# Patient Record
Sex: FEMALE | Race: BLACK OR AFRICAN AMERICAN | NOT HISPANIC OR LATINO | URBAN - METROPOLITAN AREA
[De-identification: names, ages, dates, MRNs, and addresses within clinical notes are randomized per-mention and may not be internally consistent; named-entity substitution may affect disease eponyms.]

---

## 2017-03-29 ENCOUNTER — HOSPITAL ENCOUNTER (OUTPATIENT)
Dept: MEDSURG UNIT | Facility: HOSPITAL | Age: 52
End: 2017-03-29
Attending: INTERNAL MEDICINE | Admitting: INTERNAL MEDICINE

## 2017-04-19 ENCOUNTER — HISTORICAL (OUTPATIENT)
Dept: LAB | Facility: HOSPITAL | Age: 52
End: 2017-04-19

## 2017-04-19 LAB
CREAT UR-MCNC: 155.9 MG/DL (ref 30–125)
MICROALBUMIN UR-MCNC: 0.7 MG/DL (ref 0–3)
MICROALBUMIN/CREAT RATIO PNL UR: 4.5 MG/GM CR (ref 0–30)

## 2017-07-20 ENCOUNTER — HISTORICAL (OUTPATIENT)
Dept: LAB | Facility: HOSPITAL | Age: 52
End: 2017-07-20

## 2017-07-20 LAB
ALBUMIN SERPL-MCNC: 2.9 GM/DL (ref 3.4–5)
ALBUMIN/GLOB SERPL: 0.6 RATIO (ref 1.1–2)
ALP SERPL-CCNC: 133 UNIT/L (ref 46–116)
ALT SERPL-CCNC: 32 UNIT/L (ref 12–78)
AST SERPL-CCNC: 19 UNIT/L (ref 15–37)
BILIRUB SERPL-MCNC: 0.4 MG/DL (ref 0.2–1)
BILIRUBIN DIRECT+TOT PNL SERPL-MCNC: 0.11 MG/DL (ref 0–0.2)
BILIRUBIN DIRECT+TOT PNL SERPL-MCNC: 0.29 MG/DL (ref 0–0.8)
BUN SERPL-MCNC: 8 MG/DL (ref 7–18)
CALCIUM SERPL-MCNC: 8.8 MG/DL (ref 8.5–10.1)
CHLORIDE SERPL-SCNC: 99 MMOL/L (ref 98–107)
CHOLEST SERPL-MCNC: 195 MG/DL (ref 0–200)
CHOLEST/HDLC SERPL: 5.3 {RATIO} (ref 0–4)
CO2 SERPL-SCNC: 25.4 MMOL/L (ref 21–32)
CREAT SERPL-MCNC: 1.12 MG/DL (ref 0.6–1.3)
EST. AVERAGE GLUCOSE BLD GHB EST-MCNC: 286 MG/DL
GLOBULIN SER-MCNC: 4.5 GM/DL (ref 2.4–3.5)
GLUCOSE SERPL-MCNC: 562 MG/DL (ref 74–106)
HBA1C MFR BLD: 11.6 % (ref 4.5–6.2)
HDLC SERPL-MCNC: 37 MG/DL (ref 40–60)
LDLC SERPL CALC-MCNC: 128 MG/DL (ref 0–129)
POTASSIUM SERPL-SCNC: 3.9 MMOL/L (ref 3.5–5.1)
PROT SERPL-MCNC: 7.4 GM/DL (ref 6.4–8.2)
SODIUM SERPL-SCNC: 136 MMOL/L (ref 136–145)
TRIGL SERPL-MCNC: 149 MG/DL
VLDLC SERPL CALC-MCNC: 30 MG/DL

## 2017-08-23 ENCOUNTER — HISTORICAL (OUTPATIENT)
Dept: LAB | Facility: HOSPITAL | Age: 52
End: 2017-08-23

## 2017-08-23 LAB
T4 SERPL-MCNC: 10.1 MCG/DL (ref 4.7–13.3)
TSH SERPL-ACNC: 0.65 MIU/ML (ref 0.36–3.74)

## 2017-08-25 ENCOUNTER — HISTORICAL (OUTPATIENT)
Dept: RADIOLOGY | Facility: HOSPITAL | Age: 52
End: 2017-08-25

## 2017-09-08 ENCOUNTER — HOSPITAL ENCOUNTER (OUTPATIENT)
Dept: EMERGENCY MEDICINE | Facility: HOSPITAL | Age: 52
End: 2017-09-09
Attending: INTERNAL MEDICINE | Admitting: INTERNAL MEDICINE

## 2017-09-08 LAB
ABS NEUT (OLG): 5.73 X10(3)/MCL (ref 2.1–9.2)
ALBUMIN SERPL-MCNC: 3 GM/DL (ref 3.4–5)
ALBUMIN/GLOB SERPL: 0.6 RATIO (ref 1.1–2)
ALP SERPL-CCNC: 113 UNIT/L (ref 38–126)
ALT SERPL-CCNC: 21 UNIT/L (ref 12–78)
AST SERPL-CCNC: 14 UNIT/L (ref 15–37)
BASOPHILS # BLD AUTO: 0.1 X10(3)/MCL (ref 0–0.2)
BASOPHILS NFR BLD AUTO: 1 %
BILIRUB SERPL-MCNC: 0.3 MG/DL (ref 0.2–1)
BILIRUBIN DIRECT+TOT PNL SERPL-MCNC: 0.1 MG/DL (ref 0–0.5)
BILIRUBIN DIRECT+TOT PNL SERPL-MCNC: 0.2 MG/DL (ref 0–0.8)
BNP BLD-MCNC: 21 PG/ML (ref 0–100)
BUN SERPL-MCNC: 11 MG/DL (ref 7–18)
CALCIUM SERPL-MCNC: 9.2 MG/DL (ref 8.5–10.1)
CHLORIDE SERPL-SCNC: 99 MMOL/L (ref 98–107)
CO2 SERPL-SCNC: 29 MMOL/L (ref 21–32)
CREAT SERPL-MCNC: 0.79 MG/DL (ref 0.55–1.02)
EOSINOPHIL # BLD AUTO: 0.3 X10(3)/MCL (ref 0–0.9)
EOSINOPHIL NFR BLD AUTO: 3 %
ERYTHROCYTE [DISTWIDTH] IN BLOOD BY AUTOMATED COUNT: 12 % (ref 11.5–17)
GLOBULIN SER-MCNC: 5.1 GM/DL (ref 2.4–3.5)
GLUCOSE SERPL-MCNC: 237 MG/DL (ref 74–106)
HCT VFR BLD AUTO: 39.5 % (ref 37–47)
HGB BLD-MCNC: 13 GM/DL (ref 12–16)
LYMPHOCYTES # BLD AUTO: 3.2 X10(3)/MCL (ref 0.6–4.6)
LYMPHOCYTES NFR BLD AUTO: 32 %
MCH RBC QN AUTO: 30.7 PG (ref 27–31)
MCHC RBC AUTO-ENTMCNC: 32.9 GM/DL (ref 33–36)
MCV RBC AUTO: 93.4 FL (ref 80–94)
MONOCYTES # BLD AUTO: 0.6 X10(3)/MCL (ref 0.1–1.3)
MONOCYTES NFR BLD AUTO: 6 %
NEUTROPHILS # BLD AUTO: 5.73 X10(3)/MCL (ref 1.4–7.9)
NEUTROPHILS NFR BLD AUTO: 57 %
PLATELET # BLD AUTO: 336 X10(3)/MCL (ref 130–400)
PMV BLD AUTO: 9.8 FL (ref 9.4–12.4)
POC TROPONIN: 0 NG/ML (ref 0–0.08)
POTASSIUM SERPL-SCNC: 3.5 MMOL/L (ref 3.5–5.1)
PROT SERPL-MCNC: 8.1 GM/DL (ref 6.4–8.2)
RBC # BLD AUTO: 4.23 X10(6)/MCL (ref 4.2–5.4)
SODIUM SERPL-SCNC: 137 MMOL/L (ref 136–145)
WBC # SPEC AUTO: 10 X10(3)/MCL (ref 4.5–11.5)

## 2017-09-09 LAB
TROPONIN I SERPL-MCNC: <0.02 NG/ML (ref 0.02–0.49)
TROPONIN I SERPL-MCNC: <0.02 NG/ML (ref 0.02–0.49)

## 2017-09-20 ENCOUNTER — HISTORICAL (OUTPATIENT)
Dept: SLEEP MEDICINE | Facility: HOSPITAL | Age: 52
End: 2017-09-20

## 2017-11-03 ENCOUNTER — HISTORICAL (OUTPATIENT)
Dept: RADIOLOGY | Facility: HOSPITAL | Age: 52
End: 2017-11-03

## 2017-12-26 ENCOUNTER — HISTORICAL (OUTPATIENT)
Dept: LAB | Facility: HOSPITAL | Age: 52
End: 2017-12-26

## 2017-12-26 LAB
ALBUMIN SERPL-MCNC: 3 GM/DL (ref 3.4–5)
ALBUMIN/GLOB SERPL: 0.7 RATIO (ref 1.1–2)
ALP SERPL-CCNC: 126 UNIT/L (ref 46–116)
ALT SERPL-CCNC: 18 UNIT/L (ref 12–78)
AST SERPL-CCNC: 12 UNIT/L (ref 15–37)
BILIRUB SERPL-MCNC: 0.3 MG/DL (ref 0.2–1)
BILIRUBIN DIRECT+TOT PNL SERPL-MCNC: 0.09 MG/DL (ref 0–0.2)
BILIRUBIN DIRECT+TOT PNL SERPL-MCNC: 0.25 MG/DL (ref 0–0.8)
BUN SERPL-MCNC: 8.6 MG/DL (ref 7–18)
CALCIUM SERPL-MCNC: 9.2 MG/DL (ref 8.5–10.1)
CHLORIDE SERPL-SCNC: 100 MMOL/L (ref 98–107)
CO2 SERPL-SCNC: 29.9 MMOL/L (ref 21–32)
CREAT SERPL-MCNC: 0.93 MG/DL (ref 0.6–1.3)
EST. AVERAGE GLUCOSE BLD GHB EST-MCNC: 194 MG/DL
GLOBULIN SER-MCNC: 4.5 GM/DL (ref 2.4–3.5)
GLUCOSE SERPL-MCNC: 363 MG/DL (ref 74–106)
HBA1C MFR BLD: 8.4 % (ref 4.5–6.2)
POTASSIUM SERPL-SCNC: 4.5 MMOL/L (ref 3.5–5.1)
PROT SERPL-MCNC: 7.5 GM/DL (ref 6.4–8.2)
SODIUM SERPL-SCNC: 138 MMOL/L (ref 136–145)

## 2018-04-27 LAB
BILIRUB SERPL-MCNC: NEGATIVE MG/DL
BLOOD URINE, POC: NEGATIVE
CLARITY, POC UA: CLEAR
COLOR, POC UA: NORMAL
GLUCOSE UR QL STRIP: NORMAL
KETONES UR QL STRIP: NEGATIVE
LEUKOCYTE EST, POC UA: NEGATIVE
NITRITE, POC UA: NEGATIVE
PH, POC UA: 6
PROTEIN, POC: NEGATIVE
SPECIFIC GRAVITY, POC UA: 1.01
UROBILINOGEN, POC UA: NORMAL

## 2018-05-02 ENCOUNTER — HISTORICAL (OUTPATIENT)
Dept: RADIOLOGY | Facility: HOSPITAL | Age: 53
End: 2018-05-02

## 2018-05-02 LAB
BUN SERPL-MCNC: 10 MG/DL (ref 7–18)
CALCIUM SERPL-MCNC: 9.3 MG/DL (ref 8.5–10.1)
CHLORIDE SERPL-SCNC: 101 MMOL/L (ref 98–107)
CO2 SERPL-SCNC: 30.8 MMOL/L (ref 21–32)
CREAT SERPL-MCNC: 0.82 MG/DL (ref 0.6–1.3)
CREAT/UREA NIT SERPL: 12
GLUCOSE SERPL-MCNC: 373 MG/DL (ref 74–106)
MAGNESIUM SERPL-MCNC: 1.7 MG/DL (ref 1.8–2.4)
POTASSIUM SERPL-SCNC: 4 MMOL/L (ref 3.5–5.1)
SODIUM SERPL-SCNC: 137 MMOL/L (ref 136–145)

## 2018-05-30 ENCOUNTER — HISTORICAL (OUTPATIENT)
Dept: LAB | Facility: HOSPITAL | Age: 53
End: 2018-05-30

## 2018-05-30 LAB
CREAT UR-MCNC: 40.6 MG/DL (ref 30–125)
EST. AVERAGE GLUCOSE BLD GHB EST-MCNC: 301 MG/DL
HBA1C MFR BLD: 12.1 % (ref 4.5–6.2)
MICROALBUMIN UR-MCNC: 0.1 MG/DL (ref 0–3)
MICROALBUMIN/CREAT RATIO PNL UR: 2.5 MG/GM CR (ref 0–30)

## 2018-07-05 ENCOUNTER — HISTORICAL (OUTPATIENT)
Dept: NUTRITION | Facility: HOSPITAL | Age: 53
End: 2018-07-05

## 2018-09-20 ENCOUNTER — HISTORICAL (OUTPATIENT)
Dept: LAB | Facility: HOSPITAL | Age: 53
End: 2018-09-20

## 2018-09-20 LAB
BUN SERPL-MCNC: 10.3 MG/DL (ref 7–18)
CALCIUM SERPL-MCNC: 9.2 MG/DL (ref 8.5–10.1)
CHLORIDE SERPL-SCNC: 96 MMOL/L (ref 98–107)
CO2 SERPL-SCNC: 27.7 MMOL/L (ref 21–32)
CREAT SERPL-MCNC: 1.02 MG/DL (ref 0.6–1.3)
CREAT/UREA NIT SERPL: 10
EST. AVERAGE GLUCOSE BLD GHB EST-MCNC: 278 MG/DL
GLUCOSE SERPL-MCNC: 546 MG/DL (ref 74–106)
HBA1C MFR BLD: 11.3 % (ref 4.5–6.2)
POTASSIUM SERPL-SCNC: 4.4 MMOL/L (ref 3.5–5.1)
SODIUM SERPL-SCNC: 133 MMOL/L (ref 136–145)
T3FREE SERPL-MCNC: 2.4 PG/ML (ref 2.2–4)
T4 FREE SERPL-MCNC: 1.18 NG/DL (ref 0.76–1.46)
TSH SERPL-ACNC: 0.59 MIU/ML (ref 0.36–3.74)

## 2019-04-11 ENCOUNTER — HISTORICAL (OUTPATIENT)
Dept: LAB | Facility: HOSPITAL | Age: 54
End: 2019-04-11

## 2019-04-11 LAB
ABS NEUT (OLG): 4.62 X10(3)/MCL (ref 2.1–9.2)
ALBUMIN SERPL-MCNC: 3.2 GM/DL (ref 3.4–5)
ALBUMIN/GLOB SERPL: 0.7 RATIO (ref 1.1–2)
ALP SERPL-CCNC: 128 UNIT/L (ref 46–116)
ALT SERPL-CCNC: 24 UNIT/L (ref 12–78)
AST SERPL-CCNC: 12 UNIT/L (ref 15–37)
BASOPHILS # BLD AUTO: 0 X10(3)/MCL (ref 0–0.2)
BASOPHILS NFR BLD AUTO: 0 %
BILIRUB SERPL-MCNC: 0.4 MG/DL (ref 0.2–1)
BILIRUBIN DIRECT+TOT PNL SERPL-MCNC: 0.12 MG/DL (ref 0–0.2)
BILIRUBIN DIRECT+TOT PNL SERPL-MCNC: 0.28 MG/DL (ref 0–0.8)
BUN SERPL-MCNC: 14.2 MG/DL (ref 7–18)
CALCIUM SERPL-MCNC: 9.2 MG/DL (ref 8.5–10.1)
CHLORIDE SERPL-SCNC: 101 MMOL/L (ref 98–107)
CHOLEST SERPL-MCNC: 160 MG/DL (ref 0–200)
CHOLEST/HDLC SERPL: 4.6 {RATIO} (ref 0–4)
CO2 SERPL-SCNC: 30.3 MMOL/L (ref 21–32)
CREAT SERPL-MCNC: 0.79 MG/DL (ref 0.6–1.3)
DEPRECATED CALCIDIOL+CALCIFEROL SERPL-MC: 10.66 NG/ML (ref 30–80)
EOSINOPHIL # BLD AUTO: 0.3 X10(3)/MCL (ref 0–0.9)
EOSINOPHIL NFR BLD AUTO: 4 %
ERYTHROCYTE [DISTWIDTH] IN BLOOD BY AUTOMATED COUNT: 11.7 % (ref 11.5–17)
EST. AVERAGE GLUCOSE BLD GHB EST-MCNC: 258 MG/DL
GLOBULIN SER-MCNC: 4.3 GM/DL (ref 2.4–3.5)
GLUCOSE SERPL-MCNC: 236 MG/DL (ref 74–106)
HBA1C MFR BLD: 10.6 % (ref 4.5–6.2)
HCT VFR BLD AUTO: 37.7 % (ref 37–47)
HDLC SERPL-MCNC: 35 MG/DL (ref 40–60)
HGB BLD-MCNC: 12.6 GM/DL (ref 12–16)
IMM GRANULOCYTES # BLD AUTO: 0.02 % (ref 0–0.02)
IMM GRANULOCYTES NFR BLD AUTO: 0.3 % (ref 0–0.43)
LDLC SERPL CALC-MCNC: 108 MG/DL (ref 0–129)
LYMPHOCYTES # BLD AUTO: 2.2 X10(3)/MCL (ref 0.6–4.6)
LYMPHOCYTES NFR BLD AUTO: 29 %
MCH RBC QN AUTO: 30.1 PG (ref 27–31)
MCHC RBC AUTO-ENTMCNC: 33.4 GM/DL (ref 33–36)
MCV RBC AUTO: 90 FL (ref 80–94)
MONOCYTES # BLD AUTO: 0.5 X10(3)/MCL (ref 0.1–1.3)
MONOCYTES NFR BLD AUTO: 7 %
NEUTROPHILS # BLD AUTO: 4.62 X10(3)/MCL (ref 1.4–7.9)
NEUTROPHILS NFR BLD AUTO: 60 %
PLATELET # BLD AUTO: 301 X10(3)/MCL (ref 130–400)
PMV BLD AUTO: 10.1 FL (ref 9.4–12.4)
POTASSIUM SERPL-SCNC: 4.2 MMOL/L (ref 3.5–5.1)
PROT SERPL-MCNC: 7.5 GM/DL (ref 6.4–8.2)
RBC # BLD AUTO: 4.19 X10(6)/MCL (ref 4.2–5.4)
SODIUM SERPL-SCNC: 139 MMOL/L (ref 136–145)
T4 FREE SERPL-MCNC: 1.2 NG/DL (ref 0.76–1.46)
TRIGL SERPL-MCNC: 86 MG/DL
TSH SERPL-ACNC: 1.17 MIU/ML (ref 0.36–3.74)
VIT B12 SERPL-MCNC: 768 PG/ML (ref 193–986)
VLDLC SERPL CALC-MCNC: 17 MG/DL
WBC # SPEC AUTO: 7.7 X10(3)/MCL (ref 4.5–11.5)

## 2022-04-10 ENCOUNTER — HISTORICAL (OUTPATIENT)
Dept: ADMINISTRATIVE | Facility: HOSPITAL | Age: 57
End: 2022-04-10

## 2022-04-25 VITALS
BODY MASS INDEX: 38.97 KG/M2 | DIASTOLIC BLOOD PRESSURE: 110 MMHG | HEIGHT: 66 IN | SYSTOLIC BLOOD PRESSURE: 180 MMHG | WEIGHT: 242.5 LBS | OXYGEN SATURATION: 100 %

## 2022-09-21 ENCOUNTER — HISTORICAL (OUTPATIENT)
Dept: ADMINISTRATIVE | Facility: HOSPITAL | Age: 57
End: 2022-09-21

## 2023-04-30 ENCOUNTER — HOSPITAL ENCOUNTER (OUTPATIENT)
Facility: HOSPITAL | Age: 58
Discharge: HOME | End: 2023-05-03
Attending: EMERGENCY MEDICINE | Admitting: INTERNAL MEDICINE
Payer: MEDICAID

## 2023-04-30 DIAGNOSIS — R51.9 INTRACTABLE HEADACHE, UNSPECIFIED CHRONICITY PATTERN, UNSPECIFIED HEADACHE TYPE: Primary | ICD-10-CM

## 2023-04-30 DIAGNOSIS — R07.9 CHEST PAIN: ICD-10-CM

## 2023-04-30 DIAGNOSIS — R51.9 INTRACTABLE HEADACHE: ICD-10-CM

## 2023-04-30 DIAGNOSIS — I10 HYPERTENSION, UNSPECIFIED TYPE: ICD-10-CM

## 2023-04-30 LAB
ALBUMIN SERPL-MCNC: 2.9 G/DL (ref 3.5–5)
ALBUMIN/GLOB SERPL: 0.7 RATIO (ref 1.1–2)
ALP SERPL-CCNC: 122 UNIT/L (ref 40–150)
ALT SERPL-CCNC: 24 UNIT/L (ref 0–55)
APTT PPP: 25.3 SECONDS (ref 23.2–33.7)
AST SERPL-CCNC: 28 UNIT/L (ref 5–34)
BASOPHILS # BLD AUTO: 0.04 X10(3)/MCL (ref 0–0.2)
BASOPHILS NFR BLD AUTO: 0.4 %
BILIRUBIN DIRECT+TOT PNL SERPL-MCNC: 0.5 MG/DL
BUN SERPL-MCNC: 16.6 MG/DL (ref 9.8–20.1)
CALCIUM SERPL-MCNC: 9 MG/DL (ref 8.4–10.2)
CHLORIDE SERPL-SCNC: 103 MMOL/L (ref 98–107)
CO2 SERPL-SCNC: 31 MMOL/L (ref 22–29)
CREAT SERPL-MCNC: 0.73 MG/DL (ref 0.55–1.02)
EOSINOPHIL # BLD AUTO: 0.17 X10(3)/MCL (ref 0–0.9)
EOSINOPHIL NFR BLD AUTO: 1.9 %
ERYTHROCYTE [DISTWIDTH] IN BLOOD BY AUTOMATED COUNT: 12.1 % (ref 11.5–17)
GFR SERPLBLD CREATININE-BSD FMLA CKD-EPI: >60 MLS/MIN/1.73/M2
GLOBULIN SER-MCNC: 4 GM/DL (ref 2.4–3.5)
GLUCOSE SERPL-MCNC: 192 MG/DL (ref 74–100)
HCT VFR BLD AUTO: 35.9 % (ref 37–47)
HGB BLD-MCNC: 11.6 G/DL (ref 12–16)
IMM GRANULOCYTES # BLD AUTO: 0.05 X10(3)/MCL (ref 0–0.04)
IMM GRANULOCYTES NFR BLD AUTO: 0.6 %
INR BLD: 0.98 (ref 0–1.3)
LYMPHOCYTES # BLD AUTO: 2.34 X10(3)/MCL (ref 0.6–4.6)
LYMPHOCYTES NFR BLD AUTO: 26.3 %
MCH RBC QN AUTO: 30.7 PG (ref 27–31)
MCHC RBC AUTO-ENTMCNC: 32.3 G/DL (ref 33–36)
MCV RBC AUTO: 95 FL (ref 80–94)
MONOCYTES # BLD AUTO: 0.58 X10(3)/MCL (ref 0.1–1.3)
MONOCYTES NFR BLD AUTO: 6.5 %
NEUTROPHILS # BLD AUTO: 5.72 X10(3)/MCL (ref 2.1–9.2)
NEUTROPHILS NFR BLD AUTO: 64.3 %
NRBC BLD AUTO-RTO: 0 %
PLATELET # BLD AUTO: 256 X10(3)/MCL (ref 130–400)
PMV BLD AUTO: 11.1 FL (ref 7.4–10.4)
POCT GLUCOSE: 149 MG/DL (ref 70–110)
POCT GLUCOSE: 54 MG/DL (ref 70–110)
POTASSIUM SERPL-SCNC: 4 MMOL/L (ref 3.5–5.1)
PROT SERPL-MCNC: 6.9 GM/DL (ref 6.4–8.3)
PROTHROMBIN TIME: 12.9 SECONDS (ref 12.5–14.5)
RBC # BLD AUTO: 3.78 X10(6)/MCL (ref 4.2–5.4)
SODIUM SERPL-SCNC: 140 MMOL/L (ref 136–145)
TROPONIN I SERPL-MCNC: <0.01 NG/ML (ref 0–0.04)
WBC # SPEC AUTO: 8.9 X10(3)/MCL (ref 4.5–11.5)

## 2023-04-30 PROCEDURE — 63600175 PHARM REV CODE 636 W HCPCS: Performed by: EMERGENCY MEDICINE

## 2023-04-30 PROCEDURE — 85730 THROMBOPLASTIN TIME PARTIAL: CPT | Performed by: EMERGENCY MEDICINE

## 2023-04-30 PROCEDURE — 96372 THER/PROPH/DIAG INJ SC/IM: CPT | Performed by: PHYSICIAN ASSISTANT

## 2023-04-30 PROCEDURE — 82962 GLUCOSE BLOOD TEST: CPT

## 2023-04-30 PROCEDURE — G0378 HOSPITAL OBSERVATION PER HR: HCPCS

## 2023-04-30 PROCEDURE — 85610 PROTHROMBIN TIME: CPT | Performed by: EMERGENCY MEDICINE

## 2023-04-30 PROCEDURE — 85025 COMPLETE CBC W/AUTO DIFF WBC: CPT | Performed by: EMERGENCY MEDICINE

## 2023-04-30 PROCEDURE — 96375 TX/PRO/DX INJ NEW DRUG ADDON: CPT

## 2023-04-30 PROCEDURE — 84484 ASSAY OF TROPONIN QUANT: CPT | Performed by: EMERGENCY MEDICINE

## 2023-04-30 PROCEDURE — 80053 COMPREHEN METABOLIC PANEL: CPT | Performed by: EMERGENCY MEDICINE

## 2023-04-30 PROCEDURE — 63600175 PHARM REV CODE 636 W HCPCS: Performed by: PHYSICIAN ASSISTANT

## 2023-04-30 PROCEDURE — 99285 EMERGENCY DEPT VISIT HI MDM: CPT | Mod: 25

## 2023-04-30 PROCEDURE — 25000003 PHARM REV CODE 250: Performed by: NURSE PRACTITIONER

## 2023-04-30 PROCEDURE — 96376 TX/PRO/DX INJ SAME DRUG ADON: CPT

## 2023-04-30 RX ORDER — ACETAMINOPHEN 325 MG/1
650 TABLET ORAL EVERY 4 HOURS PRN
Status: DISCONTINUED | OUTPATIENT
Start: 2023-04-30 | End: 2023-04-30

## 2023-04-30 RX ORDER — PROCHLORPERAZINE EDISYLATE 5 MG/ML
10 INJECTION INTRAMUSCULAR; INTRAVENOUS
Status: COMPLETED | OUTPATIENT
Start: 2023-04-30 | End: 2023-04-30

## 2023-04-30 RX ORDER — ENOXAPARIN SODIUM 100 MG/ML
40 INJECTION SUBCUTANEOUS EVERY 24 HOURS
Status: DISCONTINUED | OUTPATIENT
Start: 2023-04-30 | End: 2023-05-03 | Stop reason: HOSPADM

## 2023-04-30 RX ORDER — DIPHENHYDRAMINE HYDROCHLORIDE 50 MG/ML
25 INJECTION INTRAMUSCULAR; INTRAVENOUS
Status: COMPLETED | OUTPATIENT
Start: 2023-04-30 | End: 2023-04-30

## 2023-04-30 RX ORDER — IBUPROFEN 200 MG
16 TABLET ORAL
Status: DISCONTINUED | OUTPATIENT
Start: 2023-04-30 | End: 2023-05-03 | Stop reason: HOSPADM

## 2023-04-30 RX ORDER — GLUCAGON 1 MG
1 KIT INJECTION
Status: DISCONTINUED | OUTPATIENT
Start: 2023-04-30 | End: 2023-05-03 | Stop reason: HOSPADM

## 2023-04-30 RX ORDER — KETOROLAC TROMETHAMINE 30 MG/ML
30 INJECTION, SOLUTION INTRAMUSCULAR; INTRAVENOUS
Status: COMPLETED | OUTPATIENT
Start: 2023-04-30 | End: 2023-04-30

## 2023-04-30 RX ORDER — KETOROLAC TROMETHAMINE 30 MG/ML
30 INJECTION, SOLUTION INTRAMUSCULAR; INTRAVENOUS EVERY 6 HOURS PRN
Status: DISCONTINUED | OUTPATIENT
Start: 2023-04-30 | End: 2023-05-01

## 2023-04-30 RX ORDER — ONDANSETRON 2 MG/ML
4 INJECTION INTRAMUSCULAR; INTRAVENOUS EVERY 6 HOURS PRN
Status: DISCONTINUED | OUTPATIENT
Start: 2023-04-30 | End: 2023-04-30

## 2023-04-30 RX ORDER — INSULIN ASPART 100 [IU]/ML
1-10 INJECTION, SOLUTION INTRAVENOUS; SUBCUTANEOUS
Status: DISCONTINUED | OUTPATIENT
Start: 2023-04-30 | End: 2023-05-03 | Stop reason: HOSPADM

## 2023-04-30 RX ORDER — PROCHLORPERAZINE EDISYLATE 5 MG/ML
5 INJECTION INTRAMUSCULAR; INTRAVENOUS EVERY 6 HOURS PRN
Status: DISCONTINUED | OUTPATIENT
Start: 2023-04-30 | End: 2023-05-03 | Stop reason: HOSPADM

## 2023-04-30 RX ORDER — POLYETHYLENE GLYCOL 3350 17 G/17G
17 POWDER, FOR SOLUTION ORAL 2 TIMES DAILY PRN
Status: DISCONTINUED | OUTPATIENT
Start: 2023-04-30 | End: 2023-05-03 | Stop reason: HOSPADM

## 2023-04-30 RX ORDER — ACETAMINOPHEN 325 MG/1
650 TABLET ORAL EVERY 6 HOURS PRN
Status: DISCONTINUED | OUTPATIENT
Start: 2023-04-30 | End: 2023-05-03 | Stop reason: HOSPADM

## 2023-04-30 RX ORDER — IBUPROFEN 200 MG
24 TABLET ORAL
Status: DISCONTINUED | OUTPATIENT
Start: 2023-04-30 | End: 2023-05-03 | Stop reason: HOSPADM

## 2023-04-30 RX ORDER — MAG HYDROX/ALUMINUM HYD/SIMETH 200-200-20
30 SUSPENSION, ORAL (FINAL DOSE FORM) ORAL 4 TIMES DAILY PRN
Status: DISCONTINUED | OUTPATIENT
Start: 2023-04-30 | End: 2023-05-03 | Stop reason: HOSPADM

## 2023-04-30 RX ORDER — HYDRALAZINE HYDROCHLORIDE 20 MG/ML
10 INJECTION INTRAMUSCULAR; INTRAVENOUS
Status: DISCONTINUED | OUTPATIENT
Start: 2023-04-30 | End: 2023-05-03 | Stop reason: HOSPADM

## 2023-04-30 RX ORDER — NALOXONE HCL 0.4 MG/ML
0.02 VIAL (ML) INJECTION
Status: DISCONTINUED | OUTPATIENT
Start: 2023-04-30 | End: 2023-05-03 | Stop reason: HOSPADM

## 2023-04-30 RX ORDER — ONDANSETRON 2 MG/ML
4 INJECTION INTRAMUSCULAR; INTRAVENOUS EVERY 4 HOURS PRN
Status: DISCONTINUED | OUTPATIENT
Start: 2023-04-30 | End: 2023-05-03 | Stop reason: HOSPADM

## 2023-04-30 RX ORDER — SODIUM CHLORIDE 9 MG/ML
INJECTION, SOLUTION INTRAVENOUS CONTINUOUS
Status: DISCONTINUED | OUTPATIENT
Start: 2023-04-30 | End: 2023-05-03 | Stop reason: HOSPADM

## 2023-04-30 RX ORDER — SIMETHICONE 80 MG
1 TABLET,CHEWABLE ORAL 4 TIMES DAILY PRN
Status: DISCONTINUED | OUTPATIENT
Start: 2023-04-30 | End: 2023-05-03 | Stop reason: HOSPADM

## 2023-04-30 RX ORDER — TALC
6 POWDER (GRAM) TOPICAL NIGHTLY PRN
Status: DISCONTINUED | OUTPATIENT
Start: 2023-04-30 | End: 2023-05-03 | Stop reason: HOSPADM

## 2023-04-30 RX ADMIN — DIPHENHYDRAMINE HYDROCHLORIDE 25 MG: 50 INJECTION, SOLUTION INTRAMUSCULAR; INTRAVENOUS at 03:04

## 2023-04-30 RX ADMIN — ENOXAPARIN SODIUM 40 MG: 40 INJECTION SUBCUTANEOUS at 06:04

## 2023-04-30 RX ADMIN — HYDRALAZINE HYDROCHLORIDE 10 MG: 20 INJECTION INTRAMUSCULAR; INTRAVENOUS at 06:04

## 2023-04-30 RX ADMIN — SODIUM CHLORIDE: 9 INJECTION, SOLUTION INTRAVENOUS at 09:04

## 2023-04-30 RX ADMIN — KETOROLAC TROMETHAMINE 30 MG: 30 INJECTION, SOLUTION INTRAMUSCULAR; INTRAVENOUS at 03:04

## 2023-04-30 RX ADMIN — HYDRALAZINE HYDROCHLORIDE 10 MG: 20 INJECTION INTRAMUSCULAR; INTRAVENOUS at 08:04

## 2023-04-30 RX ADMIN — PROCHLORPERAZINE EDISYLATE 10 MG: 5 INJECTION INTRAMUSCULAR; INTRAVENOUS at 03:04

## 2023-04-30 NOTE — Clinical Note
Diagnosis: Intractable headache [[id number]]   Future Attending Provider: CHUY EVANGELISTA [[id number]]   Admitting Provider:: CHUY EVANGELISTA [[id number]]

## 2023-04-30 NOTE — ED PROVIDER NOTES
Encounter Date: 4/30/2023       History     Chief Complaint   Patient presents with    Headache     C/o sudden onset headache with r arm and r leg weakness which began around 1300. Denies BT. Hx of HTN. . Bilat upper and lower ext weakness noted on arrival. No slurred speech or facial droop noted. GCS 15.     57-year-old female presents to the emergency department for evaluation of generalized headache which began about 1-1/2 hours prior to arrival, reports that she feels like her right side is weak as well.  She is a history of hypertension, quite hypertensive on arrival as well.  She denies any anticoagulant use.  No associated chest pain or shortness of breath.  No changes speech or facial asymmetry.  She denies any syncope.  No neck pain or stiffness.  No associated nausea or vomiting.    The history is provided by the patient and the EMS personnel.   Headache   This is a new problem. The current episode started today. The problem occurs constantly. The problem has been unchanged. Pain location: generalized. The pain is at a severity of 10/10. Associated symptoms include weakness. Pertinent negatives include no abdominal pain, fever, nausea, seizures or vomiting.   Review of patient's allergies indicates:  Not on File  No past medical history on file.  No past surgical history on file.  No family history on file.     Review of Systems   Constitutional:  Negative for diaphoresis and fever.   Respiratory:  Negative for chest tightness and shortness of breath.    Gastrointestinal:  Negative for abdominal pain, nausea and vomiting.   Neurological:  Positive for weakness and headaches. Negative for seizures, syncope, facial asymmetry and speech difficulty.     Physical Exam     Initial Vitals [04/30/23 1425]   BP Pulse Resp Temp SpO2   (!) 195/101 74 16 97.9 °F (36.6 °C) 100 %      MAP       --         Physical Exam    Nursing note and vitals reviewed.  Constitutional: She appears well-developed and  well-nourished. No distress.   HENT:   Head: Normocephalic and atraumatic.   Mouth/Throat: Oropharynx is clear and moist.   Eyes: Conjunctivae and EOM are normal. Pupils are equal, round, and reactive to light.   Neck: Neck supple.   Normal range of motion.  Cardiovascular:  Normal rate, regular rhythm and normal heart sounds.           No murmur heard.  Pulmonary/Chest: Breath sounds normal. No respiratory distress.   Abdominal: Abdomen is soft. She exhibits no distension. There is no abdominal tenderness.   Musculoskeletal:         General: Normal range of motion.      Cervical back: Normal range of motion and neck supple.     Neurological: She is alert and oriented to person, place, and time. GCS score is 15. GCS eye subscore is 4. GCS verbal subscore is 5. GCS motor subscore is 6.   Global weakness, no appreciable unilateral deficits  Both upper extremities with no drift against gravity however unable to overcome forced from examiner   Bilateral lower extremities unable to remain elevated against gravity  Sensation intact to light touch in all dermatomal distributions of the bilateral upper and lower extremities   No facial droop   Tongue protrudes midline   Speech clear   Skin: Skin is warm and dry. No rash noted.   Psychiatric: She has a normal mood and affect.       ED Course   Procedures  Labs Reviewed   COMPREHENSIVE METABOLIC PANEL - Abnormal; Notable for the following components:       Result Value    Carbon Dioxide 31 (*)     Glucose Level 192 (*)     Albumin Level 2.9 (*)     Globulin 4.0 (*)     Albumin/Globulin Ratio 0.7 (*)     All other components within normal limits   CBC WITH DIFFERENTIAL - Abnormal; Notable for the following components:    RBC 3.78 (*)     Hgb 11.6 (*)     Hct 35.9 (*)     MCV 95.0 (*)     MCHC 32.3 (*)     MPV 11.1 (*)     IG# 0.05 (*)     All other components within normal limits   PROTIME-INR - Normal   APTT - Normal   TROPONIN I - Normal   CBC W/ AUTO DIFFERENTIAL     Narrative:     The following orders were created for panel order CBC auto differential.  Procedure                               Abnormality         Status                     ---------                               -----------         ------                     CBC with Differential[[id number]]        Abnormal            Final result                 Please view results for these tests on the individual orders.          Imaging Results              MRI Brain Without Contrast (Final result)  Result time 04/30/23 16:38:02      Final result by Danny Vrema MD (04/30/23 16:38:02)                   Narrative:    EXAMINATION  MRI BRAIN WITHOUT CONTRAST    CLINICAL HISTORY  Headache, new or worsening (Age >= 50y);    TECHNIQUE  Multiplanar, multisequence MR images were obtained without the intravenous administration of gadolinium-based contrast media.    COMPARISON  Non-contrast head CT obtained earlier the same day.    FINDINGS  Exam quality: Limited secondary to patient movement throughout image acquisition, with resulting artifact.    Brain parenchyma: No restricted diffusion or other suggestion of acute ischemic insult. White matter T2/FLAIR hyperintense signal without associated restricted diffusion statistically represents changes of chronic microvascular disease. No findings to suggest intracranial hemorrhage. No discrete mass, localized mass effect, or midline shift. Differentiation of the gray-white border is grossly preserved.    CSF spaces: Normal in size and configuration of the ventricles. No abnormal extra-axial fluid. The basal cisterns are preserved. Sulcal volume appears normal for patient age.    Sella/Suprasellar regions: No abnormalities.    Other findings: Normal flow signal voids within the large intracranial vessels or dural sinuses. No focal abnormalities of the regional osseous structures and extracranial soft tissues. Mastoid air cells are well aerated. Paranasal sinuses are clear, with no fluid  level.    IMPRESSION  1. Motion degraded assessment.  2. Within limitations no convincing acute intracranial abnormality.  3. Chronic age-related secondary details discussed above.      Electronically signed by: Danny Verma  Date:    04/30/2023  Time:    16:38                                     CT Head Without Contrast (Final result)  Result time 04/30/23 14:56:55      Final result by Danny Verma MD (04/30/23 14:56:55)                   Narrative:    EXAMINATION  CT HEAD WITHOUT CONTRAST    CLINICAL HISTORY  Headache, sudden, severe;    TECHNIQUE  Axial non-contrast CT images of the head were acquired and multiplanar reconstructions accomplished by a CT technologist at a separate workstation, pushed to PACS for physician review.    COMPARISON  13 June 2020    FINDINGS  Images were reviewed in subdural, brain, soft tissue, and bone windows.    Exam quality: Motion/streak artifact limits assessment of the posterior fossa.    Hemorrhage: No evidence of acute hyperattenuating blood products.    Parenchyma: No discrete mass, localized mass-effect, or CT evidence of an acute territorial cortical-based ischemic insult. Gray-white differentiation is preserved.    Midline shift: None.    CSF spaces: Normal ventricle size and configuration. No masses or expansile fluid collections.    Vasculature: No hyperdense artery identified. No abnormal densities within the dural sinuses.    Other findings: No abnormalities of the scalp or subjacent osseous structures. Mastoids are well aerated. No focal abnormality of the sella. No acute abnormality of the included maxillofacial structures is appreciated.  Right maxillary sinus retention cyst versus polyp is unchanged.    IMPRESSION  1. No convincing acute intracranial abnormality.  2. Additional secondary details discussed above, relatively unchanged from the June 2020 CT appearance.    RADIATION DOSE  Automated tube current modulation, weight-based exposure dosing, and/or  iterative reconstruction technique utilized to reach lowest reasonably achievable exposure rate.    DLP: 1221 mGy*cm      Electronically signed by: Danny Verma  Date:    04/30/2023  Time:    14:56                                     Medications   enoxaparin injection 40 mg (has no administration in time range)   hydrALAZINE injection 10 mg (has no administration in time range)   ondansetron injection 4 mg (has no administration in time range)   acetaminophen tablet 650 mg (has no administration in time range)   ketorolac injection 30 mg (has no administration in time range)   ketorolac injection 30 mg (30 mg Intravenous Given 4/30/23 1540)   prochlorperazine injection Soln 10 mg (10 mg Intravenous Given 4/30/23 1545)   diphenhydrAMINE injection 25 mg (25 mg Intravenous Given 4/30/23 1541)      Medical Decision Making  Problems Addressed:  Hypertension, unspecified type: acute illness or injury that poses a threat to life or bodily functions  Intractable headache: acute illness or injury      ED assessment:    Ms. Kahn presented to the emergency department for evaluation of headache with what she reported as right-sided weakness; however, no appreciable unilateral deficits on examination. Markedly hypertensive as well.    Differential diagnosis (including but not limited to):   Complex migraine, hypertensive headache, intracranial hemorrhage, ruptured aneurysm, ischemic CVA, tension headache, acute kidney injury, electrolyte derangements, generalized weakness  No fever nor systemic symptoms or nuchal rigidity to suggest acute infectious etiology of headache/symptoms    ED management:   CT of the head obtained demonstrates no clinically significant acute findings.  Additionally, laboratory studies with no clinically significant acute abnormalities. See ED course below. Admitted for observation, further symptom control.     My independent radiology interpretation:   CT head without IV contrast:  No acute  intracranial hemorrhage, no mass lesion    Amount and/or Complexity of Data Reviewed  Independent historian: EMS   Summary of history:  EMS reports hypertensive, acute onset headache GCS 15 throughout transport, CBG mildly elevated  External data reviewed: notes from previous ED visits  Summary of data reviewed:  Previous emergency department visits in December of 2022 for chest pain, low-grade hypertension on those visits; however, seen in 2021 with hypertensive urgency.  No recent previous visits for significant headache complaints  Risk and benefits of testing: discussed   Labs: ordered and reviewed  Radiology: ordered and independent interpretation performed (see above or ED course)    Discussion of management or test interpretation with external provider(s): discussed with hospitalist physician   Summary of discussion:  Case discussed with hospitalist who accepts for admission    Risk  Decision regarding hospitalization  Shared decision making     Critical Care  none    I, Heidy Arce MD personally performed the history, PE, MDM, and procedures as documented above and agree with the scribe's documentation.              ED Course as of 04/30/23 1845   Sun Apr 30, 2023   1526 CT head without IV contrast demonstrates no clinically significant acute findings.  She is quite hypertensive, I do not appreciate asymmetry to her weakness at this time; however, she reports that she feels weaker on the right.  No facial droop, no pronator drift, sensation intact to light touch in all extremities.  Will attempt to control headache and get stat MRI to evaluate for ischemic changes.  Hypertensive emergency may be contributing to symptoms rather than acute ischemia.  If no ischemia on MRI, will work to lower BP, if ischemic changes, would allow permissive hypertension [KS]   1700 MRI slightly motion degraded; however, no overt ischemic changes [KS]   1809 Patient reports minimal improvement in headache, blood pressure has  improved however.  Given intractable symptoms, will discuss with Hospital Medicine regarding observation admission for further headache control/BP control if needed.  Consider further neurologic workup if symptoms not resolving. [KS]      ED Course User Index  [KS] Heidy Arce MD                 Clinical Impression:   Final diagnoses:  [R51.9] Intractable headache  [R51.9] Intractable headache, unspecified chronicity pattern, unspecified headache type (Primary)  [I10] Hypertension, unspecified type        ED Disposition Condition    Observation Stable                Heidy Arce MD  05/21/23 [id number]

## 2023-05-01 PROBLEM — G43.109 MIGRAINE WITH AURA: Status: ACTIVE | Noted: 2023-05-01

## 2023-05-01 PROBLEM — R51.9 INTRACTABLE HEADACHE: Status: ACTIVE | Noted: 2023-05-01

## 2023-05-01 LAB
ALBUMIN SERPL-MCNC: 2.7 G/DL (ref 3.5–5)
ALBUMIN/GLOB SERPL: 0.8 RATIO (ref 1.1–2)
ALP SERPL-CCNC: 124 UNIT/L (ref 40–150)
ALT SERPL-CCNC: 23 UNIT/L (ref 0–55)
AST SERPL-CCNC: 22 UNIT/L (ref 5–34)
BASOPHILS # BLD AUTO: 0.03 X10(3)/MCL (ref 0–0.2)
BASOPHILS NFR BLD AUTO: 0.3 %
BILIRUBIN DIRECT+TOT PNL SERPL-MCNC: 0.6 MG/DL
BUN SERPL-MCNC: 18.2 MG/DL (ref 9.8–20.1)
CALCIUM SERPL-MCNC: 9 MG/DL (ref 8.4–10.2)
CHLORIDE SERPL-SCNC: 103 MMOL/L (ref 98–107)
CO2 SERPL-SCNC: 32 MMOL/L (ref 22–29)
CREAT SERPL-MCNC: 0.81 MG/DL (ref 0.55–1.02)
EOSINOPHIL # BLD AUTO: 0.13 X10(3)/MCL (ref 0–0.9)
EOSINOPHIL NFR BLD AUTO: 1.5 %
ERYTHROCYTE [DISTWIDTH] IN BLOOD BY AUTOMATED COUNT: 12 % (ref 11.5–17)
GFR SERPLBLD CREATININE-BSD FMLA CKD-EPI: >60 MLS/MIN/1.73/M2
GLOBULIN SER-MCNC: 3.4 GM/DL (ref 2.4–3.5)
GLUCOSE SERPL-MCNC: 408 MG/DL (ref 74–100)
HCT VFR BLD AUTO: 34.7 % (ref 37–47)
HGB BLD-MCNC: 11.1 G/DL (ref 12–16)
IMM GRANULOCYTES # BLD AUTO: 0.05 X10(3)/MCL (ref 0–0.04)
IMM GRANULOCYTES NFR BLD AUTO: 0.6 %
LYMPHOCYTES # BLD AUTO: 2.31 X10(3)/MCL (ref 0.6–4.6)
LYMPHOCYTES NFR BLD AUTO: 26.1 %
MAGNESIUM SERPL-MCNC: 1.6 MG/DL (ref 1.6–2.6)
MCH RBC QN AUTO: 30.1 PG (ref 27–31)
MCHC RBC AUTO-ENTMCNC: 32 G/DL (ref 33–36)
MCV RBC AUTO: 94 FL (ref 80–94)
MONOCYTES # BLD AUTO: 0.51 X10(3)/MCL (ref 0.1–1.3)
MONOCYTES NFR BLD AUTO: 5.8 %
NEUTROPHILS # BLD AUTO: 5.83 X10(3)/MCL (ref 2.1–9.2)
NEUTROPHILS NFR BLD AUTO: 65.7 %
NRBC BLD AUTO-RTO: 0 %
PHOSPHATE SERPL-MCNC: 4.6 MG/DL (ref 2.3–4.7)
PLATELET # BLD AUTO: 241 X10(3)/MCL (ref 130–400)
PMV BLD AUTO: 11.4 FL (ref 7.4–10.4)
POCT GLUCOSE: 177 MG/DL (ref 70–110)
POCT GLUCOSE: 180 MG/DL (ref 70–110)
POCT GLUCOSE: 370 MG/DL (ref 70–110)
POTASSIUM SERPL-SCNC: 5.1 MMOL/L (ref 3.5–5.1)
PROT SERPL-MCNC: 6.1 GM/DL (ref 6.4–8.3)
RBC # BLD AUTO: 3.69 X10(6)/MCL (ref 4.2–5.4)
SODIUM SERPL-SCNC: 140 MMOL/L (ref 136–145)
WBC # SPEC AUTO: 8.9 X10(3)/MCL (ref 4.5–11.5)

## 2023-05-01 PROCEDURE — 96372 THER/PROPH/DIAG INJ SC/IM: CPT | Performed by: NURSE PRACTITIONER

## 2023-05-01 PROCEDURE — 63600175 PHARM REV CODE 636 W HCPCS: Performed by: PHYSICIAN ASSISTANT

## 2023-05-01 PROCEDURE — 83735 ASSAY OF MAGNESIUM: CPT | Performed by: NURSE PRACTITIONER

## 2023-05-01 PROCEDURE — 82962 GLUCOSE BLOOD TEST: CPT

## 2023-05-01 PROCEDURE — 99223 PR INITIAL HOSPITAL CARE,LEVL III: ICD-10-PCS | Mod: ,,, | Performed by: PSYCHIATRY & NEUROLOGY

## 2023-05-01 PROCEDURE — 96376 TX/PRO/DX INJ SAME DRUG ADON: CPT

## 2023-05-01 PROCEDURE — 96372 THER/PROPH/DIAG INJ SC/IM: CPT | Performed by: PHYSICIAN ASSISTANT

## 2023-05-01 PROCEDURE — 63600175 PHARM REV CODE 636 W HCPCS: Performed by: NURSE PRACTITIONER

## 2023-05-01 PROCEDURE — G0378 HOSPITAL OBSERVATION PER HR: HCPCS

## 2023-05-01 PROCEDURE — 80053 COMPREHEN METABOLIC PANEL: CPT | Performed by: PHYSICIAN ASSISTANT

## 2023-05-01 PROCEDURE — 84100 ASSAY OF PHOSPHORUS: CPT | Performed by: NURSE PRACTITIONER

## 2023-05-01 PROCEDURE — 85025 COMPLETE CBC W/AUTO DIFF WBC: CPT | Performed by: PHYSICIAN ASSISTANT

## 2023-05-01 PROCEDURE — 96374 THER/PROPH/DIAG INJ IV PUSH: CPT

## 2023-05-01 PROCEDURE — 25000003 PHARM REV CODE 250: Performed by: NURSE PRACTITIONER

## 2023-05-01 PROCEDURE — 99223 1ST HOSP IP/OBS HIGH 75: CPT | Mod: ,,, | Performed by: PSYCHIATRY & NEUROLOGY

## 2023-05-01 RX ORDER — GABAPENTIN 600 MG/1
600 TABLET ORAL 3 TIMES DAILY
COMMUNITY
Start: 2023-04-25

## 2023-05-01 RX ORDER — METOPROLOL TARTRATE 25 MG/1
25 TABLET, FILM COATED ORAL 2 TIMES DAILY
COMMUNITY

## 2023-05-01 RX ORDER — GLIMEPIRIDE 4 MG/1
4 TABLET ORAL 2 TIMES DAILY
COMMUNITY
Start: 2023-04-25

## 2023-05-01 RX ORDER — ATORVASTATIN CALCIUM 20 MG/1
20 TABLET, FILM COATED ORAL DAILY
COMMUNITY

## 2023-05-01 RX ORDER — IBUPROFEN 600 MG/1
600 TABLET ORAL EVERY 6 HOURS PRN
Status: DISCONTINUED | OUTPATIENT
Start: 2023-05-01 | End: 2023-05-03 | Stop reason: HOSPADM

## 2023-05-01 RX ORDER — LOSARTAN POTASSIUM AND HYDROCHLOROTHIAZIDE 12.5; 5 MG/1; MG/1
1 TABLET ORAL DAILY
COMMUNITY
Start: 2023-04-25

## 2023-05-01 RX ADMIN — HYDRALAZINE HYDROCHLORIDE 10 MG: 20 INJECTION INTRAMUSCULAR; INTRAVENOUS at 08:05

## 2023-05-01 RX ADMIN — PROCHLORPERAZINE EDISYLATE 5 MG: 5 INJECTION INTRAMUSCULAR; INTRAVENOUS at 09:05

## 2023-05-01 RX ADMIN — HYDRALAZINE HYDROCHLORIDE 10 MG: 20 INJECTION INTRAMUSCULAR; INTRAVENOUS at 04:05

## 2023-05-01 RX ADMIN — Medication 6 MG: at 08:05

## 2023-05-01 RX ADMIN — ONDANSETRON 4 MG: 2 INJECTION INTRAMUSCULAR; INTRAVENOUS at 06:05

## 2023-05-01 RX ADMIN — ENOXAPARIN SODIUM 40 MG: 40 INJECTION SUBCUTANEOUS at 04:05

## 2023-05-01 RX ADMIN — ACETAMINOPHEN 650 MG: 325 TABLET ORAL at 12:05

## 2023-05-01 RX ADMIN — INSULIN ASPART 10 UNITS: 100 INJECTION, SOLUTION INTRAVENOUS; SUBCUTANEOUS at 04:05

## 2023-05-01 NOTE — ED NOTES
Informed Dr. Pope of pt's c/o of nausea and dizziness. Dr. Pope gave orders to pass information on to augustus OTERO.

## 2023-05-01 NOTE — HPI
56 y/o F with PMH DM II, HTN, and asthma who presented to ED on 4/30 with c/o HA x3 days. on intial ED presentation pt's BP markedly elevated (190s/100s). pt reported HA had associated RUE/RLE weakness. denies any other focal neurologic deficits. denies h/o migraines. BP improved in ED, w/o resolution of HA.     MRI brain w/o unrevealing for infarct or any other acute intracranial abnormalities. Labs unremarkable.

## 2023-05-01 NOTE — CONSULTS
[facility] - Emergency Dept  Neurology  Consult Note    Patient Name: Greer Kahn  MRN: [id number]  Admission Date: 4/30/2023  Hospital Length of Stay: 0 days  Code Status: Full Code   Attending Provider: Danuta Esposito MD   Consulting Provider: JAIME LynneCNP-BC  Primary Care Physician: No primary care provider on file.  Principal Problem:Intractable headache    Inpatient consult to Neurology  Consult performed by: JOHN Lynne  Consult ordered by: Danuta Esposito MD         Subjective:     Chief Complaint:       HPI:   58 y/o F with PMH DM II, HTN, and asthma who presented to ED on 4/30 with c/o HA x3 days. on intial ED presentation pt's BP markedly elevated (190s/100s). pt reported HA had associated RUE/RLE weakness. denies any other focal neurologic deficits. denies h/o migraines. BP improved in ED, w/o resolution of HA.     MRI brain w/o unrevealing for infarct or any other acute intracranial abnormalities. Labs unremarkable.        No past medical history on file.    No past surgical history on file.    Review of patient's allergies indicates:  Not on File    Current Neurological Medications:     No current facility-administered medications on file prior to encounter.     Current Outpatient Medications on File Prior to Encounter   Medication Sig    atorvastatin (LIPITOR) 20 MG tablet Take 20 mg by mouth once daily.    gabapentin (NEURONTIN) 600 MG tablet Take 600 mg by mouth 3 (three) times daily.    glimepiride (AMARYL) 4 MG tablet Take 4 mg by mouth 2 (two) times daily.    losartan-hydrochlorothiazide 50-12.5 mg (HYZAAR) 50-12.5 mg per tablet Take 1 tablet by mouth once daily.    metoprolol tartrate (LOPRESSOR) 25 MG tablet Take 25 mg by mouth 2 (two) times daily.     Family History    None       Tobacco Use    Smoking status: Not on file    Smokeless tobacco: Not on file   Substance and Sexual Activity    Alcohol use: Not on file    Drug use: Not on file    Sexual  activity: Not on file     Review of Systems  A 14pt ros was reviewed & is negative unless o/w documented in the hpi    Objective:     Vital Signs (Most Recent):  Temp: 97.7 °F (36.5 °C) (04/30/23 1855)  Pulse: 97 (05/01/23 1345)  Resp: 17 (05/01/23 1345)  BP: (!) 153/84 (05/01/23 1315)  SpO2: 95 % (05/01/23 1345)   Vital Signs (24h Range):  Temp:  [97.7 °F (36.5 °C)-97.9 °F (36.6 °C)] 97.7 °F (36.5 °C)  Pulse:  [] 97  Resp:  [0-22] 17  SpO2:  [94 %-100 %] 95 %  BP: ()/() 153/84     Weight: 104.3 kg (230 lb)  Body mass index is 36.02 kg/m².    Physical Exam  Full neurologic exam, to follow per Neurologist       Significant Labs:   Recent Lab Results  (Last 5 results in the past 24 hours)        05/01/23  0631   05/01/23  0433   05/01/23  0345   04/30/23  2344   04/30/23  2306        Albumin/Globulin Ratio     0.8           Albumin     2.7           Alkaline Phosphatase     124           ALT     23           AST     22           Baso #     0.03           Basophil %     0.3           BILIRUBIN TOTAL     0.6           BUN     18.2           Calcium     9.0           Chloride     103           CO2     32           Creatinine     0.81           eGFR     >60           Eos #     0.13           Eosinophil %     1.5           Globulin, Total     3.4           Glucose     408           Hematocrit     34.7           Hemoglobin     11.1           Immature Grans (Abs)     0.05           Immature Granulocytes     0.6           Lymph #     2.31           LYMPH %     26.1           Magnesium     1.60           MCH     30.1           MCHC     32.0           MCV     94.0           Mono #     0.51           Mono %     5.8           MPV     11.4           Neut #     5.83           Neut %     65.7           nRBC     0.0           Phosphorus     4.6           Platelets     241           POCT Glucose 177   370     149   54       Potassium     5.1           PROTEIN TOTAL     6.1           RBC     3.69           RDW      12.0           Sodium     140           WBC     8.9                                  Significant Imaging:   MRI brain w/o 5/1/2023:  FINDINGS  Exam quality: Limited secondary to patient movement throughout image acquisition, with resulting artifact.     Brain parenchyma: No restricted diffusion or other suggestion of acute ischemic insult. White matter T2/FLAIR hyperintense signal without associated restricted diffusion statistically represents changes of chronic microvascular disease. No findings to suggest intracranial hemorrhage. No discrete mass, localized mass effect, or midline shift. Differentiation of the gray-white border is grossly preserved.     CSF spaces: Normal in size and configuration of the ventricles. No abnormal extra-axial fluid. The basal cisterns are preserved. Sulcal volume appears normal for patient age.     Sella/Suprasellar regions: No abnormalities.     Other findings: Normal flow signal voids within the large intracranial vessels or dural sinuses. No focal abnormalities of the regional osseous structures and extracranial soft tissues. Mastoid air cells are well aerated. Paranasal sinuses are clear, with no fluid level.     IMPRESSION  1. Motion degraded assessment.  2. Within limitations no convincing acute intracranial abnormality.  3. Chronic age-related secondary details discussed above.       I have reviewed all pertinent imaging results/findings within the past 24 hours.    Assessment and Plan:     Migraine with aura  Needs to f/u o/p with neurology  Call with any questions        VTE Risk Mitigation (From admission, onward)         Ordered     enoxaparin injection 40 mg  Daily         04/30/23 3591                Thank you for your consult. Further recommendations to follow per MD Any Correa, [facility]-BC  Inpatient Neurology  [facility] - Emergency Dept

## 2023-05-01 NOTE — SUBJECTIVE & OBJECTIVE
No past medical history on file.    No past surgical history on file.    Review of patient's allergies indicates:  Not on File    Current Neurological Medications:     No current facility-administered medications on file prior to encounter.     Current Outpatient Medications on File Prior to Encounter   Medication Sig    atorvastatin (LIPITOR) 20 MG tablet Take 20 mg by mouth once daily.    gabapentin (NEURONTIN) 600 MG tablet Take 600 mg by mouth 3 (three) times daily.    glimepiride (AMARYL) 4 MG tablet Take 4 mg by mouth 2 (two) times daily.    losartan-hydrochlorothiazide 50-12.5 mg (HYZAAR) 50-12.5 mg per tablet Take 1 tablet by mouth once daily.    metoprolol tartrate (LOPRESSOR) 25 MG tablet Take 25 mg by mouth 2 (two) times daily.     Family History    None       Tobacco Use    Smoking status: Not on file    Smokeless tobacco: Not on file   Substance and Sexual Activity    Alcohol use: Not on file    Drug use: Not on file    Sexual activity: Not on file     Review of Systems  A 14pt ros was reviewed & is negative unless o/w documented in the hpi    Objective:     Vital Signs (Most Recent):  Temp: 97.7 °F (36.5 °C) (04/30/23 1855)  Pulse: 97 (05/01/23 1345)  Resp: 17 (05/01/23 1345)  BP: (!) 153/84 (05/01/23 1315)  SpO2: 95 % (05/01/23 1345)   Vital Signs (24h Range):  Temp:  [97.7 °F (36.5 °C)-97.9 °F (36.6 °C)] 97.7 °F (36.5 °C)  Pulse:  [] 97  Resp:  [0-22] 17  SpO2:  [94 %-100 %] 95 %  BP: ()/() 153/84     Weight: 104.3 kg (230 lb)  Body mass index is 36.02 kg/m².    Physical Exam  Full neurologic exam, to follow per Neurologist       Significant Labs:   Recent Lab Results  (Last 5 results in the past 24 hours)        05/01/23  0631   05/01/23  0433   05/01/23  0345   04/30/23  2344   04/30/23  2306        Albumin/Globulin Ratio     0.8           Albumin     2.7           Alkaline Phosphatase     124           ALT     23           AST     22           Baso #     0.03           Basophil %      0.3           BILIRUBIN TOTAL     0.6           BUN     18.2           Calcium     9.0           Chloride     103           CO2     32           Creatinine     0.81           eGFR     >60           Eos #     0.13           Eosinophil %     1.5           Globulin, Total     3.4           Glucose     408           Hematocrit     34.7           Hemoglobin     11.1           Immature Grans (Abs)     0.05           Immature Granulocytes     0.6           Lymph #     2.31           LYMPH %     26.1           Magnesium     1.60           MCH     30.1           MCHC     32.0           MCV     94.0           Mono #     0.51           Mono %     5.8           MPV     11.4           Neut #     5.83           Neut %     65.7           nRBC     0.0           Phosphorus     4.6           Platelets     241           POCT Glucose 177   370     149   54       Potassium     5.1           PROTEIN TOTAL     6.1           RBC     3.69           RDW     12.0           Sodium     140           WBC     8.9                                  Significant Imaging:   MRI brain w/o 5/1/2023:  FINDINGS  Exam quality: Limited secondary to patient movement throughout image acquisition, with resulting artifact.     Brain parenchyma: No restricted diffusion or other suggestion of acute ischemic insult. White matter T2/FLAIR hyperintense signal without associated restricted diffusion statistically represents changes of chronic microvascular disease. No findings to suggest intracranial hemorrhage. No discrete mass, localized mass effect, or midline shift. Differentiation of the gray-white border is grossly preserved.     CSF spaces: Normal in size and configuration of the ventricles. No abnormal extra-axial fluid. The basal cisterns are preserved. Sulcal volume appears normal for patient age.     Sella/Suprasellar regions: No abnormalities.     Other findings: Normal flow signal voids within the large intracranial vessels or dural sinuses. No focal  abnormalities of the regional osseous structures and extracranial soft tissues. Mastoid air cells are well aerated. Paranasal sinuses are clear, with no fluid level.     IMPRESSION  1. Motion degraded assessment.  2. Within limitations no convincing acute intracranial abnormality.  3. Chronic age-related secondary details discussed above.       I have reviewed all pertinent imaging results/findings within the past 24 hours.

## 2023-05-01 NOTE — ED NOTES
"Patient ambulated to restroom independently. Reports relief of dizziness "for the most part". Wctm   "

## 2023-05-01 NOTE — PROGRESS NOTES
[facility]  Hospital Medicine Progress Note        Chief Complaint: Inpatient Follow-up for     HPI: 57 year old female with a pmh of DM2, HTN, Asthma who presented to the ED with c/o headache x3 days, getting worse today.  She denies a previous history of migraines.  She states she did not try any medication for her symptoms.  Denies chest pain, shortness of breath, fever.     Today's ED lab work revealed glucose 192, all other indices unremarkable.  CT head without contrast showed no convincing acute intracranial abnormality.  MRI brain without contrast showed within limitations, no convincing acute intracranial abnormality.  She was afebrile, markedly hypertensive in the ED, satting well on room air.  She was given Toradol, Benadryl, Compazine and admitted to Hospital Medicine for management.  MRI of the brain was ordered that has been negative, patient    Interval Hx:   Patient seen and examined this morning complained of nausea and some dizziness and headache    Objective/physical exam:  General: In no acute distress, afebrile  Chest: Clear to auscultation bilaterally  Heart: RRR, +S1, S2, no appreciable murmur  Abdomen: Soft, nontender, BS +  MSK: Warm, no lower extremity edema, no clubbing or cyanosis  Neurologic: Alert and oriented x4,  VITAL SIGNS: 24 HRS MIN & MAX LAST   Temp  Min: 97.7 °F (36.5 °C)  Max: 97.9 °F (36.6 °C) 97.7 °F (36.5 °C)   BP  Min: 97/67  Max: 195/101 112/81   Pulse  Min: 66  Max: 101  87   Resp  Min: 0  Max: 20 12   SpO2  Min: 94 %  Max: 100 % 97 %       Recent Labs   Lab 04/30/23  1506 05/01/23  0345   WBC 8.9 8.9   RBC 3.78* 3.69*   HGB 11.6* 11.1*   HCT 35.9* 34.7*   MCV 95.0* 94.0   MCH 30.7 30.1   MCHC 32.3* 32.0*   RDW 12.1 12.0    241   MPV 11.1* 11.4*       Recent Labs   Lab 04/30/23  1506 05/01/23  0345    140   K 4.0 5.1   CO2 31* 32*   BUN 16.6 18.2   CREATININE 0.73 0.81   CALCIUM 9.0 9.0   MG  --  1.60   ALBUMIN 2.9* 2.7*    ALKPHOS 122 124   ALT 24 23   AST 28 22   BILITOT 0.5 0.6          Microbiology Results (last 7 days)       ** No results found for the last 168 hours. **             See below for Radiology    Scheduled Med:   enoxaparin  40 mg Subcutaneous Daily        Continuous Infusions:   sodium chloride 0.9% 75 mL/hr at 04/30/23 2156        PRN Meds:  acetaminophen, aluminum-magnesium hydroxide-simethicone, dextrose 10%, dextrose 10%, glucagon (human recombinant), glucose, glucose, hydrALAZINE, ibuprofen, insulin aspart U-100, melatonin, naloxone, ondansetron, polyethylene glycol, prochlorperazine, simethicone       Assessment/Plan:  Headache with nausea and dizziness   Uncontrolled hypertension  Diabetes mellitus type 2   Asthma    MRI of the brain was negative for any acute infarct  Patient is on Zofran and prochlorperazine as needed for nausea   Tylenol as needed for headache   We have consulted Neurology  Most likely migraine patient does complain of history of migraine when she was younger but nothing in the past few years   Will get orthostatics  Patient's blood sugar was found to be significantly elevated but we checked it later and was found to be 131   Discussed with the nursing staff to update patient's home medications and she checked with the patient apparently patient does not know and once family will be here then they will bring her home medication list    VTE prophylaxis:  Lovenox    Patient condition:  Stable/Fair/Guarded/ Serious/ Critical    Anticipated discharge and Disposition:         All diagnosis and differential diagnosis have been reviewed; assessment and plan has been documented; I have personally reviewed the labs and test results that are presently available; I have reviewed the patients medication list; I have reviewed the consulting providers response and recommendations. I have reviewed or attempted to review medical records based upon their availability    All of the patient's questions have  been  addressed and answered. Patient's is agreeable to the above stated plan. I will continue to monitor closely and make adjustments to medical management as needed.  _____________________________________________________________________    Nutrition Status:    Radiology:  MRI Brain Without Contrast  EXAMINATION  MRI BRAIN WITHOUT CONTRAST    CLINICAL HISTORY  Headache, new or worsening (Age >= 50y);    TECHNIQUE  Multiplanar, multisequence MR images were obtained without the intravenous administration of gadolinium-based contrast media.    COMPARISON  Non-contrast head CT obtained earlier the same day.    FINDINGS  Exam quality: Limited secondary to patient movement throughout image acquisition, with resulting artifact.    Brain parenchyma: No restricted diffusion or other suggestion of acute ischemic insult. White matter T2/FLAIR hyperintense signal without associated restricted diffusion statistically represents changes of chronic microvascular disease. No findings to suggest intracranial hemorrhage. No discrete mass, localized mass effect, or midline shift. Differentiation of the gray-white border is grossly preserved.    CSF spaces: Normal in size and configuration of the ventricles. No abnormal extra-axial fluid. The basal cisterns are preserved. Sulcal volume appears normal for patient age.    Sella/Suprasellar regions: No abnormalities.    Other findings: Normal flow signal voids within the large intracranial vessels or dural sinuses. No focal abnormalities of the regional osseous structures and extracranial soft tissues. Mastoid air cells are well aerated. Paranasal sinuses are clear, with no fluid level.    IMPRESSION  1. Motion degraded assessment.  2. Within limitations no convincing acute intracranial abnormality.  3. Chronic age-related secondary details discussed above.    Electronically signed by: Danny Verma  Date:    04/30/2023  Time:    16:38  CT Head Without Contrast  EXAMINATION  CT HEAD  WITHOUT CONTRAST    CLINICAL HISTORY  Headache, sudden, severe;    TECHNIQUE  Axial non-contrast CT images of the head were acquired and multiplanar reconstructions accomplished by a CT technologist at a separate workstation, pushed to PACS for physician review.    COMPARISON  13 June 2020    FINDINGS  Images were reviewed in subdural, brain, soft tissue, and bone windows.    Exam quality: Motion/streak artifact limits assessment of the posterior fossa.    Hemorrhage: No evidence of acute hyperattenuating blood products.    Parenchyma: No discrete mass, localized mass-effect, or CT evidence of an acute territorial cortical-based ischemic insult. Gray-white differentiation is preserved.    Midline shift: None.    CSF spaces: Normal ventricle size and configuration. No masses or expansile fluid collections.    Vasculature: No hyperdense artery identified. No abnormal densities within the dural sinuses.    Other findings: No abnormalities of the scalp or subjacent osseous structures. Mastoids are well aerated. No focal abnormality of the sella. No acute abnormality of the included maxillofacial structures is appreciated.  Right maxillary sinus retention cyst versus polyp is unchanged.    IMPRESSION  1. No convincing acute intracranial abnormality.  2. Additional secondary details discussed above, relatively unchanged from the June 2020 CT appearance.    RADIATION DOSE  Automated tube current modulation, weight-based exposure dosing, and/or iterative reconstruction technique utilized to reach lowest reasonably achievable exposure rate.    DLP: 1221 mGy*cm    Electronically signed by: Danny Verma  Date:    04/30/2023  Time:    14:56      Danuta Esposito MD   05/01/2023

## 2023-05-01 NOTE — H&P
[facility] Hospital Medicine History & Physical Examination       Patient Name: Greer Kahn  MRN: [id number]  Patient Class: OP- Observation   Admission Date: 4/30/2023  2:36 PM  Length of Stay: 0  Admitting Service: Hospital Medicine   Attending Physician: Franck Pope MD   Primary Care Provider: No primary care provider on file.  History source: EMR, patient and/or patient's family    CHIEF COMPLAINT   Headache (C/o sudden onset headache with r arm and r leg weakness which began around 1300. Denies BT. Hx of HTN. . Bilat upper and lower ext weakness noted on arrival. No slurred speech or facial droop noted. GCS 15.)      HISTORY OF PRESENT ILLNESS:   Ms. Kahn is a 57 year old female with a pmh of DM2, HTN, Asthma who presented to the ED with c/o headache x3 days, getting worse today.  She denies a previous history of migraines.  She states she did not try any medication for her symptoms.  Denies chest pain, shortness of breath, fever.    Today's ED lab work revealed glucose 192, all other indices unremarkable.  CT head without contrast showed no convincing acute intracranial abnormality.  MRI brain without contrast showed within limitations, no convincing acute intracranial abnormality.  She was afebrile, markedly hypertensive in the ED, satting well on room air.  She was given Toradol, Benadryl, Compazine and admitted to Hospital Medicine for management.      PAST MEDICAL HISTORY:     Hypertension  Diabetes Mellitus Type II  Asthma      PAST SURGICAL HISTORY:     Cholecystectomy    ALLERGIES:   Patient has no allergy information on record.    FAMILY HISTORY:   Reviewed and non-contributory     SOCIAL HISTORY:     Tobacco use: Denies  Etoh use: Denies  Drug use: Denies       HOME MEDICATIONS:     Prior to Admission medications    Not on File       REVIEW OF SYSTEMS:   Except as documented, all other systems reviewed and negative     PHYSICAL EXAM:   T 97.7 °F  (36.5 °C)   BP (!) 170/96   P 75   RR 14   O2 95 %  GENERAL: awake, alert, oriented and in no acute distress, non-toxic appearing   HEENT: normocephalic atraumatic   NECK: supple   LUNGS: Clear bilaterally, no wheezing or rales, no accessory muscle use   CVS: Regular rate and rhythm, normal peripheral perfusion  ABD: Soft, non-tender, non-distended, bowel sounds present  EXTREMITIES: no clubbing or cyanosis  SKIN: Warm, dry.   NEURO: alert and oriented, grossly without focal deficits. Equal strength throughout. No facial droop.  PSYCHIATRIC: Cooperative    LABS AND IMAGING:     Recent Labs     04/30/23  1506   WBC 8.9   RBC 3.78*   HGB 11.6*   HCT 35.9*   MCV 95.0*   MCH 30.7   MCHC 32.3*   RDW 12.1        No results for input(s): LACTIC in the last 72 hours.  Recent Labs     04/30/23  1506   INR 0.98     No results for input(s): HGBA1C, CHOL, TRIG, LDL, VLDL, HDL in the last 72 hours.   Recent Labs     04/30/23  1506      K 4.0   CHLORIDE 103   CO2 31*   BUN 16.6   CREATININE 0.73   GLUCOSE 192*   CALCIUM 9.0   ALBUMIN 2.9*   GLOBULIN 4.0*   ALKPHOS 122   ALT 24   AST 28   BILITOT 0.5     Recent Labs     04/30/23  1506   TROPONINI <0.010          MRI Brain Without Contrast  EXAMINATION  MRI BRAIN WITHOUT CONTRAST    CLINICAL HISTORY  Headache, new or worsening (Age >= 50y);    TECHNIQUE  Multiplanar, multisequence MR images were obtained without the intravenous administration of gadolinium-based contrast media.    COMPARISON  Non-contrast head CT obtained earlier the same day.    FINDINGS  Exam quality: Limited secondary to patient movement throughout image acquisition, with resulting artifact.    Brain parenchyma: No restricted diffusion or other suggestion of acute ischemic insult. White matter T2/FLAIR hyperintense signal without associated restricted diffusion statistically represents changes of chronic microvascular disease. No findings to suggest intracranial hemorrhage. No discrete mass,  localized mass effect, or midline shift. Differentiation of the gray-white border is grossly preserved.    CSF spaces: Normal in size and configuration of the ventricles. No abnormal extra-axial fluid. The basal cisterns are preserved. Sulcal volume appears normal for patient age.    Sella/Suprasellar regions: No abnormalities.    Other findings: Normal flow signal voids within the large intracranial vessels or dural sinuses. No focal abnormalities of the regional osseous structures and extracranial soft tissues. Mastoid air cells are well aerated. Paranasal sinuses are clear, with no fluid level.    IMPRESSION  1. Motion degraded assessment.  2. Within limitations no convincing acute intracranial abnormality.  3. Chronic age-related secondary details discussed above.    Electronically signed by: Danny Verma  Date:    04/30/2023  Time:    16:38  CT Head Without Contrast  EXAMINATION  CT HEAD WITHOUT CONTRAST    CLINICAL HISTORY  Headache, sudden, severe;    TECHNIQUE  Axial non-contrast CT images of the head were acquired and multiplanar reconstructions accomplished by a CT technologist at a separate workstation, pushed to PACS for physician review.    COMPARISON  13 June 2020    FINDINGS  Images were reviewed in subdural, brain, soft tissue, and bone windows.    Exam quality: Motion/streak artifact limits assessment of the posterior fossa.    Hemorrhage: No evidence of acute hyperattenuating blood products.    Parenchyma: No discrete mass, localized mass-effect, or CT evidence of an acute territorial cortical-based ischemic insult. Gray-white differentiation is preserved.    Midline shift: None.    CSF spaces: Normal ventricle size and configuration. No masses or expansile fluid collections.    Vasculature: No hyperdense artery identified. No abnormal densities within the dural sinuses.    Other findings: No abnormalities of the scalp or subjacent osseous structures. Mastoids are well aerated. No focal abnormality  of the sella. No acute abnormality of the included maxillofacial structures is appreciated.  Right maxillary sinus retention cyst versus polyp is unchanged.    IMPRESSION  1. No convincing acute intracranial abnormality.  2. Additional secondary details discussed above, relatively unchanged from the June 2020 CT appearance.    RADIATION DOSE  Automated tube current modulation, weight-based exposure dosing, and/or iterative reconstruction technique utilized to reach lowest reasonably achievable exposure rate.    DLP: 1221 mGy*cm    Electronically signed by: Danny Verma  Date:    04/30/2023  Time:    14:56      ASSESSMENT & PLAN:     1.  Intractable headache  2.  Hypertensive urgency    History:  HTN, DM2, Asthma    PLAN:  -Tylenol/Motrin PRN  -Sliding scale insulin with accu-checks  -Antihypertensives as needed  -Resume home meds as appropriate once available  -Labs in AM    I, Sylvia Campos NP have reviewed and discussed this case with Dr. Pope.  Please see addendum for further assessment and plan from attending MD.      DVT prophylaxis: Lovenox  Code status: Full      _____________________________________________________________________  I, Dr. Franck Pope assumed care of this patient.  For the patient encounter, I performed the substantive portion of the visit, I reviewed the NPPA documentation, treatment plan, and medical decision making.  I had face to face time with this patient.  I have personally reviewed the labs and test results that are presently available. I have reviewed or attempted to review medical records based upon their availability. If patient was admitted under observational status it is with my approval.      57-year-old female presents with symptoms of migraine, CT head no acute findings MRI brain unremarkable.  No focal neurologic deficits on exam.  Will likely need a sumatriptan prescription at discharge in AM.     Time seen: 11PM 4/30/23  Franck Pope MD

## 2023-05-01 NOTE — ED NOTES
Notified Dr. Pope that pt's BS was 54 on Bedside check, gave pt sandwich tray and glass of OJ. Will recheck in 20 minutes

## 2023-05-01 NOTE — ED NOTES
Paged hospitalist to notify them that patient is nauseated, vomiting, and dizzy. Waiting for response

## 2023-05-01 NOTE — ED NOTES
Patient reports symptom improvement with tylenol administration. Family at bedside. Pt eating lunch, NAD at this time

## 2023-05-01 NOTE — NURSING
Nurses Note -- 4 Eyes      5/1/2023   6:07 PM      Skin assessed during: Admit      [x] No Altered Skin Integrity Present    [x]Prevention Measures Documented      [] Yes- Altered Skin Integrity Present or Discovered   [] LDA Added if Not in Epic (Describe Wound)   [] New Altered Skin Integrity was Present on Admit and Documented in LDA   [] Wound Image Taken    Wound Care Consulted? No    Attending Nurse:  Junior Vaughn LPN     Second RN/Staff Member:  Cecille Young RN

## 2023-05-02 LAB
POCT GLUCOSE: 131 MG/DL (ref 70–110)
POCT GLUCOSE: 156 MG/DL (ref 70–110)
POCT GLUCOSE: 193 MG/DL (ref 70–110)
POCT GLUCOSE: 221 MG/DL (ref 70–110)
POCT GLUCOSE: 226 MG/DL (ref 70–110)

## 2023-05-02 PROCEDURE — 96372 THER/PROPH/DIAG INJ SC/IM: CPT | Performed by: NURSE PRACTITIONER

## 2023-05-02 PROCEDURE — 25000003 PHARM REV CODE 250: Performed by: INTERNAL MEDICINE

## 2023-05-02 PROCEDURE — 96372 THER/PROPH/DIAG INJ SC/IM: CPT | Performed by: PHYSICIAN ASSISTANT

## 2023-05-02 PROCEDURE — 63600175 PHARM REV CODE 636 W HCPCS: Performed by: NURSE PRACTITIONER

## 2023-05-02 PROCEDURE — 96376 TX/PRO/DX INJ SAME DRUG ADON: CPT

## 2023-05-02 PROCEDURE — 25000003 PHARM REV CODE 250: Performed by: NURSE PRACTITIONER

## 2023-05-02 PROCEDURE — G0378 HOSPITAL OBSERVATION PER HR: HCPCS

## 2023-05-02 PROCEDURE — 96374 THER/PROPH/DIAG INJ IV PUSH: CPT | Mod: 59

## 2023-05-02 PROCEDURE — 63600175 PHARM REV CODE 636 W HCPCS: Performed by: PHYSICIAN ASSISTANT

## 2023-05-02 RX ORDER — METOPROLOL TARTRATE 25 MG/1
25 TABLET, FILM COATED ORAL 2 TIMES DAILY
Status: DISCONTINUED | OUTPATIENT
Start: 2023-05-02 | End: 2023-05-03 | Stop reason: HOSPADM

## 2023-05-02 RX ORDER — BUTALBITAL, ACETAMINOPHEN AND CAFFEINE 50; 325; 40 MG/1; MG/1; MG/1
1 TABLET ORAL EVERY 4 HOURS PRN
Status: DISCONTINUED | OUTPATIENT
Start: 2023-05-02 | End: 2023-05-03 | Stop reason: HOSPADM

## 2023-05-02 RX ORDER — GABAPENTIN 300 MG/1
600 CAPSULE ORAL 3 TIMES DAILY
Status: DISCONTINUED | OUTPATIENT
Start: 2023-05-02 | End: 2023-05-03 | Stop reason: HOSPADM

## 2023-05-02 RX ORDER — HYDROCHLOROTHIAZIDE 12.5 MG/1
12.5 TABLET ORAL DAILY
Status: DISCONTINUED | OUTPATIENT
Start: 2023-05-02 | End: 2023-05-03 | Stop reason: HOSPADM

## 2023-05-02 RX ORDER — LOSARTAN POTASSIUM 50 MG/1
50 TABLET ORAL DAILY
Status: DISCONTINUED | OUTPATIENT
Start: 2023-05-02 | End: 2023-05-03 | Stop reason: HOSPADM

## 2023-05-02 RX ORDER — LOSARTAN POTASSIUM AND HYDROCHLOROTHIAZIDE 12.5; 5 MG/1; MG/1
1 TABLET ORAL DAILY
Status: DISCONTINUED | OUTPATIENT
Start: 2023-05-02 | End: 2023-05-02 | Stop reason: CLARIF

## 2023-05-02 RX ORDER — ATORVASTATIN CALCIUM 10 MG/1
20 TABLET, FILM COATED ORAL DAILY
Status: DISCONTINUED | OUTPATIENT
Start: 2023-05-03 | End: 2023-05-03 | Stop reason: HOSPADM

## 2023-05-02 RX ADMIN — BUTALBITAL, ACETAMINOPHEN, AND CAFFEINE 1 TABLET: 50; 325; 40 TABLET ORAL at 08:05

## 2023-05-02 RX ADMIN — ONDANSETRON 4 MG: 2 INJECTION INTRAMUSCULAR; INTRAVENOUS at 06:05

## 2023-05-02 RX ADMIN — SODIUM CHLORIDE: 9 INJECTION, SOLUTION INTRAVENOUS at 12:05

## 2023-05-02 RX ADMIN — LOSARTAN POTASSIUM 50 MG: 50 TABLET, FILM COATED ORAL at 04:05

## 2023-05-02 RX ADMIN — Medication 6 MG: at 08:05

## 2023-05-02 RX ADMIN — HYDRALAZINE HYDROCHLORIDE 10 MG: 20 INJECTION INTRAMUSCULAR; INTRAVENOUS at 06:05

## 2023-05-02 RX ADMIN — GABAPENTIN 600 MG: 300 CAPSULE ORAL at 08:05

## 2023-05-02 RX ADMIN — ENOXAPARIN SODIUM 40 MG: 40 INJECTION SUBCUTANEOUS at 04:05

## 2023-05-02 RX ADMIN — ACETAMINOPHEN 650 MG: 325 TABLET ORAL at 12:05

## 2023-05-02 RX ADMIN — INSULIN ASPART 4 UNITS: 100 INJECTION, SOLUTION INTRAVENOUS; SUBCUTANEOUS at 04:05

## 2023-05-02 RX ADMIN — INSULIN ASPART 2 UNITS: 100 INJECTION, SOLUTION INTRAVENOUS; SUBCUTANEOUS at 08:05

## 2023-05-02 RX ADMIN — BUTALBITAL, ACETAMINOPHEN, AND CAFFEINE 1 TABLET: 50; 325; 40 TABLET ORAL at 10:05

## 2023-05-02 RX ADMIN — METOPROLOL TARTRATE 25 MG: 25 TABLET, FILM COATED ORAL at 08:05

## 2023-05-02 RX ADMIN — HYDROCHLOROTHIAZIDE 12.5 MG: 12.5 TABLET ORAL at 04:05

## 2023-05-02 NOTE — PROGRESS NOTES
[facility]  Hospital Medicine Progress Note        Chief Complaint: Inpatient Follow-up for migraine    HPI: 57 year old female with a pmh of DM2, HTN, Asthma who presented to the ED with c/o headache x3 days, getting worse today.  She denies a previous history of migraines.  She states she did not try any medication for her symptoms.  Denies chest pain, shortness of breath, fever.     Today's ED lab work revealed glucose 192, all other indices unremarkable.  CT head without contrast showed no convincing acute intracranial abnormality.  MRI brain without contrast showed within limitations, no convincing acute intracranial abnormality.  She was afebrile, markedly hypertensive in the ED, satting well on room air.  She was given Toradol, Benadryl, Compazine and admitted to Hospital Medicine for management.  MRI of the brain was ordered that has been negative.  Neurology evaluated suspected most likely her headache is migraines with aura recommended to have neurology outpatient follow up for migraines in 4-8 weeks.    Interval Hx:   No acute events reported overnight.  Patient was seen and examined at bedside this morning.  She reported 10/10 headache associated with nausea and she was unable to open her eyes completely.  She was requesting the room to be deemed could not tolerate breakfast this morning.  Objective/physical exam:  General: In no acute distress, afebrile  Chest: Clear to auscultation bilaterally  Heart: RRR  Abdomen: Soft, nontender, BS +  MSK: Warm, no lower extremity edema, no clubbing or cyanosis  Neurologic: Alert and oriented x4, moving all extremities spontaneously  VITAL SIGNS: 24 HRS MIN & MAX LAST   Temp  Min: 97.9 °F (36.6 °C)  Max: 98.2 °F (36.8 °C) 97.9 °F (36.6 °C)   BP  Min: 119/74  Max: 186/114 (!) 175/76   Pulse  Min: 81  Max: 97  84   Resp  Min: 17  Max: 22 17   SpO2  Min: 94 %  Max: 96 % 95 %       Recent Labs   Lab 04/30/23  1506 05/01/23  0345   WBC 8.9  8.9   RBC 3.78* 3.69*   HGB 11.6* 11.1*   HCT 35.9* 34.7*   MCV 95.0* 94.0   MCH 30.7 30.1   MCHC 32.3* 32.0*   RDW 12.1 12.0    241   MPV 11.1* 11.4*       Recent Labs   Lab 04/30/23  1506 05/01/23  0345    140   K 4.0 5.1   CO2 31* 32*   BUN 16.6 18.2   CREATININE 0.73 0.81   CALCIUM 9.0 9.0   MG  --  1.60   ALBUMIN 2.9* 2.7*   ALKPHOS 122 124   ALT 24 23   AST 28 22   BILITOT 0.5 0.6          Microbiology Results (last 7 days)       ** No results found for the last 168 hours. **             See below for Radiology    Scheduled Med:   enoxaparin  40 mg Subcutaneous Daily        Continuous Infusions:   sodium chloride 0.9% 75 mL/hr at 05/02/23 0008        PRN Meds:  acetaminophen, aluminum-magnesium hydroxide-simethicone, dextrose 10%, dextrose 10%, glucagon (human recombinant), glucose, glucose, hydrALAZINE, ibuprofen, insulin aspart U-100, melatonin, naloxone, ondansetron, polyethylene glycol, prochlorperazine, simethicone       Assessment/Plan:  Headache with nausea and dizziness likely Migraine with Aura   Uncontrolled hypertension  Diabetes mellitus type 2   Asthma    Add Fioricet to her current regimen  Patient is on Zofran and prochlorperazine as needed for nausea   Tylenol as needed for headache   Reviewed neurology input.  Much appreciated.  MRI of the brain was negative for any acute infarct  Continue IV fluids until p.o. intake is adequate  Reviewed CBGS blood glucose in the range of 130-190  Check orthostatics  Monitor blood pressure  Reviewed home medications, resumed Lipitor, gabapentin, metoprolol, losartan hydrochlorothiazide  Case discussed with nursing staff    VTE prophylaxis:  Lovenox    Patient condition:  Fair    Anticipated discharge and Disposition:   Likely tomorrow    _____________________________________________________________________    Nutrition Status:    Radiology:  MRI Brain Without Contrast  EXAMINATION  MRI BRAIN WITHOUT CONTRAST    CLINICAL HISTORY  Headache, new or  worsening (Age >= 50y);    TECHNIQUE  Multiplanar, multisequence MR images were obtained without the intravenous administration of gadolinium-based contrast media.    COMPARISON  Non-contrast head CT obtained earlier the same day.    FINDINGS  Exam quality: Limited secondary to patient movement throughout image acquisition, with resulting artifact.    Brain parenchyma: No restricted diffusion or other suggestion of acute ischemic insult. White matter T2/FLAIR hyperintense signal without associated restricted diffusion statistically represents changes of chronic microvascular disease. No findings to suggest intracranial hemorrhage. No discrete mass, localized mass effect, or midline shift. Differentiation of the gray-white border is grossly preserved.    CSF spaces: Normal in size and configuration of the ventricles. No abnormal extra-axial fluid. The basal cisterns are preserved. Sulcal volume appears normal for patient age.    Sella/Suprasellar regions: No abnormalities.    Other findings: Normal flow signal voids within the large intracranial vessels or dural sinuses. No focal abnormalities of the regional osseous structures and extracranial soft tissues. Mastoid air cells are well aerated. Paranasal sinuses are clear, with no fluid level.    IMPRESSION  1. Motion degraded assessment.  2. Within limitations no convincing acute intracranial abnormality.  3. Chronic age-related secondary details discussed above.    Electronically signed by: Danny Verma  Date:    04/30/2023  Time:    16:38  CT Head Without Contrast  EXAMINATION  CT HEAD WITHOUT CONTRAST    CLINICAL HISTORY  Headache, sudden, severe;    TECHNIQUE  Axial non-contrast CT images of the head were acquired and multiplanar reconstructions accomplished by a CT technologist at a separate workstation, pushed to PACS for physician review.    COMPARISON  13 June 2020    FINDINGS  Images were reviewed in subdural, brain, soft tissue, and bone windows.    Exam  quality: Motion/streak artifact limits assessment of the posterior fossa.    Hemorrhage: No evidence of acute hyperattenuating blood products.    Parenchyma: No discrete mass, localized mass-effect, or CT evidence of an acute territorial cortical-based ischemic insult. Gray-white differentiation is preserved.    Midline shift: None.    CSF spaces: Normal ventricle size and configuration. No masses or expansile fluid collections.    Vasculature: No hyperdense artery identified. No abnormal densities within the dural sinuses.    Other findings: No abnormalities of the scalp or subjacent osseous structures. Mastoids are well aerated. No focal abnormality of the sella. No acute abnormality of the included maxillofacial structures is appreciated.  Right maxillary sinus retention cyst versus polyp is unchanged.    IMPRESSION  1. No convincing acute intracranial abnormality.  2. Additional secondary details discussed above, relatively unchanged from the June 2020 CT appearance.    RADIATION DOSE  Automated tube current modulation, weight-based exposure dosing, and/or iterative reconstruction technique utilized to reach lowest reasonably achievable exposure rate.    DLP: 1221 mGy*cm    Electronically signed by: Danny Verma  Date:    04/30/2023  Time:    14:56      Justine Cortez MD   05/02/2023

## 2023-05-03 VITALS
HEART RATE: 76 BPM | BODY MASS INDEX: 36.1 KG/M2 | SYSTOLIC BLOOD PRESSURE: 139 MMHG | WEIGHT: 230 LBS | HEIGHT: 67 IN | RESPIRATION RATE: 18 BRPM | OXYGEN SATURATION: 96 % | TEMPERATURE: 98 F | DIASTOLIC BLOOD PRESSURE: 82 MMHG

## 2023-05-03 LAB — POCT GLUCOSE: 239 MG/DL (ref 70–110)

## 2023-05-03 PROCEDURE — 25000003 PHARM REV CODE 250: Performed by: INTERNAL MEDICINE

## 2023-05-03 PROCEDURE — 63600175 PHARM REV CODE 636 W HCPCS: Performed by: NURSE PRACTITIONER

## 2023-05-03 PROCEDURE — G0378 HOSPITAL OBSERVATION PER HR: HCPCS

## 2023-05-03 PROCEDURE — 96372 THER/PROPH/DIAG INJ SC/IM: CPT | Performed by: NURSE PRACTITIONER

## 2023-05-03 RX ORDER — BUTALBITAL, ACETAMINOPHEN AND CAFFEINE 50; 325; 40 MG/1; MG/1; MG/1
1 TABLET ORAL EVERY 6 HOURS PRN
Qty: 20 TABLET | Refills: 0 | Status: SHIPPED | OUTPATIENT
Start: 2023-05-03 | End: 2023-06-02

## 2023-05-03 RX ADMIN — LOSARTAN POTASSIUM 50 MG: 50 TABLET, FILM COATED ORAL at 09:05

## 2023-05-03 RX ADMIN — GABAPENTIN 600 MG: 300 CAPSULE ORAL at 09:05

## 2023-05-03 RX ADMIN — INSULIN ASPART 4 UNITS: 100 INJECTION, SOLUTION INTRAVENOUS; SUBCUTANEOUS at 06:05

## 2023-05-03 RX ADMIN — HYDROCHLOROTHIAZIDE 12.5 MG: 12.5 TABLET ORAL at 09:05

## 2023-05-03 RX ADMIN — METOPROLOL TARTRATE 25 MG: 25 TABLET, FILM COATED ORAL at 09:05

## 2023-05-03 RX ADMIN — BUTALBITAL, ACETAMINOPHEN, AND CAFFEINE 1 TABLET: 50; 325; 40 TABLET ORAL at 09:05

## 2023-05-03 RX ADMIN — ATORVASTATIN CALCIUM 20 MG: 10 TABLET, FILM COATED ORAL at 09:05

## 2023-05-03 NOTE — DISCHARGE SUMMARY
[facility] Hospital Medicine Discharge Summary    Admit Date: 4/30/2023  Discharge Date and Time: 5/3/84682:49 AM  Admitting Physician: RADHA Team  Discharging Physician: Justine Cortez MD.  Primary Care Physician: No primary care provider on file.  Consults: Neurology    Discharge Diagnoses:  Intractable Headache with nausea and dizziness likely due to Migraine with Aura   Uncontrolled hypertension  Diabetes mellitus type 2   Asthma     Hospital Course:   57 year old female with a pmh of DM2, HTN, Asthma who presented to the ED with c/o headache x3 days, getting worse today.  She denies a previous history of migraines.  She states she did not try any medication for her symptoms.  Denies chest pain, shortness of breath, fever.      ED lab work revealed glucose 192, all other indices unremarkable.  CT head without contrast showed no convincing acute intracranial abnormality.  MRI brain without contrast showed within limitations, no convincing acute intracranial abnormality.  She was afebrile, markedly hypertensive in the ED, satting well on room air.  She was given Toradol, Benadryl, Compazine and admitted to Hospital Medicine for management.  MRI of the brain was ordered that has been negative.  Neurology evaluated suspected most likely her headache is migraines with aura recommended to have neurology outpatient follow up for migraines in 4-8 weeks.  Pt was seen and examined on the day of discharge, reported improvement in headache able to tolerate p.o. diet with no nausea, prescription for Fioricet was given and discussed maintenance therapy for possible migraine and have close follow-up with PCP.  Discussed outpatient neurology referral for migraine treatment.      Vitals:  VITAL SIGNS: 24 HRS MIN & MAX LAST   Temp  Min: 97.7 °F (36.5 °C)  Max: 98.1 °F (36.7 °C) 97.7 °F (36.5 °C)   BP  Min: 131/81  Max: 161/82 139/82   Pulse  Min: 72  Max: 89  76   Resp  Min: 16  Max: 18 18   SpO2   Min: 93 %  Max: 96 % 96 %       Physical Exam:  General: In no acute distress, afebrile  Chest: Clear to auscultation bilaterally  Heart: RRR  Abdomen: Soft, nontender, BS +  MSK: Warm, no lower extremity edema, no clubbing or cyanosis  Neurologic: Alert and oriented x4, moving all extremities spontaneously    Procedures Performed: No admission procedures for hospital encounter.     Significant Diagnostic Studies: See Full reports for all details    Recent Labs   Lab 04/30/23  1506 05/01/23  0345   WBC 8.9 8.9   RBC 3.78* 3.69*   HGB 11.6* 11.1*   HCT 35.9* 34.7*   MCV 95.0* 94.0   MCH 30.7 30.1   MCHC 32.3* 32.0*   RDW 12.1 12.0    241   MPV 11.1* 11.4*       Recent Labs   Lab 04/30/23  1506 05/01/23  0345    140   K 4.0 5.1   CO2 31* 32*   BUN 16.6 18.2   CREATININE 0.73 0.81   CALCIUM 9.0 9.0   MG  --  1.60   ALBUMIN 2.9* 2.7*   ALKPHOS 122 124   ALT 24 23   AST 28 22   BILITOT 0.5 0.6        Microbiology Results (last 7 days)       ** No results found for the last 168 hours. **             MRI Brain Without Contrast  EXAMINATION  MRI BRAIN WITHOUT CONTRAST    CLINICAL HISTORY  Headache, new or worsening (Age >= 50y);    TECHNIQUE  Multiplanar, multisequence MR images were obtained without the intravenous administration of gadolinium-based contrast media.    COMPARISON  Non-contrast head CT obtained earlier the same day.    FINDINGS  Exam quality: Limited secondary to patient movement throughout image acquisition, with resulting artifact.    Brain parenchyma: No restricted diffusion or other suggestion of acute ischemic insult. White matter T2/FLAIR hyperintense signal without associated restricted diffusion statistically represents changes of chronic microvascular disease. No findings to suggest intracranial hemorrhage. No discrete mass, localized mass effect, or midline shift. Differentiation of the gray-white border is grossly preserved.    CSF spaces: Normal in size and configuration of the  ventricles. No abnormal extra-axial fluid. The basal cisterns are preserved. Sulcal volume appears normal for patient age.    Sella/Suprasellar regions: No abnormalities.    Other findings: Normal flow signal voids within the large intracranial vessels or dural sinuses. No focal abnormalities of the regional osseous structures and extracranial soft tissues. Mastoid air cells are well aerated. Paranasal sinuses are clear, with no fluid level.    IMPRESSION  1. Motion degraded assessment.  2. Within limitations no convincing acute intracranial abnormality.  3. Chronic age-related secondary details discussed above.    Electronically signed by: Danny Verma  Date:    04/30/2023  Time:    16:38  CT Head Without Contrast  EXAMINATION  CT HEAD WITHOUT CONTRAST    CLINICAL HISTORY  Headache, sudden, severe;    TECHNIQUE  Axial non-contrast CT images of the head were acquired and multiplanar reconstructions accomplished by a CT technologist at a separate workstation, pushed to PACS for physician review.    COMPARISON  13 June 2020    FINDINGS  Images were reviewed in subdural, brain, soft tissue, and bone windows.    Exam quality: Motion/streak artifact limits assessment of the posterior fossa.    Hemorrhage: No evidence of acute hyperattenuating blood products.    Parenchyma: No discrete mass, localized mass-effect, or CT evidence of an acute territorial cortical-based ischemic insult. Gray-white differentiation is preserved.    Midline shift: None.    CSF spaces: Normal ventricle size and configuration. No masses or expansile fluid collections.    Vasculature: No hyperdense artery identified. No abnormal densities within the dural sinuses.    Other findings: No abnormalities of the scalp or subjacent osseous structures. Mastoids are well aerated. No focal abnormality of the sella. No acute abnormality of the included maxillofacial structures is appreciated.  Right maxillary sinus retention cyst versus polyp is  unchanged.    IMPRESSION  1. No convincing acute intracranial abnormality.  2. Additional secondary details discussed above, relatively unchanged from the June 2020 CT appearance.    RADIATION DOSE  Automated tube current modulation, weight-based exposure dosing, and/or iterative reconstruction technique utilized to reach lowest reasonably achievable exposure rate.    DLP: 1221 mGy*cm    Electronically signed by: Danny Verma  Date:    04/30/2023  Time:    14:56         Medication List        START taking these medications      butalbital-acetaminophen-caffeine -40 mg -40 mg per tablet  Commonly known as: FIORICET, ESGIC  Take 1 tablet by mouth every 6 (six) hours as needed for Headaches.            CONTINUE taking these medications      atorvastatin 20 MG tablet  Commonly known as: LIPITOR     gabapentin 600 MG tablet  Commonly known as: NEURONTIN     glimepiride 4 MG tablet  Commonly known as: AMARYL     losartan-hydrochlorothiazide 50-12.5 mg 50-12.5 mg per tablet  Commonly known as: HYZAAR     metoprolol tartrate 25 MG tablet  Commonly known as: LOPRESSOR               Where to Get Your Medications        These medications were sent to [facility] - [facility] [address]  [address] [address]      Phone: [phone]   butalbital-acetaminophen-caffeine -40 mg -40 mg per tablet          Explained in detail to the patient about the discharge plan, medications, and follow-up visits. Pt understands and agrees with the treatment plan  Discharge Disposition:    Discharged Condition: stable  Diet-   Dietary Orders (From admission, onward)       Start     Ordered    04/30/23 2113  Diet diabetic  (Diet/Nutrition [facility])  Diet effective now         04/30/23 2116                   Medications Per [facility] med rec  Activities as tolerated    For further questions contact hospitalist office    Discharge time 33 minutes    For  worsening symptoms, chest pain, shortness of breath, increased abdominal pain, high grade fever, stroke or stroke like symptoms, immediately go to the nearest Emergency Room or call 911 as soon as possible.      Justine Amaya M.D, on 5/3/2023. at 8:49 AM.

## 2023-05-03 NOTE — PLAN OF CARE
05/03/23 1039   Discharge Assessment   Assessment Type Discharge Planning Assessment   Confirmed/corrected address, phone number and insurance Yes   Confirmed Demographics Correct on Facesheet   Source of Information patient   Does patient/caregiver understand observation status Yes   Communicated TANIYA with patient/caregiver Yes   Reason For Admission intractable headache   People in Home child(hien), adult   Facility Arrived From: home   Do you expect to return to your current living situation? Yes   Do you have help at home or someone to help you manage your care at home? Yes   Who are your caregiver(s) and their phone number(s)? dgtr. Harmony Marquez   Prior to hospitilization cognitive status: Unable to Assess   Current cognitive status: Alert/Oriented   Equipment Currently Used at Home none   Readmission within 30 days? No   Patient currently being followed by outpatient case management? No   Do you currently have service(s) that help you manage your care at home? No   Do you take prescription medications? Yes   Do you have prescription coverage? Yes   Coverage medcaid   Is the patient taking medications as prescribed? yes   Who is going to help you get home at discharge? dgtr   Are you on dialysis? No   Discharge Plan A Home with family   Discharge Plan B Home   DME Needed Upon Discharge  none   Discharge Plan discussed with: Patient   Discharge Barriers Identified None   Financial Resource Strain   How hard is it for you to pay for the very basics like food, housing, medical care, and heating? Not hard   Housing Stability   In the last 12 months, how many places have you lived? 1   In the last 12 months, was there a time when you did not have a steady place to sleep or slept in a shelter (including now)? N   Transportation Needs   In the past 12 months, has lack of transportation kept you from medical appointments or from getting medications? no   In the past 12 months, has lack of transportation kept you from  meetings, work, or from getting things needed for daily living? No   OTHER   Name(s) of People in Home pt and zena Sierra     Pt's is employed by [employer],  she has 3 kids, spouse  4 yrs ago.  Pcp is macey ross NP,  uses [facility], asked for RTW excuse RN is aware

## 2024-04-07 ENCOUNTER — HOSPITAL ENCOUNTER (INPATIENT)
Facility: HOSPITAL | Age: 59
LOS: 5 days | Discharge: HOME | DRG: 690 | End: 2024-04-12
Attending: STUDENT IN AN ORGANIZED HEALTH CARE EDUCATION/TRAINING PROGRAM | Admitting: INTERNAL MEDICINE
Payer: MEDICAID

## 2024-04-07 DIAGNOSIS — R11.2 NAUSEA AND VOMITING, UNSPECIFIED VOMITING TYPE: ICD-10-CM

## 2024-04-07 DIAGNOSIS — N12 PYELONEPHRITIS OF RIGHT KIDNEY: Primary | ICD-10-CM

## 2024-04-07 DIAGNOSIS — R11.2 NAUSEA & VOMITING: ICD-10-CM

## 2024-04-07 LAB
ALBUMIN SERPL-MCNC: 2.8 G/DL (ref 3.5–5)
ALBUMIN/GLOB SERPL: 0.6 RATIO (ref 1.1–2)
ALP SERPL-CCNC: 119 UNIT/L (ref 40–150)
ALT SERPL-CCNC: 28 UNIT/L (ref 0–55)
APPEARANCE UR: ABNORMAL
AST SERPL-CCNC: 21 UNIT/L (ref 5–34)
BACTERIA #/AREA URNS AUTO: ABNORMAL /HPF
BASOPHILS # BLD AUTO: 0.04 X10(3)/MCL
BASOPHILS NFR BLD AUTO: 0.5 %
BILIRUB SERPL-MCNC: 0.6 MG/DL
BILIRUB UR QL STRIP.AUTO: NEGATIVE
BUN SERPL-MCNC: 11.2 MG/DL (ref 9.8–20.1)
CALCIUM SERPL-MCNC: 9.1 MG/DL (ref 8.4–10.2)
CHLORIDE SERPL-SCNC: 97 MMOL/L (ref 98–107)
CO2 SERPL-SCNC: 27 MMOL/L (ref 22–29)
COLOR UR AUTO: ABNORMAL
CREAT SERPL-MCNC: 0.89 MG/DL (ref 0.55–1.02)
EOSINOPHIL # BLD AUTO: 0.15 X10(3)/MCL (ref 0–0.9)
EOSINOPHIL NFR BLD AUTO: 2 %
ERYTHROCYTE [DISTWIDTH] IN BLOOD BY AUTOMATED COUNT: 11.9 % (ref 11.5–17)
EST. AVERAGE GLUCOSE BLD GHB EST-MCNC: 326.4 MG/DL
FLUAV AG UPPER RESP QL IA.RAPID: NOT DETECTED
FLUBV AG UPPER RESP QL IA.RAPID: NOT DETECTED
GFR SERPLBLD CREATININE-BSD FMLA CKD-EPI: >60 MLS/MIN/1.73/M2
GLOBULIN SER-MCNC: 4.8 GM/DL (ref 2.4–3.5)
GLUCOSE SERPL-MCNC: 394 MG/DL (ref 74–100)
GLUCOSE SERPL-MCNC: 438 MG/DL (ref 70–110)
GLUCOSE UR QL STRIP.AUTO: >=1000
HBA1C MFR BLD: 13 %
HCT VFR BLD AUTO: 38.7 % (ref 37–47)
HGB BLD-MCNC: 12.7 G/DL (ref 12–16)
IMM GRANULOCYTES # BLD AUTO: 0.05 X10(3)/MCL (ref 0–0.04)
IMM GRANULOCYTES NFR BLD AUTO: 0.7 %
KETONES UR QL STRIP.AUTO: NEGATIVE
LEUKOCYTE ESTERASE UR QL STRIP.AUTO: NEGATIVE
LIPASE SERPL-CCNC: 9 U/L
LYMPHOCYTES # BLD AUTO: 2.08 X10(3)/MCL (ref 0.6–4.6)
LYMPHOCYTES NFR BLD AUTO: 28.4 %
MCH RBC QN AUTO: 29.5 PG (ref 27–31)
MCHC RBC AUTO-ENTMCNC: 32.8 G/DL (ref 33–36)
MCV RBC AUTO: 90 FL (ref 80–94)
MONOCYTES # BLD AUTO: 1.14 X10(3)/MCL (ref 0.1–1.3)
MONOCYTES NFR BLD AUTO: 15.6 %
NEUTROPHILS # BLD AUTO: 3.86 X10(3)/MCL (ref 2.1–9.2)
NEUTROPHILS NFR BLD AUTO: 52.8 %
NITRITE UR QL STRIP.AUTO: NEGATIVE
NRBC BLD AUTO-RTO: 0 %
PH UR STRIP.AUTO: 6 [PH]
PLATELET # BLD AUTO: 246 X10(3)/MCL (ref 130–400)
PMV BLD AUTO: 11.2 FL (ref 7.4–10.4)
POTASSIUM SERPL-SCNC: 3.9 MMOL/L (ref 3.5–5.1)
PROT SERPL-MCNC: 7.6 GM/DL (ref 6.4–8.3)
PROT UR QL STRIP.AUTO: ABNORMAL
RBC # BLD AUTO: 4.3 X10(6)/MCL (ref 4.2–5.4)
RBC #/AREA URNS AUTO: ABNORMAL /HPF
RBC UR QL AUTO: ABNORMAL
RSV A 5' UTR RNA NPH QL NAA+PROBE: NOT DETECTED
SARS-COV-2 RNA RESP QL NAA+PROBE: NOT DETECTED
SODIUM SERPL-SCNC: 133 MMOL/L (ref 136–145)
SP GR UR STRIP.AUTO: 1.01 (ref 1–1.03)
SQUAMOUS #/AREA URNS AUTO: ABNORMAL /HPF
TROPONIN I SERPL-MCNC: 0.01 NG/ML (ref 0–0.04)
UROBILINOGEN UR STRIP-ACNC: 0.2
WBC # SPEC AUTO: 7.32 X10(3)/MCL (ref 4.5–11.5)
WBC #/AREA URNS AUTO: ABNORMAL /HPF

## 2024-04-07 PROCEDURE — 83690 ASSAY OF LIPASE: CPT | Performed by: STUDENT IN AN ORGANIZED HEALTH CARE EDUCATION/TRAINING PROGRAM

## 2024-04-07 PROCEDURE — 0241U COVID/RSV/FLU A&B PCR: CPT | Performed by: STUDENT IN AN ORGANIZED HEALTH CARE EDUCATION/TRAINING PROGRAM

## 2024-04-07 PROCEDURE — 81003 URINALYSIS AUTO W/O SCOPE: CPT | Performed by: STUDENT IN AN ORGANIZED HEALTH CARE EDUCATION/TRAINING PROGRAM

## 2024-04-07 PROCEDURE — 84484 ASSAY OF TROPONIN QUANT: CPT | Performed by: STUDENT IN AN ORGANIZED HEALTH CARE EDUCATION/TRAINING PROGRAM

## 2024-04-07 PROCEDURE — 63600175 PHARM REV CODE 636 W HCPCS: Performed by: STUDENT IN AN ORGANIZED HEALTH CARE EDUCATION/TRAINING PROGRAM

## 2024-04-07 PROCEDURE — 93010 ELECTROCARDIOGRAM REPORT: CPT | Mod: ,,, | Performed by: INTERNAL MEDICINE

## 2024-04-07 PROCEDURE — 11000001 HC ACUTE MED/SURG PRIVATE ROOM

## 2024-04-07 PROCEDURE — 85025 COMPLETE CBC W/AUTO DIFF WBC: CPT | Performed by: STUDENT IN AN ORGANIZED HEALTH CARE EDUCATION/TRAINING PROGRAM

## 2024-04-07 PROCEDURE — 81001 URINALYSIS AUTO W/SCOPE: CPT | Performed by: STUDENT IN AN ORGANIZED HEALTH CARE EDUCATION/TRAINING PROGRAM

## 2024-04-07 PROCEDURE — 87086 URINE CULTURE/COLONY COUNT: CPT | Performed by: STUDENT IN AN ORGANIZED HEALTH CARE EDUCATION/TRAINING PROGRAM

## 2024-04-07 PROCEDURE — 83036 HEMOGLOBIN GLYCOSYLATED A1C: CPT | Performed by: STUDENT IN AN ORGANIZED HEALTH CARE EDUCATION/TRAINING PROGRAM

## 2024-04-07 PROCEDURE — 96375 TX/PRO/DX INJ NEW DRUG ADDON: CPT

## 2024-04-07 PROCEDURE — 96361 HYDRATE IV INFUSION ADD-ON: CPT

## 2024-04-07 PROCEDURE — 80053 COMPREHEN METABOLIC PANEL: CPT | Performed by: STUDENT IN AN ORGANIZED HEALTH CARE EDUCATION/TRAINING PROGRAM

## 2024-04-07 PROCEDURE — 93005 ELECTROCARDIOGRAM TRACING: CPT

## 2024-04-07 RX ORDER — ONDANSETRON HYDROCHLORIDE 2 MG/ML
4 INJECTION, SOLUTION INTRAVENOUS
Status: COMPLETED | OUTPATIENT
Start: 2024-04-07 | End: 2024-04-07

## 2024-04-07 RX ADMIN — ONDANSETRON 4 MG: 2 INJECTION INTRAMUSCULAR; INTRAVENOUS at 11:04

## 2024-04-07 RX ADMIN — SODIUM CHLORIDE, POTASSIUM CHLORIDE, SODIUM LACTATE AND CALCIUM CHLORIDE 1000 ML: 600; 310; 30; 20 INJECTION, SOLUTION INTRAVENOUS at 11:04

## 2024-04-07 NOTE — Clinical Note
Diagnosis: Pyelonephritis of right kidney [[id number]]   Future Attending Provider: ALEK CARTER [[id number]]   Admit to which facility:: [facility] [[id number]]

## 2024-04-08 PROBLEM — N12 PYELONEPHRITIS OF RIGHT KIDNEY: Status: ACTIVE | Noted: 2024-04-08

## 2024-04-08 PROBLEM — R11.2 NAUSEA & VOMITING: Status: ACTIVE | Noted: 2024-04-08

## 2024-04-08 LAB
ALBUMIN SERPL-MCNC: 2.5 G/DL (ref 3.5–5)
ALBUMIN/GLOB SERPL: 0.6 RATIO (ref 1.1–2)
ALP SERPL-CCNC: 106 UNIT/L (ref 40–150)
ALT SERPL-CCNC: 24 UNIT/L (ref 0–55)
AST SERPL-CCNC: 18 UNIT/L (ref 5–34)
BASOPHILS # BLD AUTO: 0.03 X10(3)/MCL
BASOPHILS NFR BLD AUTO: 0.4 %
BILIRUB SERPL-MCNC: 0.4 MG/DL
BUN SERPL-MCNC: 10 MG/DL (ref 9.8–20.1)
CALCIUM SERPL-MCNC: 8.6 MG/DL (ref 8.4–10.2)
CHLORIDE SERPL-SCNC: 97 MMOL/L (ref 98–107)
CO2 SERPL-SCNC: 29 MMOL/L (ref 22–29)
CREAT SERPL-MCNC: 0.93 MG/DL (ref 0.55–1.02)
EOSINOPHIL # BLD AUTO: 0.18 X10(3)/MCL (ref 0–0.9)
EOSINOPHIL NFR BLD AUTO: 2.4 %
ERYTHROCYTE [DISTWIDTH] IN BLOOD BY AUTOMATED COUNT: 11.9 % (ref 11.5–17)
GFR SERPLBLD CREATININE-BSD FMLA CKD-EPI: >60 MLS/MIN/1.73/M2
GLOBULIN SER-MCNC: 4.2 GM/DL (ref 2.4–3.5)
GLUCOSE SERPL-MCNC: 418 MG/DL (ref 74–100)
HCT VFR BLD AUTO: 36.4 % (ref 37–47)
HGB BLD-MCNC: 11.9 G/DL (ref 12–16)
IMM GRANULOCYTES # BLD AUTO: 0.04 X10(3)/MCL (ref 0–0.04)
IMM GRANULOCYTES NFR BLD AUTO: 0.5 %
LACTATE SERPL-SCNC: 1.1 MMOL/L (ref 0.5–2.2)
LYMPHOCYTES # BLD AUTO: 2.41 X10(3)/MCL (ref 0.6–4.6)
LYMPHOCYTES NFR BLD AUTO: 32.1 %
MCH RBC QN AUTO: 30.1 PG (ref 27–31)
MCHC RBC AUTO-ENTMCNC: 32.7 G/DL (ref 33–36)
MCV RBC AUTO: 92.2 FL (ref 80–94)
MONOCYTES # BLD AUTO: 1.01 X10(3)/MCL (ref 0.1–1.3)
MONOCYTES NFR BLD AUTO: 13.5 %
NEUTROPHILS # BLD AUTO: 3.83 X10(3)/MCL (ref 2.1–9.2)
NEUTROPHILS NFR BLD AUTO: 51.1 %
NRBC BLD AUTO-RTO: 0 %
OHS QRS DURATION: 96 MS
OHS QTC CALCULATION: 447 MS
PLATELET # BLD AUTO: 217 X10(3)/MCL (ref 130–400)
PMV BLD AUTO: 10.8 FL (ref 7.4–10.4)
POCT GLUCOSE: 321 MG/DL (ref 70–110)
POCT GLUCOSE: 337 MG/DL (ref 70–110)
POCT GLUCOSE: 337 MG/DL (ref 70–110)
POCT GLUCOSE: 373 MG/DL (ref 70–110)
POCT GLUCOSE: 438 MG/DL (ref 70–110)
POCT GLUCOSE: >500 MG/DL (ref 70–110)
POTASSIUM SERPL-SCNC: 3.6 MMOL/L (ref 3.5–5.1)
PROT SERPL-MCNC: 6.7 GM/DL (ref 6.4–8.3)
RBC # BLD AUTO: 3.95 X10(6)/MCL (ref 4.2–5.4)
SODIUM SERPL-SCNC: 131 MMOL/L (ref 136–145)
WBC # SPEC AUTO: 7.5 X10(3)/MCL (ref 4.5–11.5)

## 2024-04-08 PROCEDURE — 63600175 PHARM REV CODE 636 W HCPCS: Performed by: STUDENT IN AN ORGANIZED HEALTH CARE EDUCATION/TRAINING PROGRAM

## 2024-04-08 PROCEDURE — 80053 COMPREHEN METABOLIC PANEL: CPT | Performed by: STUDENT IN AN ORGANIZED HEALTH CARE EDUCATION/TRAINING PROGRAM

## 2024-04-08 PROCEDURE — 25000003 PHARM REV CODE 250: Performed by: STUDENT IN AN ORGANIZED HEALTH CARE EDUCATION/TRAINING PROGRAM

## 2024-04-08 PROCEDURE — 96375 TX/PRO/DX INJ NEW DRUG ADDON: CPT

## 2024-04-08 PROCEDURE — 25000003 PHARM REV CODE 250: Performed by: INTERNAL MEDICINE

## 2024-04-08 PROCEDURE — 96365 THER/PROPH/DIAG IV INF INIT: CPT

## 2024-04-08 PROCEDURE — 63600175 PHARM REV CODE 636 W HCPCS: Performed by: INTERNAL MEDICINE

## 2024-04-08 PROCEDURE — 99285 EMERGENCY DEPT VISIT HI MDM: CPT | Mod: 25

## 2024-04-08 PROCEDURE — 96372 THER/PROPH/DIAG INJ SC/IM: CPT | Performed by: STUDENT IN AN ORGANIZED HEALTH CARE EDUCATION/TRAINING PROGRAM

## 2024-04-08 PROCEDURE — G0378 HOSPITAL OBSERVATION PER HR: HCPCS

## 2024-04-08 PROCEDURE — 96372 THER/PROPH/DIAG INJ SC/IM: CPT | Performed by: INTERNAL MEDICINE

## 2024-04-08 PROCEDURE — 63600175 PHARM REV CODE 636 W HCPCS: Mod: JZ,JG | Performed by: INTERNAL MEDICINE

## 2024-04-08 PROCEDURE — 87040 BLOOD CULTURE FOR BACTERIA: CPT | Performed by: STUDENT IN AN ORGANIZED HEALTH CARE EDUCATION/TRAINING PROGRAM

## 2024-04-08 PROCEDURE — 25500020 PHARM REV CODE 255: Performed by: STUDENT IN AN ORGANIZED HEALTH CARE EDUCATION/TRAINING PROGRAM

## 2024-04-08 PROCEDURE — 85025 COMPLETE CBC W/AUTO DIFF WBC: CPT | Performed by: STUDENT IN AN ORGANIZED HEALTH CARE EDUCATION/TRAINING PROGRAM

## 2024-04-08 PROCEDURE — 82962 GLUCOSE BLOOD TEST: CPT

## 2024-04-08 PROCEDURE — 21400001 HC TELEMETRY ROOM

## 2024-04-08 PROCEDURE — 83605 ASSAY OF LACTIC ACID: CPT | Performed by: STUDENT IN AN ORGANIZED HEALTH CARE EDUCATION/TRAINING PROGRAM

## 2024-04-08 RX ORDER — ONDANSETRON HYDROCHLORIDE 2 MG/ML
4 INJECTION, SOLUTION INTRAVENOUS EVERY 8 HOURS PRN
Status: DISCONTINUED | OUTPATIENT
Start: 2024-04-08 | End: 2024-04-09

## 2024-04-08 RX ORDER — PIOGLITAZONEHYDROCHLORIDE 30 MG/1
30 TABLET ORAL DAILY
Status: ON HOLD | COMMUNITY
End: 2024-04-12

## 2024-04-08 RX ORDER — HYDROCODONE BITARTRATE AND ACETAMINOPHEN 5; 325 MG/1; MG/1
1 TABLET ORAL EVERY 6 HOURS PRN
Status: DISCONTINUED | OUTPATIENT
Start: 2024-04-08 | End: 2024-04-12 | Stop reason: HOSPADM

## 2024-04-08 RX ORDER — ATORVASTATIN CALCIUM 10 MG/1
20 TABLET, FILM COATED ORAL DAILY
Status: DISCONTINUED | OUTPATIENT
Start: 2024-04-08 | End: 2024-04-12 | Stop reason: HOSPADM

## 2024-04-08 RX ORDER — BISACODYL 10 MG/1
10 SUPPOSITORY RECTAL DAILY PRN
Status: DISCONTINUED | OUTPATIENT
Start: 2024-04-08 | End: 2024-04-10

## 2024-04-08 RX ORDER — GLIMEPIRIDE 4 MG/1
4 TABLET ORAL
Status: ON HOLD | COMMUNITY
End: 2024-04-12 | Stop reason: HOSPADM

## 2024-04-08 RX ORDER — HYDROCHLOROTHIAZIDE 25 MG/1
25 TABLET ORAL DAILY
Status: ON HOLD | COMMUNITY
End: 2024-04-12

## 2024-04-08 RX ORDER — CLONIDINE HYDROCHLORIDE 0.1 MG/1
0.2 TABLET ORAL
Status: COMPLETED | OUTPATIENT
Start: 2024-04-08 | End: 2024-04-08

## 2024-04-08 RX ORDER — BUTALBITAL, ACETAMINOPHEN AND CAFFEINE 50; 325; 40 MG/1; MG/1; MG/1
1 TABLET ORAL EVERY 4 HOURS PRN
Status: ON HOLD | COMMUNITY
Start: 2024-01-23 | End: 2024-04-12

## 2024-04-08 RX ORDER — AMLODIPINE BESYLATE 10 MG/1
10 TABLET ORAL DAILY
Status: ON HOLD | COMMUNITY
End: 2024-04-12

## 2024-04-08 RX ORDER — ENOXAPARIN SODIUM 100 MG/ML
40 INJECTION SUBCUTANEOUS EVERY 24 HOURS
Status: DISCONTINUED | OUTPATIENT
Start: 2024-04-08 | End: 2024-04-12 | Stop reason: HOSPADM

## 2024-04-08 RX ORDER — METFORMIN HYDROCHLORIDE 1000 MG/1
1000 TABLET ORAL 2 TIMES DAILY
Status: ON HOLD | COMMUNITY
End: 2024-04-12

## 2024-04-08 RX ORDER — HYDRALAZINE HYDROCHLORIDE 20 MG/ML
10 INJECTION INTRAMUSCULAR; INTRAVENOUS EVERY 6 HOURS PRN
Status: DISCONTINUED | OUTPATIENT
Start: 2024-04-08 | End: 2024-04-12 | Stop reason: HOSPADM

## 2024-04-08 RX ORDER — SODIUM CHLORIDE 0.9 % (FLUSH) 0.9 %
10 SYRINGE (ML) INJECTION
Status: DISCONTINUED | OUTPATIENT
Start: 2024-04-08 | End: 2024-04-12 | Stop reason: HOSPADM

## 2024-04-08 RX ORDER — IBUPROFEN 200 MG
16 TABLET ORAL
Status: DISCONTINUED | OUTPATIENT
Start: 2024-04-08 | End: 2024-04-12 | Stop reason: HOSPADM

## 2024-04-08 RX ORDER — MORPHINE SULFATE 4 MG/ML
2 INJECTION, SOLUTION INTRAMUSCULAR; INTRAVENOUS EVERY 4 HOURS PRN
Status: DISCONTINUED | OUTPATIENT
Start: 2024-04-08 | End: 2024-04-10

## 2024-04-08 RX ORDER — FLUTICASONE PROPIONATE 44 UG/1
2 AEROSOL, METERED RESPIRATORY (INHALATION) 2 TIMES DAILY
Status: ON HOLD | COMMUNITY
Start: 2023-12-04 | End: 2024-04-12 | Stop reason: HOSPADM

## 2024-04-08 RX ORDER — POLYETHYLENE GLYCOL 3350 17 G/17G
17 POWDER, FOR SOLUTION ORAL DAILY
Status: DISCONTINUED | OUTPATIENT
Start: 2024-04-08 | End: 2024-04-12 | Stop reason: HOSPADM

## 2024-04-08 RX ORDER — TALC
6 POWDER (GRAM) TOPICAL NIGHTLY PRN
Status: DISCONTINUED | OUTPATIENT
Start: 2024-04-08 | End: 2024-04-12 | Stop reason: HOSPADM

## 2024-04-08 RX ORDER — GLUCAGON 1 MG
1 KIT INJECTION
Status: DISCONTINUED | OUTPATIENT
Start: 2024-04-08 | End: 2024-04-12 | Stop reason: HOSPADM

## 2024-04-08 RX ORDER — METOPROLOL TARTRATE 25 MG/1
25 TABLET, FILM COATED ORAL 2 TIMES DAILY
Status: DISCONTINUED | OUTPATIENT
Start: 2024-04-08 | End: 2024-04-12 | Stop reason: HOSPADM

## 2024-04-08 RX ORDER — CLONIDINE HYDROCHLORIDE 0.1 MG/1
0.1 TABLET ORAL 2 TIMES DAILY PRN
Status: ON HOLD | COMMUNITY
Start: 2024-01-28 | End: 2024-04-12 | Stop reason: HOSPADM

## 2024-04-08 RX ORDER — METFORMIN HYDROCHLORIDE 500 MG/1
500 TABLET ORAL 2 TIMES DAILY WITH MEALS
Status: DISCONTINUED | OUTPATIENT
Start: 2024-04-08 | End: 2024-04-08

## 2024-04-08 RX ORDER — METFORMIN HYDROCHLORIDE 500 MG/1
1000 TABLET ORAL 2 TIMES DAILY
Status: DISCONTINUED | OUTPATIENT
Start: 2024-04-08 | End: 2024-04-12 | Stop reason: HOSPADM

## 2024-04-08 RX ORDER — AMOXICILLIN 250 MG
2 CAPSULE ORAL DAILY
Status: DISCONTINUED | OUTPATIENT
Start: 2024-04-08 | End: 2024-04-12 | Stop reason: HOSPADM

## 2024-04-08 RX ORDER — GLIMEPIRIDE 4 MG/1
4 TABLET ORAL
Status: DISCONTINUED | OUTPATIENT
Start: 2024-04-08 | End: 2024-04-12 | Stop reason: HOSPADM

## 2024-04-08 RX ORDER — INSULIN GLARGINE 100 [IU]/ML
30 INJECTION, SOLUTION SUBCUTANEOUS NIGHTLY
Status: ON HOLD | COMMUNITY
End: 2024-04-12

## 2024-04-08 RX ORDER — FAMOTIDINE 20 MG/1
20 TABLET, FILM COATED ORAL 2 TIMES DAILY
Status: DISCONTINUED | OUTPATIENT
Start: 2024-04-08 | End: 2024-04-12 | Stop reason: HOSPADM

## 2024-04-08 RX ORDER — HYDROCHLOROTHIAZIDE 25 MG/1
25 TABLET ORAL DAILY
Status: DISCONTINUED | OUTPATIENT
Start: 2024-04-08 | End: 2024-04-12 | Stop reason: HOSPADM

## 2024-04-08 RX ORDER — SODIUM CHLORIDE 9 MG/ML
INJECTION, SOLUTION INTRAVENOUS CONTINUOUS
Status: DISCONTINUED | OUTPATIENT
Start: 2024-04-08 | End: 2024-04-09

## 2024-04-08 RX ORDER — INSULIN ASPART 100 [IU]/ML
0-10 INJECTION, SOLUTION INTRAVENOUS; SUBCUTANEOUS
Status: DISCONTINUED | OUTPATIENT
Start: 2024-04-08 | End: 2024-04-12 | Stop reason: HOSPADM

## 2024-04-08 RX ORDER — BISMUTH SUBSALICYLATE 525 MG/30ML
30 LIQUID ORAL EVERY 6 HOURS PRN
Status: DISCONTINUED | OUTPATIENT
Start: 2024-04-08 | End: 2024-04-12 | Stop reason: HOSPADM

## 2024-04-08 RX ORDER — IBUPROFEN 200 MG
24 TABLET ORAL
Status: DISCONTINUED | OUTPATIENT
Start: 2024-04-08 | End: 2024-04-12 | Stop reason: HOSPADM

## 2024-04-08 RX ORDER — SODIUM CHLORIDE, SODIUM LACTATE, POTASSIUM CHLORIDE, CALCIUM CHLORIDE 600; 310; 30; 20 MG/100ML; MG/100ML; MG/100ML; MG/100ML
INJECTION, SOLUTION INTRAVENOUS CONTINUOUS
Status: DISCONTINUED | OUTPATIENT
Start: 2024-04-08 | End: 2024-04-08

## 2024-04-08 RX ORDER — AMLODIPINE BESYLATE 5 MG/1
10 TABLET ORAL DAILY
Status: DISCONTINUED | OUTPATIENT
Start: 2024-04-08 | End: 2024-04-08

## 2024-04-08 RX ORDER — PIOGLITAZONEHYDROCHLORIDE 15 MG/1
30 TABLET ORAL DAILY
Status: DISCONTINUED | OUTPATIENT
Start: 2024-04-08 | End: 2024-04-12 | Stop reason: HOSPADM

## 2024-04-08 RX ADMIN — POLYETHYLENE GLYCOL 3350 17 G: 17 POWDER, FOR SOLUTION ORAL at 03:04

## 2024-04-08 RX ADMIN — SODIUM CHLORIDE, POTASSIUM CHLORIDE, SODIUM LACTATE AND CALCIUM CHLORIDE: 600; 310; 30; 20 INJECTION, SOLUTION INTRAVENOUS at 11:04

## 2024-04-08 RX ADMIN — INSULIN ASPART 10 UNITS: 100 INJECTION, SOLUTION INTRAVENOUS; SUBCUTANEOUS at 05:04

## 2024-04-08 RX ADMIN — METFORMIN HYDROCHLORIDE 1000 MG: 500 TABLET, FILM COATED ORAL at 12:04

## 2024-04-08 RX ADMIN — INSULIN DETEMIR 30 UNITS: 100 INJECTION, SOLUTION SUBCUTANEOUS at 06:04

## 2024-04-08 RX ADMIN — CEFTRIAXONE SODIUM 2 G: 2 INJECTION, POWDER, FOR SOLUTION INTRAMUSCULAR; INTRAVENOUS at 01:04

## 2024-04-08 RX ADMIN — CLONIDINE HYDROCHLORIDE 0.2 MG: 0.1 TABLET ORAL at 01:04

## 2024-04-08 RX ADMIN — HYDROCODONE BITARTRATE AND ACETAMINOPHEN 1 TABLET: 5; 325 TABLET ORAL at 05:04

## 2024-04-08 RX ADMIN — FAMOTIDINE 20 MG: 20 TABLET ORAL at 04:04

## 2024-04-08 RX ADMIN — SODIUM CHLORIDE: 9 INJECTION, SOLUTION INTRAVENOUS at 12:04

## 2024-04-08 RX ADMIN — HYDROCODONE BITARTRATE AND ACETAMINOPHEN 1 TABLET: 5; 325 TABLET ORAL at 08:04

## 2024-04-08 RX ADMIN — SENNOSIDES AND DOCUSATE SODIUM 2 TABLET: 8.6; 5 TABLET ORAL at 03:04

## 2024-04-08 RX ADMIN — PIOGLITAZONE 30 MG: 15 TABLET ORAL at 12:04

## 2024-04-08 RX ADMIN — ENOXAPARIN SODIUM 40 MG: 40 INJECTION SUBCUTANEOUS at 04:04

## 2024-04-08 RX ADMIN — ATORVASTATIN CALCIUM 20 MG: 10 TABLET, FILM COATED ORAL at 09:04

## 2024-04-08 RX ADMIN — IOHEXOL 100 ML: 350 INJECTION, SOLUTION INTRAVENOUS at 12:04

## 2024-04-08 RX ADMIN — GLIMEPIRIDE 4 MG: 4 TABLET ORAL at 12:04

## 2024-04-08 RX ADMIN — METOPROLOL TARTRATE 25 MG: 25 TABLET, FILM COATED ORAL at 08:04

## 2024-04-08 RX ADMIN — HYDROCHLOROTHIAZIDE 25 MG: 25 TABLET ORAL at 09:04

## 2024-04-08 RX ADMIN — INSULIN ASPART 8 UNITS: 100 INJECTION, SOLUTION INTRAVENOUS; SUBCUTANEOUS at 03:04

## 2024-04-08 RX ADMIN — MORPHINE SULFATE 2 MG: 4 INJECTION, SOLUTION INTRAMUSCULAR; INTRAVENOUS at 01:04

## 2024-04-08 RX ADMIN — SODIUM CHLORIDE, POTASSIUM CHLORIDE, SODIUM LACTATE AND CALCIUM CHLORIDE: 600; 310; 30; 20 INJECTION, SOLUTION INTRAVENOUS at 02:04

## 2024-04-08 RX ADMIN — INSULIN DETEMIR 30 UNITS: 100 INJECTION, SOLUTION SUBCUTANEOUS at 08:04

## 2024-04-08 RX ADMIN — INSULIN ASPART 10 UNITS: 100 INJECTION, SOLUTION INTRAVENOUS; SUBCUTANEOUS at 10:04

## 2024-04-08 RX ADMIN — METOPROLOL TARTRATE 25 MG: 25 TABLET, FILM COATED ORAL at 09:04

## 2024-04-08 RX ADMIN — INSULIN ASPART 1 UNITS: 100 INJECTION, SOLUTION INTRAVENOUS; SUBCUTANEOUS at 08:04

## 2024-04-08 NOTE — PLAN OF CARE
Problem: Adult Inpatient Plan of Care  Goal: Plan of Care Review  Outcome: Ongoing, Progressing  Goal: Patient-Specific Goal (Individualized)  Outcome: Ongoing, Progressing  Goal: Absence of Hospital-Acquired Illness or Injury  Outcome: Ongoing, Progressing  Goal: Optimal Comfort and Wellbeing  Outcome: Ongoing, Progressing  Goal: Readiness for Transition of Care  Outcome: Ongoing, Progressing     Problem: Diabetes Comorbidity  Goal: Blood Glucose Level Within Targeted Range  Outcome: Ongoing, Progressing     Problem: Hypertension Comorbidity  Goal: Blood Pressure in Desired Range  Outcome: Ongoing, Progressing

## 2024-04-08 NOTE — NURSING
Nurses Note -- 4 Eyes      4/8/2024   3:26 AM      Skin assessed during: Admit      [x] No Altered Skin Integrity Present    []Prevention Measures Documented      [] Yes- Altered Skin Integrity Present or Discovered   [] LDA Added if Not in Epic (Describe Wound)   [] New Altered Skin Integrity was Present on Admit and Documented in LDA   [] Wound Image Taken    Wound Care Consulted? No    Attending Nurse:  Argelia Jones RN/Staff Member:  Nora Burrell RN

## 2024-04-08 NOTE — H&P
[facility] - Emergency Dept    History & Physical      Patient Name: Greer Kahn  MRN: [id number]  Admission Date: 4/7/2024  Attending Physician: Thor Ryan MD   Primary Care Provider: [facility] Primary Doctor         Patient information was obtained from patient and ER records.     Subjective:     Principal Problem:Pyelonephritis of right kidney    Chief Complaint:   Chief Complaint   Patient presents with    Weakness     Pt states she's been having persistent fevers, vomiting, weakness since Thursday. Went to  in [place] where they did imaging/swabs (all negative). Only UC concern was her sugar and BP were elevated. Pt isnt sure if they treated either before d/c. Was d/c with Abx but her fever has persisted and pt is concerns bc she's getting weaker. CBG in triage 438     Vomiting    Dizziness    Fever    Abdominal Pain        HPI:  58-year-old female with past medical history significant for diabetes mellitus, hypertension, obesity who presented to ED with complaints of generalized abdominal pain associated nausea vomiting with generalized weakness patient also noted to have persistent febrile episodes since onset of last Thursday.  Patient presented to urgent Care sudden and in [place] Friday where she had studies including imaging done and was negative at that time and patient was managed for her uncontrolled diabetes with hyperglycemia.  At this time she was discharged on oral antibiotics for upper respiratory infection, but due to persistent fevers and continued weakness she presented for evaluation.  Upon initial workup in ED patient was found to have urinary tract infection and possible pyelonephritis given CT imaging studies that she has been admitted for urgent management at this time.    Past Medical History:   Diagnosis Date    Asthma     Diabetes mellitus     Hypertension        History reviewed. No pertinent surgical history.    Review of patient's allergies  indicates:  No Known Allergies    No current facility-administered medications on file prior to encounter.     Current Outpatient Medications on File Prior to Encounter   Medication Sig    amLODIPine (NORVASC) 10 MG tablet Take 10 mg by mouth once daily.    atorvastatin (LIPITOR) 20 MG tablet Take 20 mg by mouth once daily.    butalbital-acetaminophen-caffeine -40 mg (FIORICET, ESGIC) -40 mg per tablet Take 1 tablet by mouth every 4 (four) hours as needed.    cloNIDine (CATAPRES) 0.1 MG tablet Take 0.1 mg by mouth 2 (two) times daily as needed.    FLOVENT HFA 44 mcg/actuation inhaler 2 puffs 2 (two) times daily.    glimepiride (AMARYL) 4 MG tablet Take 4 mg by mouth daily with breakfast.    hydroCHLOROthiazide (HYDRODIURIL) 25 MG tablet Take 25 mg by mouth once daily.    insulin glargine (LANTUS) 100 unit/mL injection Inject 30 Units into the skin every evening.    metFORMIN (GLUCOPHAGE) 1000 MG tablet Take 1,000 mg by mouth 2 (two) times daily.    metoprolol tartrate (LOPRESSOR) 25 MG tablet Take 25 mg by mouth 2 (two) times daily.    pioglitazone (ACTOS) 30 MG tablet Take 30 mg by mouth once daily.    gabapentin (NEURONTIN) 600 MG tablet Take 600 mg by mouth 3 (three) times daily.    glimepiride (AMARYL) 4 MG tablet Take 4 mg by mouth 2 (two) times daily.    [DISCONTINUED] losartan-hydrochlorothiazide 50-12.5 mg (HYZAAR) 50-12.5 mg per tablet Take 1 tablet by mouth once daily.     Family History    None       Tobacco Use    Smoking status: Never    Smokeless tobacco: Never   Substance and Sexual Activity    Alcohol use: Never    Drug use: Never    Sexual activity: Not Currently     Review of Systems   Constitutional:  Positive for chills, fatigue and fever.   Gastrointestinal:  Positive for abdominal pain, nausea and vomiting.   Neurological:  Positive for weakness.   All other systems reviewed and are negative.    Objective:     Vital Signs (Most Recent):  Temp: 98.3 °F (36.8 °C) (04/08/24  0526)  Pulse: 81 (04/08/24 0526)  Resp: 20 (04/08/24 0548)  BP: 123/74 (04/08/24 0526)  SpO2: (!) 90 % (04/08/24 0526) Vital Signs (24h Range):  Temp:  [98 °F (36.7 °C)-98.3 °F (36.8 °C)] 98.3 °F (36.8 °C)  Pulse:  [81-95] 81  Resp:  [16-20] 20  SpO2:  [90 %-97 %] 90 %  BP: (123-197)/(74-96) 123/74     Weight: 111.1 kg (245 lb)  Body mass index is 38.36 kg/m².    Physical Exam  Constitutional:       General: She is in acute distress.      Appearance: She is obese. She is ill-appearing.   HENT:      Head: Normocephalic and atraumatic.      Nose: Nose normal.      Mouth/Throat:      Pharynx: Oropharynx is clear.   Eyes:      Extraocular Movements: Extraocular movements intact.      Pupils: Pupils are equal, round, and reactive to light.   Cardiovascular:      Rate and Rhythm: Normal rate and regular rhythm.      Pulses: Normal pulses.      Heart sounds: Normal heart sounds.   Pulmonary:      Effort: Pulmonary effort is normal.      Breath sounds: Normal breath sounds.   Abdominal:      Palpations: Abdomen is soft.      Tenderness: There is abdominal tenderness.   Musculoskeletal:         General: Normal range of motion.      Cervical back: Neck supple.   Skin:     General: Skin is warm and dry.      Capillary Refill: Capillary refill takes less than 2 seconds.   Neurological:      General: No focal deficit present.      Mental Status: She is alert. Mental status is at baseline.      Motor: Weakness present.   Psychiatric:         Mood and Affect: Mood normal.           CRANIAL NERVES     CN III, IV, VI   Pupils are equal, round, and reactive to light.      Significant Labs: All pertinent labs within the past 24 hours have been reviewed.  Recent Lab Results  (Last 5 results in the past 24 hours)        04/08/24  0559   04/08/24  0121   04/08/24  0042   04/07/24  2332   04/07/24  2300        Albumin/Globulin Ratio         0.6       Albumin         2.8       ALP         119       ALT         28       AST         21        Baso # 0.03         0.04       Basophil % 0.4         0.5       BILIRUBIN TOTAL         0.6       BUN         11.2       Calcium         9.1       Chloride         97       CO2         27       Creatinine         0.89       eGFR         >60       Eos # 0.18         0.15       Eos % 2.4         2.0       Estimated Avg Glucose       326.4         Globulin, Total         4.8       Glucose         394       Hematocrit 36.4         38.7       Hemoglobin 11.9         12.7       Hemoglobin A1C External       13.0         Immature Grans (Abs) 0.04         0.05       Immature Granulocytes 0.5         0.7       Lactic Acid Level   1.1             Lipase         9       Lymph # 2.41         2.08       LYMPH % 32.1         28.4       MCH 30.1         29.5       MCHC 32.7         32.8       MCV 92.2         90.0       Mono # 1.01         1.14       Mono % 13.5         15.6       MPV 10.8         11.2       Neut # 3.83         3.86       Neut % 51.1         52.8       nRBC 0.0         0.0       Platelet Count 217         246       POCT Glucose     337           Potassium         3.9       PROTEIN TOTAL         7.6       RBC 3.95         4.30       RDW 11.9         11.9       Sodium         133       Troponin I       0.011         WBC 7.50         7.32                              Significant Imaging: I have reviewed all pertinent imaging results/findings within the past 24 hours.    Assessment/Plan:     Active Diagnoses:    Diagnosis Date Noted POA    PRINCIPAL PROBLEM:  Pyelonephritis of right kidney [N12] 04/08/2024 Yes    Nausea & vomiting [R11.2] 04/08/2024 Yes      Problems Resolved During this Admission:     VTE Risk Mitigation (From admission, onward)           Ordered     IP VTE HIGH RISK PATIENT  Once         04/08/24 0104     Place sequential compression device  Until discontinued         04/08/24 0104                    Acute Pyelonephritis:  With bilateral flank pain  CT abdomen pelvis confirmed right acute  pyelonephritis  Patient with persistent nausea with vomiting not able tolerate p.o.  Follow up urine culture  IVF resuscitation  Empiric abx (Rocephin)    DM II:  Uncontrolled for blood glucose 438  Restarted long-acting insulin given 30 units now and we will resume 30 units q.h.s.  Sliding scale Insulin  Accu cheks ACHS  Hypoglycemic protocol    HTN:  Resume home meds once reconciled (metoprolol, HCTZ)  Give IV antihypertensive for prn use with parameters        Code: FULL   PPX: BSCDs              The patient is expected to have a LOS less than 2 midnights and will be admitted to OBSERVATION status.      Service was provided using HIPAA compliant web platform using SOC for audio/visual equipment.  Patient location: Hospital  Provider Location: [place], [place]  Participants on call: Bedside RN, Patient  Consent was obtained and the patient was seen with nurse assiting from the bedside.     Yudelka Aguilera MD  Department of Hospital Medicine   [facility] - Emergency Dept

## 2024-04-08 NOTE — PROGRESS NOTES
Inpatient Nutrition Assessment    Admit Date: 4/7/2024   Total duration of encounter: 1 day   Patient Age: 58 y.o.    Nutrition Recommendation/Prescription     Continue 2000 Diabetic diet as tolerated  Will provided Boost Glucose Control with TID due to poor p.o. intake x 4 days. (240 kcal and 14 gm pro per serving)  Weigh weekly  Diabetic education when pt allows        Communication of Recommendations: reviewed with patient    Nutrition Assessment     Malnutrition Assessment/Nutrition-Focused Physical Exam    Malnutrition Context:  (Does not meet criteria) (04/08/24 1218)Does not meet criteria                                                           A minimum of two characteristics is recommended for diagnosis of either severe or non-severe malnutrition.    Chart Review    Reason Seen: continuous nutrition monitoring    Malnutrition Screening Tool Results   Have you recently lost weight without trying?: No  Have you been eating poorly because of a decreased appetite?: No   MST Score: 0   Diagnosis:  Pyelephritis of right kidney, DM II (uncontrolled) HTN    Relevant Medical History:   DM, HTN, asthma, obesity    Scheduled Medications:  atorvastatin, 20 mg, Daily  cefTRIAXone (Rocephin) IV (PEDS and ADULTS), 2 g, Q24H  enoxparin, 40 mg, Q24H (prophylaxis, 1700)  glimepiride, 4 mg, Daily with breakfast  hydroCHLOROthiazide, 25 mg, Daily  insulin detemir U-100, 30 Units, QHS  metFORMIN, 1,000 mg, BID  metoprolol tartrate, 25 mg, BID  pioglitazone, 30 mg, Daily    Continuous Infusions:  sodium chloride 0.9%, Last Rate: 100 mL/hr at 04/08/24 1215    PRN Medications: dextrose 10%, dextrose 10%, glucagon (human recombinant), glucose, glucose, hydrALAZINE, HYDROcodone-acetaminophen, insulin aspart U-100, melatonin, morphine, ondansetron, sodium chloride 0.9%    Calorie Containing IV Medications: no significant kcals from medications at this time    Recent Labs   Lab 04/07/24  2300 04/07/24  2332 04/08/24  0559   *   "--  131*   K 3.9  --  3.6   CALCIUM 9.1  --  8.6   CHLORIDE 97*  --  97*   CO2 27  --  29   BUN 11.2  --  10.0   CREATININE 0.89  --  0.93   EGFRNORACEVR >60  --  >60   GLUCOSE 394*  --  418*   BILITOT 0.6  --  0.4   ALKPHOS 119  --  106   ALT 28  --  24   AST 21  --  18   ALBUMIN 2.8*  --  2.5*   HGBA1C  --  13.0*  --    LIPASE 9  --   --    WBC 7.32  --  7.50   HGB 12.7  --  11.9*   HCT 38.7  --  36.4*     Nutrition Orders:  Diet diabetic 2000 Calorie      Appetite/Oral Intake: poor/0-25% of meals  Factors Affecting Nutritional Intake: decreased appetite  Food/Mormon/Cultural Preferences: none reported  Food Allergies: none reported  Last Bowel Movement: 24  Wound(s):  Intact    Comments    () Met with pt. Pt poor appetite and intake x past 4 days. RDN observed pt had not touched her lunch tray and she does not plan it eat. Reported no N/V/D/C. Stated she still had some abdominal pain but it "isn't too bad right not". Stated no difficulties chewing or swallowing. Able to feed self with set-up assist. Denied any recent wt changes. Stated # Med/labs reviewed. RDN attempted to provide diabetic education due to elevated A1C of 13% Pt stated she has been a diabetic for a long time. She is not interested in education at this time due to not feeling well. RDN to attempt education before pt is discharged    Anthropometrics    Height: 5' 7.01" (170.2 cm), Height Method: Stated  Last Weight: 111.1 kg (245 lb) (24 0333), Weight Method: Standard Scale  BMI (Calculated): 38.4  BMI Classification: obese grade II (BMI 35-39.9)     Ideal Body Weight (IBW), Female: 135.05 lb     % Ideal Body Weight, Female (lb): 181.41 %                    Usual Body Weight (UBW), k kg  % Usual Body Weight: 102.17     Usual Weight Provided By: patient    Wt Readings from Last 5 Encounters:   24 111.1 kg (245 lb)   23 104.3 kg (230 lb)   19 110 kg (242 lb 8.1 oz)     Weight Change(s) Since Admission: " new admit  Wt Readings from Last 1 Encounters:   04/08/24 0333 111.1 kg (245 lb)   04/07/24 2121 111.1 kg (245 lb)   Admit Weight: 111.1 kg (245 lb) (04/07/24 2121), Weight Method: Stated    Estimated Needs    Weight Used For Calorie Calculations: 111 kg (244 lb 11.4 oz)  Energy Calorie Requirements (kcal): 2000 kcal (18 kcal/kg/BW)  Energy Need Method: Kcal/kg  Weight Used For Protein Calculations: 111 kg (244 lb 11.4 oz)  Protein Requirements: 111 gm (1 gm/kg/BW)  Fluid Requirements (mL): 2778 ml (25 ml/kcal)    Enteral Nutrition     Patient not receiving enteral nutrition at this time.    Parenteral Nutrition     Patient not receiving parenteral nutrition support at this time.    Evaluation of Received Nutrient Intake    Calories: not meeting estimated needs  Protein: not meeting estimated needs    Patient Education     Not applicable.    Nutrition Diagnosis     PES: Altered nutrition-related laboratory values related to   diabetes dx as evidenced by elevated A1C 13%. (new)     PES: Inadequate oral intake related to inability to consume sufficient nutrients as evidenced by pt with 0% intake ,decreased appetite and some abdominal pain. (new)     Nutrition Interventions     Intervention(s): general/healthful diet, commercial beverage, and collaboration with other providers    Goal: Consume % of meals/snacks by follow-up. (new)  Goal: Maintain weight throughout hospitalization. (new)    Nutrition Goals & Monitoring     Dietitian will monitor: energy intake, weight, electrolyte/renal panel, glucose/endocrine profile, and gastrointestinal profile    Nutrition Risk/Follow-Up: moderate (follow-up in 3-5 days)   Please consult if re-assessment needed sooner.

## 2024-04-08 NOTE — NURSING
Nurses Note -- 4 Eyes      4/8/2024   3:25 PM      Skin assessed during: Admit      [x] No Altered Skin Integrity Present    []Prevention Measures Documented      [] Yes- Altered Skin Integrity Present or Discovered   [] LDA Added if Not in Epic (Describe Wound)   [] New Altered Skin Integrity was Present on Admit and Documented in LDA   [] Wound Image Taken    Wound Care Consulted? No    Attending Nurse:  Jerilyn Jones RN/Staff Member:  Cathi

## 2024-04-08 NOTE — ED PROVIDER NOTES
Encounter Date: 4/7/2024       History     Chief Complaint   Patient presents with    Weakness     Pt states she's been having persistent fevers, vomiting, weakness since Thursday. Went to  in [place] Friday where they did imaging/swabs (all negative). Only  concern was her sugar and BP were elevated. Pt isnt sure if they treated either before d/c. Was d/c with Abx but her fever has persisted and pt is concerns bc she's getting weaker. CBG in triage 438     Vomiting    Dizziness    Fever    Abdominal Pain     HPI    58-year-old female with a past medical history of insulin-dependent type 2 diabetes, hypertension and asthma presents emergency department generalized abdominal pain, nausea, vomiting and weakness.  Patient states that she had episodes of vomiting for last few days.  States she had fever few days back no fevers for last 2 days.  Into urgent care discharge with antibiotics for URI supposedly.  Patient states she has not feeling any better.    Review of patient's allergies indicates:  No Known Allergies  Past Medical History:   Diagnosis Date    Asthma     Diabetes mellitus     Hypertension      History reviewed. No pertinent surgical history.  History reviewed. No pertinent family history.  Social History     Tobacco Use    Smoking status: Never    Smokeless tobacco: Never   Substance Use Topics    Alcohol use: Never    Drug use: Never     Review of Systems   Constitutional:  Positive for fatigue. Negative for fever.   HENT:  Negative for sore throat.    Respiratory:  Negative for cough and shortness of breath.    Cardiovascular:  Negative for chest pain.   Gastrointestinal:  Positive for abdominal pain, nausea and vomiting. Negative for constipation and diarrhea.   Genitourinary:  Negative for dysuria.   Musculoskeletal:  Negative for back pain.   Skin:  Negative for rash.   Neurological:  Negative for weakness and headaches.   Hematological:  Does not bruise/bleed easily.   All other systems reviewed  and are negative.      Physical Exam     Initial Vitals [04/07/24 2121]   BP Pulse Resp Temp SpO2   (!) 197/96 95 19 98.3 °F (36.8 °C) 97 %      MAP       --         Physical Exam    Nursing note and vitals reviewed.  Constitutional: She appears well-developed and well-nourished. No distress.   Cardiovascular:  Normal rate and regular rhythm.           Pulmonary/Chest: Breath sounds normal. No respiratory distress. She has no wheezes. She has no rhonchi. She has no rales.   Abdominal: Abdomen is soft. There is abdominal tenderness (diffuse). There is no rebound and no guarding.   Musculoskeletal:         General: No tenderness. Normal range of motion.     Neurological: She is alert and oriented to person, place, and time. She has normal strength.   Skin: Skin is warm. Capillary refill takes less than 2 seconds.         ED Course   Procedures  Labs Reviewed   URINALYSIS, REFLEX TO URINE CULTURE - Abnormal; Notable for the following components:       Result Value    Appearance, UA SL CLOUDY (*)     Protein, UA Trace (*)     Glucose, UA >=1000 (*)     Blood, UA Small (*)     All other components within normal limits   COMPREHENSIVE METABOLIC PANEL - Abnormal; Notable for the following components:    Sodium Level 133 (*)     Chloride 97 (*)     Glucose Level 394 (*)     Albumin Level 2.8 (*)     Globulin 4.8 (*)     Albumin/Globulin Ratio 0.6 (*)     All other components within normal limits   CBC WITH DIFFERENTIAL - Abnormal; Notable for the following components:    MCHC 32.8 (*)     MPV 11.2 (*)     IG# 0.05 (*)     All other components within normal limits   URINALYSIS, MICROSCOPIC - Abnormal; Notable for the following components:    Bacteria, UA Few (*)     WBC, UA 11-20 (*)     Squamous Epithelial Cells, UA Few (*)     All other components within normal limits   HEMOGLOBIN A1C - Abnormal; Notable for the following components:    Hemoglobin A1c 13.0 (*)     All other components within normal limits   POCT GLUCOSE -  Abnormal; Notable for the following components:    POCT Glucose 438 (*)     All other components within normal limits   POCT GLUCOSE - Abnormal; Notable for the following components:    POCT Glucose 337 (*)     All other components within normal limits   COVID/RSV/FLU A&B PCR - Normal    Narrative:     The Xpert Xpress SARS-CoV-2/FLU/RSV plus is a rapid, multiplexed real-time PCR test intended for the simultaneous qualitative detection and differentiation of SARS-CoV-2, Influenza A, Influenza B, and respiratory syncytial virus (RSV) viral RNA in either nasopharyngeal swab or nasal swab specimens.         LIPASE - Normal   TROPONIN I - Normal   CULTURE, URINE   BLOOD CULTURE [facility]   BLOOD CULTURE [facility]   CBC W/ AUTO DIFFERENTIAL    Narrative:     The following orders were created for panel order CBC auto differential.  Procedure                               Abnormality         Status                     ---------                               -----------         ------                     CBC with Differential[[id number]]       Abnormal            Final result                 Please view results for these tests on the individual orders.   LACTIC ACID, PLASMA   CBC W/ AUTO DIFFERENTIAL    Narrative:     The following orders were created for panel order CBC auto differential.  Procedure                               Abnormality         Status                     ---------                               -----------         ------                     CBC with Differential[[id number]]                                                        Please view results for these tests on the individual orders.   COMPREHENSIVE METABOLIC PANEL   CBC WITH DIFFERENTIAL   POCT GLUCOSE, HAND-HELD DEVICE   POCT GLUCOSE MONITORING CONTINUOUS     EKG Readings: (Independently Interpreted)   Initial Reading: No STEMI. Rhythm: Normal Sinus Rhythm. Heart Rate: 92. Ectopy: No Ectopy. Conduction: Normal. ST Segments: Normal ST Segments. T Waves:  Normal. Clinical Impression: Normal Sinus Rhythm       Imaging Results              CT Abdomen Pelvis With IV Contrast NO Oral Contrast (Preliminary result)  Result time 04/08/24 00:43:44      Preliminary result by Olayinka Dukes MD (04/08/24 00:43:44)                   Narrative:    START OF REPORT:  Technique: CT of the abdomen and pelvis was performed with axial images as well as sagittal and coronal reconstruction images with intravenous contrast.    Comparison: None available.    Clinical History: Pt states she's been having persistent fevers, vomiting, weakness since Thursday. Went to  in [place] Friday where they did imaging/swabs (all negative). Only  concern was her sugar and BP were elevated. Pt isnt sure if they treated either before d/c. Was d/c with Abx but her fever has persisted and pt is concerns bc she's getting weaker. CBG in triage 438.    Dosage Information: Automated Exposure Control was utilized 525.44 mGy.cm.    Findings:  Thorax:  Lungs: Streaky opacity is noted in the right lower lobe, which may reflect subsegmental atelectasis and / or parenchymal scarring.  Pleura: No effusions or thickening are seen.  Heart: The heart size is within normal limits.  Abdomen:  Abdominal Wall: No abdominal wall pathology is seen.  Liver: The liver is mildly enlarged with mild diffuse fatty infiltration. The liver otherwise appears unremarkable.  Biliary System: No extrahepatic biliary duct dilatation is seen.  Gallbladder: Surgical clips are seen in the gallbladder fossa consistent with prior cholecystectomy.  Pancreas: The pancreas appears unremarkable.  Spleen: The spleen appears unremarkable.  Kidneys: The left kidney appears unremarkable with no stones cysts masses or hydronephrosis. A single cyst measuring 1 cm is seen on series 2, image 32 in the lower pole of the right kidney. Subtle ill-defined hypoperfused areas are seen in the right renal cortex including in the upper and mid poles with  associated perinephric fat stranding (series 2, images 13 and 27). This is consistent with acute pyelonephritis. No stones cysts or masses are identified in the right kidney.  Aorta: There is mild calcification of the abdominal aorta and its branches.  IVC: Unremarkable.  Bowel:  Esophagus: The visualized esophagus appears unremarkable.  Stomach: The stomach appears unremarkable.  Duodenum: Unremarkable appearing duodenum.  Small Bowel: The small bowel appears unremarkable.  Colon: Nondistended.  Appendix: The appendix appears unremarkable (series 2, images 55-59).  Peritoneum: No intraperitoneal free air or ascites is seen.    Pelvis:  Bladder: The bladder appears unremarkable.  Female:  Uterus: The uterus appears unremarkable.  Ovaries: No adnexal masses are seen.    Bony structures:  Dorsal Spine: There is mild spondylosis of the visualized dorsal spine.  Bony Pelvis: Soft tissue calcification is seen in the right iliacus muscle at its insertion. There is mild osteoarthrosis of both hip joints.      Impression:  1. Subtle ill-defined hypoperfused areas are seen in the right renal cortex including in the upper and mid poles with associated perinephric fat stranding (series 2, images 13 and 27). This is consistent with acute pyelonephritis. Correlate with clinical and laboratory findings.  2. Details and other findings as discussed above.                                         Medications   cefTRIAXone (ROCEPHIN) 2 g in dextrose 5 % in water (D5W) 100 mL IVPB (MB+) (has no administration in time range)   glucose chewable tablet 16 g (has no administration in time range)   glucose chewable tablet 24 g (has no administration in time range)   glucagon (human recombinant) injection 1 mg (has no administration in time range)   insulin aspart U-100 injection 0-10 Units (has no administration in time range)   dextrose 10% bolus 125 mL 125 mL (has no administration in time range)   dextrose 10% bolus 250 mL 250 mL (has no  administration in time range)   sodium chloride 0.9% flush 10 mL (has no administration in time range)   melatonin tablet 6 mg (has no administration in time range)   lactated ringers infusion (has no administration in time range)   ondansetron injection 4 mg (has no administration in time range)   lactated ringers bolus 1,000 mL (0 mLs Intravenous Stopped 4/8/24 0001)   ondansetron injection 4 mg (4 mg Intravenous Given 4/7/24 2304)   iohexoL (OMNIPAQUE 350) injection 100 mL (100 mLs Intravenous Given 4/8/24 0009)     Medical Decision Making  differential diagnosis  Nausea, vomiting, gastroenteritis, constipation, diverticulitis, UTI,  as well as multiple other possible etiologies      Problems Addressed:  Nausea and vomiting, unspecified vomiting type: acute illness or injury  Pyelonephritis of right kidney: acute illness or injury    Amount and/or Complexity of Data Reviewed  Labs: ordered. Decision-making details documented in ED Course.  Radiology: ordered.  ECG/medicine tests: ordered and independent interpretation performed.    Risk  OTC drugs.  Prescription drug management.  Decision regarding hospitalization.               ED Course as of 04/08/24 0104   Sun Apr 07, 2024   2324 WBC: 7.32 [BS]   2324 Hemoglobin: 12.7 [BS]   2324 Hematocrit: 38.7 [BS]   2324 Platelet Count: 246 [BS]   2324 WBC, UA(!): 11-20 [BS]   2324 Bacteria, UA(!): Few [BS]   2324 Leukocyte Esterase, UA: Negative [BS]   2324 NITRITE UA: Negative [BS]   Mon Apr 08, 2024   0049 CT confirming right pyelonephritis.  Patient with continued nausea and not wanting to drink because of the concern of vomiting.  Will admit for pyelonephritis and treat the nausea until she is able to tolerate p.o. for antibiotic treatment by mouth [BS]   0103 Hospitalist agrees with admission [BS]      ED Course User Index  [BS] Orlando Garcia MD                             Clinical Impression:  Final diagnoses:  [R11.2] Nausea & vomiting  [N12] Pyelonephritis  of right kidney (Primary)  [R11.2] Nausea and vomiting, unspecified vomiting type          ED Disposition Condition    Observation                 Orlando Garcia MD  04/08/24 0104

## 2024-04-08 NOTE — PROGRESS NOTES
[facility]  [facility] Medicine Progress Note      Patient Name: Greer Kahn  MRN: [id number]  Admission Date: 4/7/2024   Length of Stay: 0  Attending Physician: Thor Ryan MD  Primary Care Provider: Jesica, Primary Doctor  Face-to-Face encounter date: 04/08/2024    Code Status: Full Code        Chief Complaint:   Weakness (Pt states she's been having persistent fevers, vomiting, weakness since Thursday. Went to  in [place] where they did imaging/swabs (all negative). Only UC concern was her sugar and BP were elevated. Pt isnt sure if they treated either before d/c. Was d/c with Abx but her fever has persisted and pt is concerns bc she's getting weaker. CBG in triage 438 ), Vomiting, Dizziness, Fever, and Abdominal Pain        HPI:   58-year-old female with past medical history significant for diabetes mellitus, hypertension, obesity who presented to ED with complaints of generalized abdominal pain associated nausea vomiting with generalized weakness patient also noted to have persistent febrile episodes since onset of last Thursday.  Patient presented to urgent Care sudden and in [place] where she had studies including imaging done and was negative at that time and patient was managed for her uncontrolled diabetes with hyperglycemia.  At this time she was discharged on oral antibiotics for upper respiratory infection, but due to persistent fevers and continued weakness she presented for evaluation.  Upon initial workup in ED patient was found to have urinary tract infection and possible pyelonephritis given CT imaging studies that she has been admitted for urgent management at this time.     Overview/Hospital Course:  No notes on file       Interval Hx:   Pt resting in bed, pain controlled, says cbgs poorly controlled at home.  No f/c/n/v, +right flank pain    Review of Systems   All other systems reviewed and are negative.      Objective/physical  exam:  General: In no acute distress, afebrile, obese  Chest: Clear to auscultation bilaterally  Heart: RRR, +S1, S2, no appreciable murmur  Abdomen: Soft, right flank tenderness/guarding, BS +  MSK: Warm, 1+ lower extremity edema, no clubbing or cyanosis  Neurologic: Alert and oriented x4, Cranial nerve II-XII intact,     VITAL SIGNS: 24 HRS MIN & MAX LAST   Temp  Min: 97.9 °F (36.6 °C)  Max: 98.3 °F (36.8 °C) 97.9 °F (36.6 °C)   BP  Min: 95/61  Max: 197/96 95/61   Pulse  Min: 72  Max: 95  72   Resp  Min: 16  Max: 20 20   SpO2  Min: 90 %  Max: 97 % 95 %       Recent Labs   Lab 04/07/24 2300 04/08/24  0559   WBC 7.32 7.50   RBC 4.30 3.95*   HGB 12.7 11.9*   HCT 38.7 36.4*   MCV 90.0 92.2   MCH 29.5 30.1   MCHC 32.8* 32.7*   RDW 11.9 11.9    217   MPV 11.2* 10.8*       Recent Labs   Lab 04/07/24 2300 04/08/24  0559   * 131*   K 3.9 3.6   CO2 27 29   BUN 11.2 10.0   CREATININE 0.89 0.93   CALCIUM 9.1 8.6   ALBUMIN 2.8* 2.5*   ALKPHOS 119 106   ALT 28 24   AST 21 18   BILITOT 0.6 0.4        Microbiology Results (last 7 days)       Procedure Component Value Units Date/Time    Blood culture #2 **CANNOT BE ORDERED STAT** [[id number]] Collected: 04/08/24 0121    Order Status: Resulted Specimen: Blood from Hand, Right Updated: 04/08/24 0123    Blood culture #1 **CANNOT BE ORDERED STAT** [[id number]] Collected: 04/08/24 0121    Order Status: Resulted Specimen: Blood from Hand, Left Updated: 04/08/24 0123    Urine culture [[id number]] Collected: 04/07/24 2130    Order Status: Sent Specimen: Urine Updated: 04/07/24 2207             Radiology:  CT Abdomen Pelvis With IV Contrast NO Oral Contrast  Narrative: Technique: CT of the abdomen and pelvis was performed with axial images as well as sagittal and coronal reconstruction images with intravenous contrast.    Comparison: June 13, 2020 none available.    Clinical History: Pt states she's been having persistent fevers, vomiting, weakness since Thursday. Went to UC  [place] where they did imaging/swabs (all negative). Only UC concern was her sugar and BP were elevated. Pt isnt sure if they treated either before d/c. Was d/c with Abx but her fever has persisted and pt is concerns bc she's getting weaker. CBG in triage 438.    Dosage Information: Automated Exposure Control was utilized 525.44 mGy.cm.    Findings:    Thorax:    Lungs: Streaky opacity is noted in the right lower lobe, which may reflect subsegmental atelectasis and / or parenchymal scarring.    Pleura: No effusions or thickening are seen.    Abdomen:    Abdominal Wall: No abdominal wall pathology is seen.    Liver: The liver is mildly enlarged with mild diffuse fatty infiltration. The liver otherwise appears unremarkable.    Biliary System: No extrahepatic biliary duct dilatation is seen.    Gallbladder: Surgical clips are seen in the gallbladder fossa consistent with prior cholecystectomy.    Pancreas: The pancreas appears unremarkable.    Spleen: The spleen appears unremarkable.    Kidneys: The left kidney appears unremarkable with no stones cysts masses or hydronephrosis. A stent single cyst measuring 1 cm is seen on series 2, image 32 in the lower pole of the right kidney for which no specific follow-up imaging is recommended.  Subtle ill-defined hypoperfused areas are seen in the right renal cortex including in the upper and mid poles with associated perinephric fat stranding (series 2, images 13 and 27). This is consistent with acute pyelonephritis. No stones cysts or masses are identified in the right kidney.    Aorta: There is mild calcification of the abdominal aorta and its branches.    Bowel:    Esophagus: The visualized esophagus appears unremarkable.    Stomach: The stomach appears unremarkable.    Duodenum: Unremarkable appearing duodenum.    Small Bowel: The small bowel appears unremarkable.    Colon: Nondistended.    Appendix: The appendix appears unremarkable (series 2, images  55-59).    Peritoneum: No intraperitoneal free air or ascites is seen.    Pelvis:    Bladder: The bladder appears unremarkable.    Female:    Uterus: The uterus appears unremarkable.    Ovaries: No adnexal masses are seen.    Bony structures:    Dorsal Spine: There is mild spondylosis of the visualized dorsal spine.    Bony Pelvis: Soft tissue calcification is seen in the right iliacus muscle at its insertion. There is mild osteoarthrosis of both hip joints.  Impression: Impression:    1. Subtle ill-defined hypoperfused areas are seen in the right renal cortex including in the upper and mid poles with associated perinephric fat stranding (series 2, images 13 and 27). This is consistent with acute pyelonephritis. Correlate with clinical and laboratory findings.    2. Details and other findings as discussed above.    No significant discrepancy with overnight report.    Electronically signed by: Garland Bowie  Date:    04/08/2024  Time:    08:13      Scheduled Med:   atorvastatin  20 mg Oral Daily    cefTRIAXone (Rocephin) IV (PEDS and ADULTS)  2 g Intravenous Q24H    hydroCHLOROthiazide  25 mg Oral Daily    insulin detemir U-100  30 Units Subcutaneous QHS    metoprolol tartrate  25 mg Oral BID        Continuous Infusions:   lactated ringers 125 mL/hr at 04/08/24 1101        PRN Meds:  dextrose 10%, dextrose 10%, glucagon (human recombinant), glucose, glucose, hydrALAZINE, HYDROcodone-acetaminophen, insulin aspart U-100, melatonin, morphine, ondansetron, sodium chloride 0.9%     Nutrition Status:      Assessment/Plan:  Acute pyelonephritis  N/v  Dm poor control  Htn    Plan    Pain control  Rocephin  F/u cxs  Change ivfs  Zofran prn  Resume Home meds/iss  2000 calorie ada diet  Labs in am    Dvt proph: lovenox                All diagnosis and differential diagnosis have been reviewed; assessment and plan has been documented; I have personally reviewed the labs and test results that are presently available; I have  reviewed the patients medication list; I have reviewed the consulting providers response and recommendations. I have reviewed or attempted to review medical records based upon their availability      _____________________________________________________________________            Thor Ryan MD   04/08/2024

## 2024-04-08 NOTE — PLAN OF CARE
04/08/24 1517   Discharge Assessment   Assessment Type Discharge Planning Assessment   Source of Information patient   Does patient/caregiver understand observation status Yes   Communicated TANIYA with patient/caregiver Yes   Reason For Admission pyelonephritis   People in Home alone   Do you expect to return to your current living situation? Yes   Do you have help at home or someone to help you manage your care at home? Yes   Who are your caregiver(s) and their phone number(s)? daughter   Prior to hospitilization cognitive status: Alert/Oriented   Current cognitive status: Alert/Oriented   Walking or Climbing Stairs Difficulty no   Dressing/Bathing Difficulty no   Equipment Currently Used at Home none   Readmission within 30 days? No   Patient currently being followed by outpatient case management? No   Do you currently have service(s) that help you manage your care at home? No   Do you take prescription medications? Yes   Do you have prescription coverage? Yes   Coverage medicaid   Do you have any problems affording any of your prescribed medications? No   Is the patient taking medications as prescribed? yes   Who is going to help you get home at discharge? daughter   How do you get to doctors appointments? car, drives self   Are you on dialysis? No   Do you take coumadin? No   Discharge Plan A Home with family   Discharge Plan B Home with family   DME Needed Upon Discharge  none   Discharge Plan discussed with: Patient   Transition of Care Barriers None     Admitted w pyelonephritis; N/V. Spk w pt -- std she feels better. Nausea/ vomiting resolved. Still c/o severe headache & constipation. No dme. No hh. No dcp needs at present. Daughter to asst w recovery.   Will f/u.

## 2024-04-09 LAB
ANION GAP SERPL CALC-SCNC: 8 MEQ/L
BASOPHILS # BLD AUTO: 0.03 X10(3)/MCL
BASOPHILS NFR BLD AUTO: 0.4 %
BUN SERPL-MCNC: 12.7 MG/DL (ref 9.8–20.1)
CALCIUM SERPL-MCNC: 8.7 MG/DL (ref 8.4–10.2)
CHLORIDE SERPL-SCNC: 100 MMOL/L (ref 98–107)
CO2 SERPL-SCNC: 29 MMOL/L (ref 22–29)
CREAT SERPL-MCNC: 0.76 MG/DL (ref 0.55–1.02)
CREAT/UREA NIT SERPL: 17
EOSINOPHIL # BLD AUTO: 0.24 X10(3)/MCL (ref 0–0.9)
EOSINOPHIL NFR BLD AUTO: 2.9 %
ERYTHROCYTE [DISTWIDTH] IN BLOOD BY AUTOMATED COUNT: 12 % (ref 11.5–17)
GFR SERPLBLD CREATININE-BSD FMLA CKD-EPI: >60 MLS/MIN/1.73/M2
GLUCOSE SERPL-MCNC: 182 MG/DL (ref 74–100)
HCT VFR BLD AUTO: 35.5 % (ref 37–47)
HGB BLD-MCNC: 11.5 G/DL (ref 12–16)
IMM GRANULOCYTES # BLD AUTO: 0.06 X10(3)/MCL (ref 0–0.04)
IMM GRANULOCYTES NFR BLD AUTO: 0.7 %
LYMPHOCYTES # BLD AUTO: 2.79 X10(3)/MCL (ref 0.6–4.6)
LYMPHOCYTES NFR BLD AUTO: 33.2 %
MCH RBC QN AUTO: 30.2 PG (ref 27–31)
MCHC RBC AUTO-ENTMCNC: 32.4 G/DL (ref 33–36)
MCV RBC AUTO: 93.2 FL (ref 80–94)
MONOCYTES # BLD AUTO: 0.98 X10(3)/MCL (ref 0.1–1.3)
MONOCYTES NFR BLD AUTO: 11.7 %
NEUTROPHILS # BLD AUTO: 4.3 X10(3)/MCL (ref 2.1–9.2)
NEUTROPHILS NFR BLD AUTO: 51.1 %
NRBC BLD AUTO-RTO: 0 %
PLATELET # BLD AUTO: 227 X10(3)/MCL (ref 130–400)
PMV BLD AUTO: 10.8 FL (ref 7.4–10.4)
POCT GLUCOSE: 167 MG/DL (ref 70–110)
POCT GLUCOSE: 194 MG/DL (ref 70–110)
POCT GLUCOSE: 239 MG/DL (ref 70–110)
POCT GLUCOSE: 261 MG/DL (ref 70–110)
POCT GLUCOSE: >500 MG/DL (ref 70–110)
POTASSIUM SERPL-SCNC: 3.8 MMOL/L (ref 3.5–5.1)
RBC # BLD AUTO: 3.81 X10(6)/MCL (ref 4.2–5.4)
SODIUM SERPL-SCNC: 137 MMOL/L (ref 136–145)
WBC # SPEC AUTO: 8.4 X10(3)/MCL (ref 4.5–11.5)

## 2024-04-09 PROCEDURE — 25000003 PHARM REV CODE 250: Performed by: INTERNAL MEDICINE

## 2024-04-09 PROCEDURE — 63600175 PHARM REV CODE 636 W HCPCS: Performed by: INTERNAL MEDICINE

## 2024-04-09 PROCEDURE — 96372 THER/PROPH/DIAG INJ SC/IM: CPT | Performed by: STUDENT IN AN ORGANIZED HEALTH CARE EDUCATION/TRAINING PROGRAM

## 2024-04-09 PROCEDURE — 21400001 HC TELEMETRY ROOM

## 2024-04-09 PROCEDURE — 96372 THER/PROPH/DIAG INJ SC/IM: CPT | Performed by: INTERNAL MEDICINE

## 2024-04-09 PROCEDURE — 94761 N-INVAS EAR/PLS OXIMETRY MLT: CPT

## 2024-04-09 PROCEDURE — 25000003 PHARM REV CODE 250: Performed by: STUDENT IN AN ORGANIZED HEALTH CARE EDUCATION/TRAINING PROGRAM

## 2024-04-09 PROCEDURE — 63600175 PHARM REV CODE 636 W HCPCS: Performed by: STUDENT IN AN ORGANIZED HEALTH CARE EDUCATION/TRAINING PROGRAM

## 2024-04-09 PROCEDURE — G0378 HOSPITAL OBSERVATION PER HR: HCPCS

## 2024-04-09 PROCEDURE — 97166 OT EVAL MOD COMPLEX 45 MIN: CPT

## 2024-04-09 PROCEDURE — 97162 PT EVAL MOD COMPLEX 30 MIN: CPT

## 2024-04-09 PROCEDURE — 99900031 HC PATIENT EDUCATION (STAT)

## 2024-04-09 PROCEDURE — 85025 COMPLETE CBC W/AUTO DIFF WBC: CPT | Performed by: INTERNAL MEDICINE

## 2024-04-09 PROCEDURE — 80048 BASIC METABOLIC PNL TOTAL CA: CPT | Performed by: INTERNAL MEDICINE

## 2024-04-09 RX ORDER — PROCHLORPERAZINE EDISYLATE 5 MG/ML
5 INJECTION INTRAMUSCULAR; INTRAVENOUS EVERY 6 HOURS PRN
Status: DISCONTINUED | OUTPATIENT
Start: 2024-04-09 | End: 2024-04-09

## 2024-04-09 RX ORDER — BUTALBITAL, ACETAMINOPHEN AND CAFFEINE 50; 325; 40 MG/1; MG/1; MG/1
1 TABLET ORAL EVERY 4 HOURS PRN
Status: DISCONTINUED | OUTPATIENT
Start: 2024-04-09 | End: 2024-04-12 | Stop reason: HOSPADM

## 2024-04-09 RX ORDER — PROCHLORPERAZINE EDISYLATE 5 MG/ML
5 INJECTION INTRAMUSCULAR; INTRAVENOUS EVERY 6 HOURS PRN
Status: DISCONTINUED | OUTPATIENT
Start: 2024-04-09 | End: 2024-04-12 | Stop reason: HOSPADM

## 2024-04-09 RX ORDER — AMLODIPINE BESYLATE 5 MG/1
10 TABLET ORAL DAILY
Status: DISCONTINUED | OUTPATIENT
Start: 2024-04-09 | End: 2024-04-12 | Stop reason: HOSPADM

## 2024-04-09 RX ORDER — ONDANSETRON HYDROCHLORIDE 2 MG/ML
4 INJECTION, SOLUTION INTRAVENOUS EVERY 6 HOURS PRN
Status: DISCONTINUED | OUTPATIENT
Start: 2024-04-09 | End: 2024-04-12 | Stop reason: HOSPADM

## 2024-04-09 RX ADMIN — METFORMIN HYDROCHLORIDE 1000 MG: 500 TABLET, FILM COATED ORAL at 11:04

## 2024-04-09 RX ADMIN — ONDANSETRON HYDROCHLORIDE 4 MG: 2 SOLUTION INTRAMUSCULAR; INTRAVENOUS at 08:04

## 2024-04-09 RX ADMIN — POLYETHYLENE GLYCOL 3350 17 G: 17 POWDER, FOR SOLUTION ORAL at 11:04

## 2024-04-09 RX ADMIN — ENOXAPARIN SODIUM 40 MG: 40 INJECTION SUBCUTANEOUS at 04:04

## 2024-04-09 RX ADMIN — FAMOTIDINE 20 MG: 20 TABLET ORAL at 08:04

## 2024-04-09 RX ADMIN — GLIMEPIRIDE 4 MG: 4 TABLET ORAL at 07:04

## 2024-04-09 RX ADMIN — ONDANSETRON 4 MG: 2 INJECTION INTRAMUSCULAR; INTRAVENOUS at 12:04

## 2024-04-09 RX ADMIN — METOPROLOL TARTRATE 25 MG: 25 TABLET, FILM COATED ORAL at 11:04

## 2024-04-09 RX ADMIN — METOPROLOL TARTRATE 25 MG: 25 TABLET, FILM COATED ORAL at 08:04

## 2024-04-09 RX ADMIN — INSULIN ASPART 2 UNITS: 100 INJECTION, SOLUTION INTRAVENOUS; SUBCUTANEOUS at 06:04

## 2024-04-09 RX ADMIN — AMLODIPINE BESYLATE 10 MG: 5 TABLET ORAL at 03:04

## 2024-04-09 RX ADMIN — PROCHLORPERAZINE EDISYLATE 5 MG: 5 INJECTION INTRAMUSCULAR; INTRAVENOUS at 06:04

## 2024-04-09 RX ADMIN — HYDROCODONE BITARTRATE AND ACETAMINOPHEN 1 TABLET: 5; 325 TABLET ORAL at 08:04

## 2024-04-09 RX ADMIN — INSULIN ASPART 2 UNITS: 100 INJECTION, SOLUTION INTRAVENOUS; SUBCUTANEOUS at 10:04

## 2024-04-09 RX ADMIN — HYDROCHLOROTHIAZIDE 25 MG: 25 TABLET ORAL at 11:04

## 2024-04-09 RX ADMIN — INSULIN ASPART 3 UNITS: 100 INJECTION, SOLUTION INTRAVENOUS; SUBCUTANEOUS at 08:04

## 2024-04-09 RX ADMIN — PIOGLITAZONE 30 MG: 15 TABLET ORAL at 11:04

## 2024-04-09 RX ADMIN — INSULIN DETEMIR 35 UNITS: 100 INJECTION, SOLUTION SUBCUTANEOUS at 08:04

## 2024-04-09 RX ADMIN — SODIUM CHLORIDE: 9 INJECTION, SOLUTION INTRAVENOUS at 03:04

## 2024-04-09 RX ADMIN — CEFTRIAXONE SODIUM 2 G: 2 INJECTION, POWDER, FOR SOLUTION INTRAMUSCULAR; INTRAVENOUS at 02:04

## 2024-04-09 RX ADMIN — METFORMIN HYDROCHLORIDE 1000 MG: 500 TABLET, FILM COATED ORAL at 08:04

## 2024-04-09 RX ADMIN — SENNOSIDES AND DOCUSATE SODIUM 2 TABLET: 8.6; 5 TABLET ORAL at 11:04

## 2024-04-09 RX ADMIN — ONDANSETRON HYDROCHLORIDE 4 MG: 2 SOLUTION INTRAMUSCULAR; INTRAVENOUS at 03:04

## 2024-04-09 RX ADMIN — INSULIN ASPART 4 UNITS: 100 INJECTION, SOLUTION INTRAVENOUS; SUBCUTANEOUS at 04:04

## 2024-04-09 RX ADMIN — ATORVASTATIN CALCIUM 20 MG: 10 TABLET, FILM COATED ORAL at 11:04

## 2024-04-09 RX ADMIN — FAMOTIDINE 20 MG: 20 TABLET ORAL at 11:04

## 2024-04-09 NOTE — PROGRESS NOTES
[facility]  [facility] Medicine Progress Note      Patient Name: Greer aKhn  MRN: [id number]  Admission Date: 4/7/2024   Length of Stay: 0  Attending Physician: Thor Ryan MD  Primary Care Provider: Jesica, Primary Doctor  Face-to-Face encounter date: 04/09/2024    Code Status: Full Code        Chief Complaint:   Weakness (Pt states she's been having persistent fevers, vomiting, weakness since Thursday. Went to  in [place] where they did imaging/swabs (all negative). Only UC concern was her sugar and BP were elevated. Pt isnt sure if they treated either before d/c. Was d/c with Abx but her fever has persisted and pt is concerns bc she's getting weaker. CBG in triage 438 ), Vomiting, Dizziness, Fever, and Abdominal Pain        HPI:   58-year-old female with past medical history significant for diabetes mellitus, hypertension, obesity who presented to ED with complaints of generalized abdominal pain associated nausea vomiting with generalized weakness patient also noted to have persistent febrile episodes since onset of last Thursday.  Patient presented to urgent Care sudden and in [place] where she had studies including imaging done and was negative at that time and patient was managed for her uncontrolled diabetes with hyperglycemia.  At this time she was discharged on oral antibiotics for upper respiratory infection, but due to persistent fevers and continued weakness she presented for evaluation.  Upon initial workup in ED patient was found to have urinary tract infection and possible pyelonephritis given CT imaging studies that she has been admitted for urgent management at this time.     Overview/Hospital Course:  No notes on file       Interval Hx:   Flank pain resolved, no f/c.  Complaint of dizziness with nausea at rest, +headache    Review of Systems   All other systems reviewed and are negative.      Objective/physical exam:  General: In  no acute distress, afebrile, obese  Chest: Clear to auscultation bilaterally  Heart: RRR, +S1, S2, no appreciable murmur  Abdomen: Soft, no tenderness/guarding, BS +  MSK: Warm, 1+ lower extremity edema, no clubbing or cyanosis  Neurologic: Alert and oriented x4, Cranial nerve II-XII intact,     VITAL SIGNS: 24 HRS MIN & MAX LAST   Temp  Min: 97.8 °F (36.6 °C)  Max: 98.9 °F (37.2 °C) 97.8 °F (36.6 °C)   BP  Min: 145/82  Max: 161/97 (!) 149/83   Pulse  Min: 79  Max: 83  83   Resp  Min: 17  Max: 20 18   SpO2  Min: 93 %  Max: 97 % 97 %       Recent Labs   Lab 04/07/24 2300 04/08/24  0559 04/09/24  0450   WBC 7.32 7.50 8.40   RBC 4.30 3.95* 3.81*   HGB 12.7 11.9* 11.5*   HCT 38.7 36.4* 35.5*   MCV 90.0 92.2 93.2   MCH 29.5 30.1 30.2   MCHC 32.8* 32.7* 32.4*   RDW 11.9 11.9 12.0    217 227   MPV 11.2* 10.8* 10.8*         Recent Labs   Lab 04/07/24 2300 04/08/24  0559 04/09/24  0450   * 131* 137   K 3.9 3.6 3.8   CO2 27 29 29   BUN 11.2 10.0 12.7   CREATININE 0.89 0.93 0.76   CALCIUM 9.1 8.6 8.7   ALBUMIN 2.8* 2.5*  --    ALKPHOS 119 106  --    ALT 28 24  --    AST 21 18  --    BILITOT 0.6 0.4  --           Microbiology Results (last 7 days)       Procedure Component Value Units Date/Time    Blood culture #2 **CANNOT BE ORDERED STAT** [[id number]]  (Normal) Collected: 04/08/24 0121    Order Status: Completed Specimen: Blood from Hand, Right Updated: 04/09/24 1101     CULTURE, BLOOD ([facility]) No Growth At 24 Hours    Blood culture #1 **CANNOT BE ORDERED STAT** [[id number]]  (Normal) Collected: 04/08/24 0121    Order Status: Completed Specimen: Blood from Hand, Left Updated: 04/09/24 1101     CULTURE, BLOOD ([facility]) No Growth At 24 Hours    Urine culture [[id number]] Collected: 04/07/24 2130    Order Status: Completed Specimen: Urine Updated: 04/09/24 0654     Urine Culture No Growth At 24 Hours             Radiology:  CT Abdomen Pelvis With IV Contrast NO Oral Contrast  Narrative: Technique: CT of the abdomen and  pelvis was performed with axial images as well as sagittal and coronal reconstruction images with intravenous contrast.    Comparison: June 13, 2020 none available.    Clinical History: Pt states she's been having persistent fevers, vomiting, weakness since Thursday. Went to  in [place] where they did imaging/swabs (all negative). Only UC concern was her sugar and BP were elevated. Pt isnt sure if they treated either before d/c. Was d/c with Abx but her fever has persisted and pt is concerns bc she's getting weaker. CBG in triage 438.    Dosage Information: Automated Exposure Control was utilized 525.44 mGy.cm.    Findings:    Thorax:    Lungs: Streaky opacity is noted in the right lower lobe, which may reflect subsegmental atelectasis and / or parenchymal scarring.    Pleura: No effusions or thickening are seen.    Abdomen:    Abdominal Wall: No abdominal wall pathology is seen.    Liver: The liver is mildly enlarged with mild diffuse fatty infiltration. The liver otherwise appears unremarkable.    Biliary System: No extrahepatic biliary duct dilatation is seen.    Gallbladder: Surgical clips are seen in the gallbladder fossa consistent with prior cholecystectomy.    Pancreas: The pancreas appears unremarkable.    Spleen: The spleen appears unremarkable.    Kidneys: The left kidney appears unremarkable with no stones cysts masses or hydronephrosis. A stent single cyst measuring 1 cm is seen on series 2, image 32 in the lower pole of the right kidney for which no specific follow-up imaging is recommended.  Subtle ill-defined hypoperfused areas are seen in the right renal cortex including in the upper and mid poles with associated perinephric fat stranding (series 2, images 13 and 27). This is consistent with acute pyelonephritis. No stones cysts or masses are identified in the right kidney.    Aorta: There is mild calcification of the abdominal aorta and its branches.    Bowel:    Esophagus: The visualized  esophagus appears unremarkable.    Stomach: The stomach appears unremarkable.    Duodenum: Unremarkable appearing duodenum.    Small Bowel: The small bowel appears unremarkable.    Colon: Nondistended.    Appendix: The appendix appears unremarkable (series 2, images 55-59).    Peritoneum: No intraperitoneal free air or ascites is seen.    Pelvis:    Bladder: The bladder appears unremarkable.    Female:    Uterus: The uterus appears unremarkable.    Ovaries: No adnexal masses are seen.    Bony structures:    Dorsal Spine: There is mild spondylosis of the visualized dorsal spine.    Bony Pelvis: Soft tissue calcification is seen in the right iliacus muscle at its insertion. There is mild osteoarthrosis of both hip joints.  Impression: Impression:    1. Subtle ill-defined hypoperfused areas are seen in the right renal cortex including in the upper and mid poles with associated perinephric fat stranding (series 2, images 13 and 27). This is consistent with acute pyelonephritis. Correlate with clinical and laboratory findings.    2. Details and other findings as discussed above.    No significant discrepancy with overnight report.    Electronically signed by: Garland Bowie  Date:    04/08/2024  Time:    08:13      Scheduled Med:   atorvastatin  20 mg Oral Daily    cefTRIAXone (Rocephin) IV (PEDS and ADULTS)  2 g Intravenous Q24H    enoxparin  40 mg Subcutaneous Q24H (prophylaxis, 1700)    famotidine  20 mg Oral BID    glimepiride  4 mg Oral Daily with breakfast    hydroCHLOROthiazide  25 mg Oral Daily    insulin detemir U-100  30 Units Subcutaneous QHS    metFORMIN  1,000 mg Oral BID    metoprolol tartrate  25 mg Oral BID    pioglitazone  30 mg Oral Daily    polyethylene glycol  17 g Oral Daily    senna-docusate 8.6-50 mg  2 tablet Oral Daily        Continuous Infusions:   sodium chloride 0.9% 100 mL/hr at 04/09/24 0315        PRN Meds:  bisacodyL, bismuth subsalicylate, dextrose 10%, dextrose 10%, glucagon (human  recombinant), glucose, glucose, hydrALAZINE, HYDROcodone-acetaminophen, insulin aspart U-100, melatonin, morphine, ondansetron, prochlorperazine, sodium chloride 0.9%     Nutrition Status:      Assessment/Plan:  Acute pyelonephritis  N/v  Dm poor control  Htn poor control  Headache    Plan       Rocephin   cxs no growth  dc ivfs  Resume home meds -fioricet, flovent, norvasc  Increase detemir 35u  Monitor bp  Zofran prn  2000 calorie ada diet  Home tomorrow  Consult PTOT    Dvt proph: lovenox                All diagnosis and differential diagnosis have been reviewed; assessment and plan has been documented; I have personally reviewed the labs and test results that are presently available; I have reviewed the patients medication list; I have reviewed the consulting providers response and recommendations. I have reviewed or attempted to review medical records based upon their availability      _____________________________________________________________________            Thor Ryan MD   04/09/2024

## 2024-04-09 NOTE — PLAN OF CARE
Problem: Adult Inpatient Plan of Care  Goal: Plan of Care Review  Outcome: Ongoing, Progressing  Goal: Patient-Specific Goal (Individualized)  Outcome: Ongoing, Progressing  Goal: Absence of Hospital-Acquired Illness or Injury  Outcome: Ongoing, Progressing  Goal: Optimal Comfort and Wellbeing  Outcome: Ongoing, Progressing  Goal: Readiness for Transition of Care  Outcome: Ongoing, Progressing     Problem: Diabetes Comorbidity  Goal: Blood Glucose Level Within Targeted Range  Outcome: Ongoing, Progressing     Problem: Hypertension Comorbidity  Goal: Blood Pressure in Desired Range  Outcome: Ongoing, Progressing     Problem: Nausea and Vomiting  Goal: Fluid and Electrolyte Balance  Outcome: Ongoing, Progressing

## 2024-04-09 NOTE — PT/OT/SLP EVAL
Occupational Therapy   Evaluation and Discharge Note    Name: Greer Kahn  MRN: [id number]  Admitting Diagnosis: Pyelonephritis of right kidney  Recent Surgery: * No surgery found *      Recommendations:     Discharge Recommendations: No Therapy Indicated  Discharge Equipment Recommendations: none  Barriers to discharge:  None    Assessment:     Greer Kahn is a 58 y.o. female with a medical diagnosis of Pyelonephritis of right kidney. At this time, patient is functioning at their prior level of function and does not require further acute OT services.     Plan:     During this hospitalization, patient does not require further acute OT services.  Please re-consult if situation changes.    Plan of Care Reviewed with: patient    Subjective     Chief Complaint: previous nausea  Patient/Family Comments/goals: feeling much better now    Occupational Profile:  Living Environment: lives alone in [facility] with 3 [address]. Has a tub/shower combo.  Previous level of function: independent  Roles and Routines: full time DSW  Equipment Used at home: none  Assistance upon Discharge: daughter    Pain/Comfort:  Pain Rating 1: 0/10    Patients cultural, spiritual, Congregation conflicts given the current situation: no    Objective:     Communicated with: NSDAFNE prior to session.  Patient found supine with peripheral IV upon OT entry to room.    General Precautions: Standard, fall  Orthopedic Precautions: N/A  Braces: N/A  Respiratory Status: Room air     Occupational Performance:    Bed Mobility:    Patient completed Rolling/Turning to Left with  independence  Patient completed Rolling/Turning to Right with independence  Patient completed Scooting/Bridging with independence  Patient completed Supine to Sit with independence  Patient completed Sit to Supine with independence    Functional Mobility/Transfers:  Patient completed Sit <> Stand Transfer with independence  with  no assistive device   Patient completed Toilet Transfer Step Transfer  technique with independence with  no AD  Functional Mobility: In-room FM with no AD for toilet transfer.     Activities of Daily Living:  Bathing: independence per pt report, took bath last night prior to presenting to ED  Upper Body Dressing: independence per pt report  Lower Body Dressing: independence per pt report    Cognitive/Visual Perceptual:  Cognitive/Psychosocial Skills:     -       Oriented to: Person, Place, Time, and Situation   -       Follows Commands/attention:Follows multistep  commands  -       Communication: clear/fluent  -       Memory: No Deficits noted  -       Safety awareness/insight to disability: intact   -       Mood/Affect/Coping skills/emotional control: Appropriate to situation, Cooperative, and Pleasant    Physical Exam:  Upper Extremity Range of Motion:     -       Right Upper Extremity: WFL except deficits noted with external rotation  -       Left Upper Extremity: WFL except deficits noted with external rotation  Upper Extremity Strength:    -       Right Upper Extremity: WFL  -       Left Upper Extremity: WFL    Treatment & Education:  Roles and goals of occupational therapy discussed with patient.    Patient left left sidelying with all lines intact, call button in reach, and RN notified    GOALS:   Multidisciplinary Problems       Occupational Therapy Goals       Not on file              Multidisciplinary Problems (Resolved)          Problem: Occupational Therapy    Goal Priority Disciplines Outcome Interventions   Occupational Therapy Goal   (Resolved)     OT, PT/OT Met    Description: Patient to perform toilet transfer independently by d/c. MET                       History:     Past Medical History:   Diagnosis Date    Asthma     Diabetes mellitus     Hypertension        History reviewed. No pertinent surgical history.    Time Tracking:     OT Date of Treatment: 04/09/24  OT Start Time: 1650  OT Stop Time: 1700  OT Total Time (min): 10 min    Billable Minutes:Evaluation  10    4/9/2024

## 2024-04-09 NOTE — PLAN OF CARE
Problem: Occupational Therapy  Goal: Occupational Therapy Goal  Description: Patient to perform toilet transfer independently by d/c. MET  Outcome: Met

## 2024-04-09 NOTE — PT/OT/SLP EVAL
Physical Therapy Evaluation    Patient Name:  Greer Kahn   MRN:  [id number]    Recommendations:     Discharge Recommendations: No Therapy Indicated   Discharge Equipment Recommendations: none   Barriers to discharge: None    Assessment:     Greer Kahn is a 58 y.o. female admitted with a medical diagnosis of Pyelonephritis of right kidney.  She presents with the following impairments/functional limitations: weakness .    Rehab Prognosis: Good; patient would benefit from acute skilled PT services to address these deficits and reach maximum level of function.    Recent Surgery: * No surgery found *      Plan:     During this hospitalization, patient to be seen daily to address the identified rehab impairments via gait training, therapeutic activities, therapeutic exercises and progress toward the following goals:    Plan of Care Expires:  04/15/24    Subjective     Chief Complaint: Dizziness  Patient/Family Comments/goals:   Pain/Comfort:  Pain Rating 1: 0/10    Patients cultural, spiritual, Yarsanism conflicts given the current situation:      Living Environment:  Pt lives in single story home alone, 3 steps with R HR to enter.  Prior to admission, patients level of function was ind.  Equipment used at home: none.  DME owned (not currently used): none.  Upon discharge, patient will have assistance from family.    Objective:     Communicated with nurse prior to session.  Patient found supine with peripheral IV  upon PT entry to room.    General Precautions: Standard, fall  Orthopedic Precautions:N/A   Braces: N/A  Respiratory Status: Room air    Exams:  RLE ROM: WFL  RLE Strength: WFL  LLE ROM: WFL  LLE Strength: WFL    Functional Mobility:  Bed Mobility:     Supine to Sit: stand by assistance  Transfers:     Sit to Stand:  contact guard assistance with no AD  Toilet Transfer: contact guard assistance with no AD using  Step Transfer  Gait: Pt ambulated 100 ft w no AD and CGA, using step through gait pattern  at slow pace. Pt refused further ambulation due to slight dizziness          Treatment & Education:  Pt edu on importance of frequent mobility    Patient left up in chair with all lines intact, call button in reach, and nurse notified.    GOALS:   Multidisciplinary Problems       Physical Therapy Goals          Problem: Physical Therapy    Goal Priority Disciplines Outcome Goal Variances Interventions   Physical Therapy Goal     PT, PT/OT Ongoing, Progressing     Description: Pt will improve functional independence by performing:    Bed mobility: mod I  Sit to stand: SBA with rolling walker  Bed to chair t/f: SBA with Stand Step  with rolling walker  Ambulation x 200'  with SBA with rolling walker  1 Step (Curb): SBA with rolling walker  3 Steps: Min A  with R HR  Independent with total hip HEP                        History:     Past Medical History:   Diagnosis Date    Asthma     Diabetes mellitus     Hypertension        History reviewed. No pertinent surgical history.    Time Tracking:     PT Received On:    PT Start Time: 1718     PT Stop Time: 1730  PT Total Time (min): 12 min     Billable Minutes: Evaluation 12 04/09/2024

## 2024-04-09 NOTE — PLAN OF CARE
Problem: Physical Therapy  Goal: Physical Therapy Goal  Description: Pt will improve functional independence by performing:    Bed mobility: mod I  Sit to stand: SBA with rolling walker  Bed to chair t/f: SBA with Stand Step  with rolling walker  Ambulation x 200'  with SBA with rolling walker  1 Step (Curb): SBA with rolling walker  3 Steps: Min A  with R HR  Independent with total hip HEP   Outcome: Ongoing, Progressing

## 2024-04-10 LAB
BACTERIA UR CULT: NORMAL
POCT GLUCOSE: 183 MG/DL (ref 70–110)
POCT GLUCOSE: 185 MG/DL (ref 70–110)
POCT GLUCOSE: 234 MG/DL (ref 70–110)
POCT GLUCOSE: 253 MG/DL (ref 70–110)

## 2024-04-10 PROCEDURE — 96372 THER/PROPH/DIAG INJ SC/IM: CPT | Performed by: STUDENT IN AN ORGANIZED HEALTH CARE EDUCATION/TRAINING PROGRAM

## 2024-04-10 PROCEDURE — 25000003 PHARM REV CODE 250: Performed by: INTERNAL MEDICINE

## 2024-04-10 PROCEDURE — 63600175 PHARM REV CODE 636 W HCPCS: Performed by: INTERNAL MEDICINE

## 2024-04-10 PROCEDURE — 97116 GAIT TRAINING THERAPY: CPT | Mod: CQ

## 2024-04-10 PROCEDURE — 21400001 HC TELEMETRY ROOM

## 2024-04-10 PROCEDURE — 94761 N-INVAS EAR/PLS OXIMETRY MLT: CPT

## 2024-04-10 PROCEDURE — 63600175 PHARM REV CODE 636 W HCPCS: Performed by: STUDENT IN AN ORGANIZED HEALTH CARE EDUCATION/TRAINING PROGRAM

## 2024-04-10 PROCEDURE — 97530 THERAPEUTIC ACTIVITIES: CPT | Mod: CQ

## 2024-04-10 PROCEDURE — 25000003 PHARM REV CODE 250: Performed by: STUDENT IN AN ORGANIZED HEALTH CARE EDUCATION/TRAINING PROGRAM

## 2024-04-10 PROCEDURE — 99900031 HC PATIENT EDUCATION (STAT)

## 2024-04-10 RX ORDER — MECLIZINE HCL 12.5 MG 12.5 MG/1
25 TABLET ORAL 3 TIMES DAILY
Status: DISCONTINUED | OUTPATIENT
Start: 2024-04-10 | End: 2024-04-12 | Stop reason: HOSPADM

## 2024-04-10 RX ADMIN — FAMOTIDINE 20 MG: 20 TABLET ORAL at 08:04

## 2024-04-10 RX ADMIN — INSULIN ASPART 6 UNITS: 100 INJECTION, SOLUTION INTRAVENOUS; SUBCUTANEOUS at 05:04

## 2024-04-10 RX ADMIN — METOPROLOL TARTRATE 25 MG: 25 TABLET, FILM COATED ORAL at 08:04

## 2024-04-10 RX ADMIN — METFORMIN HYDROCHLORIDE 1000 MG: 500 TABLET, FILM COATED ORAL at 08:04

## 2024-04-10 RX ADMIN — INSULIN ASPART 2 UNITS: 100 INJECTION, SOLUTION INTRAVENOUS; SUBCUTANEOUS at 06:04

## 2024-04-10 RX ADMIN — MECLIZINE 25 MG: 12.5 TABLET ORAL at 09:04

## 2024-04-10 RX ADMIN — ONDANSETRON HYDROCHLORIDE 4 MG: 2 SOLUTION INTRAMUSCULAR; INTRAVENOUS at 02:04

## 2024-04-10 RX ADMIN — HYDROCODONE BITARTRATE AND ACETAMINOPHEN 1 TABLET: 5; 325 TABLET ORAL at 07:04

## 2024-04-10 RX ADMIN — INSULIN DETEMIR 50 UNITS: 100 INJECTION, SOLUTION SUBCUTANEOUS at 08:04

## 2024-04-10 RX ADMIN — ENOXAPARIN SODIUM 40 MG: 40 INJECTION SUBCUTANEOUS at 05:04

## 2024-04-10 RX ADMIN — GLIMEPIRIDE 4 MG: 4 TABLET ORAL at 08:04

## 2024-04-10 RX ADMIN — PIOGLITAZONE 30 MG: 15 TABLET ORAL at 08:04

## 2024-04-10 RX ADMIN — INSULIN ASPART 2 UNITS: 100 INJECTION, SOLUTION INTRAVENOUS; SUBCUTANEOUS at 11:04

## 2024-04-10 RX ADMIN — HYDROCHLOROTHIAZIDE 25 MG: 25 TABLET ORAL at 08:04

## 2024-04-10 RX ADMIN — AMLODIPINE BESYLATE 10 MG: 5 TABLET ORAL at 08:04

## 2024-04-10 RX ADMIN — CEFTRIAXONE SODIUM 2 G: 2 INJECTION, POWDER, FOR SOLUTION INTRAMUSCULAR; INTRAVENOUS at 02:04

## 2024-04-10 RX ADMIN — PROCHLORPERAZINE EDISYLATE 5 MG: 5 INJECTION INTRAMUSCULAR; INTRAVENOUS at 07:04

## 2024-04-10 RX ADMIN — ATORVASTATIN CALCIUM 20 MG: 10 TABLET, FILM COATED ORAL at 08:04

## 2024-04-10 NOTE — PLAN OF CARE
Problem: Physical Therapy  Goal: Physical Therapy Goal  Description: Pt will improve functional independence by performing:    Bed mobility: mod I--MET  Sit to stand: SBA with rolling walker--MET  Bed to chair t/f: SBA with Stand Step  with rolling walker--MET  Ambulation x 200'  with SBA with rolling walker  1 Step (Curb): SBA with rolling walker  3 Steps: Min A  with R HR--MET  Independent with total hip HEP   Outcome: Ongoing, Progressing

## 2024-04-10 NOTE — PT/OT/SLP PROGRESS
Physical Therapy Treatment    Patient Name:  Greer Kahn   MRN:  [id number]    Recommendations:     Discharge Recommendations: No Therapy Indicated  Discharge Equipment Recommendations: none  Barriers to discharge: Decreased caregiver support    Assessment:     Greer Kahn is a 58 y.o. female admitted with a medical diagnosis of Pyelonephritis of right kidney.  She presents with the following impairments/functional limitations: weakness, impaired balance, impaired endurance, impaired self care skills .    Rehab Prognosis: Fair; patient would benefit from acute skilled PT services to address these deficits and reach maximum level of function.    Recent Surgery: * No surgery found *      Plan:     During this hospitalization, patient to be seen daily to address the identified rehab impairments via gait training, therapeutic activities, therapeutic exercises and progress toward the following goals:    Plan of Care Expires:  04/15/24    Subjective     Chief Complaint: nausea   Patient/Family Comments/goals:  to feel better   Pain/Comfort:  Pain Rating 1: 0/10  Pain Addressed 1: Other (see comments) (nursing aware of nausea)      Objective:     Communicated with nursing  prior to session.  Patient found supine with peripheral IV, Other (comments) (pt in bed) upon PT entry to room.     General Precautions: Standard, fall  Orthopedic Precautions: N/A  Braces: N/A  Respiratory Status: Room air     Functional Mobility:  Bed Mobility:     Supine to Sit: independence  Transfers:     Sit to Stand:  independence with no AD  Gait: pt ambulated without assistive device 225ft cga/sba   Stairs:  Pt ascended/descended 3 stair(s) with No Assistive Device with right handrail with Supervision or Set-up Assistance.     Treatment & Education:  Pt educated on at this time use of assistive device for safety/fall prevention     Patient left up in chair with call button in reach and BLE elevated pt paz tx well pt improving with  activities tolerance  ..    GOALS:   Multidisciplinary Problems       Physical Therapy Goals          Problem: Physical Therapy    Goal Priority Disciplines Outcome Goal Variances Interventions   Physical Therapy Goal     PT, PT/OT Ongoing, Progressing     Description: Pt will improve functional independence by performing:    Bed mobility: mod I--MET  Sit to stand: SBA with rolling walker--MET  Bed to chair t/f: SBA with Stand Step  with rolling walker--MET  Ambulation x 200'  with SBA with rolling walker  1 Step (Curb): SBA with rolling walker  3 Steps: Min A  with R HR--MET  Independent with total hip HEP                        Time Tracking:     PT Received On: 04/10/24  PT Start Time: 0750     PT Stop Time: 0815  PT Total Time (min): 25 min     Billable Minutes: Gait Training 15min and Therapeutic Activity 10min    Treatment Type: Treatment  PT/PTA: PTA     Number of PTA visits since last PT visit: 2     04/10/2024

## 2024-04-10 NOTE — PROGRESS NOTES
[facility]  [facility] Medicine Progress Note      Patient Name: Greer Kahn  MRN: [id number]  Admission Date: 4/7/2024   Length of Stay: 0  Attending Physician: Ernesto Kennedy MD  Primary Care Provider: Jesica, Primary Doctor  Face-to-Face encounter date: 04/10/2024    Code Status: Full Code        Chief Complaint:   Weakness (Pt states she's been having persistent fevers, vomiting, weakness since Thursday. Went to  in [place] Friday where they did imaging/swabs (all negative). Only UC concern was her sugar and BP were elevated. Pt isnt sure if they treated either before d/c. Was d/c with Abx but her fever has persisted and pt is concerns bc she's getting weaker. CBG in triage 438 ), Vomiting, Dizziness, Fever, and Abdominal Pain        HPI:   58-year-old female with past medical history significant for diabetes mellitus, hypertension, obesity who presented to ED with complaints of generalized abdominal pain associated nausea vomiting with generalized weakness patient also noted to have persistent febrile episodes since onset of last Thursday.  Patient presented to urgent Care sudden and in [place] Friday where she had studies including imaging done and was negative at that time and patient was managed for her uncontrolled diabetes with hyperglycemia.  At this time she was discharged on oral antibiotics for upper respiratory infection, but due to persistent fevers and continued weakness she presented for evaluation.  Upon initial workup in ED patient was found to have urinary tract infection and possible pyelonephritis given CT imaging studies that she has been admitted for urgent management at this time.     Overview/Hospital Course:  No notes on file       Interval Hx:   The pt reports 3 episodes of emesis today. On further questioning she has vertigo and the triggers her vomiting. She denies bloating or early satiety. She uses 56 units of Lantus QHS at home not the  30 units that is listed. Her cbgs range from 200-400 but her glucometer broke over a month ago so she hasn't been monitoring her blood sugars. She has had polyuria and polydipsia.     Review of Systems   All other systems reviewed and are negative.      Objective/physical exam:  General: In no acute distress, afebrile, obese  Chest: Clear to auscultation bilaterally  Heart: RRR, +S1, S2, no appreciable murmur  Abdomen: Soft, no tenderness/guarding, BS +  MSK: Warm,   Neurologic: Alert and oriented x4    VITAL SIGNS: 24 HRS MIN & MAX LAST   Temp  Min: 97.9 °F (36.6 °C)  Max: 98.9 °F (37.2 °C) 98.7 °F (37.1 °C)   BP  Min: 112/72  Max: 147/84 120/74   Pulse  Min: 78  Max: 90  81   Resp  Min: 17  Max: 18 18   SpO2  Min: 94 %  Max: 97 % (!) 94 %       Recent Labs   Lab 04/07/24 2300 04/08/24  0559 04/09/24  0450   WBC 7.32 7.50 8.40   RBC 4.30 3.95* 3.81*   HGB 12.7 11.9* 11.5*   HCT 38.7 36.4* 35.5*   MCV 90.0 92.2 93.2   MCH 29.5 30.1 30.2   MCHC 32.8* 32.7* 32.4*   RDW 11.9 11.9 12.0    217 227   MPV 11.2* 10.8* 10.8*         Recent Labs   Lab 04/07/24 2300 04/08/24  0559 04/09/24  0450   * 131* 137   K 3.9 3.6 3.8   CO2 27 29 29   BUN 11.2 10.0 12.7   CREATININE 0.89 0.93 0.76   CALCIUM 9.1 8.6 8.7   ALBUMIN 2.8* 2.5*  --    ALKPHOS 119 106  --    ALT 28 24  --    AST 21 18  --    BILITOT 0.6 0.4  --           Microbiology Results (last 7 days)       Procedure Component Value Units Date/Time    Blood culture #1 **CANNOT BE ORDERED STAT** [[id number]]  (Normal) Collected: 04/08/24 0121    Order Status: Completed Specimen: Blood from Hand, Left Updated: 04/10/24 1100     CULTURE, BLOOD ([facility]) No Growth At 48 Hours    Blood culture #2 **CANNOT BE ORDERED STAT** [[id number]]  (Normal) Collected: 04/08/24 0121    Order Status: Completed Specimen: Blood from Hand, Right Updated: 04/10/24 1100     CULTURE, BLOOD ([facility]) No Growth At 48 Hours    Urine culture [[id number]] Collected: 04/07/24 2130    Order Status:  Completed Specimen: Urine Updated: 04/10/24 1006     Urine Culture No Significant Growth             Radiology:  CT Abdomen Pelvis With IV Contrast NO Oral Contrast  Narrative: Technique: CT of the abdomen and pelvis was performed with axial images as well as sagittal and coronal reconstruction images with intravenous contrast.    Comparison: June 13, 2020 none available.    Clinical History: Pt states she's been having persistent fevers, vomiting, weakness since Thursday. Went to  in [place] Friday where they did imaging/swabs (all negative). Only UC concern was her sugar and BP were elevated. Pt isnt sure if they treated either before d/c. Was d/c with Abx but her fever has persisted and pt is concerns bc she's getting weaker. CBG in triage 438.    Dosage Information: Automated Exposure Control was utilized 525.44 mGy.cm.    Findings:    Thorax:    Lungs: Streaky opacity is noted in the right lower lobe, which may reflect subsegmental atelectasis and / or parenchymal scarring.    Pleura: No effusions or thickening are seen.    Abdomen:    Abdominal Wall: No abdominal wall pathology is seen.    Liver: The liver is mildly enlarged with mild diffuse fatty infiltration. The liver otherwise appears unremarkable.    Biliary System: No extrahepatic biliary duct dilatation is seen.    Gallbladder: Surgical clips are seen in the gallbladder fossa consistent with prior cholecystectomy.    Pancreas: The pancreas appears unremarkable.    Spleen: The spleen appears unremarkable.    Kidneys: The left kidney appears unremarkable with no stones cysts masses or hydronephrosis. A stent single cyst measuring 1 cm is seen on series 2, image 32 in the lower pole of the right kidney for which no specific follow-up imaging is recommended.  Subtle ill-defined hypoperfused areas are seen in the right renal cortex including in the upper and mid poles with associated perinephric fat stranding (series 2, images 13 and 27). This is consistent  with acute pyelonephritis. No stones cysts or masses are identified in the right kidney.    Aorta: There is mild calcification of the abdominal aorta and its branches.    Bowel:    Esophagus: The visualized esophagus appears unremarkable.    Stomach: The stomach appears unremarkable.    Duodenum: Unremarkable appearing duodenum.    Small Bowel: The small bowel appears unremarkable.    Colon: Nondistended.    Appendix: The appendix appears unremarkable (series 2, images 55-59).    Peritoneum: No intraperitoneal free air or ascites is seen.    Pelvis:    Bladder: The bladder appears unremarkable.    Female:    Uterus: The uterus appears unremarkable.    Ovaries: No adnexal masses are seen.    Bony structures:    Dorsal Spine: There is mild spondylosis of the visualized dorsal spine.    Bony Pelvis: Soft tissue calcification is seen in the right iliacus muscle at its insertion. There is mild osteoarthrosis of both hip joints.  Impression: Impression:    1. Subtle ill-defined hypoperfused areas are seen in the right renal cortex including in the upper and mid poles with associated perinephric fat stranding (series 2, images 13 and 27). This is consistent with acute pyelonephritis. Correlate with clinical and laboratory findings.    2. Details and other findings as discussed above.    No significant discrepancy with overnight report.    Electronically signed by: Garland Bowie  Date:    04/08/2024  Time:    08:13      Scheduled Med:   amLODIPine  10 mg Oral Daily    atorvastatin  20 mg Oral Daily    cefTRIAXone (Rocephin) IV (PEDS and ADULTS)  2 g Intravenous Q24H    enoxparin  40 mg Subcutaneous Q24H (prophylaxis, 1700)    famotidine  20 mg Oral BID    fluticasone furoate  1 puff Inhalation Daily    glimepiride  4 mg Oral Daily with breakfast    hydroCHLOROthiazide  25 mg Oral Daily    insulin detemir U-100  35 Units Subcutaneous QHS    metFORMIN  1,000 mg Oral BID    metoprolol tartrate  25 mg Oral BID    pioglitazone   30 mg Oral Daily    polyethylene glycol  17 g Oral Daily    senna-docusate 8.6-50 mg  2 tablet Oral Daily        Continuous Infusions:         PRN Meds:  bisacodyL, bismuth subsalicylate, butalbital-acetaminophen-caffeine -40 mg, dextrose 10%, dextrose 10%, glucagon (human recombinant), glucose, glucose, hydrALAZINE, HYDROcodone-acetaminophen, insulin aspart U-100, melatonin, morphine, ondansetron, prochlorperazine, sodium chloride 0.9%     Nutrition Status:      Assessment/Plan:  Acute pyelonephritis of right kidney  Vertigo, N/v  Dm 2 uncontrolled - A1c 13  Htn  Headache    Plan  Cont Rocephin, plan for 2 week course of abx  Urine cxs no growth  Dc tele  Start antivert  Increase detemir      Dvt proph: lovenox                All diagnosis and differential diagnosis have been reviewed; assessment and plan has been documented; I have personally reviewed the labs and test results that are presently available; I have reviewed the patients medication list; I have reviewed the consulting providers response and recommendations. I have reviewed or attempted to review medical records based upon their availability      _____________________________________________________________________            Ernesto Kennedy MD   04/10/2024

## 2024-04-10 NOTE — PLAN OF CARE
Per Medical MD, possible discharge Weds 4/10 to home.     Problem: Adult Inpatient Plan of Care  Goal: Plan of Care Review  Outcome: Ongoing, Progressing  Goal: Patient-Specific Goal (Individualized)  Outcome: Ongoing, Progressing  Goal: Absence of Hospital-Acquired Illness or Injury  Outcome: Ongoing, Progressing  Goal: Optimal Comfort and Wellbeing  Outcome: Ongoing, Progressing  Goal: Readiness for Transition of Care  Outcome: Ongoing, Progressing     Problem: Hypertension Comorbidity  Goal: Blood Pressure in Desired Range  Outcome: Ongoing, Progressing

## 2024-04-11 LAB
POCT GLUCOSE: 158 MG/DL (ref 70–110)
POCT GLUCOSE: 163 MG/DL (ref 70–110)
POCT GLUCOSE: 170 MG/DL (ref 70–110)
POCT GLUCOSE: 187 MG/DL (ref 70–110)

## 2024-04-11 PROCEDURE — 25000003 PHARM REV CODE 250: Performed by: STUDENT IN AN ORGANIZED HEALTH CARE EDUCATION/TRAINING PROGRAM

## 2024-04-11 PROCEDURE — 63600175 PHARM REV CODE 636 W HCPCS: Performed by: STUDENT IN AN ORGANIZED HEALTH CARE EDUCATION/TRAINING PROGRAM

## 2024-04-11 PROCEDURE — 25000242 PHARM REV CODE 250 ALT 637 W/ HCPCS: Performed by: INTERNAL MEDICINE

## 2024-04-11 PROCEDURE — 25000003 PHARM REV CODE 250: Performed by: INTERNAL MEDICINE

## 2024-04-11 PROCEDURE — 63600175 PHARM REV CODE 636 W HCPCS: Performed by: INTERNAL MEDICINE

## 2024-04-11 PROCEDURE — 21400001 HC TELEMETRY ROOM

## 2024-04-11 RX ADMIN — METFORMIN HYDROCHLORIDE 1000 MG: 500 TABLET, FILM COATED ORAL at 08:04

## 2024-04-11 RX ADMIN — AMLODIPINE BESYLATE 10 MG: 5 TABLET ORAL at 08:04

## 2024-04-11 RX ADMIN — FAMOTIDINE 20 MG: 20 TABLET ORAL at 08:04

## 2024-04-11 RX ADMIN — METOPROLOL TARTRATE 25 MG: 25 TABLET, FILM COATED ORAL at 08:04

## 2024-04-11 RX ADMIN — PROCHLORPERAZINE EDISYLATE 5 MG: 5 INJECTION INTRAMUSCULAR; INTRAVENOUS at 02:04

## 2024-04-11 RX ADMIN — HYDROCODONE BITARTRATE AND ACETAMINOPHEN 1 TABLET: 5; 325 TABLET ORAL at 05:04

## 2024-04-11 RX ADMIN — MECLIZINE 25 MG: 12.5 TABLET ORAL at 04:04

## 2024-04-11 RX ADMIN — PROCHLORPERAZINE EDISYLATE 5 MG: 5 INJECTION INTRAMUSCULAR; INTRAVENOUS at 10:04

## 2024-04-11 RX ADMIN — GLIMEPIRIDE 4 MG: 4 TABLET ORAL at 08:04

## 2024-04-11 RX ADMIN — INSULIN ASPART 2 UNITS: 100 INJECTION, SOLUTION INTRAVENOUS; SUBCUTANEOUS at 04:04

## 2024-04-11 RX ADMIN — INSULIN ASPART 2 UNITS: 100 INJECTION, SOLUTION INTRAVENOUS; SUBCUTANEOUS at 10:04

## 2024-04-11 RX ADMIN — PIOGLITAZONE 30 MG: 15 TABLET ORAL at 08:04

## 2024-04-11 RX ADMIN — CEFTRIAXONE SODIUM 2 G: 2 INJECTION, POWDER, FOR SOLUTION INTRAMUSCULAR; INTRAVENOUS at 02:04

## 2024-04-11 RX ADMIN — INSULIN ASPART 2 UNITS: 100 INJECTION, SOLUTION INTRAVENOUS; SUBCUTANEOUS at 06:04

## 2024-04-11 RX ADMIN — MECLIZINE 25 MG: 12.5 TABLET ORAL at 08:04

## 2024-04-11 RX ADMIN — ENOXAPARIN SODIUM 40 MG: 40 INJECTION SUBCUTANEOUS at 04:04

## 2024-04-11 RX ADMIN — HYDROCHLOROTHIAZIDE 25 MG: 25 TABLET ORAL at 08:04

## 2024-04-11 RX ADMIN — ATORVASTATIN CALCIUM 20 MG: 10 TABLET, FILM COATED ORAL at 08:04

## 2024-04-11 RX ADMIN — FLUTICASONE FUROATE 1 PUFF: 100 POWDER RESPIRATORY (INHALATION) at 11:04

## 2024-04-11 RX ADMIN — INSULIN DETEMIR 50 UNITS: 100 INJECTION, SOLUTION SUBCUTANEOUS at 08:04

## 2024-04-11 NOTE — PT/OT/SLP PROGRESS
Physical Therapy      Patient Name:  Greer Kahn   MRN:  [id number]    Attempted treatment at 1104 and 1646. pt refused both times due to nausea/weakness despite edu on importance of frequent mobility. Nursing notified. Will follow up tomorrow AM

## 2024-04-11 NOTE — PROGRESS NOTES
[facility]  [facility] Medicine Progress Note      Patient Name: Greer Kahn  MRN: [id number]  Admission Date: 4/7/2024   Length of Stay: 1  Attending Physician: Ernesto Kennedy MD  Primary Care Provider: Jesica, Primary Doctor  Face-to-Face encounter date: 04/11/2024    Code Status: Full Code        Chief Complaint:   Weakness (Pt states she's been having persistent fevers, vomiting, weakness since Thursday. Went to  in [place] Friday where they did imaging/swabs (all negative). Only UC concern was her sugar and BP were elevated. Pt isnt sure if they treated either before d/c. Was d/c with Abx but her fever has persisted and pt is concerns bc she's getting weaker. CBG in triage 438 ), Vomiting, Dizziness, Fever, and Abdominal Pain        HPI:   58-year-old female with past medical history significant for diabetes mellitus, hypertension, obesity who presented to ED with complaints of generalized abdominal pain associated nausea vomiting with generalized weakness patient also noted to have persistent febrile episodes since onset of last Thursday.  Patient presented to urgent Care sudden and in [place] Friday where she had studies including imaging done and was negative at that time and patient was managed for her uncontrolled diabetes with hyperglycemia.  At this time she was discharged on oral antibiotics for upper respiratory infection, but due to persistent fevers and continued weakness she presented for evaluation.  Upon initial workup in ED patient was found to have urinary tract infection and possible pyelonephritis given CT imaging studies that she has been admitted for urgent management at this time.     Overview/Hospital Course:  No notes on file       Interval Hx:   The pt reports 2 episodes of nausea but no emesis today. Her vertigo was better today.       Review of Systems   All other systems reviewed and are negative.      Objective/physical exam:  General: In  no acute distress, afebrile, obese  Chest: Clear to auscultation bilaterally  Heart: RRR, +S1, S2, no appreciable murmur  Abdomen: Soft, no tenderness/guarding, BS +  MSK: Warm,   Neurologic: Alert and oriented x4    VITAL SIGNS: 24 HRS MIN & MAX LAST   Temp  Min: 97.9 °F (36.6 °C)  Max: 98.8 °F (37.1 °C) 98.8 °F (37.1 °C)   BP  Min: 120/64  Max: 159/82 (!) 142/83   Pulse  Min: 76  Max: 88  87   Resp  Min: 16  Max: 20 16   SpO2  Min: 93 %  Max: 96 % 95 %       Recent Labs   Lab 04/07/24 2300 04/08/24  0559 04/09/24  0450   WBC 7.32 7.50 8.40   RBC 4.30 3.95* 3.81*   HGB 12.7 11.9* 11.5*   HCT 38.7 36.4* 35.5*   MCV 90.0 92.2 93.2   MCH 29.5 30.1 30.2   MCHC 32.8* 32.7* 32.4*   RDW 11.9 11.9 12.0    217 227   MPV 11.2* 10.8* 10.8*         Recent Labs   Lab 04/07/24 2300 04/08/24  0559 04/09/24  0450   * 131* 137   K 3.9 3.6 3.8   CO2 27 29 29   BUN 11.2 10.0 12.7   CREATININE 0.89 0.93 0.76   CALCIUM 9.1 8.6 8.7   ALBUMIN 2.8* 2.5*  --    ALKPHOS 119 106  --    ALT 28 24  --    AST 21 18  --    BILITOT 0.6 0.4  --           Microbiology Results (last 7 days)       Procedure Component Value Units Date/Time    Blood culture #2 **CANNOT BE ORDERED STAT** [[id number]]  (Normal) Collected: 04/08/24 0121    Order Status: Completed Specimen: Blood from Hand, Right Updated: 04/11/24 1100     CULTURE, BLOOD ([facility]) No Growth At 72 Hours    Blood culture #1 **CANNOT BE ORDERED STAT** [[id number]]  (Normal) Collected: 04/08/24 0121    Order Status: Completed Specimen: Blood from Hand, Left Updated: 04/11/24 1100     CULTURE, BLOOD ([facility]) No Growth At 72 Hours    Urine culture [[id number]] Collected: 04/07/24 2130    Order Status: Completed Specimen: Urine Updated: 04/10/24 1006     Urine Culture No Significant Growth             Radiology:  CT Abdomen Pelvis With IV Contrast NO Oral Contrast  Narrative: Technique: CT of the abdomen and pelvis was performed with axial images as well as sagittal and coronal  reconstruction images with intravenous contrast.    Comparison: June 13, 2020 none available.    Clinical History: Pt states she's been having persistent fevers, vomiting, weakness since Thursday. Went to UC in [place] Friday where they did imaging/swabs (all negative). Only UC concern was her sugar and BP were elevated. Pt isnt sure if they treated either before d/c. Was d/c with Abx but her fever has persisted and pt is concerns bc she's getting weaker. CBG in triage 438.    Dosage Information: Automated Exposure Control was utilized 525.44 mGy.cm.    Findings:    Thorax:    Lungs: Streaky opacity is noted in the right lower lobe, which may reflect subsegmental atelectasis and / or parenchymal scarring.    Pleura: No effusions or thickening are seen.    Abdomen:    Abdominal Wall: No abdominal wall pathology is seen.    Liver: The liver is mildly enlarged with mild diffuse fatty infiltration. The liver otherwise appears unremarkable.    Biliary System: No extrahepatic biliary duct dilatation is seen.    Gallbladder: Surgical clips are seen in the gallbladder fossa consistent with prior cholecystectomy.    Pancreas: The pancreas appears unremarkable.    Spleen: The spleen appears unremarkable.    Kidneys: The left kidney appears unremarkable with no stones cysts masses or hydronephrosis. A stent single cyst measuring 1 cm is seen on series 2, image 32 in the lower pole of the right kidney for which no specific follow-up imaging is recommended.  Subtle ill-defined hypoperfused areas are seen in the right renal cortex including in the upper and mid poles with associated perinephric fat stranding (series 2, images 13 and 27). This is consistent with acute pyelonephritis. No stones cysts or masses are identified in the right kidney.    Aorta: There is mild calcification of the abdominal aorta and its branches.    Bowel:    Esophagus: The visualized esophagus appears unremarkable.    Stomach: The stomach appears  unremarkable.    Duodenum: Unremarkable appearing duodenum.    Small Bowel: The small bowel appears unremarkable.    Colon: Nondistended.    Appendix: The appendix appears unremarkable (series 2, images 55-59).    Peritoneum: No intraperitoneal free air or ascites is seen.    Pelvis:    Bladder: The bladder appears unremarkable.    Female:    Uterus: The uterus appears unremarkable.    Ovaries: No adnexal masses are seen.    Bony structures:    Dorsal Spine: There is mild spondylosis of the visualized dorsal spine.    Bony Pelvis: Soft tissue calcification is seen in the right iliacus muscle at its insertion. There is mild osteoarthrosis of both hip joints.  Impression: Impression:    1. Subtle ill-defined hypoperfused areas are seen in the right renal cortex including in the upper and mid poles with associated perinephric fat stranding (series 2, images 13 and 27). This is consistent with acute pyelonephritis. Correlate with clinical and laboratory findings.    2. Details and other findings as discussed above.    No significant discrepancy with overnight report.    Electronically signed by: Garland Bowie  Date:    04/08/2024  Time:    08:13      Scheduled Med:   amLODIPine  10 mg Oral Daily    atorvastatin  20 mg Oral Daily    cefTRIAXone (Rocephin) IV (PEDS and ADULTS)  2 g Intravenous Q24H    enoxparin  40 mg Subcutaneous Q24H (prophylaxis, 1700)    famotidine  20 mg Oral BID    fluticasone furoate  1 puff Inhalation Daily    glimepiride  4 mg Oral Daily with breakfast    hydroCHLOROthiazide  25 mg Oral Daily    insulin detemir U-100  50 Units Subcutaneous QHS    meclizine  25 mg Oral TID    metFORMIN  1,000 mg Oral BID    metoprolol tartrate  25 mg Oral BID    pioglitazone  30 mg Oral Daily    polyethylene glycol  17 g Oral Daily    senna-docusate 8.6-50 mg  2 tablet Oral Daily        Continuous Infusions:         PRN Meds:  bismuth subsalicylate, butalbital-acetaminophen-caffeine -40 mg, dextrose 10%,  dextrose 10%, glucagon (human recombinant), glucose, glucose, hydrALAZINE, HYDROcodone-acetaminophen, insulin aspart U-100, melatonin, ondansetron, prochlorperazine, sodium chloride 0.9%     Nutrition Status:      Assessment/Plan:  Acute pyelonephritis of right kidney  Vertigo, N/v  Dm 2 uncontrolled - A1c 13  Htn  Headache    Plan  Cont Rocephin, plan for 2 week course of abx  Urine cxs no growth  cont antivert  cont detemir      Dvt proph: lovenox                All diagnosis and differential diagnosis have been reviewed; assessment and plan has been documented; I have personally reviewed the labs and test results that are presently available; I have reviewed the patients medication list; I have reviewed the consulting providers response and recommendations. I have reviewed or attempted to review medical records based upon their availability      _____________________________________________________________________            Ernesto Kennedy MD   04/11/2024

## 2024-04-12 VITALS
SYSTOLIC BLOOD PRESSURE: 111 MMHG | BODY MASS INDEX: 38.45 KG/M2 | RESPIRATION RATE: 18 BRPM | WEIGHT: 245 LBS | HEIGHT: 67 IN | TEMPERATURE: 98 F | DIASTOLIC BLOOD PRESSURE: 72 MMHG | OXYGEN SATURATION: 96 % | HEART RATE: 83 BPM

## 2024-04-12 PROBLEM — R11.2 NAUSEA & VOMITING: Status: RESOLVED | Noted: 2024-04-08 | Resolved: 2024-04-12

## 2024-04-12 LAB
ALBUMIN SERPL-MCNC: 2.4 G/DL (ref 3.5–5)
BUN SERPL-MCNC: 16.2 MG/DL (ref 9.8–20.1)
CALCIUM SERPL-MCNC: 9.1 MG/DL (ref 8.4–10.2)
CHLORIDE SERPL-SCNC: 97 MMOL/L (ref 98–107)
CO2 SERPL-SCNC: 33 MMOL/L (ref 22–29)
CREAT SERPL-MCNC: 0.72 MG/DL (ref 0.55–1.02)
ERYTHROCYTE [DISTWIDTH] IN BLOOD BY AUTOMATED COUNT: 12.1 % (ref 11.5–17)
GFR SERPLBLD CREATININE-BSD FMLA CKD-EPI: >60 MLS/MIN/1.73/M2
GLUCOSE SERPL-MCNC: 154 MG/DL (ref 74–100)
HCT VFR BLD AUTO: 35.3 % (ref 37–47)
HGB BLD-MCNC: 11.1 G/DL (ref 12–16)
MAGNESIUM SERPL-MCNC: 1.5 MG/DL (ref 1.6–2.6)
MCH RBC QN AUTO: 29.9 PG (ref 27–31)
MCHC RBC AUTO-ENTMCNC: 31.4 G/DL (ref 33–36)
MCV RBC AUTO: 95.1 FL (ref 80–94)
NRBC BLD AUTO-RTO: 0 %
PHOSPHATE SERPL-MCNC: 3.3 MG/DL (ref 2.3–4.7)
PLATELET # BLD AUTO: 359 X10(3)/MCL (ref 130–400)
PMV BLD AUTO: 10.3 FL (ref 7.4–10.4)
POCT GLUCOSE: 143 MG/DL (ref 70–110)
POCT GLUCOSE: 162 MG/DL (ref 70–110)
POTASSIUM SERPL-SCNC: 3.8 MMOL/L (ref 3.5–5.1)
RBC # BLD AUTO: 3.71 X10(6)/MCL (ref 4.2–5.4)
SODIUM SERPL-SCNC: 139 MMOL/L (ref 136–145)
WBC # SPEC AUTO: 10.23 X10(3)/MCL (ref 4.5–11.5)

## 2024-04-12 PROCEDURE — 25000003 PHARM REV CODE 250: Performed by: INTERNAL MEDICINE

## 2024-04-12 PROCEDURE — 25000003 PHARM REV CODE 250: Performed by: STUDENT IN AN ORGANIZED HEALTH CARE EDUCATION/TRAINING PROGRAM

## 2024-04-12 PROCEDURE — 85027 COMPLETE CBC AUTOMATED: CPT | Performed by: INTERNAL MEDICINE

## 2024-04-12 PROCEDURE — 25000242 PHARM REV CODE 250 ALT 637 W/ HCPCS: Performed by: INTERNAL MEDICINE

## 2024-04-12 PROCEDURE — 80069 RENAL FUNCTION PANEL: CPT | Performed by: INTERNAL MEDICINE

## 2024-04-12 PROCEDURE — 63600175 PHARM REV CODE 636 W HCPCS: Performed by: STUDENT IN AN ORGANIZED HEALTH CARE EDUCATION/TRAINING PROGRAM

## 2024-04-12 PROCEDURE — 83735 ASSAY OF MAGNESIUM: CPT | Performed by: INTERNAL MEDICINE

## 2024-04-12 RX ORDER — BUTALBITAL, ACETAMINOPHEN AND CAFFEINE 50; 325; 40 MG/1; MG/1; MG/1
1 TABLET ORAL EVERY 4 HOURS PRN
Qty: 30 TABLET | Refills: 0 | Status: SHIPPED | OUTPATIENT
Start: 2024-04-12 | End: 2024-04-22

## 2024-04-12 RX ORDER — HYDROCODONE BITARTRATE AND ACETAMINOPHEN 5; 325 MG/1; MG/1
1 TABLET ORAL EVERY 8 HOURS PRN
Qty: 21 TABLET | Refills: 0 | Status: SHIPPED | OUTPATIENT
Start: 2024-04-12 | End: 2024-04-19

## 2024-04-12 RX ORDER — PIOGLITAZONEHYDROCHLORIDE 30 MG/1
30 TABLET ORAL DAILY
Qty: 30 TABLET | Refills: 0 | Status: ON HOLD | OUTPATIENT
Start: 2024-04-12 | End: 2024-05-12

## 2024-04-12 RX ORDER — LANOLIN ALCOHOL/MO/W.PET/CERES
400 CREAM (GRAM) TOPICAL DAILY
Status: DISCONTINUED | OUTPATIENT
Start: 2024-04-12 | End: 2024-04-12 | Stop reason: HOSPADM

## 2024-04-12 RX ORDER — GLIMEPIRIDE 4 MG/1
4 TABLET ORAL
Qty: 30 TABLET | Refills: 0 | Status: ON HOLD | OUTPATIENT
Start: 2024-04-13 | End: 2024-05-13

## 2024-04-12 RX ORDER — ATORVASTATIN CALCIUM 20 MG/1
20 TABLET, FILM COATED ORAL DAILY
Qty: 30 TABLET | Refills: 0 | Status: ON HOLD | OUTPATIENT
Start: 2024-04-12 | End: 2024-05-12

## 2024-04-12 RX ORDER — INSULIN GLARGINE 100 [IU]/ML
50 INJECTION, SOLUTION SUBCUTANEOUS NIGHTLY
Qty: 15 ML | Refills: 1 | Status: ON HOLD | OUTPATIENT
Start: 2024-04-12 | End: 2024-06-11

## 2024-04-12 RX ORDER — METFORMIN HYDROCHLORIDE 1000 MG/1
1000 TABLET ORAL 2 TIMES DAILY
Qty: 60 TABLET | Refills: 0 | Status: ON HOLD | OUTPATIENT
Start: 2024-04-12 | End: 2024-05-12

## 2024-04-12 RX ORDER — CIPROFLOXACIN 500 MG/1
500 TABLET ORAL EVERY 12 HOURS
Qty: 18 TABLET | Refills: 0 | Status: SHIPPED | OUTPATIENT
Start: 2024-04-12 | End: 2024-04-21

## 2024-04-12 RX ORDER — AMLODIPINE BESYLATE 10 MG/1
10 TABLET ORAL DAILY
Qty: 30 TABLET | Refills: 0 | Status: ON HOLD | OUTPATIENT
Start: 2024-04-12 | End: 2024-05-12

## 2024-04-12 RX ORDER — FAMOTIDINE 20 MG/1
20 TABLET, FILM COATED ORAL 2 TIMES DAILY
Qty: 60 TABLET | Refills: 0 | Status: ON HOLD | OUTPATIENT
Start: 2024-04-12 | End: 2024-05-12

## 2024-04-12 RX ORDER — MECLIZINE HYDROCHLORIDE 25 MG/1
25 TABLET ORAL 3 TIMES DAILY
Qty: 30 TABLET | Refills: 0 | Status: ON HOLD | OUTPATIENT
Start: 2024-04-12 | End: 2024-04-22

## 2024-04-12 RX ORDER — METOPROLOL TARTRATE 25 MG/1
25 TABLET, FILM COATED ORAL 2 TIMES DAILY
Qty: 60 TABLET | Refills: 0 | Status: ON HOLD | OUTPATIENT
Start: 2024-04-12 | End: 2024-05-12

## 2024-04-12 RX ORDER — HYDROCHLOROTHIAZIDE 25 MG/1
25 TABLET ORAL DAILY
Qty: 30 TABLET | Refills: 0 | Status: ON HOLD | OUTPATIENT
Start: 2024-04-12 | End: 2024-05-12

## 2024-04-12 RX ORDER — LANOLIN ALCOHOL/MO/W.PET/CERES
400 CREAM (GRAM) TOPICAL DAILY
Qty: 7 TABLET | Refills: 0 | Status: SHIPPED | OUTPATIENT
Start: 2024-04-12 | End: 2024-04-19

## 2024-04-12 RX ADMIN — METFORMIN HYDROCHLORIDE 1000 MG: 500 TABLET, FILM COATED ORAL at 09:04

## 2024-04-12 RX ADMIN — METOPROLOL TARTRATE 25 MG: 25 TABLET, FILM COATED ORAL at 09:04

## 2024-04-12 RX ADMIN — MECLIZINE 25 MG: 12.5 TABLET ORAL at 09:04

## 2024-04-12 RX ADMIN — MECLIZINE 25 MG: 12.5 TABLET ORAL at 03:04

## 2024-04-12 RX ADMIN — POLYETHYLENE GLYCOL 3350 17 G: 17 POWDER, FOR SOLUTION ORAL at 09:04

## 2024-04-12 RX ADMIN — INSULIN ASPART 2 UNITS: 100 INJECTION, SOLUTION INTRAVENOUS; SUBCUTANEOUS at 12:04

## 2024-04-12 RX ADMIN — CEFTRIAXONE SODIUM 2 G: 2 INJECTION, POWDER, FOR SOLUTION INTRAMUSCULAR; INTRAVENOUS at 01:04

## 2024-04-12 RX ADMIN — HYDROCHLOROTHIAZIDE 25 MG: 25 TABLET ORAL at 09:04

## 2024-04-12 RX ADMIN — FLUTICASONE FUROATE 1 PUFF: 100 POWDER RESPIRATORY (INHALATION) at 09:04

## 2024-04-12 RX ADMIN — PIOGLITAZONE 30 MG: 15 TABLET ORAL at 09:04

## 2024-04-12 RX ADMIN — FAMOTIDINE 20 MG: 20 TABLET ORAL at 09:04

## 2024-04-12 RX ADMIN — GLIMEPIRIDE 4 MG: 4 TABLET ORAL at 09:04

## 2024-04-12 RX ADMIN — AMLODIPINE BESYLATE 10 MG: 5 TABLET ORAL at 09:04

## 2024-04-12 RX ADMIN — ATORVASTATIN CALCIUM 20 MG: 10 TABLET, FILM COATED ORAL at 09:04

## 2024-04-12 RX ADMIN — Medication 400 MG: at 03:04

## 2024-04-12 RX ADMIN — SENNOSIDES AND DOCUSATE SODIUM 2 TABLET: 8.6; 5 TABLET ORAL at 09:04

## 2024-04-12 NOTE — NURSING
RX: scripts sent to patient pharmacy    Educated on home care, f/u, meds., complication prevention, when to seek medical attention, ect. See AVS for specifics.     Questions/concerns addressed. Stable and ready for discharge.

## 2024-04-12 NOTE — PROGRESS NOTES
Inpatient Nutrition Assessment    Admit Date: 4/7/2024   Total duration of encounter: 5 days   Patient Age: 58 y.o.    Nutrition Recommendation/Prescription     Continue 2000 Diabetic diet as tolerated  Continue Boost Glucose Control with TID (240 kcal and 14 gm pro per serving)  Weigh weekly  Diabetic education when pt allows        Communication of Recommendations: reviewed with patient    Nutrition Assessment     Malnutrition Assessment/Nutrition-Focused Physical Exam    Malnutrition Context:  (Does not meet criteria) (04/08/24 1218)Does not meet criteria                                                           A minimum of two characteristics is recommended for diagnosis of either severe or non-severe malnutrition.    Chart Review    Reason Seen: follow-up    Malnutrition Screening Tool Results   Have you recently lost weight without trying?: No  Have you been eating poorly because of a decreased appetite?: No   MST Score: 0   Diagnosis:  Pyelephritis of right kidney, DM II (uncontrolled) HTN    Relevant Medical History:   DM, HTN, asthma, obesity    Scheduled Medications:  amLODIPine, 10 mg, Daily  atorvastatin, 20 mg, Daily  cefTRIAXone (Rocephin) IV (PEDS and ADULTS), 2 g, Q24H  enoxparin, 40 mg, Q24H (prophylaxis, 1700)  famotidine, 20 mg, BID  fluticasone furoate, 1 puff, Daily  glimepiride, 4 mg, Daily with breakfast  hydroCHLOROthiazide, 25 mg, Daily  insulin detemir U-100, 50 Units, QHS  meclizine, 25 mg, TID  metFORMIN, 1,000 mg, BID  metoprolol tartrate, 25 mg, BID  pioglitazone, 30 mg, Daily  polyethylene glycol, 17 g, Daily  senna-docusate 8.6-50 mg, 2 tablet, Daily    Continuous Infusions:     PRN Medications: bismuth subsalicylate, butalbital-acetaminophen-caffeine -40 mg, dextrose 10%, dextrose 10%, glucagon (human recombinant), glucose, glucose, hydrALAZINE, HYDROcodone-acetaminophen, insulin aspart U-100, melatonin, ondansetron, prochlorperazine, sodium chloride 0.9%    Calorie Containing  "IV Medications: no significant kcals from medications at this time    Recent Labs   Lab 04/07/24  2300 04/07/24  2332 04/08/24  0559 04/09/24  0450 04/12/24  0436   *  --  131* 137 139   K 3.9  --  3.6 3.8 3.8   CALCIUM 9.1  --  8.6 8.7 9.1   PHOS  --   --   --   --  3.3   MG  --   --   --   --  1.50*   CHLORIDE 97*  --  97* 100 97*   CO2 27  --  29 29 33*   BUN 11.2  --  10.0 12.7 16.2   CREATININE 0.89  --  0.93 0.76 0.72   EGFRNORACEVR >60  --  >60 >60 >60   GLUCOSE 394*  --  418* 182* 154*   BILITOT 0.6  --  0.4  --   --    ALKPHOS 119  --  106  --   --    ALT 28  --  24  --   --    AST 21  --  18  --   --    ALBUMIN 2.8*  --  2.5*  --  2.4*   HGBA1C  --  13.0*  --   --   --    LIPASE 9  --   --   --   --    WBC 7.32  --  7.50 8.40 10.23   HGB 12.7  --  11.9* 11.5* 11.1*   HCT 38.7  --  36.4* 35.5* 35.3*       Nutrition Orders:  Diet diabetic 2000 Calorie      Appetite/Oral Intake: poor/0-25% of meals  Factors Affecting Nutritional Intake: decreased appetite  Food/Holiness/Cultural Preferences: none reported  Food Allergies: none reported  Last Bowel Movement: 04/10/24  Wound(s):  Intact    Comments    (4/8) Met with pt. Pt poor appetite and intake x past 4 days. RDN observed pt had not touched her lunch tray and she does not plan it eat. Reported no N/V/D/C. Stated she still had some abdominal pain but it "isn't too bad right not". Stated no difficulties chewing or swallowing. Able to feed self with set-up assist. Denied any recent wt changes. Stated # Med/labs reviewed. RDN attempted to provide diabetic education due to elevated A1C of 13% Pt stated she has been a diabetic for a long time. She is not interested in education at this time due to not feeling well. RDN to attempt education before pt is discharged    (4/12) Pt reports improved appetite and intake. Stated she consumes about 75% of most meals. Stated she had some nausea this morning but has resolved as of now. No vomiting, constipation " "or diarrhea reported. No new wt recorded. Med/labs reviewed. CBGs over past 4 days ranged 158-261    Anthropometrics    Height: 5' 7.01" (170.2 cm), Height Method: Stated  Last Weight: 111.1 kg (245 lb) (24 0333), Weight Method: Standard Scale  BMI (Calculated): 38.4  BMI Classification: obese grade II (BMI 35-39.9)     Ideal Body Weight (IBW), Female: 135.05 lb     % Ideal Body Weight, Female (lb): 181.41 %                    Usual Body Weight (UBW), k kg  % Usual Body Weight: 102.17     Usual Weight Provided By: patient    Wt Readings from Last 5 Encounters:   24 111.1 kg (245 lb)   23 104.3 kg (230 lb)   19 110 kg (242 lb 8.1 oz)     Weight Change(s) Since Admission: no new wt recorded  Wt Readings from Last 1 Encounters:   24 111.1 kg (245 lb)   24 111.1 kg (245 lb)   Admit Weight: 111.1 kg (245 lb) (24), Weight Method: Stated    Estimated Needs    Weight Used For Calorie Calculations: 111 kg (244 lb 11.4 oz)  Energy Calorie Requirements (kcal): 2000 kcal (18 kcal/kg/BW)  Energy Need Method: Kcal/kg  Weight Used For Protein Calculations: 111 kg (244 lb 11.4 oz)  Protein Requirements: 111 gm (1 gm/kg/BW)  Fluid Requirements (mL): 2778 ml (25 ml/kcal)    Enteral Nutrition     Patient not receiving enteral nutrition at this time.    Parenteral Nutrition     Patient not receiving parenteral nutrition support at this time.    Evaluation of Received Nutrient Intake    Calories: not meeting estimated needs  Protein: not meeting estimated needs    Patient Education     Not applicable.    Nutrition Diagnosis     PES: Altered nutrition-related laboratory values related to   diabetes dx as evidenced by elevated A1C 13%. (active)     PES: Inadequate oral intake related to inability to consume sufficient nutrients as evidenced by pt with 0% intake ,decreased appetite and some abdominal pain. (active)     Nutrition Interventions     Intervention(s): " general/healthful diet, commercial beverage, and collaboration with other providers    Goal: Consume % of meals/snacks by follow-up. (goal progressing)  Goal: Maintain weight throughout hospitalization. (goal progressing)    Nutrition Goals & Monitoring     Dietitian will monitor: energy intake, weight, electrolyte/renal panel, glucose/endocrine profile, and gastrointestinal profile    Nutrition Risk/Follow-Up: moderate (follow-up in 3-5 days)   Please consult if re-assessment needed sooner.

## 2024-04-12 NOTE — DISCHARGE SUMMARY
[facility] Hospital Medicine Discharge Summary    Admit Date: 4/7/2024  Discharge Date and Time: 4/12/20242:49 PM  Admitting Physician: [unfilled]  Discharging Physician: Ernesto Kennedy MD.  Primary Care Physician: Jesica, Primary Doctor           Discharge Diagnoses:  Acute pyelonephritis of right kidney  Vertigo -resolved  N/v -resolved  Dm 2 uncontrolled - A1c 13  Htn  Headache -resolved  Hypomagnesimi    HPI  58-year-old female with past medical history significant for diabetes mellitus, hypertension, obesity who presented to ED with complaints of generalized abdominal pain associated nausea vomiting with generalized weakness patient also noted to have persistent febrile episodes since onset of last Thursday.  Patient presented to urgent Care sudden and in [place] Friday where she had studies including imaging done and was negative at that time and patient was managed for her uncontrolled diabetes with hyperglycemia.  At this time she was discharged on oral antibiotics for upper respiratory infection, but due to persistent fevers and continued weakness she presented for evaluation.  Upon initial workup in ED patient was found to have urinary tract infection and possible pyelonephritis given CT imaging studies that she has been admitted for urgent management at this time.       Hospital Course:   The pt was started on empric treatment for pyelonephritis. Her urine and blood cultures remained negative. Her vertigo, nausea and vomiting have resolved. Her blood sugar control improved once her levemir was increased to 50 units qhs. The pt is stable for dc home. She will finish her antibiotic course with cipro 500 bid for 9 days. She will f/u as below in clinic for DM and general checkup.      Pt was seen and examined on the day of discharge.    Vitals:  VITAL SIGNS: 24 HRS MIN & MAX LAST   Temp  Min: 98 °F (36.7 °C)  Max: 98.8 °F (37.1 °C) 98 °F (36.7 °C)   BP  Min:  120/72  Max: 142/83 132/80   Pulse  Min: 78  Max: 89  78   Resp  Min: 16  Max: 18 18   SpO2  Min: 93 %  Max: 98 % 96 %       Physical Exam:  General: In no acute distress, afebrile, obese  Chest: Clear to auscultation bilaterally  Heart: RRR, +S1, S2, no appreciable murmur  Abdomen: Soft, no tenderness/guarding, BS +  MSK: Warm,   Neurologic: Alert and oriented x4      Procedures Performed:   No admission procedures for hospital encounter.     Significant Diagnostic Studies: See Full reports for all details    Recent Labs   Lab 04/08/24  0559 04/09/24  0450 04/12/24  0436   WBC 7.50 8.40 10.23   RBC 3.95* 3.81* 3.71*   HGB 11.9* 11.5* 11.1*   HCT 36.4* 35.5* 35.3*   MCV 92.2 93.2 95.1*   MCH 30.1 30.2 29.9   MCHC 32.7* 32.4* 31.4*   RDW 11.9 12.0 12.1    227 359   MPV 10.8* 10.8* 10.3       Recent Labs   Lab 04/07/24  2300 04/08/24  0559 04/09/24  0450 04/12/24  0436   * 131* 137 139   K 3.9 3.6 3.8 3.8   CO2 27 29 29 33*   BUN 11.2 10.0 12.7 16.2   CREATININE 0.89 0.93 0.76 0.72   CALCIUM 9.1 8.6 8.7 9.1   MG  --   --   --  1.50*   ALBUMIN 2.8* 2.5*  --  2.4*   ALKPHOS 119 106  --   --    ALT 28 24  --   --    AST 21 18  --   --    BILITOT 0.6 0.4  --   --         Microbiology Results (last 7 days)       Procedure Component Value Units Date/Time    Blood culture #2 **CANNOT BE ORDERED STAT** [[id number]]  (Normal) Collected: 04/08/24 0121    Order Status: Completed Specimen: Blood from Hand, Right Updated: 04/12/24 1101     CULTURE, BLOOD ([facility]) No Growth At 96 Hours    Blood culture #1 **CANNOT BE ORDERED STAT** [[id number]]  (Normal) Collected: 04/08/24 0121    Order Status: Completed Specimen: Blood from Hand, Left Updated: 04/12/24 1101     CULTURE, BLOOD ([facility]) No Growth At 96 Hours    Urine culture [[id number]] Collected: 04/07/24 2130    Order Status: Completed Specimen: Urine Updated: 04/10/24 1006     Urine Culture No Significant Growth             CT Abdomen Pelvis With IV Contrast NO Oral  Contrast  Narrative: Technique: CT of the abdomen and pelvis was performed with axial images as well as sagittal and coronal reconstruction images with intravenous contrast.    Comparison: June 13, 2020 none available.    Clinical History: Pt states she's been having persistent fevers, vomiting, weakness since Thursday. Went to  in [place] Friday where they did imaging/swabs (all negative). Only UC concern was her sugar and BP were elevated. Pt isnt sure if they treated either before d/c. Was d/c with Abx but her fever has persisted and pt is concerns bc she's getting weaker. CBG in triage 438.    Dosage Information: Automated Exposure Control was utilized 525.44 mGy.cm.    Findings:    Thorax:    Lungs: Streaky opacity is noted in the right lower lobe, which may reflect subsegmental atelectasis and / or parenchymal scarring.    Pleura: No effusions or thickening are seen.    Abdomen:    Abdominal Wall: No abdominal wall pathology is seen.    Liver: The liver is mildly enlarged with mild diffuse fatty infiltration. The liver otherwise appears unremarkable.    Biliary System: No extrahepatic biliary duct dilatation is seen.    Gallbladder: Surgical clips are seen in the gallbladder fossa consistent with prior cholecystectomy.    Pancreas: The pancreas appears unremarkable.    Spleen: The spleen appears unremarkable.    Kidneys: The left kidney appears unremarkable with no stones cysts masses or hydronephrosis. A stent single cyst measuring 1 cm is seen on series 2, image 32 in the lower pole of the right kidney for which no specific follow-up imaging is recommended.  Subtle ill-defined hypoperfused areas are seen in the right renal cortex including in the upper and mid poles with associated perinephric fat stranding (series 2, images 13 and 27). This is consistent with acute pyelonephritis. No stones cysts or masses are identified in the right kidney.    Aorta: There is mild calcification of the abdominal aorta and  its branches.    Bowel:    Esophagus: The visualized esophagus appears unremarkable.    Stomach: The stomach appears unremarkable.    Duodenum: Unremarkable appearing duodenum.    Small Bowel: The small bowel appears unremarkable.    Colon: Nondistended.    Appendix: The appendix appears unremarkable (series 2, images 55-59).    Peritoneum: No intraperitoneal free air or ascites is seen.    Pelvis:    Bladder: The bladder appears unremarkable.    Female:    Uterus: The uterus appears unremarkable.    Ovaries: No adnexal masses are seen.    Bony structures:    Dorsal Spine: There is mild spondylosis of the visualized dorsal spine.    Bony Pelvis: Soft tissue calcification is seen in the right iliacus muscle at its insertion. There is mild osteoarthrosis of both hip joints.  Impression: Impression:    1. Subtle ill-defined hypoperfused areas are seen in the right renal cortex including in the upper and mid poles with associated perinephric fat stranding (series 2, images 13 and 27). This is consistent with acute pyelonephritis. Correlate with clinical and laboratory findings.    2. Details and other findings as discussed above.    No significant discrepancy with overnight report.    Electronically signed by: Garland Bowie  Date:    04/08/2024  Time:    08:13         Medication List        START taking these medications      ciprofloxacin HCl 500 MG tablet  Commonly known as: CIPRO  Take 1 tablet (500 mg total) by mouth every 12 (twelve) hours. for 9 days     famotidine 20 MG tablet  Commonly known as: PEPCID  Take 1 tablet (20 mg total) by mouth 2 (two) times daily.     fluticasone furoate 100 mcg/actuation inhaler  Commonly known as: ARNUITY ELLIPTA  Inhale 1 puff into the lungs once daily. Controller  Start taking on: April 13, 2024     HYDROcodone-acetaminophen 5-325 mg per tablet  Commonly known as: NORCO  Take 1 tablet by mouth every 8 (eight) hours as needed for Pain.     magnesium oxide 400 mg (241.3 mg  magnesium) tablet  Commonly known as: MAG-OX  Take 1 tablet (400 mg total) by mouth once daily. for 7 days     meclizine 25 mg tablet  Commonly known as: ANTIVERT  Take 1 tablet (25 mg total) by mouth 3 (three) times daily. for 10 days            CHANGE how you take these medications      butalbital-acetaminophen-caffeine -40 mg -40 mg per tablet  Commonly known as: FIORICET, ESGIC  Take 1 tablet by mouth every 4 (four) hours as needed for Headaches.  What changed: reasons to take this     glimepiride 4 MG tablet  Commonly known as: AMARYL  Take 1 tablet (4 mg total) by mouth daily with breakfast.  Start taking on: April 13, 2024  What changed: Another medication with the same name was removed. Continue taking this medication, and follow the directions you see here.     insulin glargine 100 unit/mL injection  Commonly known as: Lantus  Inject 50 Units into the skin every evening.  What changed: how much to take            CONTINUE taking these medications      amLODIPine 10 MG tablet  Commonly known as: NORVASC  Take 1 tablet (10 mg total) by mouth once daily.     atorvastatin 20 MG tablet  Commonly known as: LIPITOR  Take 1 tablet (20 mg total) by mouth once daily.     hydroCHLOROthiazide 25 MG tablet  Commonly known as: HYDRODIURIL  Take 1 tablet (25 mg total) by mouth once daily.     metFORMIN 1000 MG tablet  Commonly known as: GLUCOPHAGE  Take 1 tablet (1,000 mg total) by mouth 2 (two) times daily.     metoprolol tartrate 25 MG tablet  Commonly known as: LOPRESSOR  Take 1 tablet (25 mg total) by mouth 2 (two) times daily.     pioglitazone 30 MG tablet  Commonly known as: ACTOS  Take 1 tablet (30 mg total) by mouth once daily.            STOP taking these medications      cloNIDine 0.1 MG tablet  Commonly known as: CATAPRES     FLOVENT HFA 44 mcg/actuation inhaler  Generic drug: fluticasone propionate     gabapentin 600 MG tablet  Commonly known as: NEURONTIN               Where to Get Your  Medications        [id number] [place] - [address] AT [facility] [address]  [address], [place] [address]      Phone: [phone]   amLODIPine 10 MG tablet  atorvastatin 20 MG tablet  butalbital-acetaminophen-caffeine -40 mg -40 mg per tablet  ciprofloxacin HCl 500 MG tablet  famotidine 20 MG tablet  fluticasone furoate 100 mcg/actuation inhaler  glimepiride 4 MG tablet  hydroCHLOROthiazide 25 MG tablet  HYDROcodone-acetaminophen 5-325 mg per tablet  insulin glargine 100 unit/mL injection  magnesium oxide 400 mg (241.3 mg magnesium) tablet  meclizine 25 mg tablet  metFORMIN 1000 MG tablet  metoprolol tartrate 25 MG tablet  pioglitazone 30 MG tablet          Explained in detail to the patient the discharge plan, medications, and follow-up visits. Pt understands and agrees with the treatment plan.  Discharge Disposition: Home or Self Care  Discharged Condition: stable  Diet-   Dietary Orders (From admission, onward)       Start     Ordered    04/08/24 1223  Dietary nutrition supplements Boost Glucose Control - Any flavor; TID  Continuous        Question Answer Comment   Select PO Supplement: Boost Glucose Control - Any flavor    Frequency: TID        04/08/24 1223    04/08/24 1133  Diet diabetic 2000 Calorie  Diet effective now        Question:  Total calories:  Answer:  2000 Calorie    04/08/24 1132                   Medications Per DC med rec  Activities as tolerated   Follow-up Information       No, Primary Doctor Follow up.               Anh Nicholson MD Follow up in 2 week(s).    Specialty: Internal Medicine  Why: For diabetic checkup  Contact information:  [address] [facility] [facility] [address]  [place] [address] [phone]                           For further questions contact hospitalist office.    Discharge time >30 minutes    For worsening symptoms, chest pain, shortness of  breath, increased abdominal pain, high grade fever, stroke or stroke like symptoms, immediately go to the nearest Emergency Room or call 911 as soon as possible.      Ernesto Liriano M.D, on 4/12/2024. at 2:49 PM.

## 2024-04-12 NOTE — PT/OT/SLP PROGRESS
Physical Therapy      Patient Name:  Greer Kahn   MRN:  [id number]    Patient not seen today secondary to feeling ill and unwilling to participate  . Pt stated she doesn't want to do anything right now and for therapy to come back later.  Will follow-up when schedule allows.

## 2024-04-13 LAB
BACTERIA BLD CULT: NORMAL
BACTERIA BLD CULT: NORMAL

## 2024-06-13 ENCOUNTER — HOSPITAL ENCOUNTER (INPATIENT)
Facility: HOSPITAL | Age: 59
LOS: 26 days | Discharge: HOME HEALTH | DRG: 853 | End: 2024-07-09
Attending: EMERGENCY MEDICINE | Admitting: INTERNAL MEDICINE

## 2024-06-13 DIAGNOSIS — A41.9 SEPSIS, DUE TO UNSPECIFIED ORGANISM, UNSPECIFIED WHETHER ACUTE ORGAN DYSFUNCTION PRESENT: ICD-10-CM

## 2024-06-13 DIAGNOSIS — E87.70 HYPERVOLEMIA: ICD-10-CM

## 2024-06-13 DIAGNOSIS — L02.215 PERINEAL ABSCESS: Primary | ICD-10-CM

## 2024-06-13 DIAGNOSIS — N17.9 ACUTE KIDNEY INJURY: ICD-10-CM

## 2024-06-13 DIAGNOSIS — D63.1 ANEMIA IN CHRONIC KIDNEY DISEASE, UNSPECIFIED CKD STAGE: ICD-10-CM

## 2024-06-13 DIAGNOSIS — R07.9 CHEST PAIN: ICD-10-CM

## 2024-06-13 DIAGNOSIS — N18.9 ANEMIA IN CHRONIC KIDNEY DISEASE, UNSPECIFIED CKD STAGE: ICD-10-CM

## 2024-06-13 DIAGNOSIS — A41.9 SEPSIS: ICD-10-CM

## 2024-06-13 DIAGNOSIS — N12 PYELONEPHRITIS OF RIGHT KIDNEY: ICD-10-CM

## 2024-06-13 LAB
ABS NEUT CALC (OHS): 21.92 X10(3)/MCL (ref 2.1–9.2)
ALBUMIN SERPL-MCNC: 2.8 G/DL (ref 3.5–5)
ALBUMIN/GLOB SERPL: 0.5 RATIO (ref 1.1–2)
ALP SERPL-CCNC: 178 UNIT/L (ref 40–150)
ALT SERPL-CCNC: 25 UNIT/L (ref 0–55)
ANION GAP SERPL CALC-SCNC: 14 MEQ/L
AST SERPL-CCNC: 22 UNIT/L (ref 5–34)
BACTERIA #/AREA URNS AUTO: ABNORMAL /HPF
BILIRUB SERPL-MCNC: 2.7 MG/DL
BILIRUB UR QL STRIP.AUTO: ABNORMAL
BUN SERPL-MCNC: 9.7 MG/DL (ref 9.8–20.1)
CALCIUM SERPL-MCNC: 9 MG/DL (ref 8.4–10.2)
CHLORIDE SERPL-SCNC: 94 MMOL/L (ref 98–107)
CLARITY UR: ABNORMAL
CO2 SERPL-SCNC: 23 MMOL/L (ref 22–29)
COLOR UR AUTO: ABNORMAL
CREAT SERPL-MCNC: 0.98 MG/DL (ref 0.55–1.02)
CREAT/UREA NIT SERPL: 10
ERYTHROCYTE [DISTWIDTH] IN BLOOD BY AUTOMATED COUNT: 12.1 % (ref 11.5–17)
FLUAV AG UPPER RESP QL IA.RAPID: NOT DETECTED
FLUBV AG UPPER RESP QL IA.RAPID: NOT DETECTED
GFR SERPLBLD CREATININE-BSD FMLA CKD-EPI: >60 ML/MIN/1.73/M2
GLOBULIN SER-MCNC: 5.3 GM/DL (ref 2.4–3.5)
GLUCOSE SERPL-MCNC: 353 MG/DL (ref 74–100)
GLUCOSE UR QL STRIP: >=1000
HCT VFR BLD AUTO: 35.8 % (ref 37–47)
HGB BLD-MCNC: 11.8 G/DL (ref 12–16)
HGB UR QL STRIP: NEGATIVE
KETONES UR QL STRIP: 15
LACTATE SERPL-SCNC: 1.3 MMOL/L (ref 0.5–2.2)
LACTATE SERPL-SCNC: 1.4 MMOL/L (ref 0.5–2.2)
LEUKOCYTE ESTERASE UR QL STRIP: NEGATIVE
LYMPHOCYTES NFR BLD MANUAL: 1.89 X10(3)/MCL
LYMPHOCYTES NFR BLD MANUAL: 7 % (ref 13–40)
MCH RBC QN AUTO: 30.4 PG (ref 27–31)
MCHC RBC AUTO-ENTMCNC: 33 G/DL (ref 33–36)
MCV RBC AUTO: 92.3 FL (ref 80–94)
MONOCYTES NFR BLD MANUAL: 12 % (ref 2–11)
MONOCYTES NFR BLD MANUAL: 3.25 X10(3)/MCL (ref 0.1–1.3)
NEUTROPHILS NFR BLD MANUAL: 81 % (ref 47–80)
NITRITE UR QL STRIP: NEGATIVE
NRBC BLD AUTO-RTO: 0 %
PH UR STRIP: 6 [PH]
PLATELET # BLD AUTO: 264 X10(3)/MCL (ref 130–400)
PLATELET # BLD EST: ADEQUATE 10*3/UL
PMV BLD AUTO: 10.6 FL (ref 7.4–10.4)
POCT GLUCOSE: 422 MG/DL (ref 70–110)
POTASSIUM SERPL-SCNC: 3.8 MMOL/L (ref 3.5–5.1)
PROT SERPL-MCNC: 8.1 GM/DL (ref 6.4–8.3)
PROT UR QL STRIP: 30
RBC # BLD AUTO: 3.88 X10(6)/MCL (ref 4.2–5.4)
RBC #/AREA URNS AUTO: ABNORMAL /HPF
RBC MORPH BLD: NORMAL
SARS-COV-2 RNA RESP QL NAA+PROBE: NOT DETECTED
SODIUM SERPL-SCNC: 131 MMOL/L (ref 136–145)
SP GR UR STRIP.AUTO: 1.01 (ref 1–1.03)
SQUAMOUS #/AREA URNS AUTO: ABNORMAL /HPF
UROBILINOGEN UR STRIP-ACNC: 4
WBC # SPEC AUTO: 27.06 X10(3)/MCL (ref 4.5–11.5)
WBC #/AREA URNS AUTO: ABNORMAL /HPF
YEAST UR QL AUTO: ABNORMAL /HPF

## 2024-06-13 PROCEDURE — 83605 ASSAY OF LACTIC ACID: CPT | Performed by: INTERNAL MEDICINE

## 2024-06-13 PROCEDURE — 0H99XZZ DRAINAGE OF PERINEUM SKIN, EXTERNAL APPROACH: ICD-10-PCS | Performed by: EMERGENCY MEDICINE

## 2024-06-13 PROCEDURE — 81003 URINALYSIS AUTO W/O SCOPE: CPT | Performed by: EMERGENCY MEDICINE

## 2024-06-13 PROCEDURE — 63600175 PHARM REV CODE 636 W HCPCS: Performed by: INTERNAL MEDICINE

## 2024-06-13 PROCEDURE — 85007 BL SMEAR W/DIFF WBC COUNT: CPT | Performed by: EMERGENCY MEDICINE

## 2024-06-13 PROCEDURE — 80053 COMPREHEN METABOLIC PANEL: CPT | Performed by: EMERGENCY MEDICINE

## 2024-06-13 PROCEDURE — 83605 ASSAY OF LACTIC ACID: CPT | Performed by: EMERGENCY MEDICINE

## 2024-06-13 PROCEDURE — 87077 CULTURE AEROBIC IDENTIFY: CPT | Performed by: EMERGENCY MEDICINE

## 2024-06-13 PROCEDURE — 87070 CULTURE OTHR SPECIMN AEROBIC: CPT | Performed by: EMERGENCY MEDICINE

## 2024-06-13 PROCEDURE — 96365 THER/PROPH/DIAG IV INF INIT: CPT

## 2024-06-13 PROCEDURE — 63600175 PHARM REV CODE 636 W HCPCS: Performed by: NURSE PRACTITIONER

## 2024-06-13 PROCEDURE — 21400001 HC TELEMETRY ROOM

## 2024-06-13 PROCEDURE — 85027 COMPLETE CBC AUTOMATED: CPT | Performed by: EMERGENCY MEDICINE

## 2024-06-13 PROCEDURE — 56405 I&D VULVA/PERINEAL ABSCESS: CPT

## 2024-06-13 PROCEDURE — 87040 BLOOD CULTURE FOR BACTERIA: CPT | Performed by: EMERGENCY MEDICINE

## 2024-06-13 PROCEDURE — 96375 TX/PRO/DX INJ NEW DRUG ADDON: CPT

## 2024-06-13 PROCEDURE — 87184 SC STD DISK METHOD PER PLATE: CPT | Performed by: EMERGENCY MEDICINE

## 2024-06-13 PROCEDURE — 36415 COLL VENOUS BLD VENIPUNCTURE: CPT | Performed by: INTERNAL MEDICINE

## 2024-06-13 PROCEDURE — 25500020 PHARM REV CODE 255: Performed by: EMERGENCY MEDICINE

## 2024-06-13 PROCEDURE — 25000003 PHARM REV CODE 250: Performed by: INTERNAL MEDICINE

## 2024-06-13 PROCEDURE — 86140 C-REACTIVE PROTEIN: CPT | Performed by: INTERNAL MEDICINE

## 2024-06-13 PROCEDURE — 0240U COVID/FLU A&B PCR: CPT | Performed by: EMERGENCY MEDICINE

## 2024-06-13 PROCEDURE — 25000003 PHARM REV CODE 250: Performed by: EMERGENCY MEDICINE

## 2024-06-13 PROCEDURE — 99285 EMERGENCY DEPT VISIT HI MDM: CPT | Mod: 25

## 2024-06-13 PROCEDURE — 63600175 PHARM REV CODE 636 W HCPCS: Performed by: EMERGENCY MEDICINE

## 2024-06-13 RX ORDER — SODIUM CHLORIDE 9 MG/ML
1000 INJECTION, SOLUTION INTRAVENOUS
Status: COMPLETED | OUTPATIENT
Start: 2024-06-13 | End: 2024-06-13

## 2024-06-13 RX ORDER — ACETAMINOPHEN 500 MG
1000 TABLET ORAL
Status: COMPLETED | OUTPATIENT
Start: 2024-06-13 | End: 2024-06-13

## 2024-06-13 RX ORDER — CLINDAMYCIN PHOSPHATE 600 MG/50ML
600 INJECTION, SOLUTION INTRAVENOUS
Status: DISCONTINUED | OUTPATIENT
Start: 2024-06-13 | End: 2024-06-14

## 2024-06-13 RX ORDER — LIDOCAINE HYDROCHLORIDE 10 MG/ML
100 INJECTION INFILTRATION; PERINEURAL
Status: COMPLETED | OUTPATIENT
Start: 2024-06-13 | End: 2024-06-13

## 2024-06-13 RX ORDER — ACETAMINOPHEN 500 MG
1000 TABLET ORAL EVERY 6 HOURS PRN
Status: DISCONTINUED | OUTPATIENT
Start: 2024-06-13 | End: 2024-06-14

## 2024-06-13 RX ORDER — IBUPROFEN 200 MG
24 TABLET ORAL
Status: DISCONTINUED | OUTPATIENT
Start: 2024-06-13 | End: 2024-06-24

## 2024-06-13 RX ORDER — INSULIN ASPART 100 [IU]/ML
0-10 INJECTION, SOLUTION INTRAVENOUS; SUBCUTANEOUS
Status: DISCONTINUED | OUTPATIENT
Start: 2024-06-13 | End: 2024-06-13

## 2024-06-13 RX ORDER — ONDANSETRON HYDROCHLORIDE 2 MG/ML
4 INJECTION, SOLUTION INTRAVENOUS
Status: COMPLETED | OUTPATIENT
Start: 2024-06-13 | End: 2024-06-13

## 2024-06-13 RX ORDER — ONDANSETRON HYDROCHLORIDE 2 MG/ML
4 INJECTION, SOLUTION INTRAVENOUS EVERY 6 HOURS PRN
Status: DISCONTINUED | OUTPATIENT
Start: 2024-06-13 | End: 2024-06-14

## 2024-06-13 RX ORDER — MIDODRINE HYDROCHLORIDE 5 MG/1
5 TABLET ORAL 3 TIMES DAILY
Status: DISCONTINUED | OUTPATIENT
Start: 2024-06-13 | End: 2024-06-14

## 2024-06-13 RX ORDER — IBUPROFEN 600 MG/1
600 TABLET ORAL ONCE
Status: COMPLETED | OUTPATIENT
Start: 2024-06-13 | End: 2024-06-13

## 2024-06-13 RX ORDER — INSULIN GLARGINE 100 [IU]/ML
15 INJECTION, SOLUTION SUBCUTANEOUS NIGHTLY
Status: DISCONTINUED | OUTPATIENT
Start: 2024-06-13 | End: 2024-06-14

## 2024-06-13 RX ORDER — ACETAMINOPHEN 325 MG/1
650 TABLET ORAL EVERY 6 HOURS PRN
Status: DISCONTINUED | OUTPATIENT
Start: 2024-06-13 | End: 2024-06-13

## 2024-06-13 RX ORDER — KETOROLAC TROMETHAMINE 30 MG/ML
30 INJECTION, SOLUTION INTRAMUSCULAR; INTRAVENOUS
Status: COMPLETED | OUTPATIENT
Start: 2024-06-13 | End: 2024-06-13

## 2024-06-13 RX ORDER — IBUPROFEN 200 MG
16 TABLET ORAL
Status: DISCONTINUED | OUTPATIENT
Start: 2024-06-13 | End: 2024-06-24

## 2024-06-13 RX ORDER — SODIUM CHLORIDE, SODIUM LACTATE, POTASSIUM CHLORIDE, CALCIUM CHLORIDE 600; 310; 30; 20 MG/100ML; MG/100ML; MG/100ML; MG/100ML
INJECTION, SOLUTION INTRAVENOUS CONTINUOUS
Status: DISCONTINUED | OUTPATIENT
Start: 2024-06-13 | End: 2024-06-14

## 2024-06-13 RX ORDER — INSULIN ASPART 100 [IU]/ML
0-10 INJECTION, SOLUTION INTRAVENOUS; SUBCUTANEOUS
Status: DISCONTINUED | OUTPATIENT
Start: 2024-06-13 | End: 2024-06-14

## 2024-06-13 RX ORDER — IBUPROFEN 400 MG/1
400 TABLET ORAL EVERY 6 HOURS PRN
Status: DISCONTINUED | OUTPATIENT
Start: 2024-06-13 | End: 2024-06-24

## 2024-06-13 RX ORDER — GLUCAGON 1 MG
1 KIT INJECTION
Status: DISCONTINUED | OUTPATIENT
Start: 2024-06-13 | End: 2024-06-24

## 2024-06-13 RX ORDER — ALBUTEROL SULFATE 90 UG/1
2 AEROSOL, METERED RESPIRATORY (INHALATION)
COMMUNITY
Start: 2024-01-17

## 2024-06-13 RX ADMIN — ONDANSETRON 4 MG: 2 INJECTION INTRAMUSCULAR; INTRAVENOUS at 09:06

## 2024-06-13 RX ADMIN — IOHEXOL 100 ML: 350 INJECTION, SOLUTION INTRAVENOUS at 09:06

## 2024-06-13 RX ADMIN — CLINDAMYCIN PHOSPHATE 600 MG: 600 INJECTION, SOLUTION INTRAVENOUS at 09:06

## 2024-06-13 RX ADMIN — ACETAMINOPHEN 1000 MG: 500 TABLET ORAL at 04:06

## 2024-06-13 RX ADMIN — SODIUM CHLORIDE, POTASSIUM CHLORIDE, SODIUM LACTATE AND CALCIUM CHLORIDE: 600; 310; 30; 20 INJECTION, SOLUTION INTRAVENOUS at 09:06

## 2024-06-13 RX ADMIN — SODIUM CHLORIDE, POTASSIUM CHLORIDE, SODIUM LACTATE AND CALCIUM CHLORIDE 1000 ML: 600; 310; 30; 20 INJECTION, SOLUTION INTRAVENOUS at 09:06

## 2024-06-13 RX ADMIN — KETOROLAC TROMETHAMINE 30 MG: 30 INJECTION, SOLUTION INTRAMUSCULAR at 04:06

## 2024-06-13 RX ADMIN — INSULIN GLARGINE 15 UNITS: 100 INJECTION, SOLUTION SUBCUTANEOUS at 09:06

## 2024-06-13 RX ADMIN — ACETAMINOPHEN 1000 MG: 500 TABLET ORAL at 08:06

## 2024-06-13 RX ADMIN — SODIUM CHLORIDE 1000 ML: 9 INJECTION, SOLUTION INTRAVENOUS at 03:06

## 2024-06-13 RX ADMIN — INSULIN ASPART 10 UNITS: 100 INJECTION, SOLUTION INTRAVENOUS; SUBCUTANEOUS at 07:06

## 2024-06-13 RX ADMIN — INSULIN ASPART 5 UNITS: 100 INJECTION, SOLUTION INTRAVENOUS; SUBCUTANEOUS at 09:06

## 2024-06-13 RX ADMIN — KETOROLAC TROMETHAMINE 30 MG: 30 INJECTION, SOLUTION INTRAMUSCULAR at 09:06

## 2024-06-13 RX ADMIN — PIPERACILLIN SODIUM AND TAZOBACTAM SODIUM 4.5 G: 4; .5 INJECTION, POWDER, LYOPHILIZED, FOR SOLUTION INTRAVENOUS at 10:06

## 2024-06-13 RX ADMIN — IBUPROFEN 600 MG: 600 TABLET, FILM COATED ORAL at 07:06

## 2024-06-13 RX ADMIN — VANCOMYCIN HYDROCHLORIDE 1000 MG: 1 INJECTION, POWDER, LYOPHILIZED, FOR SOLUTION INTRAVENOUS at 11:06

## 2024-06-13 RX ADMIN — MIDODRINE HYDROCHLORIDE 5 MG: 5 TABLET ORAL at 09:06

## 2024-06-13 RX ADMIN — VANCOMYCIN HYDROCHLORIDE 1000 MG: 1 INJECTION, POWDER, LYOPHILIZED, FOR SOLUTION INTRAVENOUS at 02:06

## 2024-06-13 RX ADMIN — CEFTRIAXONE SODIUM 2 G: 2 INJECTION, POWDER, FOR SOLUTION INTRAMUSCULAR; INTRAVENOUS at 09:06

## 2024-06-13 RX ADMIN — SODIUM CHLORIDE 500 ML: 9 INJECTION, SOLUTION INTRAVENOUS at 07:06

## 2024-06-13 RX ADMIN — SODIUM CHLORIDE 1000 ML: 9 INJECTION, SOLUTION INTRAVENOUS at 08:06

## 2024-06-13 RX ADMIN — LIDOCAINE HYDROCHLORIDE 100 MG: 10 INJECTION, SOLUTION INFILTRATION; PERINEURAL at 09:06

## 2024-06-13 RX ADMIN — ONDANSETRON 4 MG: 2 INJECTION INTRAMUSCULAR; INTRAVENOUS at 08:06

## 2024-06-13 NOTE — ED PROVIDER NOTES
Encounter Date: 6/13/2024       History     Chief Complaint   Patient presents with    Fever     Pt concerned at fever 102 yesterday reporting possible UTI and boils to right thigh and left buttock     58-year-old female history of asthma, diabetes, hypertension was in her usual state of health until 2 days ago when she developed fevers/chills.  She has also had too numerous to count episodes of nausea/ vomiting.  Patient says she is vomiting phone.  No blood or coffee-ground emesis.  Yesterday she noticed boil x2 to her buttock area.  She denies urinary symptoms, diarrhea, shortness of breath, abdominal pain, URI symptoms    The history is provided by the patient.     Review of patient's allergies indicates:  No Known Allergies  Past Medical History:   Diagnosis Date    Asthma     Diabetes mellitus     Hypertension      No past surgical history on file.  No family history on file.  Social History     Tobacco Use    Smoking status: Never    Smokeless tobacco: Never   Substance Use Topics    Alcohol use: Never    Drug use: Never     Review of Systems   Constitutional:  Positive for chills and fever. Negative for diaphoresis and fatigue.   HENT:  Negative for congestion, drooling, ear discharge, ear pain, postnasal drip, rhinorrhea, sinus pressure, sinus pain, sore throat and tinnitus.    Eyes:  Negative for discharge.   Respiratory:  Negative for cough, chest tightness, shortness of breath and wheezing.    Cardiovascular:  Negative for chest pain and palpitations.   Gastrointestinal:  Positive for nausea and vomiting. Negative for abdominal pain and diarrhea.   Genitourinary:  Negative for frequency, hematuria and urgency.   Musculoskeletal:  Positive for back pain. Negative for neck pain and neck stiffness.   Skin:  Negative for rash.   Neurological:  Positive for weakness and headaches. Negative for syncope, light-headedness and numbness.   Psychiatric/Behavioral:  Negative for suicidal ideas.        Physical Exam      Initial Vitals [06/13/24 0734]   BP Pulse Resp Temp SpO2   (!) 189/132 (!) 124 18 (!) 101 °F (38.3 °C) (!) 94 %      MAP       --         Physical Exam    Nursing note and vitals reviewed.  Constitutional: No distress.   HENT:   Mouth/Throat: Oropharynx is clear and moist.   Eyes: Conjunctivae are normal.   Neck:   Normal range of motion.  Cardiovascular:    Tachycardia present.         Pulmonary/Chest: Breath sounds normal. No respiratory distress.   Abdominal: Abdomen is soft. There is no abdominal tenderness. There is no guarding.   Genitourinary:       Musculoskeletal:         General: Normal range of motion.      Cervical back: Normal range of motion.     Neurological: She is alert and oriented to person, place, and time. She has normal strength. No sensory deficit.   Skin: Skin is warm and dry.         ED Course   I & D - Incision and Drainage    Date/Time: 6/13/2024 9:11 AM  Location procedure was performed: [facility] EMERGENCY DEPARTMENT    Performed by: Ez Rangel MD  Authorized by: Ez Rangel MD  Consent Done: Not Needed  Type: abscess  Body area: anogenital  Location details: perineum  Anesthesia: local infiltration    Anesthesia:  Local Anesthetic: lidocaine 1% without epinephrine    Patient sedated: no  Scalpel size: 11  Incision type: single straight  Incision depth: dermal  Drainage: pus  Drainage amount: scant  Wound treatment: wound left open and deloculation  Complications: No  Specimens: No  Implants: No    Incision depth: dermal        Labs Reviewed   COMPREHENSIVE METABOLIC PANEL - Abnormal; Notable for the following components:       Result Value    Sodium 131 (*)     Chloride 94 (*)     Glucose 353 (*)     Blood Urea Nitrogen 9.7 (*)     Albumin 2.8 (*)     Globulin 5.3 (*)     Albumin/Globulin Ratio 0.5 (*)     Bilirubin Total 2.7 (*)      (*)     All other components within normal limits   CBC WITH DIFFERENTIAL - Abnormal; Notable for the following components:    WBC  27.06 (*)     RBC 3.88 (*)     Hgb 11.8 (*)     Hct 35.8 (*)     MPV 10.6 (*)     All other components within normal limits   MANUAL DIFFERENTIAL - Abnormal; Notable for the following components:    Neutrophils % 81 (*)     Lymphs % 7 (*)     Monocytes % 12 (*)     Neutrophils Abs Calc 21.9186 (*)     Monocytes Abs 3.2472 (*)     All other components within normal limits   COVID/FLU A&B PCR - Normal    Narrative:     The Xpert Xpress SARS-CoV-2/FLU/RSV plus is a rapid, multiplexed real-time PCR test intended for the simultaneous qualitative detection and differentiation of SARS-CoV-2, Influenza A, Influenza B, and respiratory syncytial virus (RSV) viral RNA in either nasopharyngeal swab or nasal swab specimens.         LACTIC ACID, PLASMA - Normal   BLOOD CULTURE [facility]   BLOOD CULTURE [facility]   WOUND CULTURE ([facility])   CBC W/ AUTO DIFFERENTIAL    Narrative:     The following orders were created for panel order CBC auto differential.  Procedure                               Abnormality         Status                     ---------                               -----------         ------                     CBC with Differential[[id number]]       Abnormal            Final result               Manual Differential[[id number]]         Abnormal            Final result                 Please view results for these tests on the individual orders.   URINALYSIS, REFLEX TO URINE CULTURE          Imaging Results              CTA Chest Non-Coronary (PE Studies) (Final result)  Result time 06/13/24 09:58:42      Final result by Dillon Barbour MD (06/13/24 09:58:42)                   Impression:      No pulmonary embolism seen    Right lower lobe atelectasis and small right-sided pleural effusion      Electronically signed by: Ayush Barbour  Date:    06/13/2024  Time:    09:58               Narrative:    EXAMINATION:  CTA CHEST NON CORONARY (PE STUDIES)    CLINICAL HISTORY:  Pulmonary embolism (PE) suspected, unknown  D-dimer;    TECHNIQUE:  Low dose axial images, sagittal and coronal reformations were obtained from the thoracic inlet to the lung bases following the IV administration of contrast..  Contrast timing was optimized to evaluate the pulmonary arteries.  MIP images were performed.  Automated exposure control was utilized    COMPARISON:  None    FINDINGS:  There are chronic interstitial lung changes seen bilaterally.  There is right lower lobe atelectasis.  No infiltrate is seen.  No mass is seen.  There is a right-sided pleural effusion.  It is small..  No pleural thickening is seen.  No pneumothorax is seen.    No pulmonary embolism is seen.    The thoracic aorta appears normal.  No mediastinal lymphadenopathy is seen.  The heart appears normal.    Upper abdomen shows no acute abnormality.  There is some air seen within the esophagus which is nonspecific.                                       CT Abdomen Pelvis With IV Contrast NO Oral Contrast (Final result)  Result time 06/13/24 10:38:30      Final result by Dillon Barbour MD (06/13/24 10:38:30)                   Impression:      Area of cellulitis involving the perineum on the left side extending to the medial gluteal fold with a area of thickening and inflammatory changes in the labia on the left side with a area of hypoattenuation seen within the labia on the left side concerning for possible developing abscess.  Follow-up is recommended.    Right lower lobe atelectasis and right-sided pleural effusion      Electronically signed by: Ayush Barbour  Date:    06/13/2024  Time:    10:38               Narrative:    EXAMINATION:  CT ABDOMEN PELVIS WITH IV CONTRAST    CLINICAL HISTORY:  sepsis, abscess buttock, s/p I&D;    TECHNIQUE:  Low dose axial images, sagittal and coronal reformations were obtained from the lung bases to the pubic symphysis following the IV administration of contrast. Automatic exposure control (AEC) is utilized to reduce patient radiation  exposure.    COMPARISON:  04/08/2024    FINDINGS:  There is right lower lobe atelectasis.  There is a right-sided pleural effusion..  There is a calcified pleural plaque in the right mid hemithorax.    The liver appears normal.  No liver mass or lesion is seen.  Portal and hepatic veins appear normal.    The patient is status post cholecystectomy..    The pancreas appears normal.  No pancreatic mass or lesion is seen.    The spleen shows no acute abnormality.    The adrenal glands appear normal.  No adrenal nodule is seen.    The kidneys appear normal.  No hydronephrosis is seen.  No hydroureter is seen.  There is a punctate nonobstructing medullary calculus in the midpole of the right kidney.  There is a cyst in the lower pole of the right kidney.  It appears to be a simple cyst.  It is stable since the prior examination..  No obvious ureteral stones are seen.    Urinary bladder appears grossly unremarkable.    No colitis is seen.  No diverticulitis is seen.  No obvious colonic mass or lesion is seen.  The appendix appears normal.    Uterus and ovaries appear normal.    There are inflammatory changes seen involving the perineum on the left side extending into the medial gluteal fold on the left side.  The inflammatory changes are limited to the left side with some labial fold thickening seen on the left side as well.  There is a area of hypoattenuation along the labial fold on the left side which may represent developing abscess.  It measures 4 cm x 1.7 cm.  Follow-up of this area is recommended.    No free air is seen.  No free fluid is seen.                                       Medications   vancomycin (VANCOCIN) 1,000 mg in dextrose 5 % (D5W) 250 mL IVPB (Vial-Mate) ( Intravenous Verify Only 6/13/24 1133)   sodium chloride 0.9% bolus 1,000 mL 1,000 mL (0 mLs Intravenous Stopped 6/13/24 0903)   acetaminophen tablet 1,000 mg (1,000 mg Oral Given 6/13/24 0801)   ondansetron injection 4 mg (4 mg Intravenous Given  6/13/24 0803)   lactated ringers bolus 1,000 mL ( Intravenous Stopped 6/13/24 1106)   cefTRIAXone (ROCEPHIN) 2 g in dextrose 5 % in water (D5W) 100 mL IVPB (MB+) (0 g Intravenous Stopped 6/13/24 1017)   LIDOcaine HCL 10 mg/ml (1%) injection 100 mg (100 mg Intradermal Given 6/13/24 0948)   ketorolac injection 30 mg (30 mg Intravenous Given 6/13/24 0923)   iohexoL (OMNIPAQUE 350) injection 100 mL (100 mLs Intravenous Given 6/13/24 0944)     Medical Decision Making  Medical Decision Making  Problem list/ differential diagnosis including but not limited to:        Patient's chronic illnesses impacting care:  none      Diagnostic test considered but not ordered:      My interpretations:    Radiology reports      Discussion of case with external qualified healthcare professionals:  Not applicable      Review of external notes( inpt, ems, NH, clinic):      Decision rules/scores:    Medications reviewed:  Medications ordered in the ER:  Discharge prescriptions:    Social variables possible impacting patient's healthcare:    Code status/discussion    Shared decision making:    Consideration for admission versus discharge:  Admission    Patient arrived to emergency department complaining of fever, nausea vomiting.  Vital signs significant for fever and tachycardia.  Patient was also hypertensive.  Exam showed abscess with induration to right buttock, perineum.  Lab significant for elevated white count.  Lactic normal.  Patient started on IV fluids, given Tylenol and Toradol.  But an hour after arrival patient's oxygenation dropped into the mid 80s.  So placed on 2 L nasal cannula.  Oxygenation has remained stable.  Attempted source control with I and D. only scant pus expressed after probing.  Wound culture sent.  CT was performed after I and D and still shows possible abscess.  Patient placed on ceftriaxone and vancomycin.      Amount and/or Complexity of Data Reviewed  Labs: ordered. Decision-making details documented in ED  Course.  Radiology: ordered.    Risk  OTC drugs.  Prescription drug management.  Decision regarding hospitalization.               ED Course as of 06/13/24 1157   Thu Jun 13, 2024   0929 Influenza A, Molecular: Not Detected [WC]   0929 Influenza B, Molecular: Not Detected [WC]   0929 SARS-CoV2 (COVID-19) Qualitative PCR: Not Detected [WC]   0929 WBC(!): 27.06 [WC]   0929 Glucose(!): 353 [WC]   0929 Anion Gap: 14.0 [WC]   0929 Lactic Acid Level: 1.4 [WC]   1127 Post infusion IV fluids patient heart rate has come down.  Blood pressure is improved as well.  Oxygen has remained stable [WC]      ED Course User Index  [WC] Ez Rangel MD                           Clinical Impression:  Final diagnoses:  [A41.9] Sepsis          ED Disposition Condition    Admit                 Ez Rangel MD  06/14/24 0657

## 2024-06-13 NOTE — ED NOTES
"Pt called me the room and stated that she is "feels like I can't stop shaking" Denies any itching or difficulty breathing.  "

## 2024-06-13 NOTE — AI DETERIORATION ALERT
Artificial Intelligence Notification  [facility]  [address] [place] [address]  Phone: [phone]    This documentation was triggered by an Artificial Intelligence Notification:    Admit Date: 2024   LOS: 0  Code Status: Full Code  : 1965  Age: 58 y.o.  Weight:   Wt Readings from Last 1 Encounters:   24 108.9 kg (240 lb)        Sex: female  Bed: 6/[facility]  MRN: [id number]  Attending Physician: Portillo North MD     Date of Alert: 2024  Time AI Alert Received: 1750            Vitals:    24 1722   BP: 100/67   Pulse: (!) 115   Resp:    Temp: (!) 102.7 °F (39.3 °C)     SpO2: 95 %      Artificial Intelligence alert discussed with Provider:     Name: BELEM Mendes    Date/Time of Provider Notification: 1800      Patient Condition: patient AAOx4. New admit to floor. Suspected UTI and infected boils. Patient given 3 ounds of Abx. Talked with nurse on the floor and advised this patient needs to be seen by the attending and orders placed. RN was told by attending the night team will see this patient and put orders in. I advised the RN to tell the MD that ICU is receiving alerts on this patient and she needs IV fluids for proper fluid resuscitation due to BP trending down and initiating the sepsis protocol. MD ordered bolus and fluids. Patient also having uncontrollable temps. Suggested cooling IV fluids to help with temps. ICU available if needed.

## 2024-06-13 NOTE — Clinical Note
Diagnosis: Sepsis [[id number]]   Future Attending Provider: CORINA DIAZ [[id number]]   Admit to which facility:: [facility] [[id number]]   Reason for IP Medical Treatment  (Clinical interventions that can only be accomplished in the IP setting? ) :: IV antibiotics and oxygen   I certify that Inpatient services for greater than or equal to 2 midnights are medically necessary:: Yes   Plans for Post-Acute care--if anticipated (pick the single best option):: A. No post acute care anticipated at this time

## 2024-06-13 NOTE — H&P
[facility] Hospital Medicine - H&P Note    Patient Name: Greer Kahn  : 1965  MRN: [id number]  PCP: No, Primary Doctor  Admitting Physician:  CANDICE Jackson MD  Admission Class: IP- Inpatient   Code status: Full    Allergies   Patient has no known allergies.    Chief Complaint   Fever, painful boils    History of Present Illness   58-year-old female whose history includes hypertension, diabetes, asthma and dyslipidemia.  Presented to the ED with complaints of a 1 day history of fever and multiple painful lesions to her thighs and buttocks. First noticed them approximately 3 days ago.     VS on arrival: T 101, P 124, R 18, B/P 189/132, Sats 94% on room air.  Initial labs include WBC 27.06 hemoglobin 11.8, hematocrit 35.8, platelets 264, sodium 131, potassium 3.8, chloride 94, bicarb 23, BUN 9.7, creatinine 0.98, glucose 353 and otherwise unremarkable.  Lactic acid 1.4.  COVID and flu not detected.  UA with significant proteinuria, glucosuria, ketones and few bacteria and yeast.  No white blood cells noted.  CT abdomen and pelvis with contrast shows an area of cellulitis involving the perineum on the left side extending to the medial gluteal fold with an area of thickening and inflammatory changes in the labia on the left side with an area of hypoattenuation seen within the labia on the left side concerning for possible developing abscess.  CT chest with contrast shows a right lower lobe atelectasis and small right-sided pleural effusion with no evidence of pulmonary embolism.  Medications given in the ER include vancomycin and Rocephin.    ROS   Except as documented, all other systems reviewed and negative     Past Medical History   Hypertension  Diabetes mellitus type, 2  Asthma  Dyslipidemia   Noncompliance with medical therapy    Past Surgical History   Cholecystectomy     Social History   Denies alcohol, tobacco or illicit drug use.     Screening for Social Drivers for  health:  Patient screened for food insecurity, housing instability, transportation needs, utility difficulties, and interpersonal safety (select all that apply as identified as concern)  []Housing or Food  []Transportation Needs  []Utility Difficulties  []Interpersonal safety  [x]None      Family History   Reviewed and negative    Home Medications     Prior to Admission medications    Medication Sig Start Date End Date Taking? Authorizing Provider   albuterol (PROVENTIL/VENTOLIN HFA) 90 mcg/actuation inhaler Inhale 2 puffs into the lungs every 4 to 6 hours as needed for Shortness of Breath. 1/17/24  Yes Provider, Historical   amLODIPine (NORVASC) 10 MG tablet Take 1 tablet (10 mg total) by mouth once daily. 4/12/24 5/12/24  Ernesto Kennedy MD   atorvastatin (LIPITOR) 20 MG tablet Take 1 tablet (20 mg total) by mouth once daily. 4/12/24 5/12/24  Ernesto Kennedy MD   famotidine (PEPCID) 20 MG tablet Take 1 tablet (20 mg total) by mouth 2 (two) times daily. 4/12/24 5/12/24  Ernesto Kennedy MD   glimepiride (AMARYL) 4 MG tablet Take 1 tablet (4 mg total) by mouth daily with breakfast. 4/13/24 5/13/24  Ernesto Kennedy MD   hydroCHLOROthiazide (HYDRODIURIL) 25 MG tablet Take 1 tablet (25 mg total) by mouth once daily. 4/12/24 5/12/24  Ernesto Kennedy MD   insulin glargine (LANTUS) 100 unit/mL injection Inject 50 Units into the skin every evening. 4/12/24 6/11/24  Ernesto Kennedy MD   meclizine (ANTIVERT) 25 mg tablet Take 1 tablet (25 mg total) by mouth 3 (three) times daily. for 10 days 4/12/24 4/22/24  Ernesto Kennedy MD   metFORMIN (GLUCOPHAGE) 1000 MG tablet Take 1 tablet (1,000 mg total) by mouth 2 (two) times daily. 4/12/24 5/12/24  Ernesto Kennedy MD   metoprolol tartrate (LOPRESSOR) 25 MG tablet Take 1 tablet (25 mg total) by mouth 2 (two) times daily. 4/12/24 5/12/24  Ernesto Kennedy MD   pioglitazone (ACTOS) 30 MG tablet Take 1 tablet (30 mg total) by mouth once daily. 4/12/24 5/12/24  Ernesot Kennedy MD      PATIENT  "ADMITS THAT SHE RAN OUT OF THE ABOVE MEDICATIONS SEVERAL MONTHS AGO AND HAS NOT BEEN TAKING ANY MEDICATIONS AT HOME AT ALL.    Physical Exam   Vital Signs  Temp:  [98.4 °F (36.9 °C)-102.7 °F (39.3 °C)]   Pulse:  []   Resp:  [16-24]   BP: (100-189)/()   SpO2:  [94 %-100 %]    General: Appears comfortable  HEENT: NC/AT  Neck:  No JVD  Chest: CTABL  CVS: Regular rhythm. Normal S1/S2.  Abdomen: nondistended, normoactive BS, soft and non-tender.  MSK: No obvious deformity or joint swelling  Skin: Warm and dry - several lesions to bilateral thighs and buttocks with yellow drainage  Neuro: AAOx3, no focal neurological deficit  Psych: Cooperative    Labs     Recent Labs     06/13/24  0804   WBC 27.06*   RBC 3.88*   HGB 11.8*   HCT 35.8*   MCV 92.3   MCH 30.4   MCHC 33.0   RDW 12.1        No results for input(s): "PROTIME", "INR", "PTT", "D-DIMER", "FERRITIN", "IRON", "TRANS", "TIBC", "LABIRON", "[id number]", "FOLATE", "LDH", "HAPTOGLOBIN", "RETICCNTAUTO", "RETABS", "PERIPSMEAREV" in the last 72 hours.   Recent Labs     06/13/24  0804   *   K 3.8   CO2 23   BUN 9.7*   CREATININE 0.98   EGFRNORACEVR >60   GLUCOSE 353*   CALCIUM 9.0   ALBUMIN 2.8*   GLOBULIN 5.3*   ALKPHOS 178*   ALT 25   AST 22   BILITOT 2.7*     No results for input(s): "LACTIC" in the last 72 hours.  No results for input(s): "CPK", "TROPONINI" in the last 72 hours.     Microbiology Results (last 7 days)       Procedure Component Value Units Date/Time    Wound Culture [[id number]] Collected: 06/13/24 0917    Order Status: Sent Specimen: Abscess from Perineum Updated: 06/13/24 0920    Blood culture #1 **CANNOT BE ORDERED STAT** [[id number]] Collected: 06/13/24 0832    Order Status: Resulted Specimen: Blood from Hand, Left Updated: 06/13/24 0836    Blood culture #2 **CANNOT BE ORDERED STAT** [[id number]] Collected: 06/13/24 0832    Order Status: Resulted Specimen: Blood from Antecubital, Left Updated: 06/13/24 0835           Imaging "   CT Abdomen Pelvis With IV Contrast NO Oral Contrast  Narrative: EXAMINATION:  CT ABDOMEN PELVIS WITH IV CONTRAST    CLINICAL HISTORY:  sepsis, abscess buttock, s/p I&D;    TECHNIQUE:  Low dose axial images, sagittal and coronal reformations were obtained from the lung bases to the pubic symphysis following the IV administration of contrast. Automatic exposure control (AEC) is utilized to reduce patient radiation exposure.    COMPARISON:  04/08/2024    FINDINGS:  There is right lower lobe atelectasis.  There is a right-sided pleural effusion..  There is a calcified pleural plaque in the right mid hemithorax.    The liver appears normal.  No liver mass or lesion is seen.  Portal and hepatic veins appear normal.    The patient is status post cholecystectomy..    The pancreas appears normal.  No pancreatic mass or lesion is seen.    The spleen shows no acute abnormality.    The adrenal glands appear normal.  No adrenal nodule is seen.    The kidneys appear normal.  No hydronephrosis is seen.  No hydroureter is seen.  There is a punctate nonobstructing medullary calculus in the midpole of the right kidney.  There is a cyst in the lower pole of the right kidney.  It appears to be a simple cyst.  It is stable since the prior examination..  No obvious ureteral stones are seen.    Urinary bladder appears grossly unremarkable.    No colitis is seen.  No diverticulitis is seen.  No obvious colonic mass or lesion is seen.  The appendix appears normal.    Uterus and ovaries appear normal.    There are inflammatory changes seen involving the perineum on the left side extending into the medial gluteal fold on the left side.  The inflammatory changes are limited to the left side with some labial fold thickening seen on the left side as well.  There is a area of hypoattenuation along the labial fold on the left side which may represent developing abscess.  It measures 4 cm x 1.7 cm.  Follow-up of this area is recommended.    No  free air is seen.  No free fluid is seen.  Impression: Area of cellulitis involving the perineum on the left side extending to the medial gluteal fold with a area of thickening and inflammatory changes in the labia on the left side with a area of hypoattenuation seen within the labia on the left side concerning for possible developing abscess.  Follow-up is recommended.    Right lower lobe atelectasis and right-sided pleural effusion    Electronically signed by: Ayush Barbour  Date:    06/13/2024  Time:    10:38  CTA Chest Non-Coronary (PE Studies)  Narrative: EXAMINATION:  CTA CHEST NON CORONARY (PE STUDIES)    CLINICAL HISTORY:  Pulmonary embolism (PE) suspected, unknown D-dimer;    TECHNIQUE:  Low dose axial images, sagittal and coronal reformations were obtained from the thoracic inlet to the lung bases following the IV administration of contrast..  Contrast timing was optimized to evaluate the pulmonary arteries.  MIP images were performed.  Automated exposure control was utilized    COMPARISON:  None    FINDINGS:  There are chronic interstitial lung changes seen bilaterally.  There is right lower lobe atelectasis.  No infiltrate is seen.  No mass is seen.  There is a right-sided pleural effusion.  It is small..  No pleural thickening is seen.  No pneumothorax is seen.    No pulmonary embolism is seen.    The thoracic aorta appears normal.  No mediastinal lymphadenopathy is seen.  The heart appears normal.    Upper abdomen shows no acute abnormality.  There is some air seen within the esophagus which is nonspecific.  Impression: No pulmonary embolism seen    Right lower lobe atelectasis and small right-sided pleural effusion    Electronically signed by: Ayush Barbour  Date:    06/13/2024  Time:    09:58    Assessment & Plan     Sepsis secondary to labial abscess - MRSA suspected   - continue IV clindamycin and zosyn   - f/u on blood cultures  - general surgery consult for possible I&D   - PRN analgesics      Hypertensive crisis - resolved  - patient not taking any medications at home for several months  - p.r.n. antihypertensives for now and will need prescriptions at the time of discharge.    Diabetes mellitus, type 2  - has not been on any oral meds or insulin at home for several months  - Start Lantus 40 units sq daily  - Hba1c pending  - accuchecks with SS    VTE Prophylaxis: Elizabeth FOWLER, Lizzy Jin, RAHULP-BC have discussed this patients case with Dr. Jackson who agrees with the diagnosis and treatment plan.

## 2024-06-13 NOTE — ED TRIAGE NOTES
Pt concerned at fever 102 yesterday reporting possible UTI and boils to right thigh and left buttock

## 2024-06-14 ENCOUNTER — ANESTHESIA EVENT (OUTPATIENT)
Dept: SURGERY | Facility: HOSPITAL | Age: 59
End: 2024-06-14

## 2024-06-14 ENCOUNTER — ANESTHESIA (OUTPATIENT)
Dept: SURGERY | Facility: HOSPITAL | Age: 59
End: 2024-06-14

## 2024-06-14 PROBLEM — A41.9 SEPSIS: Status: ACTIVE | Noted: 2024-06-14

## 2024-06-14 PROBLEM — L02.215 PERINEAL ABSCESS: Status: ACTIVE | Noted: 2024-06-14

## 2024-06-14 LAB
ABS NEUT (OLG): 23.5 X10(3)/MCL (ref 2.1–9.2)
ALBUMIN SERPL-MCNC: 2.3 G/DL (ref 3.5–5)
ALBUMIN/GLOB SERPL: 0.5 RATIO (ref 1.1–2)
ALP SERPL-CCNC: 173 UNIT/L (ref 40–150)
ALT SERPL-CCNC: 23 UNIT/L (ref 0–55)
ANION GAP SERPL CALC-SCNC: 10 MEQ/L
AST SERPL-CCNC: 19 UNIT/L (ref 5–34)
BASOPHILS NFR BLD MANUAL: 0.26 X10(3)/MCL (ref 0–0.2)
BASOPHILS NFR BLD MANUAL: 1 %
BILIRUB SERPL-MCNC: 2.6 MG/DL
BUN SERPL-MCNC: 16.9 MG/DL (ref 9.8–20.1)
CALCIUM SERPL-MCNC: 8.4 MG/DL (ref 8.4–10.2)
CHLORIDE SERPL-SCNC: 97 MMOL/L (ref 98–107)
CO2 SERPL-SCNC: 22 MMOL/L (ref 22–29)
CREAT SERPL-MCNC: 1.76 MG/DL (ref 0.55–1.02)
CREAT/UREA NIT SERPL: 10
CRP SERPL-MCNC: 367.1 MG/L
ERYTHROCYTE [DISTWIDTH] IN BLOOD BY AUTOMATED COUNT: 12.3 % (ref 11.5–17)
EST. AVERAGE GLUCOSE BLD GHB EST-MCNC: 269 MG/DL
GFR SERPLBLD CREATININE-BSD FMLA CKD-EPI: 33 ML/MIN/1.73/M2
GLOBULIN SER-MCNC: 4.7 GM/DL (ref 2.4–3.5)
GLUCOSE SERPL-MCNC: 286 MG/DL (ref 70–110)
GLUCOSE SERPL-MCNC: 349 MG/DL (ref 70–110)
GLUCOSE SERPL-MCNC: 484 MG/DL (ref 74–100)
HBA1C MFR BLD: 11 %
HCT VFR BLD AUTO: 32.7 % (ref 37–47)
HGB BLD-MCNC: 10.5 G/DL (ref 12–16)
INSTRUMENT WBC (OLG): 26.41 X10(3)/MCL
LYMPHOCYTES NFR BLD MANUAL: 0.79 X10(3)/MCL
LYMPHOCYTES NFR BLD MANUAL: 3 %
MAGNESIUM SERPL-MCNC: 1.6 MG/DL (ref 1.6–2.6)
MCH RBC QN AUTO: 30.4 PG (ref 27–31)
MCHC RBC AUTO-ENTMCNC: 32.1 G/DL (ref 33–36)
MCV RBC AUTO: 94.8 FL (ref 80–94)
MONOCYTES NFR BLD MANUAL: 2.11 X10(3)/MCL (ref 0.1–1.3)
MONOCYTES NFR BLD MANUAL: 8 %
MRSA PCR SCRN (OHS): DETECTED
NEUTROPHILS NFR BLD MANUAL: 89 %
NRBC BLD AUTO-RTO: 0 %
PHOSPHATE SERPL-MCNC: 2.8 MG/DL (ref 2.3–4.7)
PLATELET # BLD AUTO: 236 X10(3)/MCL (ref 130–400)
PLATELET # BLD EST: NORMAL 10*3/UL
PMV BLD AUTO: 11.5 FL (ref 7.4–10.4)
POCT GLUCOSE: 364 MG/DL (ref 70–110)
POCT GLUCOSE: 400 MG/DL (ref 70–110)
POCT GLUCOSE: 410 MG/DL (ref 70–110)
POTASSIUM SERPL-SCNC: 3.6 MMOL/L (ref 3.5–5.1)
PROT SERPL-MCNC: 7 GM/DL (ref 6.4–8.3)
RBC # BLD AUTO: 3.45 X10(6)/MCL (ref 4.2–5.4)
RBC MORPH BLD: NORMAL
SODIUM SERPL-SCNC: 129 MMOL/L (ref 136–145)
T PALLIDUM AB SER QL: NONREACTIVE
WBC # SPEC AUTO: 26.41 X10(3)/MCL (ref 4.5–11.5)

## 2024-06-14 PROCEDURE — 85027 COMPLETE CBC AUTOMATED: CPT | Performed by: INTERNAL MEDICINE

## 2024-06-14 PROCEDURE — 83735 ASSAY OF MAGNESIUM: CPT | Performed by: INTERNAL MEDICINE

## 2024-06-14 PROCEDURE — 25000003 PHARM REV CODE 250: Performed by: INTERNAL MEDICINE

## 2024-06-14 PROCEDURE — 63600175 PHARM REV CODE 636 W HCPCS: Performed by: NURSE ANESTHETIST, CERTIFIED REGISTERED

## 2024-06-14 PROCEDURE — 85007 BL SMEAR W/DIFF WBC COUNT: CPT | Performed by: INTERNAL MEDICINE

## 2024-06-14 PROCEDURE — 80053 COMPREHEN METABOLIC PANEL: CPT | Performed by: INTERNAL MEDICINE

## 2024-06-14 PROCEDURE — 25000003 PHARM REV CODE 250: Performed by: SURGERY

## 2024-06-14 PROCEDURE — 87641 MR-STAPH DNA AMP PROBE: CPT | Performed by: INTERNAL MEDICINE

## 2024-06-14 PROCEDURE — 86780 TREPONEMA PALLIDUM: CPT | Performed by: NURSE PRACTITIONER

## 2024-06-14 PROCEDURE — 83036 HEMOGLOBIN GLYCOSYLATED A1C: CPT | Performed by: INTERNAL MEDICINE

## 2024-06-14 PROCEDURE — 94760 N-INVAS EAR/PLS OXIMETRY 1: CPT

## 2024-06-14 PROCEDURE — P9047 ALBUMIN (HUMAN), 25%, 50ML: HCPCS | Mod: JZ,JG | Performed by: NURSE ANESTHETIST, CERTIFIED REGISTERED

## 2024-06-14 PROCEDURE — 37000008 HC ANESTHESIA 1ST 15 MINUTES: Performed by: SURGERY

## 2024-06-14 PROCEDURE — 0J9B0ZZ DRAINAGE OF PERINEUM SUBCUTANEOUS TISSUE AND FASCIA, OPEN APPROACH: ICD-10-PCS | Performed by: EMERGENCY MEDICINE

## 2024-06-14 PROCEDURE — 36000705 HC OR TIME LEV I EA ADD 15 MIN: Performed by: SURGERY

## 2024-06-14 PROCEDURE — 63600175 PHARM REV CODE 636 W HCPCS: Performed by: INTERNAL MEDICINE

## 2024-06-14 PROCEDURE — 63600175 PHARM REV CODE 636 W HCPCS: Performed by: STUDENT IN AN ORGANIZED HEALTH CARE EDUCATION/TRAINING PROGRAM

## 2024-06-14 PROCEDURE — 63600175 PHARM REV CODE 636 W HCPCS: Mod: JZ,JG | Performed by: INTERNAL MEDICINE

## 2024-06-14 PROCEDURE — 99900031 HC PATIENT EDUCATION (STAT)

## 2024-06-14 PROCEDURE — 25000003 PHARM REV CODE 250: Performed by: STUDENT IN AN ORGANIZED HEALTH CARE EDUCATION/TRAINING PROGRAM

## 2024-06-14 PROCEDURE — 11004 DBRDMT SKIN XTRNL GENT&PER: CPT | Mod: ,,, | Performed by: SURGERY

## 2024-06-14 PROCEDURE — 84100 ASSAY OF PHOSPHORUS: CPT | Performed by: INTERNAL MEDICINE

## 2024-06-14 PROCEDURE — 36000704 HC OR TIME LEV I 1ST 15 MIN: Performed by: SURGERY

## 2024-06-14 PROCEDURE — 36415 COLL VENOUS BLD VENIPUNCTURE: CPT | Performed by: INTERNAL MEDICINE

## 2024-06-14 PROCEDURE — 99900035 HC TECH TIME PER 15 MIN (STAT)

## 2024-06-14 PROCEDURE — 37000009 HC ANESTHESIA EA ADD 15 MINS: Performed by: SURGERY

## 2024-06-14 PROCEDURE — 21400001 HC TELEMETRY ROOM

## 2024-06-14 PROCEDURE — 25000003 PHARM REV CODE 250: Performed by: NURSE ANESTHETIST, CERTIFIED REGISTERED

## 2024-06-14 PROCEDURE — 27000221 HC OXYGEN, UP TO 24 HOURS

## 2024-06-14 PROCEDURE — 71000033 HC RECOVERY, INTIAL HOUR: Performed by: SURGERY

## 2024-06-14 RX ORDER — ENOXAPARIN SODIUM 100 MG/ML
40 INJECTION SUBCUTANEOUS EVERY 24 HOURS
Status: DISCONTINUED | OUTPATIENT
Start: 2024-06-14 | End: 2024-06-15

## 2024-06-14 RX ORDER — PROCHLORPERAZINE EDISYLATE 5 MG/ML
5 INJECTION INTRAMUSCULAR; INTRAVENOUS EVERY 6 HOURS PRN
Status: DISCONTINUED | OUTPATIENT
Start: 2024-06-14 | End: 2024-07-09 | Stop reason: HOSPADM

## 2024-06-14 RX ORDER — ACETAMINOPHEN 325 MG/1
650 TABLET ORAL EVERY 4 HOURS PRN
Status: DISCONTINUED | OUTPATIENT
Start: 2024-06-14 | End: 2024-07-09 | Stop reason: HOSPADM

## 2024-06-14 RX ORDER — ONDANSETRON HYDROCHLORIDE 2 MG/ML
4 INJECTION, SOLUTION INTRAVENOUS EVERY 4 HOURS PRN
Status: DISCONTINUED | OUTPATIENT
Start: 2024-06-14 | End: 2024-06-15

## 2024-06-14 RX ORDER — SODIUM CHLORIDE 9 MG/ML
INJECTION, SOLUTION INTRAVENOUS CONTINUOUS
Status: DISCONTINUED | OUTPATIENT
Start: 2024-06-14 | End: 2024-06-15

## 2024-06-14 RX ORDER — INSULIN ASPART 100 [IU]/ML
0-15 INJECTION, SOLUTION INTRAVENOUS; SUBCUTANEOUS EVERY 4 HOURS PRN
Status: DISCONTINUED | OUTPATIENT
Start: 2024-06-14 | End: 2024-06-21

## 2024-06-14 RX ORDER — CLINDAMYCIN PHOSPHATE 600 MG/50ML
600 INJECTION, SOLUTION INTRAVENOUS
Status: DISCONTINUED | OUTPATIENT
Start: 2024-06-14 | End: 2024-06-17

## 2024-06-14 RX ORDER — PHENYLEPHRINE HYDROCHLORIDE 10 MG/ML
INJECTION INTRAVENOUS
Status: DISCONTINUED | OUTPATIENT
Start: 2024-06-14 | End: 2024-06-14

## 2024-06-14 RX ORDER — CALCIUM CARBONATE 200(500)MG
1000 TABLET,CHEWABLE ORAL 3 TIMES DAILY PRN
Status: DISCONTINUED | OUTPATIENT
Start: 2024-06-14 | End: 2024-07-09 | Stop reason: HOSPADM

## 2024-06-14 RX ORDER — INSULIN ASPART 100 [IU]/ML
0-10 INJECTION, SOLUTION INTRAVENOUS; SUBCUTANEOUS EVERY 6 HOURS PRN
Status: DISCONTINUED | OUTPATIENT
Start: 2024-06-14 | End: 2024-06-14

## 2024-06-14 RX ORDER — HYDROMORPHONE HYDROCHLORIDE 2 MG/ML
0.2 INJECTION, SOLUTION INTRAMUSCULAR; INTRAVENOUS; SUBCUTANEOUS EVERY 5 MIN PRN
Status: DISCONTINUED | OUTPATIENT
Start: 2024-06-14 | End: 2024-06-14 | Stop reason: HOSPADM

## 2024-06-14 RX ORDER — PROCHLORPERAZINE EDISYLATE 5 MG/ML
2.5 INJECTION INTRAMUSCULAR; INTRAVENOUS EVERY 6 HOURS PRN
Status: DISCONTINUED | OUTPATIENT
Start: 2024-06-14 | End: 2024-07-03

## 2024-06-14 RX ORDER — INSULIN ASPART 100 [IU]/ML
0-15 INJECTION, SOLUTION INTRAVENOUS; SUBCUTANEOUS EVERY 6 HOURS PRN
Status: DISCONTINUED | OUTPATIENT
Start: 2024-06-14 | End: 2024-06-14

## 2024-06-14 RX ORDER — FENTANYL CITRATE 50 UG/ML
INJECTION, SOLUTION INTRAMUSCULAR; INTRAVENOUS
Status: DISCONTINUED | OUTPATIENT
Start: 2024-06-14 | End: 2024-06-14

## 2024-06-14 RX ORDER — SODIUM CHLORIDE, SODIUM LACTATE, POTASSIUM CHLORIDE, CALCIUM CHLORIDE 600; 310; 30; 20 MG/100ML; MG/100ML; MG/100ML; MG/100ML
INJECTION, SOLUTION INTRAVENOUS CONTINUOUS
Status: DISCONTINUED | OUTPATIENT
Start: 2024-06-14 | End: 2024-06-14

## 2024-06-14 RX ORDER — ACETAMINOPHEN 10 MG/ML
INJECTION, SOLUTION INTRAVENOUS
Status: DISCONTINUED | OUTPATIENT
Start: 2024-06-14 | End: 2024-06-14

## 2024-06-14 RX ORDER — INSULIN GLARGINE 100 [IU]/ML
25 INJECTION, SOLUTION SUBCUTANEOUS ONCE
Status: COMPLETED | OUTPATIENT
Start: 2024-06-14 | End: 2024-06-14

## 2024-06-14 RX ORDER — ROCURONIUM BROMIDE 10 MG/ML
INJECTION, SOLUTION INTRAVENOUS
Status: DISCONTINUED | OUTPATIENT
Start: 2024-06-14 | End: 2024-06-14

## 2024-06-14 RX ORDER — AMOXICILLIN 250 MG
2 CAPSULE ORAL 2 TIMES DAILY PRN
Status: DISCONTINUED | OUTPATIENT
Start: 2024-06-14 | End: 2024-07-09 | Stop reason: HOSPADM

## 2024-06-14 RX ORDER — BUPIVACAINE HYDROCHLORIDE AND EPINEPHRINE 2.5; 5 MG/ML; UG/ML
INJECTION, SOLUTION EPIDURAL; INFILTRATION; INTRACAUDAL; PERINEURAL
Status: DISCONTINUED | OUTPATIENT
Start: 2024-06-14 | End: 2024-06-14 | Stop reason: HOSPADM

## 2024-06-14 RX ORDER — SODIUM CHLORIDE 0.9 % (FLUSH) 0.9 %
10 SYRINGE (ML) INJECTION
Status: DISCONTINUED | OUTPATIENT
Start: 2024-06-14 | End: 2024-06-14 | Stop reason: HOSPADM

## 2024-06-14 RX ORDER — INSULIN ASPART 100 [IU]/ML
10 INJECTION, SOLUTION INTRAVENOUS; SUBCUTANEOUS
Status: DISCONTINUED | OUTPATIENT
Start: 2024-06-14 | End: 2024-06-19

## 2024-06-14 RX ORDER — ONDANSETRON HYDROCHLORIDE 2 MG/ML
INJECTION, SOLUTION INTRAVENOUS
Status: DISCONTINUED | OUTPATIENT
Start: 2024-06-14 | End: 2024-06-14

## 2024-06-14 RX ORDER — LIDOCAINE HYDROCHLORIDE 20 MG/ML
INJECTION, SOLUTION EPIDURAL; INFILTRATION; INTRACAUDAL; PERINEURAL
Status: DISCONTINUED | OUTPATIENT
Start: 2024-06-14 | End: 2024-06-14

## 2024-06-14 RX ORDER — ALBUMIN HUMAN 250 G/1000ML
SOLUTION INTRAVENOUS
Status: DISCONTINUED | OUTPATIENT
Start: 2024-06-14 | End: 2024-06-14

## 2024-06-14 RX ORDER — POLYETHYLENE GLYCOL 3350 17 G/17G
17 POWDER, FOR SOLUTION ORAL 2 TIMES DAILY PRN
Status: DISCONTINUED | OUTPATIENT
Start: 2024-06-14 | End: 2024-07-09 | Stop reason: HOSPADM

## 2024-06-14 RX ORDER — SODIUM CHLORIDE 0.9 % (FLUSH) 0.9 %
10 SYRINGE (ML) INJECTION
Status: DISCONTINUED | OUTPATIENT
Start: 2024-06-14 | End: 2024-07-09 | Stop reason: HOSPADM

## 2024-06-14 RX ORDER — ALUMINUM HYDROXIDE, MAGNESIUM HYDROXIDE, AND SIMETHICONE 1200; 120; 1200 MG/30ML; MG/30ML; MG/30ML
30 SUSPENSION ORAL 4 TIMES DAILY PRN
Status: DISCONTINUED | OUTPATIENT
Start: 2024-06-14 | End: 2024-07-09 | Stop reason: HOSPADM

## 2024-06-14 RX ORDER — PROPOFOL 10 MG/ML
VIAL (ML) INTRAVENOUS
Status: DISCONTINUED | OUTPATIENT
Start: 2024-06-14 | End: 2024-06-14

## 2024-06-14 RX ORDER — IPRATROPIUM BROMIDE AND ALBUTEROL SULFATE 2.5; .5 MG/3ML; MG/3ML
3 SOLUTION RESPIRATORY (INHALATION) EVERY 4 HOURS PRN
Status: DISCONTINUED | OUTPATIENT
Start: 2024-06-14 | End: 2024-07-09 | Stop reason: HOSPADM

## 2024-06-14 RX ORDER — INSULIN GLARGINE 100 [IU]/ML
40 INJECTION, SOLUTION SUBCUTANEOUS NIGHTLY
Status: DISCONTINUED | OUTPATIENT
Start: 2024-06-14 | End: 2024-06-14

## 2024-06-14 RX ORDER — HYDROMORPHONE HYDROCHLORIDE 2 MG/ML
0.5 INJECTION, SOLUTION INTRAMUSCULAR; INTRAVENOUS; SUBCUTANEOUS EVERY 5 MIN PRN
Status: DISCONTINUED | OUTPATIENT
Start: 2024-06-14 | End: 2024-06-14 | Stop reason: HOSPADM

## 2024-06-14 RX ORDER — TALC
6 POWDER (GRAM) TOPICAL NIGHTLY PRN
Status: DISCONTINUED | OUTPATIENT
Start: 2024-06-14 | End: 2024-06-18

## 2024-06-14 RX ORDER — INSULIN GLARGINE 100 [IU]/ML
50 INJECTION, SOLUTION SUBCUTANEOUS NIGHTLY
Status: DISCONTINUED | OUTPATIENT
Start: 2024-06-14 | End: 2024-06-17

## 2024-06-14 RX ADMIN — INSULIN ASPART 15 UNITS: 100 INJECTION, SOLUTION INTRAVENOUS; SUBCUTANEOUS at 05:06

## 2024-06-14 RX ADMIN — SODIUM CHLORIDE: 9 INJECTION, SOLUTION INTRAVENOUS at 06:06

## 2024-06-14 RX ADMIN — ONDANSETRON 4 MG: 2 INJECTION INTRAMUSCULAR; INTRAVENOUS at 12:06

## 2024-06-14 RX ADMIN — ONDANSETRON 4 MG: 2 INJECTION INTRAMUSCULAR; INTRAVENOUS at 11:06

## 2024-06-14 RX ADMIN — PHENYLEPHRINE HYDROCHLORIDE 100 MCG: 10 INJECTION INTRAVENOUS at 11:06

## 2024-06-14 RX ADMIN — CLINDAMYCIN PHOSPHATE 600 MG: 600 INJECTION, SOLUTION INTRAVENOUS at 10:06

## 2024-06-14 RX ADMIN — INSULIN ASPART 15 UNITS: 100 INJECTION, SOLUTION INTRAVENOUS; SUBCUTANEOUS at 12:06

## 2024-06-14 RX ADMIN — HYDROMORPHONE HYDROCHLORIDE 0.5 MG: 2 INJECTION INTRAMUSCULAR; INTRAVENOUS; SUBCUTANEOUS at 01:06

## 2024-06-14 RX ADMIN — SODIUM CHLORIDE: 9 INJECTION, SOLUTION INTRAVENOUS at 10:06

## 2024-06-14 RX ADMIN — LIDOCAINE HYDROCHLORIDE 80 MG: 20 INJECTION, SOLUTION INTRAVENOUS at 11:06

## 2024-06-14 RX ADMIN — FENTANYL CITRATE 50 MCG: 50 INJECTION, SOLUTION INTRAMUSCULAR; INTRAVENOUS at 11:06

## 2024-06-14 RX ADMIN — INSULIN GLARGINE 50 UNITS: 100 INJECTION, SOLUTION SUBCUTANEOUS at 08:06

## 2024-06-14 RX ADMIN — INSULIN ASPART 15 UNITS: 100 INJECTION, SOLUTION INTRAVENOUS; SUBCUTANEOUS at 06:06

## 2024-06-14 RX ADMIN — HYDROMORPHONE HYDROCHLORIDE 0.5 MG: 2 INJECTION INTRAMUSCULAR; INTRAVENOUS; SUBCUTANEOUS at 12:06

## 2024-06-14 RX ADMIN — INSULIN ASPART 9 UNITS: 100 INJECTION, SOLUTION INTRAVENOUS; SUBCUTANEOUS at 11:06

## 2024-06-14 RX ADMIN — VANCOMYCIN HYDROCHLORIDE 1500 MG: 1.5 INJECTION, POWDER, LYOPHILIZED, FOR SOLUTION INTRAVENOUS at 11:06

## 2024-06-14 RX ADMIN — ENOXAPARIN SODIUM 40 MG: 40 INJECTION SUBCUTANEOUS at 05:06

## 2024-06-14 RX ADMIN — Medication 6 MG: at 08:06

## 2024-06-14 RX ADMIN — PROPOFOL 100 MG: 10 INJECTION, EMULSION INTRAVENOUS at 11:06

## 2024-06-14 RX ADMIN — SODIUM CHLORIDE, POTASSIUM CHLORIDE, SODIUM LACTATE AND CALCIUM CHLORIDE: 600; 310; 30; 20 INJECTION, SOLUTION INTRAVENOUS at 01:06

## 2024-06-14 RX ADMIN — ONDANSETRON 4 MG: 2 INJECTION INTRAMUSCULAR; INTRAVENOUS at 03:06

## 2024-06-14 RX ADMIN — ACETAMINOPHEN 1000 MG: 10 INJECTION, SOLUTION INTRAVENOUS at 12:06

## 2024-06-14 RX ADMIN — ALBUMIN (HUMAN) 100 ML: 12.5 SOLUTION INTRAVENOUS at 11:06

## 2024-06-14 RX ADMIN — SODIUM CHLORIDE, SODIUM GLUCONATE, SODIUM ACETATE, POTASSIUM CHLORIDE AND MAGNESIUM CHLORIDE: 526; 502; 368; 37; 30 INJECTION, SOLUTION INTRAVENOUS at 11:06

## 2024-06-14 RX ADMIN — INSULIN GLARGINE 25 UNITS: 100 INJECTION, SOLUTION SUBCUTANEOUS at 01:06

## 2024-06-14 RX ADMIN — ROCURONIUM BROMIDE 50 MG: 10 SOLUTION INTRAVENOUS at 11:06

## 2024-06-14 RX ADMIN — PIPERACILLIN SODIUM AND TAZOBACTAM SODIUM 4.5 G: 4; .5 INJECTION, POWDER, LYOPHILIZED, FOR SOLUTION INTRAVENOUS at 08:06

## 2024-06-14 RX ADMIN — SODIUM CHLORIDE 1000 ML: 9 INJECTION, SOLUTION INTRAVENOUS at 08:06

## 2024-06-14 RX ADMIN — PIPERACILLIN SODIUM AND TAZOBACTAM SODIUM 4.5 G: 4; .5 INJECTION, POWDER, LYOPHILIZED, FOR SOLUTION INTRAVENOUS at 11:06

## 2024-06-14 RX ADMIN — PIPERACILLIN SODIUM AND TAZOBACTAM SODIUM 4.5 G: 4; .5 INJECTION, POWDER, LYOPHILIZED, FOR SOLUTION INTRAVENOUS at 03:06

## 2024-06-14 RX ADMIN — INSULIN ASPART 10 UNITS: 100 INJECTION, SOLUTION INTRAVENOUS; SUBCUTANEOUS at 05:06

## 2024-06-14 RX ADMIN — PROCHLORPERAZINE EDISYLATE 5 MG: 5 INJECTION INTRAMUSCULAR; INTRAVENOUS at 10:06

## 2024-06-14 RX ADMIN — CLINDAMYCIN PHOSPHATE 600 MG: 600 INJECTION, SOLUTION INTRAVENOUS at 03:06

## 2024-06-14 RX ADMIN — CLINDAMYCIN PHOSPHATE 600 MG: 600 INJECTION, SOLUTION INTRAVENOUS at 06:06

## 2024-06-14 RX ADMIN — CALCIUM CARBONATE (ANTACID) CHEW TAB 500 MG 1000 MG: 500 CHEW TAB at 08:06

## 2024-06-14 NOTE — PROGRESS NOTES
[facility]  Hospital Medicine Progress Note        Chief Complaint: Inpatient Follow-up for     HPI:    This is a 58-year-old female medical history notable for obesity BMI 37, poorly controlled T2DM with last A1c 13 in April 2024 presented to [facility] ED on 06/13/2024 morning with chief complaint of fever, chills, nausea, vomiting, perivulvar/perineal painful lesion draining pus.     On arrival noted to be febrile 101, tachycardic, hypertensive but only 1 reading, remaining normal to hypotensive.  Labs notable for lactic acid 1.4 > 1.3, WBC 27.06 with neutrophilic shift,  urinalysis contaminated sample with many squamous cells, glucose 353, CO2 23, anion gap 14, creatinine 0.98, CT abdomen pelvis with IV contrast show area of cellulitis involving the perineum on the left side extending to the medial gluteal fold with area of thickening and inflammatory changes in the labia with hypoattenuation concerning for possible abscess CTA chest show no evidence of PE there is right lower lobe atelectasis and small right pleural effusion.     She was resuscitated with IV fluids 2 L of NS and 1 L of LR, given ceftriaxone and vancomycin.     Transferred to [facility] around 5:00 p.m. and referred to hospital medicine service     Upon arrival she was hypotensive, given additional 500 mL normal saline, midodrine, Zosyn and vancomycin by day shift hospitalist team.     Blood pressure slightly improved but still borderline low, hyperglycemia worsening, was on 50 units Lantus 2 months ago while inpatient.    Interval Hx:   Patient seen and examined by bedside.  About to go down for her surgery with General surgery this a.m..  Patient currently appears nauseous and weak.  Sugars has been high this morning.  Patient says her sugars continue to run high at home.    Case was discussed with patient's nurse and  on the floor.    Objective/physical exam:  General: Appears comfortable  HEENT:  NC/AT  Neck:  No JVD  Chest: CTABL  CVS: Regular rhythm. Normal S1/S2.  Abdomen: nondistended, normoactive BS, soft and non-tender.  MSK: No obvious deformity or joint swelling  Skin: Warm and dry - several lesions to bilateral thighs and buttocks with yellow drainage  Neuro: AAOx3, no focal neurological deficit  Psych: Cooperative    VITAL SIGNS: 24 HRS MIN & MAX LAST   Temp  Min: 97.5 °F (36.4 °C)  Max: 102.7 °F (39.3 °C) 97.5 °F (36.4 °C)   BP  Min: 88/58  Max: 138/78 94/63   Pulse  Min: 74  Max: 127  76   Resp  Min: 11  Max: 23 11   SpO2  Min: 92 %  Max: 100 % 99 %     I have reviewed the following labs:  Recent Labs   Lab 06/13/24  0804 06/14/24 0415   WBC 27.06* 26.41  26.41*   RBC 3.88* 3.45*   HGB 11.8* 10.5*   HCT 35.8* 32.7*   MCV 92.3 94.8*   MCH 30.4 30.4   MCHC 33.0 32.1*   RDW 12.1 12.3    236   MPV 10.6* 11.5*     Recent Labs   Lab 06/13/24  0804 06/14/24 0415   * 129*   K 3.8 3.6   CL 94* 97*   CO2 23 22   BUN 9.7* 16.9   CREATININE 0.98 1.76*   CALCIUM 9.0 8.4   MG  --  1.60   ALBUMIN 2.8* 2.3*   ALKPHOS 178* 173*   ALT 25 23   AST 22 19   BILITOT 2.7* 2.6*     Microbiology Results (last 7 days)       Procedure Component Value Units Date/Time    Blood culture #1 **CANNOT BE ORDERED STAT** [[id number]]  (Normal) Collected: 06/13/24 0832    Order Status: Completed Specimen: Blood from Hand, Left Updated: 06/14/24 1300     Blood Culture No Growth At 24 Hours    Blood culture #2 **CANNOT BE ORDERED STAT** [[id number]]  (Normal) Collected: 06/13/24 0832    Order Status: Completed Specimen: Blood from Antecubital, Left Updated: 06/14/24 1300     Blood Culture No Growth At 24 Hours    Wound Culture [[id number]]  (Abnormal) Collected: 06/13/24 0917    Order Status: Completed Specimen: Abscess from Perineum Updated: 06/14/24 0709     Wound Culture Moderate Staphylococcus aureus             See below for Radiology    Scheduled Med:   clindamycin IV (PEDS and ADULTS)  600 mg Intravenous Q8H     enoxparin  40 mg Subcutaneous Daily    insulin aspart U-100  10 Units Subcutaneous TIDWM    insulin glargine U-100  50 Units Subcutaneous QHS    piperacillin-tazobactam (Zosyn) IV (PEDS and ADULTS) (extended infusion is not appropriate)  4.5 g Intravenous Q8H    vancomycin (VANCOCIN) IV (PEDS and ADULTS)  1,500 mg Intravenous Q12H      Continuous Infusions:   sodium chloride 0.9%   Intravenous Continuous 125 mL/hr at 06/14/24 1021 New Bag at 06/14/24 1021      PRN Meds:    Current Facility-Administered Medications:     acetaminophen, 650 mg, Oral, Q4H PRN    albuterol-ipratropium, 3 mL, Nebulization, Q4H PRN    aluminum-magnesium hydroxide-simethicone, 30 mL, Oral, QID PRN    calcium carbonate, 1,000 mg, Oral, TID PRN    dextrose 10%, 12.5 g, Intravenous, PRN    dextrose 10%, 25 g, Intravenous, PRN    glucagon (human recombinant), 1 mg, Intramuscular, PRN    glucose, 16 g, Oral, PRN    glucose, 24 g, Oral, PRN    ibuprofen, 400 mg, Oral, Q6H PRN    insulin aspart U-100, 0-15 Units, Subcutaneous, Q4H PRN    melatonin, 6 mg, Oral, Nightly PRN    ondansetron, 4 mg, Intravenous, Q4H PRN    polyethylene glycol, 17 g, Oral, BID PRN    prochlorperazine, 2.5 mg, Intravenous, Q6H PRN    prochlorperazine, 5 mg, Intravenous, Q6H PRN    senna-docusate 8.6-50 mg, 2 tablet, Oral, BID PRN    sodium chloride 0.9%, 10 mL, Intravenous, PRN    Pharmacy to dose Vancomycin consult, , , Once **AND** vancomycin - pharmacy to dose, , Intravenous, pharmacy to manage frequency     Assessment/Plan:  Severe sepsis secondary to labial abscess   Perineal cellulitis and multiple abscess/blood concerning for necrotizing infection/4 nurse gangrene  Uncontrolled type 2 diabetes with hyperglycemia   Leukocytosis, likely reactive secondary to above   Hyponatremia, also pseudo secondary to hyperglycemia, corrected sodium 135  AUTUMN, likely secondary to dehydration  Positive MRSA PCR    Blood cultures normal to date  Wound cultures obtained, show moderate  Staphylococcus aureus, awaiting final sensitivities and results  Continue broad-spectrum antibiotics with vancomycin, clindamycin, and Zosyn  We will deescalate as cultures finalize  Continue fluids at 125 cc/hour  Patient continues to be significantly hyperglycemic, also likely reactive from stress response   Resume home 50 units of long-acting insulin, add mealtime insulin 10 units t.i.d. along with sliding scale   General surgery has been consulted, appreciate recommendations   Patient was taken down to debridement for suspected necrotizing fasciitis to OR today  Hold home blood pressure medications at this time   We will resume other home meds as appropriate   Further recs based on clinical course   Labs in a.m.    Critical care note:  Critical care diagnosis:  Severe sepsis with necrotizing fasciitis  Critical care interventions: Hands-on evaluation, review of labs/radiographs/records and discussion with patient and family if present  Critical care time spent: 35 minutes     VTE prophylaxis:  Lovenox    Patient condition:  Stable/Fair/Guarded/ Serious/ Critical    Anticipated discharge and Disposition:   To be determined      All diagnosis and differential diagnosis have been reviewed; assessment and plan has been documented; I have personally reviewed the labs and test results that are presently available; I have reviewed the patients medication list; I have reviewed the consulting providers response and recommendations. I have reviewed or attempted to review medical records based upon their availability    All of the patient's questions have been  addressed and answered. Patient's is agreeable to the above stated plan. I will continue to monitor closely and make adjustments to medical management as needed.  _____________________________________________________________________    Nutrition Status:    Radiology:  I have personally reviewed the following imaging and agree with the radiologist.     CT Abdomen  Pelvis With IV Contrast NO Oral Contrast  Narrative: EXAMINATION:  CT ABDOMEN PELVIS WITH IV CONTRAST    CLINICAL HISTORY:  sepsis, abscess buttock, s/p I&D;    TECHNIQUE:  Low dose axial images, sagittal and coronal reformations were obtained from the lung bases to the pubic symphysis following the IV administration of contrast. Automatic exposure control (AEC) is utilized to reduce patient radiation exposure.    COMPARISON:  04/08/2024    FINDINGS:  There is right lower lobe atelectasis.  There is a right-sided pleural effusion..  There is a calcified pleural plaque in the right mid hemithorax.    The liver appears normal.  No liver mass or lesion is seen.  Portal and hepatic veins appear normal.    The patient is status post cholecystectomy..    The pancreas appears normal.  No pancreatic mass or lesion is seen.    The spleen shows no acute abnormality.    The adrenal glands appear normal.  No adrenal nodule is seen.    The kidneys appear normal.  No hydronephrosis is seen.  No hydroureter is seen.  There is a punctate nonobstructing medullary calculus in the midpole of the right kidney.  There is a cyst in the lower pole of the right kidney.  It appears to be a simple cyst.  It is stable since the prior examination..  No obvious ureteral stones are seen.    Urinary bladder appears grossly unremarkable.    No colitis is seen.  No diverticulitis is seen.  No obvious colonic mass or lesion is seen.  The appendix appears normal.    Uterus and ovaries appear normal.    There are inflammatory changes seen involving the perineum on the left side extending into the medial gluteal fold on the left side.  The inflammatory changes are limited to the left side with some labial fold thickening seen on the left side as well.  There is a area of hypoattenuation along the labial fold on the left side which may represent developing abscess.  It measures 4 cm x 1.7 cm.  Follow-up of this area is recommended.    No free air is  seen.  No free fluid is seen.  Impression: Area of cellulitis involving the perineum on the left side extending to the medial gluteal fold with a area of thickening and inflammatory changes in the labia on the left side with a area of hypoattenuation seen within the labia on the left side concerning for possible developing abscess.  Follow-up is recommended.    Right lower lobe atelectasis and right-sided pleural effusion    Electronically signed by: Ayush Barbour  Date:    06/13/2024  Time:    10:38  CTA Chest Non-Coronary (PE Studies)  Narrative: EXAMINATION:  CTA CHEST NON CORONARY (PE STUDIES)    CLINICAL HISTORY:  Pulmonary embolism (PE) suspected, unknown D-dimer;    TECHNIQUE:  Low dose axial images, sagittal and coronal reformations were obtained from the thoracic inlet to the lung bases following the IV administration of contrast..  Contrast timing was optimized to evaluate the pulmonary arteries.  MIP images were performed.  Automated exposure control was utilized    COMPARISON:  None    FINDINGS:  There are chronic interstitial lung changes seen bilaterally.  There is right lower lobe atelectasis.  No infiltrate is seen.  No mass is seen.  There is a right-sided pleural effusion.  It is small..  No pleural thickening is seen.  No pneumothorax is seen.    No pulmonary embolism is seen.    The thoracic aorta appears normal.  No mediastinal lymphadenopathy is seen.  The heart appears normal.    Upper abdomen shows no acute abnormality.  There is some air seen within the esophagus which is nonspecific.  Impression: No pulmonary embolism seen    Right lower lobe atelectasis and small right-sided pleural effusion    Electronically signed by: Ayush Barbour  Date:    06/13/2024  Time:    09:58    Itzel Tillman DO  Department of Hospital Medicine  [facility]  06/14/2024

## 2024-06-14 NOTE — NURSING
Nurses Note -- 4 Eyes      6/13/2024   5:30 PM      Skin assessed during: Admit      [] No Altered Skin Integrity Present    []Prevention Measures Documented      [x] Yes- Altered Skin Integrity Present or Discovered   [x] LDA Added if Not in Epic (Describe Wound)   [x] New Altered Skin Integrity was Present on Admit and Documented in LDA   [x] Wound Image Taken    Wound Care Consulted? No    Attending Nurse:  Cinthya Jones RN/Staff Member:   Mercedez VALENCIA RN

## 2024-06-14 NOTE — TRANSFER OF CARE
"Anesthesia Transfer of Care Note    Patient: Greer Kahn    Procedure(s) Performed: Procedure(s) (LRB):  Incision and Drainage (N/A)    Patient location: PACU    Anesthesia Type: general    Transport from OR: Transported from OR on room air with adequate spontaneous ventilation    Post pain: adequate analgesia    Post assessment: no apparent anesthetic complications    Post vital signs: stable    Level of consciousness: sedated and responds to stimulation    Nausea/Vomiting: no nausea/vomiting    Complications: none    Transfer of care protocol was followed      Last vitals: Visit Vitals  BP (!) 95/58 (BP Location: Left arm, Patient Position: Lying)   Pulse 89   Temp 36.8 °C (98.2 °F) (Oral)   Resp 18   Ht 5' 7" (1.702 m)   Wt 108.9 kg (240 lb)   SpO2 95%   Breastfeeding No   BMI 37.59 kg/m²     "

## 2024-06-14 NOTE — CONSULTS
[facility] General Surgery Service  ADMIT / CONSULT / H&P NOTE  Date: 6/14/2024    CC:   Forniers gangrene     SUBJECTIVE    HPI:   Greer Kahn is a 58 y.o. female with past medical history of obesity, asthma, poorly controlled diabetes, and hypertension who presented last night with fever, chills, nausea, vomiting, and draining perineal/vulvular abscess. Patient reports severe pain to the area. She did have incision and drainage in ED but continues with pain and drainage.     ROS:  Negative except for HPI.     PMH:   Past Medical History:   Diagnosis Date    Asthma     Diabetes mellitus     Hypertension        PSH:   No past surgical history on file.    FamHx:   No family history on file.    SocHx:  Social History     Socioeconomic History    Marital status: Single   Tobacco Use    Smoking status: Never    Smokeless tobacco: Never   Substance and Sexual Activity    Alcohol use: Never    Drug use: Never    Sexual activity: Not Currently     Social Determinants of Health     Financial Resource Strain: High Risk (6/14/2024)    Overall Financial Resource Strain (CARDIA)     Difficulty of Paying Living Expenses: Hard   Food Insecurity: Food Insecurity Present (6/14/2024)    Hunger Vital Sign     Worried About Running Out of Food in the Last Year: Often true     Ran Out of Food in the Last Year: Never true   Transportation Needs: No Transportation Needs (6/14/2024)    TRANSPORTATION NEEDS     Transportation : No   Physical Activity: Unknown (6/14/2024)    Exercise Vital Sign     Days of Exercise per Week: 0 days   Stress: Stress Concern Present (6/14/2024)    [employer] [place] [employer] - Occupational Stress Questionnaire     Feeling of Stress : To some extent   Housing Stability: High Risk (6/14/2024)    Housing Stability Vital Sign     Unable to Pay for Housing in the Last Year: Yes     Homeless in the Last Year: No       Allergies:   Review of patient's allergies indicates:  No Known  "Allergies    Medications:  No current facility-administered medications on file prior to encounter.     Current Outpatient Medications on File Prior to Encounter   Medication Sig Dispense Refill    albuterol (PROVENTIL/VENTOLIN HFA) 90 mcg/actuation inhaler Inhale 2 puffs into the lungs every 4 to 6 hours as needed for Shortness of Breath.      amLODIPine (NORVASC) 10 MG tablet Take 1 tablet (10 mg total) by mouth once daily. 30 tablet 0    atorvastatin (LIPITOR) 20 MG tablet Take 1 tablet (20 mg total) by mouth once daily. 30 tablet 0    famotidine (PEPCID) 20 MG tablet Take 1 tablet (20 mg total) by mouth 2 (two) times daily. 60 tablet 0    glimepiride (AMARYL) 4 MG tablet Take 1 tablet (4 mg total) by mouth daily with breakfast. 30 tablet 0    hydroCHLOROthiazide (HYDRODIURIL) 25 MG tablet Take 1 tablet (25 mg total) by mouth once daily. 30 tablet 0    insulin glargine (LANTUS) 100 unit/mL injection Inject 50 Units into the skin every evening. 15 mL 1    meclizine (ANTIVERT) 25 mg tablet Take 1 tablet (25 mg total) by mouth 3 (three) times daily. for 10 days 30 tablet 0    metFORMIN (GLUCOPHAGE) 1000 MG tablet Take 1 tablet (1,000 mg total) by mouth 2 (two) times daily. 60 tablet 0    metoprolol tartrate (LOPRESSOR) 25 MG tablet Take 1 tablet (25 mg total) by mouth 2 (two) times daily. 60 tablet 0    pioglitazone (ACTOS) 30 MG tablet Take 1 tablet (30 mg total) by mouth once daily. 30 tablet 0       OBJECTIVE    VITAL SIGNS: 24 HR MIN & MAX LAST    Temp  Min: 97.5 °F (36.4 °C)  Max: 102.7 °F (39.3 °C)  98.2 °F (36.8 °C)        BP  Min: 91/58  Max: 159/123  119/82     Pulse  Min: 89  Max: 129  91     Resp  Min: 16  Max: 24  18    SpO2  Min: 92 %  Max: 100 %  (!) 93 %      HT: 5' 7" (170.2 cm)  WT: 108.9 kg (240 lb)  BMI: 37.6       Physical Exam:  General: NAD  HEENT: Anicteric sclera  Resp: Unlabored respirations  Cardiac: RRR  Abdomen: Soft, NT, ND  Groin: perineal abscess with drainage and severe tenderness "   Extremities: Well perfused    Results  Recent Labs   Lab 06/13/24  0804 06/14/24  0415   WBC 27.06* 26.41  26.41*   HGB 11.8* 10.5*   HCT 35.8* 32.7*    236   * 129*   CO2 23 22   BUN 9.7* 16.9   CREATININE 0.98 1.76*   GLUCOSE 353* 484*   CALCIUM 9.0 8.4   MG  --  1.60   PHOS  --  2.8   ALKPHOS 178* 173*       Imaging:  CT abdomen 6/14  Air tracking from perineal area up through pelvis     A/P:   58 y.o. female 58 y.o. female with past medical history of obesity, asthma, poorly controlled diabetes, and hypertension who presented last night with fever, chills, nausea, vomiting, and draining perineal/vulvular abscess. CT abdomen and pelvis suspicious for necrotizing fasciitis.     - To OR emergently for debridement for suspected necrotizing fasciitis   - NPO   - Continue zosyn, clinda, and vanc     Pamela Sr MD  [employer] General Surgery PGY-1

## 2024-06-14 NOTE — ANESTHESIA POSTPROCEDURE EVALUATION
Anesthesia Post Evaluation    Patient: Greer Kahn    Procedure(s) Performed: Procedure(s) (LRB):  Incision and Drainage (N/A)    Final Anesthesia Type: general      Patient location during evaluation: PACU  Patient participation: Yes- Able to Participate  Level of consciousness: awake  Post-procedure vital signs: reviewed and stable  Pain management: adequate  Airway patency: patent    PONV status at discharge: vomiting (controlled) and nausea (controlled)  Anesthetic complications: no      Cardiovascular status: hemodynamically stable  Respiratory status: spontaneous ventilation and unassisted  Hydration status: euvolemic  Follow-up not needed.  Comments:                  Vitals Value Taken Time   /61 06/14/24 1321   Temp 36 06/14/24 1343   Pulse 76 06/14/24 1321   Resp 14 06/14/24 1321   SpO2 99 % 06/14/24 1321   Vitals shown include unfiled device data.      Event Time   Out of Recovery 06/14/2024 13:22:02         Pain/Ailyn Score: Pain Rating Prior to Med Admin: 8 (6/14/2024  1:00 PM)  Pain Rating Post Med Admin: 0 (6/13/2024  8:09 PM)  Ailyn Score: 9 (6/14/2024  1:22 PM)

## 2024-06-14 NOTE — ANESTHESIA PREPROCEDURE EVALUATION
"                                                                                                             06/14/2024  Greer Kahn is a 58 y.o., female.  Pre-operative evaluation for Procedure(s) (LRB):  Incision and Drainage (N/A)    /78   Pulse 99   Temp 36.8 °C (98.2 °F) (Oral)   Resp 20   Ht 5' 7" (1.702 m)   Wt 108.9 kg (240 lb)   SpO2 99% Comment: 2 l nc  Breastfeeding No   BMI 37.59 kg/m²     Past Medical History:   Diagnosis Date    Asthma     Diabetes mellitus     Hypertension        Patient Active Problem List   Diagnosis    Intractable headache    Migraine with aura    Pyelonephritis of right kidney    Perineal abscess    Sepsis       Review of patient's allergies indicates:  No Known Allergies    Current Outpatient Medications   Medication Instructions    albuterol (PROVENTIL/VENTOLIN HFA) 90 mcg/actuation inhaler 2 puffs, Inhalation, Every 4-6 hours PRN    amLODIPine (NORVASC) 10 mg, Oral, Daily    atorvastatin (LIPITOR) 20 mg, Oral, Daily    famotidine (PEPCID) 20 mg, Oral, 2 times daily    glimepiride (AMARYL) 4 mg, Oral, With breakfast    hydroCHLOROthiazide (HYDRODIURIL) 25 mg, Oral, Daily    insulin glargine U-100 (Lantus) 50 Units, Subcutaneous, Nightly    meclizine (ANTIVERT) 25 mg, Oral, 3 times daily    metFORMIN (GLUCOPHAGE) 1,000 mg, Oral, 2 times daily    metoprolol tartrate (LOPRESSOR) 25 mg, Oral, 2 times daily    pioglitazone (ACTOS) 30 mg, Oral, Daily       No past surgical history on file.      Lab Results   Component Value Date    WBC 26.41 (H) 06/14/2024    WBC 26.41 06/14/2024    HGB 10.5 (L) 06/14/2024    HCT 32.7 (L) 06/14/2024    MCV 94.8 (H) 06/14/2024     06/14/2024          BMP  Lab Results   Component Value Date     (L) 06/14/2024    K 3.6 06/14/2024    CO2 22 06/14/2024    GLUCOSE 484 (HH) 06/14/2024    BUN 16.9 06/14/2024    CREATININE 1.76 (H) 06/14/2024    CALCIUM 8.4 06/14/2024    ANIONGAP 9 06/26/2020    CANDE 110 06/26/2020    " "EGFRNONAA >60 06/21/2020        INR  No results for input(s): "PT", "INR", "PROTIME", "APTT" in the last 72 hours.        Diagnostic Studies:      EKG:  Results for orders placed or performed during the hospital encounter of 04/07/24   EKG 12-lead    Collection Time: 04/07/24 11:35 PM   Result Value Ref Range    QRS Duration 96 ms    [facility] QTC Calculation 447 ms    Narrative    Test Reason : R11.2,    Vent. Rate : 092 BPM     Atrial Rate : 092 BPM     P-R Int : 146 ms          QRS Dur : 096 ms      QT Int : 362 ms       P-R-T Axes : 063 031 040 degrees     QTc Int : 447 ms    Normal sinus rhythm  Normal ECG  No previous ECGs available  Confirmed by Phill Santiago MD ([id number]) on 4/8/2024 11:38:47 PM    Referred By: KENNY   SELF           Confirmed By:Phill Santiago MD       No results found for this or any previous visit.            Pre-op Assessment    I have reviewed the Patient Summary Reports.    I have reviewed the NPO Status.   I have reviewed the Medications.     Review of Systems  Anesthesia Hx:  No problems with previous Anesthesia             Denies Family Hx of Anesthesia complications.    Denies Personal Hx of Anesthesia complications.                    Cardiovascular:  Cardiovascular Normal                   No Cardiac Complaints                         Pulmonary:  Pulmonary Normal        No Pulmonary Complaints               Hepatic/GI:        No Current GERD Sx              Physical Exam  General: Alert and Oriented    Airway:  Mallampati: II   Mouth Opening: Normal  TM Distance: Normal  Tongue: Normal  Neck ROM: Normal ROM    Dental:  Periodontal disease    Chest/Lungs:  Clear to auscultation, Normal Respiratory Rate    Heart:  Rate: Normal  Rhythm: Regular Rhythm        Anesthesia Plan  Type of Anesthesia, risks & benefits discussed:    Anesthesia Type: Gen ETT  Intra-op Monitoring Plan: Standard ASA Monitors  Post Op Pain Control Plan: multimodal analgesia  Induction:  IV and Inhalation  Airway Plan: " Direct, Post-Induction  Informed Consent: Informed consent signed with the Patient and all parties understand the risks and agree with anesthesia plan.  All questions answered.   ASA Score: 3  Day of Surgery Review of History & Physical: H&P Update referred to the surgeon/provider.  Anesthesia Plan Notes: Discussed Anesthetic Plan w/ Pt/Family. Questions Entertained. Accepted.    Ready For Surgery From Anesthesia Perspective.     .

## 2024-06-14 NOTE — PLAN OF CARE
06/14/24 0922   Discharge Assessment   Assessment Type Discharge Planning Assessment   Confirmed/corrected address, phone number and insurance Yes   Confirmed Demographics Correct on Facesheet   Source of Information patient   When was your last doctors appointment?   (needs pcp)   Communicated TANIYA with patient/caregiver Date not available/Unable to determine   Reason For Admission perineal abscess   People in Home child(hien), adult   [facility]ility Ar[facility] From:  [facility]   Do you expect to return to your current living situation? Yes   Prior to hospitilization cognitive status: Alert/Oriented   Current cognitive status: Alert/Oriented   Walking or Climbing Stairs Difficulty no   Dressing/Bathing Difficulty no   Equipment Currently Used at Home none   Readmission within 30 days? No   Patient currently being followed by outpatient case management? No   Do you currently have service(s) that help you manage your care at home? No   Do you take prescription medications? Yes   Do you have prescription coverage? Yes   Coverage insurance just lapsed on 6/1   Do you have any problems affording any of your prescribed medications? No   Is the patient taking medications as prescribed? yes   Who is going to help you get home at discharge? tbd   How do you get to doctors appointments? car, drives self   Are you on dialysis? No   Do you take coumadin? No   Discharge Plan A Home   Discharge Plan B Home;Home Health   DME Needed Upon Discharge  none   Discharge Plan discussed with: Patient   Transition of Care Barriers None   Physical Activity   On average, how many days per week do you engage in moderate to strenuous exercise (like a brisk walk)? 0 days   Financial Resource Strain   How hard is it for you to pay for the very basics like food, housing, medical care, and heating? Hard   Housing Stability   In the last 12 months, was there a time when you were not able to pay the mortgage or rent on time? Y   At any time in the past 12  months, were you homeless or living in a shelter (including now)? N   Transportation Needs   Has the lack of transportation kept you from medical appointments, meetings, work or from getting things needed for daily living? No   Food Insecurity   Within the past 12 months, you worried that your food would run out before you got the money to buy more. Often true   Within the past 12 months, the food you bought just didn't last and you didn't have money to get more. Never true   Stress   Do you feel stress - tense, restless, nervous, or anxious, or unable to sleep at night because your mind is troubled all the time - these days? To some exte   Social Isolation   How often do you feel lonely or isolated from those around you?  Often   Alcohol Use   Q1: How often do you have a drink containing alcohol? Never   Utilities   In the past 12 months has the electric, gas, oil, or water company threatened to shut off services in your home? Yes   Health Literacy   How often do you need to have someone help you when you read instructions, pamphlets, or other written material from your doctor or pharmacy? Never   OTHER   Name(s) of People in Home Ann Marquez (Daughter)  [phone] (     Will give pt a beacon referral.

## 2024-06-14 NOTE — PLAN OF CARE
Problem: Adult Inpatient Plan of Care  Goal: Plan of Care Review  Outcome: Progressing  Goal: Patient-Specific Goal (Individualized)  Outcome: Progressing  Goal: Absence of Hospital-Acquired Illness or Injury  Outcome: Progressing  Goal: Optimal Comfort and Wellbeing  Outcome: Progressing  Goal: Readiness for Transition of Care  Outcome: Progressing     Problem: Wound  Goal: Optimal Coping  Outcome: Progressing  Goal: Optimal Functional Ability  Outcome: Progressing  Goal: Absence of Infection Signs and Symptoms  Outcome: Progressing  Goal: Improved Oral Intake  Outcome: Progressing  Goal: Optimal Pain Control and Function  Outcome: Progressing  Goal: Skin Health and Integrity  Outcome: Progressing  Goal: Optimal Wound Healing  Outcome: Progressing     Problem: Sepsis/Septic Shock  Goal: Optimal Coping  Outcome: Progressing  Goal: Absence of Bleeding  Outcome: Progressing  Goal: Blood Glucose Level Within Targeted Range  Outcome: Progressing  Goal: Absence of Infection Signs and Symptoms  Outcome: Progressing  Goal: Optimal Nutrition Intake  Outcome: Progressing     Problem: Infection  Goal: Absence of Infection Signs and Symptoms  Outcome: Progressing

## 2024-06-14 NOTE — PLAN OF CARE
Problem: Adult Inpatient Plan of Care  Goal: Plan of Care Review  Outcome: Progressing  Goal: Patient-Specific Goal (Individualized)  Outcome: Progressing  Goal: Absence of Hospital-Acquired Illness or Injury  Outcome: Progressing  Goal: Optimal Comfort and Wellbeing  Outcome: Progressing  Goal: Readiness for Transition of Care  Outcome: Progressing     Problem: Wound  Goal: Optimal Coping  Outcome: Progressing  Goal: Optimal Functional Ability  Outcome: Progressing  Goal: Absence of Infection Signs and Symptoms  Outcome: Progressing  Goal: Improved Oral Intake  Outcome: Progressing  Goal: Optimal Pain Control and Function  Outcome: Progressing  Goal: Skin Health and Integrity  Outcome: Progressing  Goal: Optimal Wound Healing  Outcome: Progressing     Problem: Sepsis/Septic Shock  Goal: Optimal Coping  Outcome: Progressing  Goal: Absence of Bleeding  Outcome: Progressing  Goal: Blood Glucose Level Within Targeted Range  Outcome: Progressing  Goal: Absence of Infection Signs and Symptoms  Outcome: Progressing  Goal: Optimal Nutrition Intake  Outcome: Progressing

## 2024-06-14 NOTE — ANESTHESIA PROCEDURE NOTES
Intubation    Date/Time: 6/14/2024 11:28 AM    Performed by: Pradip Ireland CRNA  Authorized by: Juan C Rodriguez MD    Intubation:     Induction:  Intravenous    Intubated:  Postinduction    Mask Ventilation:  Easy mask    Attempts:  1    Attempted By:  Student    Method of Intubation:  Direct    Blade:  [place] 2    Laryngeal View Grade: Grade IIA - cords partially seen      Difficult Airway Encountered?: No      Complications:  None    Airway Device:  Oral endotracheal tube    Airway Device Size:  7.5    Style/Cuff Inflation:  Cuffed    Tube secured:  23    Secured at:  The teeth    Placement Verified By:  Capnometry    Complicating Factors:  Obesity    Findings Post-Intubation:  BS equal bilateral and atraumatic/condition of teeth unchanged

## 2024-06-14 NOTE — PROGRESS NOTES
"Pharmacokinetic Initial Assessment: IV Vancomycin    Assessment/Plan:    Initiate intravenous vancomycin with loading dose of 2000 mg once followed by a maintenance dose of vancomycin 1500 mg IV every 12 hours  Desired empiric serum trough concentration is 15 to 20 mcg/mL  Draw vancomycin trough level 60 min prior to fourth dose on 06/15 at approximately 1100  Pharmacy will continue to follow and monitor vancomycin.      Please contact pharmacy at extension [phone] with any questions regarding this assessment.     Thank you for the consult,   Arnaud Briggslly       Patient brief summary:  Greer Kahn is a 58 y.o. female initiated on antimicrobial therapy with IV Vancomycin for treatment of suspected sepsis    Drug Allergies:   Review of patient's allergies indicates:  No Known Allergies    Actual Body Weight:   108.9kg    Renal Function:   Estimated Creatinine Clearance: 80.5 mL/min (based on SCr of 0.98 mg/dL).,     Dialysis Method (if applicable):  N/A    CBC (last 72 hours):  Recent Labs   Lab Result Units 06/13/24  0804   WBC x10(3)/mcL 27.06*   Hgb g/dL 11.8*   Hct % 35.8*   Platelet x10(3)/mcL 264   Monocytes % % 12*       Metabolic Panel (last 72 hours):  Recent Labs   Lab Result Units 06/13/24  0804 06/13/24  1459   Sodium mmol/L 131*  --    Potassium mmol/L 3.8  --    Chloride mmol/L 94*  --    CO2 mmol/L 23  --    Glucose mg/dL 353*  --    Glucose, UA   --  >=1000*   Blood Urea Nitrogen mg/dL 9.7*  --    Creatinine mg/dL 0.98  --    Albumin g/dL 2.8*  --    Bilirubin Total mg/dL 2.7*  --    ALP unit/L 178*  --    AST unit/L 22  --    ALT unit/L 25  --        Drug levels (last 3 results):  No results for input(s): "VANCOMYCINRA", "VANCORANDOM", "VANCOMYCINPE", "VANCOPEAK", "VANCOMYCINTR", "VANCOTROUGH" in the last 72 hours.    Microbiologic Results:  Microbiology Results (last 7 days)       Procedure Component Value Units Date/Time    Wound Culture [[id number]] Collected: 06/13/24 0917    Order Status: Sent " Specimen: Abscess from Perineum Updated: 06/13/24 0920    Blood culture #1 **CANNOT BE ORDERED STAT** [[id number]] Collected: 06/13/24 0832    Order Status: Resulted Specimen: Blood from Hand, Left Updated: 06/13/24 0836    Blood culture #2 **CANNOT BE ORDERED STAT** [[id number]] Collected: 06/13/24 0832    Order Status: Resulted Specimen: Blood from Antecubital, Left Updated: 06/13/24 0835

## 2024-06-14 NOTE — OP NOTE
[facility] - Periop Services  Surgery Department  Operative Note    SUMMARY     Date of Procedure: 6/14/2024     Procedure: Procedure(s) (LRB):  Incision and Drainage (N/A)   Debridement of skin and soft tissue of left groin and perineum     Surgeons and Role:     * Papa Cesar Jr., MD - Primary    Assisting Surgeon: Pamela Sr PGY-1    Pre-Operative Diagnosis: Sepsis [A41.9]  Perineal abscess [L02.215]    Post-Operative Diagnosis: Post-Op Diagnosis Codes:     * Sepsis [A41.9]     * Perineal abscess [L02.215]    Anesthesia: General    Operative Findings (including complications, if any): mild necrotizing soft tissue infection minimal dead tissue. The size of the wound after debridement was 14.5 x 3 cm.    Description of Technical Procedures:   Once appropriate consents were obtained the patient was taken back to the Operative suite placed in supine position and placed under general anesthesia. Next the left groin was prepped and draped in the usual sterile fashion. A time out was done to make sure we have the appropriate patient appropriate operation and appropriate medications.  We used a 10 blade to make an elliptical incision in the left groin including skin and soft tissue( subcutaneous fat) that was necrotic and infected. The size of the wound was approximately 14.5 x 3 cm.We then probe superiorly and inferiorly and found a couple of pockets of purulence that was drained. Once all necrotic tissue was dissected free we copiously irrigated the wound with normal saline. We then packed the wound with betadine soaked Kerlix and dressed it with abd pads. At case end the patient tolerated the procedure well all counts were correct and was ultimately transferred to PACU in stable condition.      Estimated Blood Loss (EBL): 100mL    Implants: * No implants in log *    Specimens:   Specimen (24h ago, onward)      None                    Condition: Stable    Disposition: PACU - hemodynamically  stable.    Attestation: Op Note Attestation: I was physically present and scrubbed for the entire procedure.       Papa Cesar Jr, MD MS  Trauma Critical Care Surgery

## 2024-06-15 LAB
ANION GAP SERPL CALC-SCNC: 13 MEQ/L
BACTERIA WND CULT: ABNORMAL
BSA FOR ECHO PROCEDURE: 2.27 M2
BUN SERPL-MCNC: 21.2 MG/DL (ref 9.8–20.1)
CALCIUM SERPL-MCNC: 8.2 MG/DL (ref 8.4–10.2)
CHLORIDE SERPL-SCNC: 97 MMOL/L (ref 98–107)
CO2 SERPL-SCNC: 23 MMOL/L (ref 22–29)
CREAT SERPL-MCNC: 2.77 MG/DL (ref 0.55–1.02)
CREAT/UREA NIT SERPL: 8
ERYTHROCYTE [DISTWIDTH] IN BLOOD BY AUTOMATED COUNT: 12.6 % (ref 11.5–17)
GFR SERPLBLD CREATININE-BSD FMLA CKD-EPI: 19 ML/MIN/1.73/M2
GLUCOSE SERPL-MCNC: 279 MG/DL (ref 74–100)
HCT VFR BLD AUTO: 28.1 % (ref 37–47)
HGB BLD-MCNC: 8.8 G/DL (ref 12–16)
MCH RBC QN AUTO: 29.7 PG (ref 27–31)
MCHC RBC AUTO-ENTMCNC: 31.3 G/DL (ref 33–36)
MCV RBC AUTO: 94.9 FL (ref 80–94)
NRBC BLD AUTO-RTO: 0 %
PLATELET # BLD AUTO: 249 X10(3)/MCL (ref 130–400)
PMV BLD AUTO: 11.1 FL (ref 7.4–10.4)
POCT GLUCOSE: 241 MG/DL (ref 70–110)
POTASSIUM SERPL-SCNC: 3.3 MMOL/L (ref 3.5–5.1)
RBC # BLD AUTO: 2.96 X10(6)/MCL (ref 4.2–5.4)
SODIUM SERPL-SCNC: 133 MMOL/L (ref 136–145)
VANCOMYCIN SERPL-MCNC: 45.3 UG/ML (ref 15–20)
WBC # BLD AUTO: 25.03 X10(3)/MCL (ref 4.5–11.5)

## 2024-06-15 PROCEDURE — 63600175 PHARM REV CODE 636 W HCPCS: Mod: JZ,JG | Performed by: INTERNAL MEDICINE

## 2024-06-15 PROCEDURE — 80048 BASIC METABOLIC PNL TOTAL CA: CPT | Performed by: STUDENT IN AN ORGANIZED HEALTH CARE EDUCATION/TRAINING PROGRAM

## 2024-06-15 PROCEDURE — 21400001 HC TELEMETRY ROOM

## 2024-06-15 PROCEDURE — 85027 COMPLETE CBC AUTOMATED: CPT | Performed by: STUDENT IN AN ORGANIZED HEALTH CARE EDUCATION/TRAINING PROGRAM

## 2024-06-15 PROCEDURE — 25000003 PHARM REV CODE 250: Performed by: INTERNAL MEDICINE

## 2024-06-15 PROCEDURE — 63600175 PHARM REV CODE 636 W HCPCS: Performed by: HOSPITALIST

## 2024-06-15 PROCEDURE — 36415 COLL VENOUS BLD VENIPUNCTURE: CPT | Performed by: STUDENT IN AN ORGANIZED HEALTH CARE EDUCATION/TRAINING PROGRAM

## 2024-06-15 PROCEDURE — 80202 ASSAY OF VANCOMYCIN: CPT | Performed by: INTERNAL MEDICINE

## 2024-06-15 PROCEDURE — 36415 COLL VENOUS BLD VENIPUNCTURE: CPT | Performed by: INTERNAL MEDICINE

## 2024-06-15 PROCEDURE — 63600175 PHARM REV CODE 636 W HCPCS: Performed by: STUDENT IN AN ORGANIZED HEALTH CARE EDUCATION/TRAINING PROGRAM

## 2024-06-15 PROCEDURE — 63600175 PHARM REV CODE 636 W HCPCS: Performed by: INTERNAL MEDICINE

## 2024-06-15 PROCEDURE — 63600175 PHARM REV CODE 636 W HCPCS

## 2024-06-15 RX ORDER — POTASSIUM CHLORIDE 14.9 MG/ML
20 INJECTION INTRAVENOUS ONCE
Status: COMPLETED | OUTPATIENT
Start: 2024-06-15 | End: 2024-06-15

## 2024-06-15 RX ORDER — SODIUM CHLORIDE, SODIUM LACTATE, POTASSIUM CHLORIDE, CALCIUM CHLORIDE 600; 310; 30; 20 MG/100ML; MG/100ML; MG/100ML; MG/100ML
INJECTION, SOLUTION INTRAVENOUS CONTINUOUS
Status: ACTIVE | OUTPATIENT
Start: 2024-06-15 | End: 2024-06-17

## 2024-06-15 RX ORDER — METOCLOPRAMIDE HYDROCHLORIDE 5 MG/ML
10 INJECTION INTRAMUSCULAR; INTRAVENOUS EVERY 8 HOURS PRN
Status: DISCONTINUED | OUTPATIENT
Start: 2024-06-15 | End: 2024-07-09 | Stop reason: HOSPADM

## 2024-06-15 RX ORDER — ENOXAPARIN SODIUM 100 MG/ML
30 INJECTION SUBCUTANEOUS EVERY 24 HOURS
Status: DISCONTINUED | OUTPATIENT
Start: 2024-06-16 | End: 2024-06-18

## 2024-06-15 RX ORDER — MORPHINE SULFATE 4 MG/ML
2 INJECTION, SOLUTION INTRAMUSCULAR; INTRAVENOUS ONCE AS NEEDED
Status: COMPLETED | OUTPATIENT
Start: 2024-06-15 | End: 2024-06-15

## 2024-06-15 RX ADMIN — ONDANSETRON 4 MG: 2 INJECTION INTRAMUSCULAR; INTRAVENOUS at 07:06

## 2024-06-15 RX ADMIN — INSULIN GLARGINE 50 UNITS: 100 INJECTION, SOLUTION SUBCUTANEOUS at 09:06

## 2024-06-15 RX ADMIN — INSULIN ASPART 12 UNITS: 100 INJECTION, SOLUTION INTRAVENOUS; SUBCUTANEOUS at 09:06

## 2024-06-15 RX ADMIN — SODIUM CHLORIDE, POTASSIUM CHLORIDE, SODIUM LACTATE AND CALCIUM CHLORIDE 1000 ML: 600; 310; 30; 20 INJECTION, SOLUTION INTRAVENOUS at 09:06

## 2024-06-15 RX ADMIN — CLINDAMYCIN PHOSPHATE 600 MG: 600 INJECTION, SOLUTION INTRAVENOUS at 09:06

## 2024-06-15 RX ADMIN — METOCLOPRAMIDE 10 MG: 5 INJECTION, SOLUTION INTRAMUSCULAR; INTRAVENOUS at 05:06

## 2024-06-15 RX ADMIN — PIPERACILLIN SODIUM AND TAZOBACTAM SODIUM 4.5 G: 4; .5 INJECTION, POWDER, LYOPHILIZED, FOR SOLUTION INTRAVENOUS at 04:06

## 2024-06-15 RX ADMIN — VANCOMYCIN HYDROCHLORIDE 1500 MG: 1.5 INJECTION, POWDER, LYOPHILIZED, FOR SOLUTION INTRAVENOUS at 12:06

## 2024-06-15 RX ADMIN — POTASSIUM CHLORIDE 20 MEQ: 14.9 INJECTION, SOLUTION INTRAVENOUS at 02:06

## 2024-06-15 RX ADMIN — INSULIN ASPART 10 UNITS: 100 INJECTION, SOLUTION INTRAVENOUS; SUBCUTANEOUS at 05:06

## 2024-06-15 RX ADMIN — INSULIN ASPART 10 UNITS: 100 INJECTION, SOLUTION INTRAVENOUS; SUBCUTANEOUS at 09:06

## 2024-06-15 RX ADMIN — PROCHLORPERAZINE EDISYLATE 5 MG: 5 INJECTION INTRAMUSCULAR; INTRAVENOUS at 10:06

## 2024-06-15 RX ADMIN — CLINDAMYCIN PHOSPHATE 600 MG: 600 INJECTION, SOLUTION INTRAVENOUS at 02:06

## 2024-06-15 RX ADMIN — CLINDAMYCIN PHOSPHATE 600 MG: 600 INJECTION, SOLUTION INTRAVENOUS at 03:06

## 2024-06-15 RX ADMIN — MORPHINE SULFATE 2 MG: 4 INJECTION INTRAVENOUS at 10:06

## 2024-06-15 RX ADMIN — INSULIN ASPART 10 UNITS: 100 INJECTION, SOLUTION INTRAVENOUS; SUBCUTANEOUS at 07:06

## 2024-06-15 RX ADMIN — PROCHLORPERAZINE EDISYLATE 5 MG: 5 INJECTION INTRAMUSCULAR; INTRAVENOUS at 01:06

## 2024-06-15 RX ADMIN — SODIUM CHLORIDE, POTASSIUM CHLORIDE, SODIUM LACTATE AND CALCIUM CHLORIDE: 600; 310; 30; 20 INJECTION, SOLUTION INTRAVENOUS at 02:06

## 2024-06-15 RX ADMIN — METOCLOPRAMIDE 10 MG: 5 INJECTION, SOLUTION INTRAMUSCULAR; INTRAVENOUS at 09:06

## 2024-06-15 NOTE — PROGRESS NOTES
[facility] Hospital Medicine Progress Note        Follow-up for severe sepsis/septic shock secondary to perineal cellulitis concerning for a necrotizing infection     Hospital course:58 year old female with morbid obesity, poorly-controlled diabetes mellitus type 2, presented with peroneal painful lesion draining pus and septic shock: Febrile 101, tachycardic, hypotensive, [place]BC of 854[address].  Imaging demonstrated cellulitis involving the perineum on the left side extending to the medial gluteal fold with inflammatory changes in the labia.  Concerning for possible abscess.  Evaluated by surgery and underwent emergent intervention for suspected necrotizing fasciitis.  Operative findings include mild necrotizing soft tissue infection for a perineal abscess..  Wound approximately 14 x 3 cm.    Today: Postoperative day 1., at the present time, hemodynamically stable, afebrile.  On 2 L via nasal cannula as she did have some hypoxic episodes.  Presently not hypoxic.  Leukocytosis is slowly starting to trend downward.  H and H at 8.8 28.1.  Labs demonstrate some hypokalemia at 3.3, sodium 133.  Creatinine is at 2.77 and has risen steadily since admission: 0.98, 1.76, 2.77 today..  Wound cultures are growing out moderate Staphylococcus aureus.  The patient is on vanc, Zosyn, clindamycin.  The patient remains on aggressive IV hydration at 125.   Nursing colleagues report that she has been actively vomiting and has been refractory to Compazine and Reglan.  She had an emesis right prior to my evaluation of her.  Since admission, she has had significant poor oral intake:  240 mL on hospital day 1, 240 mL on hospital day 2.    Case was discussed with patient's nurse and  on the floor.    Objective/physical exam:  General: In no acute distress, afebrile  Chest: Clear to auscultation bilaterally  Heart: RRR, +S1, S2, no appreciable murmur  Abdomen: Soft, nontender, BS +  MSK: Warm, no lower  extremity edema, no clubbing or cyanosis  Neurologic: Alert and oriented x 3 , Strength 5/5 in all 4 extremities    VITAL SIGNS: 24 HRS MIN & MAX LAST   Temp  Min: 97 °F (36.1 °C)  Max: 99.9 °F (37.7 °C) 99.9 °F (37.7 °C)   BP  Min: 88/58  Max: 138/88 103/68   Pulse  Min: 74  Max: 103  88   Resp  Min: 11  Max: 23 20   SpO2  Min: 88 %  Max: 100 % 98 %     I have reviewed the following labs:  Recent Labs   Lab 06/13/24  0804 06/14/24  0415 06/15/24  0440   WBC 27.06* 26.41  26.41* 25.03*   RBC 3.88* 3.45* 2.96*   HGB 11.8* 10.5* 8.8*   HCT 35.8* 32.7* 28.1*   MCV 92.3 94.8* 94.9*   MCH 30.4 30.4 29.7   MCHC 33.0 32.1* 31.3*   RDW 12.1 12.3 12.6    236 249   MPV 10.6* 11.5* 11.1*     Recent Labs   Lab 06/13/24  0804 06/14/24 0415 06/15/24  0440   * 129* 133*   K 3.8 3.6 3.3*   CL 94* 97* 97*   CO2 23 22 23   BUN 9.7* 16.9 21.2*   CREATININE 0.98 1.76* 2.77*   CALCIUM 9.0 8.4 8.2*   MG  --  1.60  --    ALBUMIN 2.8* 2.3*  --    ALKPHOS 178* 173*  --    ALT 25 23  --    AST 22 19  --    BILITOT 2.7* 2.6*  --      Microbiology Results (last 7 days)       Procedure Component Value Units Date/Time    Blood culture #1 **CANNOT BE ORDERED STAT** [[id number]]  (Normal) Collected: 06/13/24 0832    Order Status: Completed Specimen: Blood from Hand, Left Updated: 06/14/24 1300     Blood Culture No Growth At 24 Hours    Blood culture #2 **CANNOT BE ORDERED STAT** [[id number]]  (Normal) Collected: 06/13/24 0832    Order Status: Completed Specimen: Blood from Antecubital, Left Updated: 06/14/24 1300     Blood Culture No Growth At 24 Hours    Wound Culture [[id number]]  (Abnormal) Collected: 06/13/24 0917    Order Status: Completed Specimen: Abscess from Perineum Updated: 06/14/24 0709     Wound Culture Moderate Staphylococcus aureus             See below for Radiology    Scheduled Med:   clindamycin IV (PEDS and ADULTS)  600 mg Intravenous Q8H    enoxparin  40 mg Subcutaneous Daily    insulin aspart U-100  10 Units  Subcutaneous TIDWM    insulin glargine U-100  50 Units Subcutaneous QHS    lactated ringers  1,000 mL Intravenous Once    lactated ringers  1,000 mL Intravenous Once    piperacillin-tazobactam (Zosyn) IV (PEDS and ADULTS) (extended infusion is not appropriate)  4.5 g Intravenous Q8H    vancomycin (VANCOCIN) IV (PEDS and ADULTS)  1,500 mg Intravenous Q12H      Continuous Infusions:   sodium chloride 0.9%   Intravenous Continuous 125 mL/hr at 06/14/24 1840 New Bag at 06/14/24 1840      PRN Meds:    Current Facility-Administered Medications:     acetaminophen, 650 mg, Oral, Q4H PRN    albuterol-ipratropium, 3 mL, Nebulization, Q4H PRN    aluminum-magnesium hydroxide-simethicone, 30 mL, Oral, QID PRN    calcium carbonate, 1,000 mg, Oral, TID PRN    dextrose 10%, 12.5 g, Intravenous, PRN    dextrose 10%, 25 g, Intravenous, PRN    glucagon (human recombinant), 1 mg, Intramuscular, PRN    glucose, 16 g, Oral, PRN    glucose, 24 g, Oral, PRN    ibuprofen, 400 mg, Oral, Q6H PRN    insulin aspart U-100, 0-15 Units, Subcutaneous, Q4H PRN    melatonin, 6 mg, Oral, Nightly PRN    ondansetron, 4 mg, Intravenous, Q4H PRN    polyethylene glycol, 17 g, Oral, BID PRN    prochlorperazine, 2.5 mg, Intravenous, Q6H PRN    prochlorperazine, 5 mg, Intravenous, Q6H PRN    senna-docusate 8.6-50 mg, 2 tablet, Oral, BID PRN    sodium chloride 0.9%, 10 mL, Intravenous, PRN    Pharmacy to dose Vancomycin consult, , , Once **AND** vancomycin - pharmacy to dose, , Intravenous, pharmacy to manage frequency     Assessment/Plan:  Perineal cellulitis, mild necrotizing infection  Severe sepsis secondary to above   Acute kidney injury secondary to the above plus lackof oral intake  Uncontrolled type 2 diabetes mellitus with hyperglycemia   Hyponatremia confounded by hyperglycemia and a degree of hypovolemia   Hypokalemia    At the present time, we will continue with current anti-infective therapy and scale back on the Zosyn hopefully within 24 hours.   Continue with postoperative monitoring, including potential acute blood loss anemia.  She does have a degree of hemodilution which is understandable given the hydration component.  She does have Worsening acute kidney injury which is being reflected by her significant poor oral intake alongside septic component.  Will continue to avoid nephrotoxic agents.  This is most likely a combination of sepsis alongside poor oral intake.  She is on vancomycin at the present time which is a necessity given the degree of infection.  We will bolus her with lactated ringer.  Maintenance fluids at 125 mL an hour.  We will also obtain an echocardiogram to assess her cardiac component given the multiple IV hydration.  If kidney function does not improve within 24 hours, will obtain a Nephrology consultation.  In terms of her vomiting, there maybe a degree of gastroparesis given the uncontrolled diabetes.  We will scale back to a clear liquid diet, start Reglan.  Replenish electrolytes.  Renally dose Lovenox for DVT prophylaxis.      PPI ppx:    GI Stress ulcer prophylaxis    1. PPI recommended    a. coagulopathy (platelets < 50k, INR > 1.5, or PTT > 2x control value)  b. Mechanical ventilation > 48 hours  c. hx of GI ulcer with or without bleeding  d. TBI, burn  e. Current dual anti-platelet therapy  f. Anti-platelet and anti-coagulation therapy concurrently   g. Anti-platelet therapy and hx of GI ulcer  H. anti-coagulation/anti-platelet and corticosteroid use    2. PPI suggested (continuation of outpatient treatment for)  a. Erosive esophagitis  b. Symptomatic GERD  c. H. pylori infection  d. NSAID induced GI ulcer  e. Zollinger Elison and GI hypersecretory syndrome      VTE prophylaxis:  lovenox    Patient condition:  fair    Anticipated discharge and Disposition:         All diagnosis and differential diagnosis have been reviewed; assessment and plan has been documented; I have personally reviewed the labs and test results that  are presently available; I have reviewed the patients medication list; I have reviewed the consulting providers response and recommendations. I have reviewed or attempted to review medical records based upon their availability    All of the patient's questions have been  addressed and answered. Patient's is agreeable to the above stated plan. I will continue to monitor closely and make adjustments to medical management as needed.  _____________________________________________________________________    Nutrition Status:    Radiology:  I have personally reviewed the imaging       Jessica Alejandro MD   06/15/2024

## 2024-06-15 NOTE — PROGRESS NOTES
Pharmacokinetic Assessment Follow Up: IV Vancomycin    Vancomycin serum concentration assessment(s):  The random level was drawn correctly and can be used to guide therapy at this time. The measurement is above the desired definitive target range of 15 to 20 mcg/mL.    Vancomycin Regimen Plan:  Suspected AUTUMN with worsening serum creatinine  Re-dose when the random level is less than 20 mcg/mL, next level to be drawn at 0430 on 06/17 with AM labs      Drug levels (last 3 results):  Recent Labs   Lab Result Units 06/15/24  0935   Vancomycin Random ug/ml 45.3*       Vancomycin Administrations:  vancomycin given in the last 96 hours                     vancomycin 1.5 g in dextrose 5 % 250 mL IVPB (ready to mix) (mg) 1,500 mg New Bag 06/15/24 0026     1,500 mg New Bag 06/14/24 1105    vancomycin (VANCOCIN) 1,000 mg in dextrose 5 % (D5W) 250 mL IVPB (Vial-Mate) ()  Restarted 06/13/24 1501     1,000 mg New Bag  1433     1,000 mg New Bag  1121                    Pharmacy will continue to follow and monitor vancomycin.    Please contact pharmacy at extension [phone] for questions regarding this assessment.    Thank you for the consult,   Matilda Riojas       Patient brief summary:  Greer Kahn is a 58 y.o. female initiated on antimicrobial therapy with IV Vancomycin for treatment of skin & soft tissue infection    The patient's current regimen is discontinued    Drug Allergies:   Review of patient's allergies indicates:  No Known Allergies    Actual Body Weight:  Wt Readings from Last 1 Encounters:   06/13/24 108.9 kg (240 lb)       Renal Function:   Estimated Creatinine Clearance: 28.5 mL/min (A) (based on SCr of 2.77 mg/dL (H)).,     Dialysis Method (if applicable):  N/A    CBC (last 72 hours):  Recent Labs   Lab Result Units 06/13/24  0804 06/14/24  0415 06/15/24  0440   WBC x10(3)/mcL 27.06* 26.41  26.41* 25.03*   Hgb g/dL 11.8* 10.5* 8.8*   Hemoglobin A1c %  --  11.0*  --    Hct % 35.8* 32.7* 28.1*   Platelet  x10(3)/mcL 264 236 249   Monocytes % % 12* 8  --    Basophils % %  --  1  --        Metabolic Panel (last 72 hours):  Recent Labs   Lab Result Units 06/13/24  0804 06/13/24  1459 06/14/24  0415 06/15/24  0440   Sodium mmol/L 131*  --  129* 133*   Potassium mmol/L 3.8  --  3.6 3.3*   Chloride mmol/L 94*  --  97* 97*   CO2 mmol/L 23  --  22 23   Glucose mg/dL 353*  --  484* 279*   Glucose, UA   --  >=1000*  --   --    Blood Urea Nitrogen mg/dL 9.7*  --  16.9 21.2*   Creatinine mg/dL 0.98  --  1.76* 2.77*   Albumin g/dL 2.8*  --  2.3*  --    Bilirubin Total mg/dL 2.7*  --  2.6*  --    ALP unit/L 178*  --  173*  --    AST unit/L 22  --  19  --    ALT unit/L 25  --  23  --    Magnesium Level mg/dL  --   --  1.60  --    Phosphorus Level mg/dL  --   --  2.8  --        Microbiologic Results:  Microbiology Results (last 7 days)       Procedure Component Value Units Date/Time    Wound Culture [[id number]]  (Abnormal)  (Susceptibility) Collected: 06/13/24 0917    Order Status: Completed Specimen: Abscess from Perineum Updated: 06/15/24 0903     Wound Culture Moderate Methicillin resistant Staphylococcus aureus    Blood culture #1 **CANNOT BE ORDERED STAT** [[id number]]  (Normal) Collected: 06/13/24 0832    Order Status: Completed Specimen: Blood from Hand, Left Updated: 06/14/24 1300     Blood Culture No Growth At 24 Hours    Blood culture #2 **CANNOT BE ORDERED STAT** [[id number]]  (Normal) Collected: 06/13/24 0832    Order Status: Completed Specimen: Blood from Antecubital, Left Updated: 06/14/24 1300     Blood Culture No Growth At 24 Hours

## 2024-06-15 NOTE — PROGRESS NOTES
Acute Care Surgery   Progress Note  Admit Date: 6/13/2024  HD#2  POD#1 Day Post-Op    Subjective:   Interval history:    No acute events  Afebrile and vitals in normal limits  Pain mostly controlled  Complaining of nausea and vomiting today    Home Meds:  Current Outpatient Medications   Medication Instructions    albuterol (PROVENTIL/VENTOLIN HFA) 90 mcg/actuation inhaler 2 puffs, Inhalation, Every 4-6 hours PRN    amLODIPine (NORVASC) 10 mg, Oral, Daily    atorvastatin (LIPITOR) 20 mg, Oral, Daily    famotidine (PEPCID) 20 mg, Oral, 2 times daily    glimepiride (AMARYL) 4 mg, Oral, With breakfast    hydroCHLOROthiazide (HYDRODIURIL) 25 mg, Oral, Daily    insulin glargine U-100 (Lantus) 50 Units, Subcutaneous, Nightly    meclizine (ANTIVERT) 25 mg, Oral, 3 times daily    metFORMIN (GLUCOPHAGE) 1,000 mg, Oral, 2 times daily    metoprolol tartrate (LOPRESSOR) 25 mg, Oral, 2 times daily    pioglitazone (ACTOS) 30 mg, Oral, Daily      Scheduled Meds:   clindamycin IV (PEDS and ADULTS)  600 mg Intravenous Q8H    [START ON 6/16/2024] enoxaparin  30 mg Subcutaneous Daily    insulin aspart U-100  10 Units Subcutaneous TIDWM    insulin glargine U-100  50 Units Subcutaneous QHS    lactated ringers  1,000 mL Intravenous Once    lactated ringers  1,000 mL Intravenous Once    piperacillin-tazobactam (Zosyn) IV (PEDS and ADULTS) (extended infusion is not appropriate)  4.5 g Intravenous Q8H    potassium chloride in water  20 mEq Intravenous Once     Continuous Infusions:   lactated ringers   Intravenous Continuous         PRN Meds:  Current Facility-Administered Medications:     acetaminophen, 650 mg, Oral, Q4H PRN    albuterol-ipratropium, 3 mL, Nebulization, Q4H PRN    aluminum-magnesium hydroxide-simethicone, 30 mL, Oral, QID PRN    calcium carbonate, 1,000 mg, Oral, TID PRN    dextrose 10%, 12.5 g, Intravenous, PRN    dextrose 10%, 25 g, Intravenous, PRN    glucagon (human recombinant), 1 mg, Intramuscular, PRN    glucose,  "16 g, Oral, PRN    glucose, 24 g, Oral, PRN    ibuprofen, 400 mg, Oral, Q6H PRN    insulin aspart U-100, 0-15 Units, Subcutaneous, Q4H PRN    melatonin, 6 mg, Oral, Nightly PRN    metoclopramide, 10 mg, Intravenous, Q8H PRN    morphine, 2 mg, Intravenous, Once PRN    polyethylene glycol, 17 g, Oral, BID PRN    prochlorperazine, 2.5 mg, Intravenous, Q6H PRN    prochlorperazine, 5 mg, Intravenous, Q6H PRN    senna-docusate 8.6-50 mg, 2 tablet, Oral, BID PRN    sodium chloride 0.9%, 10 mL, Intravenous, PRN    Pharmacy to dose Vancomycin consult, , , Once **AND** vancomycin - pharmacy to dose, , Intravenous, pharmacy to manage frequency     Objective:     VITAL SIGNS: 24 HR MIN & MAX LAST   Temp  Min: 97 °F (36.1 °C)  Max: 99.9 °F (37.7 °C)  99.9 °F (37.7 °C)   BP  Min: 88/58  Max: 138/88  103/68    Pulse  Min: 74  Max: 103  88    Resp  Min: 11  Max: 23  20    SpO2  Min: 88 %  Max: 100 %  98 %      HT: 5' 7" (170.2 cm)  WT: 108.9 kg (240 lb)  BMI: 37.6     Intake/output:  Intake/Output - Last 3 Shifts         06/13 0700  06/14 0659 06/14 0700  06/15 0659 06/15 0700  06/16 0659    P.O. 240      I.V. (mL/kg) 1176 (10.8) 100 (0.9)     IV Piggyback 1594 500     Total Intake(mL/kg) 3010.1 (27.6) 600 (5.5)     Urine (mL/kg/hr) 600 375 (0.1)     Total Output 600 375     Net +2410.1 +225            Urine Occurrence 3 x      Emesis Occurrence  2 x             Intake/Output Summary (Last 24 hours) at 6/15/2024 1037  Last data filed at 6/14/2024 1231  Gross per 24 hour   Intake 600 ml   Output 375 ml   Net 225 ml           Lines/drains/airway:       Peripheral IV - Single Lumen 06/13/24 0802 20 G Anterior;Proximal;Right Forearm (Active)   Site Assessment Clean;Dry;Intact 06/15/24 0600   Extremity Assessment Distal to IV No abnormal discoloration 06/15/24 0600   Line Status Infusing 06/15/24 0600   Dressing Status Clean;Dry;Intact 06/15/24 0600   Dressing Intervention Integrity maintained 06/15/24 0600   Number of days: 2           " " Urethral Catheter 06/14/24 Latex 16 Fr. (Active)   Site Assessment Clean;Intact 06/15/24 0600   Collection Container Urimeter 06/15/24 0600   Securement Method secured to top of thigh w/ adhesive device 06/15/24 0600   Catheter Care Performed yes 06/15/24 0600   Reason for Continuing Urinary Catheterization Post operative 06/15/24 0600   CAUTI Prevention Bundle Securement Device in place with 1" slack;Intact seal between catheter & drainage tubing;Drainage bag/urimeter off the floor;Sheeting clip in use;No dependent loops or kinks;Drainage bag/urimeter not overfilled (<2/3 full);Drainage bag/urimeter below bladder 06/14/24 2000   Number of days: 1       Physical examination:  Gen: NAD, AAOx3, answering questions appropriately  CV: RR  Resp: NWOB  Ext: moving all extremities spontaneously and purposefully  Neuro: CN II-XII grossly intact  Skin/wounds: R groin with packing in place. No significant surrounding erythema    Labs:  Renal:  Recent Labs     06/13/24  0804 06/14/24  0415 06/15/24  0440   BUN 9.7* 16.9 21.2*   CREATININE 0.98 1.76* 2.77*     No results for input(s): "LACTIC" in the last 72 hours.  FENGI:  Recent Labs     06/13/24  0804 06/14/24  0415 06/15/24  0440   * 129* 133*   K 3.8 3.6 3.3*   CL 94* 97* 97*   CO2 23 22 23   CALCIUM 9.0 8.4 8.2*   MG  --  1.60  --    PHOS  --  2.8  --    ALBUMIN 2.8* 2.3*  --    BILITOT 2.7* 2.6*  --    AST 22 19  --    ALKPHOS 178* 173*  --    ALT 25 23  --      Heme:  Recent Labs     06/13/24  0804 06/14/24  0415 06/15/24  0440   HGB 11.8* 10.5* 8.8*   HCT 35.8* 32.7* 28.1*    236 249     ID:  Recent Labs     06/13/24  0804 06/14/24  0415 06/15/24  0440   WBC 27.06* 26.41  26.41* 25.03*     CBG:  Recent Labs     06/13/24  0804 06/14/24  0415 06/15/24  0440   GLUCOSE 353* 484* 279*      Cardiovascular:  No results for input(s): "TROPONINI", "CKTOTAL", "CKMB", "BNP" in the last 168 hours.  I have reviewed all pertinent lab results within the past 24 " hours.    Imaging:  CTA Chest Non-Coronary (PE Studies)   Final Result      No pulmonary embolism seen      Right lower lobe atelectasis and small right-sided pleural effusion         Electronically signed by: Ayush Barbour   Date:    06/13/2024   Time:    09:58      CT Abdomen Pelvis With IV Contrast NO Oral Contrast   Final Result      Area of cellulitis involving the perineum on the left side extending to the medial gluteal fold with a area of thickening and inflammatory changes in the labia on the left side with a area of hypoattenuation seen within the labia on the left side concerning for possible developing abscess.  Follow-up is recommended.      Right lower lobe atelectasis and right-sided pleural effusion         Electronically signed by: Ayush Barbour   Date:    06/13/2024   Time:    10:38         I have reviewed all pertinent imaging results/findings within the past 24 hours.    Micro/Path/Other:  Microbiology Results (last 7 days)       Procedure Component Value Units Date/Time    Wound Culture [[id number]]  (Abnormal)  (Susceptibility) Collected: 06/13/24 0917    Order Status: Completed Specimen: Abscess from Perineum Updated: 06/15/24 0903     Wound Culture Moderate Methicillin resistant Staphylococcus aureus    Blood culture #1 **CANNOT BE ORDERED STAT** [[id number]]  (Normal) Collected: 06/13/24 0832    Order Status: Completed Specimen: Blood from Hand, Left Updated: 06/14/24 1300     Blood Culture No Growth At 24 Hours    Blood culture #2 **CANNOT BE ORDERED STAT** [[id number]]  (Normal) Collected: 06/13/24 0832    Order Status: Completed Specimen: Blood from Antecubital, Left Updated: 06/14/24 1300     Blood Culture No Growth At 24 Hours           Pathology Results  (Last 7 days)      None             Assessment & Plan:   57yo F with DM, HTN, obesity, and asthma, presenting with a R groin soft tissue infection. Initially, there was concern for NSTI and patient was taken to the OR urgently for  debridement. She tolerated this well, and there was not evidence of NSTI. She is now s/p R groin incision, drainage, and debridement on 6/14.    - PRN pain control  - Will return to change out packing from R groin wound  - Consult wound care for continued management of wound after today  - continue broad spectrum abx    Any Saxena MD  [facility], PGY-4

## 2024-06-16 LAB
ABS NEUT (OLG): 21.28 X10(3)/MCL (ref 2.1–9.2)
ANION GAP SERPL CALC-SCNC: 15 MEQ/L
BUN SERPL-MCNC: 26.3 MG/DL (ref 9.8–20.1)
CALCIUM SERPL-MCNC: 8.6 MG/DL (ref 8.4–10.2)
CHLORIDE SERPL-SCNC: 97 MMOL/L (ref 98–107)
CO2 SERPL-SCNC: 22 MMOL/L (ref 22–29)
CREAT SERPL-MCNC: 4.53 MG/DL (ref 0.55–1.02)
CREAT/UREA NIT SERPL: 6
EOSINOPHIL NFR BLD MANUAL: 0.28 X10(3)/MCL (ref 0–0.9)
EOSINOPHIL NFR BLD MANUAL: 1 %
ERYTHROCYTE [DISTWIDTH] IN BLOOD BY AUTOMATED COUNT: 13.2 % (ref 11.5–17)
GFR SERPLBLD CREATININE-BSD FMLA CKD-EPI: 11 ML/MIN/1.73/M2
GLUCOSE SERPL-MCNC: 222 MG/DL (ref 74–100)
HCT VFR BLD AUTO: 27.4 % (ref 37–47)
HGB BLD-MCNC: 9.1 G/DL (ref 12–16)
INSTRUMENT WBC (OLG): 27.63 X10(3)/MCL
LYMPHOCYTES NFR BLD MANUAL: 14 %
LYMPHOCYTES NFR BLD MANUAL: 3.87 X10(3)/MCL
MACROCYTES BLD QL SMEAR: ABNORMAL
MCH RBC QN AUTO: 30.6 PG (ref 27–31)
MCHC RBC AUTO-ENTMCNC: 33.2 G/DL (ref 33–36)
MCV RBC AUTO: 92.3 FL (ref 80–94)
METAMYELOCYTES NFR BLD MANUAL: 1 %
MONOCYTES NFR BLD MANUAL: 1.93 X10(3)/MCL (ref 0.1–1.3)
MONOCYTES NFR BLD MANUAL: 7 %
NEUTROPHILS NFR BLD MANUAL: 77 %
NRBC BLD AUTO-RTO: 0.1 %
PLATELET # BLD AUTO: 332 X10(3)/MCL (ref 130–400)
PLATELET # BLD EST: NORMAL 10*3/UL
PMV BLD AUTO: 11.2 FL (ref 7.4–10.4)
POCT GLUCOSE: 105 MG/DL (ref 70–110)
POCT GLUCOSE: 152 MG/DL (ref 70–110)
POCT GLUCOSE: 214 MG/DL (ref 70–110)
POTASSIUM SERPL-SCNC: 3.6 MMOL/L (ref 3.5–5.1)
PROMYELOCYTES # BLD MANUAL: 1 %
RBC # BLD AUTO: 2.97 X10(6)/MCL (ref 4.2–5.4)
RBC MORPH BLD: ABNORMAL
SODIUM SERPL-SCNC: 134 MMOL/L (ref 136–145)
WBC # BLD AUTO: 27.63 X10(3)/MCL (ref 4.5–11.5)

## 2024-06-16 PROCEDURE — 02HV33Z INSERTION OF INFUSION DEVICE INTO SUPERIOR VENA CAVA, PERCUTANEOUS APPROACH: ICD-10-PCS | Performed by: SURGERY

## 2024-06-16 PROCEDURE — C1751 CATH, INF, PER/CENT/MIDLINE: HCPCS

## 2024-06-16 PROCEDURE — 36415 COLL VENOUS BLD VENIPUNCTURE: CPT | Performed by: STUDENT IN AN ORGANIZED HEALTH CARE EDUCATION/TRAINING PROGRAM

## 2024-06-16 PROCEDURE — 63600175 PHARM REV CODE 636 W HCPCS: Performed by: HOSPITALIST

## 2024-06-16 PROCEDURE — 25000003 PHARM REV CODE 250: Performed by: NURSE PRACTITIONER

## 2024-06-16 PROCEDURE — 25000003 PHARM REV CODE 250: Performed by: INTERNAL MEDICINE

## 2024-06-16 PROCEDURE — 80048 BASIC METABOLIC PNL TOTAL CA: CPT | Performed by: STUDENT IN AN ORGANIZED HEALTH CARE EDUCATION/TRAINING PROGRAM

## 2024-06-16 PROCEDURE — 85027 COMPLETE CBC AUTOMATED: CPT | Performed by: HOSPITALIST

## 2024-06-16 PROCEDURE — 63600175 PHARM REV CODE 636 W HCPCS: Performed by: STUDENT IN AN ORGANIZED HEALTH CARE EDUCATION/TRAINING PROGRAM

## 2024-06-16 PROCEDURE — 36406 VNPNXR<3YRS PHY/QHP OTHER VN: CPT

## 2024-06-16 PROCEDURE — 63600175 PHARM REV CODE 636 W HCPCS: Mod: JZ,JG | Performed by: INTERNAL MEDICINE

## 2024-06-16 PROCEDURE — 63600175 PHARM REV CODE 636 W HCPCS: Performed by: INTERNAL MEDICINE

## 2024-06-16 PROCEDURE — 21400001 HC TELEMETRY ROOM

## 2024-06-16 RX ORDER — BISACODYL 10 MG/1
10 SUPPOSITORY RECTAL DAILY PRN
Status: DISCONTINUED | OUTPATIENT
Start: 2024-06-16 | End: 2024-07-09 | Stop reason: HOSPADM

## 2024-06-16 RX ORDER — SODIUM CHLORIDE 0.9 % (FLUSH) 0.9 %
10 SYRINGE (ML) INJECTION EVERY 6 HOURS
Status: DISCONTINUED | OUTPATIENT
Start: 2024-06-16 | End: 2024-07-09 | Stop reason: HOSPADM

## 2024-06-16 RX ORDER — MUPIROCIN 20 MG/G
OINTMENT TOPICAL 2 TIMES DAILY
Status: DISPENSED | OUTPATIENT
Start: 2024-06-16 | End: 2024-06-21

## 2024-06-16 RX ORDER — SODIUM CHLORIDE 0.9 % (FLUSH) 0.9 %
10 SYRINGE (ML) INJECTION
Status: DISCONTINUED | OUTPATIENT
Start: 2024-06-16 | End: 2024-07-09 | Stop reason: HOSPADM

## 2024-06-16 RX ORDER — QUETIAPINE FUMARATE 25 MG/1
25 TABLET, FILM COATED ORAL NIGHTLY PRN
Status: DISCONTINUED | OUTPATIENT
Start: 2024-06-16 | End: 2024-06-18

## 2024-06-16 RX ADMIN — CLINDAMYCIN PHOSPHATE 600 MG: 600 INJECTION, SOLUTION INTRAVENOUS at 03:06

## 2024-06-16 RX ADMIN — PIPERACILLIN SODIUM AND TAZOBACTAM SODIUM 4.5 G: 4; .5 INJECTION, POWDER, LYOPHILIZED, FOR SOLUTION INTRAVENOUS at 03:06

## 2024-06-16 RX ADMIN — INSULIN ASPART 6 UNITS: 100 INJECTION, SOLUTION INTRAVENOUS; SUBCUTANEOUS at 12:06

## 2024-06-16 RX ADMIN — QUETIAPINE FUMARATE 25 MG: 25 TABLET ORAL at 09:06

## 2024-06-16 RX ADMIN — PIPERACILLIN SODIUM AND TAZOBACTAM SODIUM 4.5 G: 4; .5 INJECTION, POWDER, LYOPHILIZED, FOR SOLUTION INTRAVENOUS at 05:06

## 2024-06-16 RX ADMIN — METOCLOPRAMIDE 10 MG: 5 INJECTION, SOLUTION INTRAMUSCULAR; INTRAVENOUS at 09:06

## 2024-06-16 RX ADMIN — ENOXAPARIN SODIUM 30 MG: 40 INJECTION SUBCUTANEOUS at 05:06

## 2024-06-16 RX ADMIN — INSULIN ASPART 10 UNITS: 100 INJECTION, SOLUTION INTRAVENOUS; SUBCUTANEOUS at 06:06

## 2024-06-16 RX ADMIN — SENNOSIDES AND DOCUSATE SODIUM 2 TABLET: 50; 8.6 TABLET ORAL at 03:06

## 2024-06-16 RX ADMIN — CLINDAMYCIN PHOSPHATE 600 MG: 600 INJECTION, SOLUTION INTRAVENOUS at 10:06

## 2024-06-16 RX ADMIN — CLINDAMYCIN PHOSPHATE 600 MG: 600 INJECTION, SOLUTION INTRAVENOUS at 02:06

## 2024-06-16 RX ADMIN — METOCLOPRAMIDE 10 MG: 5 INJECTION, SOLUTION INTRAMUSCULAR; INTRAVENOUS at 02:06

## 2024-06-16 RX ADMIN — INSULIN ASPART 10 UNITS: 100 INJECTION, SOLUTION INTRAVENOUS; SUBCUTANEOUS at 05:06

## 2024-06-16 RX ADMIN — PROCHLORPERAZINE EDISYLATE 5 MG: 5 INJECTION INTRAMUSCULAR; INTRAVENOUS at 02:06

## 2024-06-16 RX ADMIN — INSULIN ASPART 10 UNITS: 100 INJECTION, SOLUTION INTRAVENOUS; SUBCUTANEOUS at 12:06

## 2024-06-16 RX ADMIN — CALCIUM CARBONATE (ANTACID) CHEW TAB 500 MG 1000 MG: 500 CHEW TAB at 03:06

## 2024-06-16 RX ADMIN — MUPIROCIN: 20 OINTMENT TOPICAL at 08:06

## 2024-06-16 NOTE — PROGRESS NOTES
[facility] Hospital Medicine Progress Note        Chief Complaint: Inpatient Follow-up for sepsis, perineal cellulitis    HPI:   58 year old female with morbid obesity, poorly-controlled diabetes mellitus type 2, presented with peroneal painful lesion draining pus and septic shock: Febrile 101, tachycardic, hypotensive, [place] [address]. Imaging demonstrated cellulitis involving the perineum on the left side extending to the medial gluteal fold with inflammatory changes in the labia. Concerning for possible abscess. Evaluated by surgery and underwent emergent intervention for suspected necrotizing fasciitis. Operative findings include mild necrotizing soft tissue infection for a perineal abscess.. Wound approximately 14 x 3 cm.     Interval Hx:   The patient was seen and examined.  Postop day 2.  Had a restless night, stays nauseated, moaning in her sleep at the time of my visit.    Wound cultures with moderate staph aureus, patient currently on Zosyn and clindamycin.  Has not had a bowel movement in 4 days, can try Dulcolax suppository.  Already on stool softener.      Case was discussed with patient's nurse and  on the floor.    Objective/physical exam:  General: In no acute distress, afebrile  Chest: Clear to auscultation bilaterally  Heart: RRR, +S1, S2, no appreciable murmur  Abdomen: Soft, nontender, BS +  Neurologic:  Asleep, moaning     VITAL SIGNS: 24 HRS MIN & MAX LAST   Temp  Min: 97 °F (36.1 °C)  Max: 99.3 °F (37.4 °C) 97 °F (36.1 °C)   BP  Min: 96/63  Max: 105/62 105/62   Pulse  Min: 72  Max: 88  72   Resp  Min: 18  Max: 19 18   SpO2  Min: 96 %  Max: 99 % 96 %     I have reviewed the following labs:  Recent Labs   Lab 06/13/24  0804 06/14/24  0415 06/15/24  0440   WBC 27.06* 26.41  26.41* 25.03*   RBC 3.88* 3.45* 2.96*   HGB 11.8* 10.5* 8.8*   HCT 35.8* 32.7* 28.1*   MCV 92.3 94.8* 94.9*   MCH 30.4 30.4 29.7   MCHC 33.0 32.1* 31.3*   RDW 12.1 12.3 12.6     236 249   MPV 10.6* 11.5* 11.1*     Recent Labs   Lab 06/13/24  0804 06/14/24  0415 06/15/24  0440   * 129* 133*   K 3.8 3.6 3.3*   CL 94* 97* 97*   CO2 23 22 23   BUN 9.7* 16.9 21.2*   CREATININE 0.98 1.76* 2.77*   CALCIUM 9.0 8.4 8.2*   MG  --  1.60  --    ALBUMIN 2.8* 2.3*  --    ALKPHOS 178* 173*  --    ALT 25 23  --    AST 22 19  --    BILITOT 2.7* 2.6*  --      Microbiology Results (last 7 days)       Procedure Component Value Units Date/Time    Blood culture #1 **CANNOT BE ORDERED STAT** [[id number]]  (Normal) Collected: 06/13/24 0832    Order Status: Completed Specimen: Blood from Hand, Left Updated: 06/15/24 1300     Blood Culture No Growth At 48 Hours    Blood culture #2 **CANNOT BE ORDERED STAT** [[id number]]  (Normal) Collected: 06/13/24 0832    Order Status: Completed Specimen: Blood from Antecubital, Left Updated: 06/15/24 1300     Blood Culture No Growth At 48 Hours    Wound Culture [[id number]]  (Abnormal)  (Susceptibility) Collected: 06/13/24 0917    Order Status: Completed Specimen: Abscess from Perineum Updated: 06/15/24 0903     Wound Culture Moderate Methicillin resistant Staphylococcus aureus             See below for Radiology    Scheduled Med:   clindamycin IV (PEDS and ADULTS)  600 mg Intravenous Q8H    enoxaparin  30 mg Subcutaneous Daily    insulin aspart U-100  10 Units Subcutaneous TIDWM    insulin glargine U-100  50 Units Subcutaneous QHS    lactated ringers  1,000 mL Intravenous Once    lactated ringers  1,000 mL Intravenous Once    mupirocin   Nasal BID    piperacillin-tazobactam (Zosyn) IV (PEDS and ADULTS) (extended infusion is not appropriate)  4.5 g Intravenous Q8H      Continuous Infusions:   lactated ringers   Intravenous Continuous 125 mL/hr at 06/15/24 1407 New Bag at 06/15/24 1407      PRN Meds:    Current Facility-Administered Medications:     acetaminophen, 650 mg, Oral, Q4H PRN    albuterol-ipratropium, 3 mL, Nebulization, Q4H PRN    aluminum-magnesium  hydroxide-simethicone, 30 mL, Oral, QID PRN    bisacodyL, 10 mg, Rectal, Daily PRN    calcium carbonate, 1,000 mg, Oral, TID PRN    dextrose 10%, 12.5 g, Intravenous, PRN    dextrose 10%, 25 g, Intravenous, PRN    glucagon (human recombinant), 1 mg, Intramuscular, PRN    glucose, 16 g, Oral, PRN    glucose, 24 g, Oral, PRN    ibuprofen, 400 mg, Oral, Q6H PRN    insulin aspart U-100, 0-15 Units, Subcutaneous, Q4H PRN    melatonin, 6 mg, Oral, Nightly PRN    metoclopramide, 10 mg, Intravenous, Q8H PRN    polyethylene glycol, 17 g, Oral, BID PRN    prochlorperazine, 2.5 mg, Intravenous, Q6H PRN    prochlorperazine, 5 mg, Intravenous, Q6H PRN    senna-docusate 8.6-50 mg, 2 tablet, Oral, BID PRN    sodium chloride 0.9%, 10 mL, Intravenous, PRN    Pharmacy to dose Vancomycin consult, , , Once **AND** vancomycin - pharmacy to dose, , Intravenous, pharmacy to manage frequency     Assessment/Plan:    Perineal cellulitis, mild necrotizing infection  Severe sepsis secondary to above   Acute kidney injury secondary to the above plus lackof oral intake  Uncontrolled type 2 diabetes mellitus with hyperglycemia   Hyponatremia confounded by hyperglycemia and a degree of hypovolemia   Hypokalemia     At the present time, we will continue with current anti-infective therapy with clindamycin and Zosyn    Continue with postoperative monitoring, including potential acute blood loss anemia.    Monitor renal indices closely, labs pending from this morning    She does have Worsening acute kidney injury which is being reflected by her significant poor oral intake alongside septic component.  Will continue to avoid nephrotoxic agents.    Echo was obtained with preserved ejection fraction at 55-60%    In terms of her vomiting, there maybe a degree of gastroparesis given the uncontrolled diabetes.    Advanced diet as tolerated  Continue Reglan, Replenish electrolytes.  Renally dose Lovenox for DVT prophylaxis.    VTE prophylaxis:   Lovenox    Patient condition:  Stable    Anticipated discharge and Disposition:         All diagnosis and differential diagnosis have been reviewed; assessment and plan has been documented; I have personally reviewed the labs and test results that are presently available; I have reviewed the patients medication list; I have reviewed the consulting providers response and recommendations. I have reviewed or attempted to review medical records based upon their availability    All of the patient's questions have been  addressed and answered. Patient's is agreeable to the above stated plan. I will continue to monitor closely and make adjustments to medical management as needed.  _____________________________________________________________________    Nutrition Status:    Radiology:  I have personally reviewed the following imaging and agree with the radiologist.     Echo    Left Ventricle: There is normal systolic function with a visually   estimated ejection fraction of 55 - 60%.      Beverly Zapata MD  Department of Hospital Medicine   [facility]   06/16/2024

## 2024-06-16 NOTE — PROCEDURES
"Greer Kahn is a 58 y.o. female patient.    Temp: 99.3 °F (37.4 °C) (06/16/24 1121)  Pulse: 82 (06/16/24 1121)  Resp: 18 (06/16/24 0700)  BP: 112/81 (06/16/24 1121)  SpO2: 96 % (06/16/24 1121)  Weight: 108.9 kg (240 lb) (06/13/24 0734)  Height: 5' 7" (170.2 cm) (06/13/24 0734)    PICC  Date/Time: 6/16/2024 1:00 PM  Performed by: Kvng Carney RN  Consent Done: Yes  Time out: Immediately prior to procedure a time out was called to verify the correct patient, procedure, equipment, support staff and site/side marked as required  Indications: med administration and vascular access  Anesthesia: local infiltration  Local anesthetic: lidocaine 1% without epinephrine  Anesthetic Total (mL): 4  Preparation: skin prepped with ChloraPrep  Skin prep agent dried: skin prep agent completely dried prior to procedure  Sterile barriers: all five maximum sterile barriers used - cap, mask, sterile gown, sterile gloves, and large sterile sheet  Hand hygiene: hand hygiene performed prior to central venous catheter insertion  Location details: left basilic  Catheter type: single lumen  Catheter size: 4 Fr  Catheter Length: 19cm    Ultrasound guidance: yes  Vessel Caliber: medium and patent, compressibility normal  Needle advanced into vessel with real time Ultrasound guidance.  Guidewire confirmed in vessel.  Sterile sheath used.  Number of attempts: 1  Post-procedure: blood return through all ports, sterile dressing applied and chlorhexidine patch    Complications: none  Comments: Arm circ- 41cm          Kvng Carney RN  6/16/2024    "

## 2024-06-16 NOTE — PROGRESS NOTES
Acute Care Surgery   Progress Note  Admit Date: 6/13/2024  HD#3  POD#2 Days Post-Op    Subjective:   Interval history:    No acute events  Afebrile and vitals in normal limits  Pain mostly controlled  Continues with nausea and vomiting today    Home Meds:  Current Outpatient Medications   Medication Instructions    albuterol (PROVENTIL/VENTOLIN HFA) 90 mcg/actuation inhaler 2 puffs, Inhalation, Every 4-6 hours PRN    amLODIPine (NORVASC) 10 mg, Oral, Daily    atorvastatin (LIPITOR) 20 mg, Oral, Daily    famotidine (PEPCID) 20 mg, Oral, 2 times daily    glimepiride (AMARYL) 4 mg, Oral, With breakfast    hydroCHLOROthiazide (HYDRODIURIL) 25 mg, Oral, Daily    insulin glargine U-100 (Lantus) 50 Units, Subcutaneous, Nightly    meclizine (ANTIVERT) 25 mg, Oral, 3 times daily    metFORMIN (GLUCOPHAGE) 1,000 mg, Oral, 2 times daily    metoprolol tartrate (LOPRESSOR) 25 mg, Oral, 2 times daily    pioglitazone (ACTOS) 30 mg, Oral, Daily      Scheduled Meds:   clindamycin IV (PEDS and ADULTS)  600 mg Intravenous Q8H    enoxaparin  30 mg Subcutaneous Daily    insulin aspart U-100  10 Units Subcutaneous TIDWM    insulin glargine U-100  50 Units Subcutaneous QHS    lactated ringers  1,000 mL Intravenous Once    lactated ringers  1,000 mL Intravenous Once    mupirocin   Nasal BID    piperacillin-tazobactam (Zosyn) IV (PEDS and ADULTS) (extended infusion is not appropriate)  4.5 g Intravenous Q8H     Continuous Infusions:   lactated ringers   Intravenous Continuous 125 mL/hr at 06/15/24 1407 New Bag at 06/15/24 1407     PRN Meds:  Current Facility-Administered Medications:     acetaminophen, 650 mg, Oral, Q4H PRN    albuterol-ipratropium, 3 mL, Nebulization, Q4H PRN    aluminum-magnesium hydroxide-simethicone, 30 mL, Oral, QID PRN    bisacodyL, 10 mg, Rectal, Daily PRN    calcium carbonate, 1,000 mg, Oral, TID PRN    dextrose 10%, 12.5 g, Intravenous, PRN    dextrose 10%, 25 g, Intravenous, PRN    glucagon (human recombinant),  "1 mg, Intramuscular, PRN    glucose, 16 g, Oral, PRN    glucose, 24 g, Oral, PRN    ibuprofen, 400 mg, Oral, Q6H PRN    insulin aspart U-100, 0-15 Units, Subcutaneous, Q4H PRN    melatonin, 6 mg, Oral, Nightly PRN    metoclopramide, 10 mg, Intravenous, Q8H PRN    polyethylene glycol, 17 g, Oral, BID PRN    prochlorperazine, 2.5 mg, Intravenous, Q6H PRN    prochlorperazine, 5 mg, Intravenous, Q6H PRN    senna-docusate 8.6-50 mg, 2 tablet, Oral, BID PRN    sodium chloride 0.9%, 10 mL, Intravenous, PRN    Pharmacy to dose Vancomycin consult, , , Once **AND** vancomycin - pharmacy to dose, , Intravenous, pharmacy to manage frequency     Objective:     VITAL SIGNS: 24 HR MIN & MAX LAST   Temp  Min: 97 °F (36.1 °C)  Max: 99.3 °F (37.4 °C)  97 °F (36.1 °C)   BP  Min: 96/63  Max: 105/62  105/62    Pulse  Min: 72  Max: 88  72    Resp  Min: 18  Max: 19  18    SpO2  Min: 96 %  Max: 99 %  96 %      HT: 5' 7" (170.2 cm)  WT: 108.9 kg (240 lb)  BMI: 37.6     Intake/output:  Intake/Output - Last 3 Shifts         06/14 0700  06/15 0659 06/15 0700  06/16 0659 06/16 0700  06/17 0659    P.O.       I.V. (mL/kg) 100 (0.9)      IV Piggyback 500      Total Intake(mL/kg) 600 (5.5)      Urine (mL/kg/hr) 375 (0.1) 200 (0.1)     Total Output 375 200     Net +225 -200            Emesis Occurrence 2 x              Intake/Output Summary (Last 24 hours) at 6/16/2024 0909  Last data filed at 6/15/2024 1438  Gross per 24 hour   Intake --   Output 200 ml   Net -200 ml           Lines/drains/airway:       Peripheral IV - Single Lumen 06/13/24 0802 20 G Anterior;Proximal;Right Forearm (Active)   Site Assessment Clean;Dry;Intact 06/15/24 0600   Extremity Assessment Distal to IV No abnormal discoloration 06/15/24 0600   Line Status Infusing 06/15/24 0600   Dressing Status Clean;Dry;Intact 06/15/24 0600   Dressing Intervention Integrity maintained 06/15/24 0600   Number of days: 2            Urethral Catheter 06/14/24 Latex 16 Fr. (Active)   Site " "Assessment Clean;Intact 06/15/24 0600   Collection Container Urimeter 06/15/24 0600   Securement Method secured to top of thigh w/ adhesive device 06/15/24 0600   Catheter Care Performed yes 06/15/24 0600   Reason for Continuing Urinary Catheterization Post operative 06/15/24 0600   CAUTI Prevention Bundle Securement Device in place with 1" slack;Intact seal between catheter & drainage tubing;Drainage bag/urimeter off the floor;Sheeting clip in use;No dependent loops or kinks;Drainage bag/urimeter not overfilled (<2/3 full);Drainage bag/urimeter below bladder 06/14/24 2000   Number of days: 1       Physical examination:  Gen: NAD, AAOx3, answering questions appropriately  CV: RR  Resp: NWOB  Ext: moving all extremities spontaneously and purposefully  Neuro: CN II-XII grossly intact  Skin/wounds: R groin with packing in place. No significant surrounding erythema. Dressing changed - some devitalized subcutaneous fat noted in wound. No grossly necrotic tissue remaining    Labs:  Renal:  Recent Labs     06/14/24  0415 06/15/24  0440   BUN 16.9 21.2*   CREATININE 1.76* 2.77*     No results for input(s): "LACTIC" in the last 72 hours.  FENGI:  Recent Labs     06/14/24  0415 06/15/24  0440   * 133*   K 3.6 3.3*   CL 97* 97*   CO2 22 23   CALCIUM 8.4 8.2*   MG 1.60  --    PHOS 2.8  --    ALBUMIN 2.3*  --    BILITOT 2.6*  --    AST 19  --    ALKPHOS 173*  --    ALT 23  --      Heme:  Recent Labs     06/14/24  0415 06/15/24  0440 06/16/24  0826   HGB 10.5* 8.8* 9.1*   HCT 32.7* 28.1* 27.4*    249 332     ID:  Recent Labs     06/14/24  0415 06/15/24  0440 06/16/24  0826   WBC 26.41  26.41* 25.03* 27.63*     CBG:  Recent Labs     06/14/24  0415 06/15/24  0440   GLUCOSE 484* 279*      Cardiovascular:  No results for input(s): "TROPONINI", "CKTOTAL", "CKMB", "BNP" in the last 168 hours.  I have reviewed all pertinent lab results within the past 24 hours.    Imaging:  CTA Chest Non-Coronary (PE Studies)   Final " Result      No pulmonary embolism seen      Right lower lobe atelectasis and small right-sided pleural effusion         Electronically signed by: Ayush Barbour   Date:    06/13/2024   Time:    09:58      CT Abdomen Pelvis With IV Contrast NO Oral Contrast   Final Result      Area of cellulitis involving the perineum on the left side extending to the medial gluteal fold with a area of thickening and inflammatory changes in the labia on the left side with a area of hypoattenuation seen within the labia on the left side concerning for possible developing abscess.  Follow-up is recommended.      Right lower lobe atelectasis and right-sided pleural effusion         Electronically signed by: Ayush Barbour   Date:    06/13/2024   Time:    10:38         I have reviewed all pertinent imaging results/findings within the past 24 hours.    Micro/Path/Other:  Microbiology Results (last 7 days)       Procedure Component Value Units Date/Time    Blood culture #1 **CANNOT BE ORDERED STAT** [[id number]]  (Normal) Collected: 06/13/24 0832    Order Status: Completed Specimen: Blood from Hand, Left Updated: 06/15/24 1300     Blood Culture No Growth At 48 Hours    Blood culture #2 **CANNOT BE ORDERED STAT** [[id number]]  (Normal) Collected: 06/13/24 0832    Order Status: Completed Specimen: Blood from Antecubital, Left Updated: 06/15/24 1300     Blood Culture No Growth At 48 Hours    Wound Culture [[id number]]  (Abnormal)  (Susceptibility) Collected: 06/13/24 0917    Order Status: Completed Specimen: Abscess from Perineum Updated: 06/15/24 0903     Wound Culture Moderate Methicillin resistant Staphylococcus aureus           Pathology Results  (Last 7 days)      None             Assessment & Plan:   59yo F with DM, HTN, obesity, and asthma, presenting with a R groin soft tissue infection. Initially, there was concern for NSTI and patient was taken to the OR urgently for debridement. She tolerated this well, and there was not evidence of  NSTI. She is now s/p R groin incision, drainage, and debridement on 6/14.    - PRN pain control  - continue broad spectrum abx  - Surgery will continue to follow and evaluate wound tomorrow. If continues to improve, will have wound care start dressing changes and/or vac    Any Saxena MD  [employer] General Surgery, PGY-4

## 2024-06-17 LAB
ABS NEUT (OLG): 17.44 X10(3)/MCL (ref 2.1–9.2)
ALBUMIN SERPL-MCNC: 2 G/DL (ref 3.5–5)
ALBUMIN/GLOB SERPL: 0.5 RATIO (ref 1.1–2)
ALP SERPL-CCNC: 123 UNIT/L (ref 40–150)
ALT SERPL-CCNC: 32 UNIT/L (ref 0–55)
ANION GAP SERPL CALC-SCNC: 13 MEQ/L
AST SERPL-CCNC: 27 UNIT/L (ref 5–34)
BILIRUB DIRECT SERPL-MCNC: 2.9 MG/DL (ref 0–?)
BILIRUB SERPL-MCNC: 3.7 MG/DL
BUN SERPL-MCNC: 33 MG/DL (ref 9.8–20.1)
CALCIUM SERPL-MCNC: 8.3 MG/DL (ref 8.4–10.2)
CHLORIDE SERPL-SCNC: 97 MMOL/L (ref 98–107)
CO2 SERPL-SCNC: 23 MMOL/L (ref 22–29)
CREAT SERPL-MCNC: 5.27 MG/DL (ref 0.55–1.02)
CREAT/UREA NIT SERPL: 6
EOSINOPHIL NFR BLD MANUAL: 0.23 X10(3)/MCL (ref 0–0.9)
EOSINOPHIL NFR BLD MANUAL: 1 %
ERYTHROCYTE [DISTWIDTH] IN BLOOD BY AUTOMATED COUNT: 13.1 % (ref 11.5–17)
GFR SERPLBLD CREATININE-BSD FMLA CKD-EPI: 9 ML/MIN/1.73/M2
GLOBULIN SER-MCNC: 3.7 GM/DL (ref 2.4–3.5)
GLUCOSE SERPL-MCNC: 66 MG/DL (ref 70–110)
GLUCOSE SERPL-MCNC: 89 MG/DL (ref 74–100)
HBV SURFACE AG SERPL QL IA: NONREACTIVE
HCT VFR BLD AUTO: 23.7 % (ref 37–47)
HGB BLD-MCNC: 7.9 G/DL (ref 12–16)
INSTRUMENT WBC (OLG): 22.65 X10(3)/MCL
LYMPHOCYTES NFR BLD MANUAL: 10 %
LYMPHOCYTES NFR BLD MANUAL: 2.27 X10(3)/MCL
MCH RBC QN AUTO: 30.2 PG (ref 27–31)
MCHC RBC AUTO-ENTMCNC: 33.3 G/DL (ref 33–36)
MCV RBC AUTO: 90.5 FL (ref 80–94)
MONOCYTES NFR BLD MANUAL: 1.81 X10(3)/MCL (ref 0.1–1.3)
MONOCYTES NFR BLD MANUAL: 8 %
MYELOCYTES NFR BLD MANUAL: 4 %
NEUTROPHILS NFR BLD MANUAL: 77 %
NRBC BLD AUTO-RTO: 0.2 %
PLATELET # BLD AUTO: 321 X10(3)/MCL (ref 130–400)
PLATELET # BLD EST: NORMAL 10*3/UL
PMV BLD AUTO: 10.1 FL (ref 7.4–10.4)
POCT GLUCOSE: 153 MG/DL (ref 70–110)
POTASSIUM SERPL-SCNC: 3.8 MMOL/L (ref 3.5–5.1)
PROT SERPL-MCNC: 5.7 GM/DL (ref 6.4–8.3)
RBC # BLD AUTO: 2.62 X10(6)/MCL (ref 4.2–5.4)
RBC MORPH BLD: ABNORMAL
SODIUM SERPL-SCNC: 133 MMOL/L (ref 136–145)
TARGETS BLD QL SMEAR: ABNORMAL
VANCOMYCIN SERPL-MCNC: 37.5 UG/ML (ref 15–20)
WBC # BLD AUTO: 22.65 X10(3)/MCL (ref 4.5–11.5)

## 2024-06-17 PROCEDURE — 25000003 PHARM REV CODE 250: Performed by: NURSE PRACTITIONER

## 2024-06-17 PROCEDURE — 63600175 PHARM REV CODE 636 W HCPCS

## 2024-06-17 PROCEDURE — 25000003 PHARM REV CODE 250: Performed by: INTERNAL MEDICINE

## 2024-06-17 PROCEDURE — A4216 STERILE WATER/SALINE, 10 ML: HCPCS | Performed by: INTERNAL MEDICINE

## 2024-06-17 PROCEDURE — 80053 COMPREHEN METABOLIC PANEL: CPT | Performed by: INTERNAL MEDICINE

## 2024-06-17 PROCEDURE — 63600175 PHARM REV CODE 636 W HCPCS: Performed by: INTERNAL MEDICINE

## 2024-06-17 PROCEDURE — 80202 ASSAY OF VANCOMYCIN: CPT | Performed by: INTERNAL MEDICINE

## 2024-06-17 PROCEDURE — 21400001 HC TELEMETRY ROOM

## 2024-06-17 PROCEDURE — 02HV33Z INSERTION OF INFUSION DEVICE INTO SUPERIOR VENA CAVA, PERCUTANEOUS APPROACH: ICD-10-PCS | Performed by: SURGERY

## 2024-06-17 PROCEDURE — 36415 COLL VENOUS BLD VENIPUNCTURE: CPT | Performed by: INTERNAL MEDICINE

## 2024-06-17 PROCEDURE — 85027 COMPLETE CBC AUTOMATED: CPT | Performed by: INTERNAL MEDICINE

## 2024-06-17 PROCEDURE — 63600175 PHARM REV CODE 636 W HCPCS: Performed by: HOSPITALIST

## 2024-06-17 PROCEDURE — 87340 HEPATITIS B SURFACE AG IA: CPT | Performed by: INTERNAL MEDICINE

## 2024-06-17 PROCEDURE — 82248 BILIRUBIN DIRECT: CPT | Performed by: INTERNAL MEDICINE

## 2024-06-17 PROCEDURE — 63600175 PHARM REV CODE 636 W HCPCS: Mod: JZ,JG | Performed by: INTERNAL MEDICINE

## 2024-06-17 PROCEDURE — 63600175 PHARM REV CODE 636 W HCPCS: Performed by: NURSE PRACTITIONER

## 2024-06-17 PROCEDURE — 27000207 HC ISOLATION

## 2024-06-17 PROCEDURE — 86706 HEP B SURFACE ANTIBODY: CPT | Performed by: NURSE PRACTITIONER

## 2024-06-17 PROCEDURE — 63600175 PHARM REV CODE 636 W HCPCS: Performed by: STUDENT IN AN ORGANIZED HEALTH CARE EDUCATION/TRAINING PROGRAM

## 2024-06-17 PROCEDURE — 90935 HEMODIALYSIS ONE EVALUATION: CPT

## 2024-06-17 RX ORDER — HEPARIN SODIUM 1000 [USP'U]/ML
3000 INJECTION, SOLUTION INTRAVENOUS; SUBCUTANEOUS
Status: DISCONTINUED | OUTPATIENT
Start: 2024-06-17 | End: 2024-06-24

## 2024-06-17 RX ORDER — SODIUM CHLORIDE 9 MG/ML
INJECTION, SOLUTION INTRAVENOUS
Status: CANCELLED | OUTPATIENT
Start: 2024-06-17

## 2024-06-17 RX ORDER — HEPARIN SODIUM 1000 [USP'U]/ML
1000 INJECTION, SOLUTION INTRAVENOUS; SUBCUTANEOUS
Status: DISCONTINUED | OUTPATIENT
Start: 2024-06-17 | End: 2024-06-17

## 2024-06-17 RX ORDER — ONDANSETRON HYDROCHLORIDE 2 MG/ML
INJECTION, SOLUTION INTRAVENOUS
Status: COMPLETED
Start: 2024-06-17 | End: 2024-06-17

## 2024-06-17 RX ORDER — SODIUM CHLORIDE 9 MG/ML
INJECTION, SOLUTION INTRAVENOUS ONCE
Status: CANCELLED | OUTPATIENT
Start: 2024-06-17 | End: 2024-06-17

## 2024-06-17 RX ORDER — MORPHINE SULFATE 4 MG/ML
4 INJECTION, SOLUTION INTRAMUSCULAR; INTRAVENOUS ONCE
Status: COMPLETED | OUTPATIENT
Start: 2024-06-17 | End: 2024-06-17

## 2024-06-17 RX ORDER — ONDANSETRON HYDROCHLORIDE 2 MG/ML
4 INJECTION, SOLUTION INTRAVENOUS
Status: COMPLETED | OUTPATIENT
Start: 2024-06-17 | End: 2024-06-17

## 2024-06-17 RX ORDER — CLINDAMYCIN PHOSPHATE 600 MG/50ML
600 INJECTION, SOLUTION INTRAVENOUS
Status: DISCONTINUED | OUTPATIENT
Start: 2024-06-17 | End: 2024-06-29

## 2024-06-17 RX ORDER — INSULIN GLARGINE 100 [IU]/ML
30 INJECTION, SOLUTION SUBCUTANEOUS NIGHTLY
Status: DISCONTINUED | OUTPATIENT
Start: 2024-06-17 | End: 2024-06-19

## 2024-06-17 RX ADMIN — INSULIN GLARGINE 30 UNITS: 100 INJECTION, SOLUTION SUBCUTANEOUS at 10:06

## 2024-06-17 RX ADMIN — SODIUM CHLORIDE, PRESERVATIVE FREE 10 ML: 5 INJECTION INTRAVENOUS at 11:06

## 2024-06-17 RX ADMIN — PIPERACILLIN SODIUM AND TAZOBACTAM SODIUM 4.5 G: 4; .5 INJECTION, POWDER, LYOPHILIZED, FOR SOLUTION INTRAVENOUS at 09:06

## 2024-06-17 RX ADMIN — ONDANSETRON 4 MG: 2 INJECTION INTRAMUSCULAR; INTRAVENOUS at 08:06

## 2024-06-17 RX ADMIN — PIPERACILLIN SODIUM AND TAZOBACTAM SODIUM 4.5 G: 4; .5 INJECTION, POWDER, LYOPHILIZED, FOR SOLUTION INTRAVENOUS at 01:06

## 2024-06-17 RX ADMIN — ONDANSETRON HYDROCHLORIDE 4 MG: 2 INJECTION, SOLUTION INTRAVENOUS at 08:06

## 2024-06-17 RX ADMIN — PIPERACILLIN SODIUM AND TAZOBACTAM SODIUM 4.5 G: 4; .5 INJECTION, POWDER, LYOPHILIZED, FOR SOLUTION INTRAVENOUS at 04:06

## 2024-06-17 RX ADMIN — Medication 16 G: at 11:06

## 2024-06-17 RX ADMIN — INSULIN ASPART 10 UNITS: 100 INJECTION, SOLUTION INTRAVENOUS; SUBCUTANEOUS at 07:06

## 2024-06-17 RX ADMIN — MORPHINE SULFATE 4 MG: 4 INJECTION INTRAVENOUS at 05:06

## 2024-06-17 RX ADMIN — MUPIROCIN: 20 OINTMENT TOPICAL at 10:06

## 2024-06-17 RX ADMIN — INSULIN ASPART 3 UNITS: 100 INJECTION, SOLUTION INTRAVENOUS; SUBCUTANEOUS at 10:06

## 2024-06-17 RX ADMIN — MUPIROCIN: 20 OINTMENT TOPICAL at 09:06

## 2024-06-17 RX ADMIN — Medication 6 MG: at 10:06

## 2024-06-17 RX ADMIN — CLINDAMYCIN PHOSPHATE 600 MG: 600 INJECTION, SOLUTION INTRAVENOUS at 02:06

## 2024-06-17 RX ADMIN — CLINDAMYCIN PHOSPHATE 600 MG: 600 INJECTION, SOLUTION INTRAVENOUS at 06:06

## 2024-06-17 RX ADMIN — METOCLOPRAMIDE 10 MG: 5 INJECTION, SOLUTION INTRAMUSCULAR; INTRAVENOUS at 05:06

## 2024-06-17 RX ADMIN — HEPARIN SODIUM 3000 UNITS: 1000 INJECTION, SOLUTION INTRAVENOUS; SUBCUTANEOUS at 09:06

## 2024-06-17 RX ADMIN — QUETIAPINE FUMARATE 25 MG: 25 TABLET ORAL at 10:06

## 2024-06-17 RX ADMIN — CLINDAMYCIN PHOSPHATE 600 MG: 600 INJECTION, SOLUTION INTRAVENOUS at 10:06

## 2024-06-17 NOTE — CONSULTS
[facility] - 9th Floor Med Surg  Wound Care    Patient Name:  Greer Kahn   MRN:  [id number]  Date: 6/17/2024  Diagnosis: Perineal abscess    History:     Past Medical History:   Diagnosis Date    Asthma     Diabetes mellitus     Hypertension        Social History     Socioeconomic History    Marital status: Single   Tobacco Use    Smoking status: Never    Smokeless tobacco: Never   Substance and Sexual Activity    Alcohol use: Never    Drug use: Never    Sexual activity: Not Currently     Social Determinants of Health     Financial Resource Strain: High Risk (6/14/2024)    Overall Financial Resource Strain (CARDIA)     Difficulty of Paying Living Expenses: Hard   Food Insecurity: Food Insecurity Present (6/14/2024)    Hunger Vital Sign     Worried About Running Out of Food in the Last Year: Often true     Ran Out of Food in the Last Year: Never true   Transportation Needs: No Transportation Needs (6/14/2024)    TRANSPORTATION NEEDS     Transportation : No   Physical Activity: Unknown (6/14/2024)    Exercise Vital Sign     Days of Exercise per Week: 0 days   Stress: Stress Concern Present (6/14/2024)    [employer] - Occupational Stress Questionnaire     Feeling of Stress : To some extent   Housing Stability: High Risk (6/14/2024)    Housing Stability Vital Sign     Unable to Pay for Housing in the Last Year: Yes     Homeless in the Last Year: No       Precautions:     Allergies as of 06/13/2024    (No Known Allergies)       WO Assessment Details/Treatment     Wound re evaluated at bedside today but surgery team, will need further debridement in OR tomorrow. Nursing to continue with preventative measures. Will follow up.     06/17/2024

## 2024-06-17 NOTE — PROGRESS NOTES
Pharmacokinetic Assessment Follow Up: IV Vancomycin    Vancomycin serum concentration assessment(s):    The random level was drawn correctly and can be used to guide therapy at this time. The measurement is above the desired definitive target range of 15 to 20 mcg/mL.    Vancomycin Regimen Plan:  Will continue to hold further doses today  Level is still significantly elevated at 37.5, accumulation due to worsening renal function  Will reassess level tomorrow morning (6/18) and conservatively re-dose when appropriate  Re-dose when the random level is less than 20 mcg/mL, next level to be drawn at 6/18 on 0430    Scheduled Administration Times        Drug levels (last 3 results):  Recent Labs   Lab Result Units 06/15/24  0935 06/17/24  0337   Vancomycin Random ug/ml 45.3* 37.5*       Vancomycin Administrations:  vancomycin given in the last 96 hours                     vancomycin 1.5 g in dextrose 5 % 250 mL IVPB (ready to mix) (mg) 1,500 mg New Bag 06/15/24 0026     1,500 mg New Bag 06/14/24 1105    vancomycin (VANCOCIN) 1,000 mg in dextrose 5 % (D5W) 250 mL IVPB (Vial-Mate) ()  Restarted 06/13/24 1501     1,000 mg New Bag  1433     1,000 mg New Bag  1121                    Pharmacy will continue to follow and monitor vancomycin.    Please contact pharmacy at extension [phone] for questions regarding this assessment.    Thank you for the consult,   Kayla Villarreal       Patient brief summary:  Greer Kahn is a 58 y.o. female initiated on antimicrobial therapy with IV Vancomycin for treatment of skin & soft tissue infection    The patient's current regimen is held due to worsening renal function with significantly elevated levels    Drug Allergies:   Review of patient's allergies indicates:  No Known Allergies    Actual Body Weight:  Wt Readings from Last 1 Encounters:   06/13/24 108.9 kg (240 lb)       Renal Function:   Estimated Creatinine Clearance: 15 mL/min (A) (based on SCr of 5.27 mg/dL (H)).,     Dialysis  Method (if applicable):  N/A    CBC (last 72 hours):  Recent Labs   Lab Result Units 06/15/24  0440 06/16/24  0826 06/17/24  0337   WBC x10(3)/mcL 25.03* 27.63  27.63* 22.65  22.65*   Hgb g/dL 8.8* 9.1* 7.9*   Hct % 28.1* 27.4* 23.7*   Platelet x10(3)/mcL 249 332 321   Monocytes % %  --  7 8   Eosinophils % %  --  1 1       Metabolic Panel (last 72 hours):  Recent Labs   Lab Result Units 06/15/24  0440 06/16/24  0942 06/17/24  0337   Sodium mmol/L 133* 134* 133*   Potassium mmol/L 3.3* 3.6 3.8   Chloride mmol/L 97* 97* 97*   CO2 mmol/L 23 22 23   Glucose mg/dL 279* 222* 89   Blood Urea Nitrogen mg/dL 21.2* 26.3* 33.0*   Creatinine mg/dL 2.77* 4.53* 5.27*   Albumin g/dL  --   --  2.0*   Bilirubin Total mg/dL  --   --  3.7*   ALP unit/L  --   --  123   AST unit/L  --   --  27   ALT unit/L  --   --  32       Microbiologic Results:  Microbiology Results (last 7 days)       Procedure Component Value Units Date/Time    Blood culture #1 **CANNOT BE ORDERED STAT** [[id number]]  (Normal) Collected: 06/13/24 0832    Order Status: Completed Specimen: Blood from Hand, Left Updated: 06/16/24 1300     Blood Culture No Growth At 72 Hours    Blood culture #2 **CANNOT BE ORDERED STAT** [[id number]]  (Normal) Collected: 06/13/24 0832    Order Status: Completed Specimen: Blood from Antecubital, Left Updated: 06/16/24 1300     Blood Culture No Growth At 72 Hours    Wound Culture [[id number]]  (Abnormal)  (Susceptibility) Collected: 06/13/24 0917    Order Status: Completed Specimen: Abscess from Perineum Updated: 06/15/24 0903     Wound Culture Moderate Methicillin resistant Staphylococcus aureus

## 2024-06-17 NOTE — PROCEDURES
"Greer Kahn is a 58 y.o. female patient.    Temp: 98.7 °F (37.1 °C) (06/17/24 1551)  Pulse: 79 (06/17/24 1551)  Resp: (P) 18 (06/17/24 1750)  BP: 116/75 (06/17/24 1551)  SpO2: 95 % (06/17/24 1551)  Weight: 108.9 kg (240 lb 1.3 oz) (06/17/24 0740)  Height: 5' 7" (170.2 cm) (06/17/24 0740)       Central Line    Date/Time: 6/17/2024 5:52 PM    Performed by: Ailyn Reinoso MD  Authorized by: Arnaud Vital MD    Location procedure was performed:  [facility] GENERAL SURGERY  Consent Done ?:  Yes  Time out complete?: Verified correct patient, procedure, equipment, staff, and site/side    Indications:  Hemodialysis  Anesthesia:  Local infiltration  Local anesthetic:  Lidocaine 1% with epinephrine  Preparation:  Skin prepped with ChloraPrep  Skin prep agent dried: Skin prep agent completely dried prior to procedure    Sterile barriers: All five maximal sterile barriers used - gloves, gown, cap, mask and large sterile sheet    Hand hygiene: Hand hygiene performed immediately prior to central venous catheter insertion    Location:  Right internal jugular  Catheter type:  Double lumen  Catheter size:  7.5 Fr  Ultrasound guidance: Yes    Vessel Caliber:  Medium   patent  Comprressibility:  Normal  Needle advanced into vessel with real time ultrasound guidance.    Guidewire confirmed in vessel.    Steril sheath on probe.    Sterile gel used.  Manometry: No    Number of attempts:  1  Securement:  Line sutured, sterile dressing applied and blood return through all ports  Complications: No    Estimated blood loss (mL):  10  Specimens: No    XRay:  Placement verified by x-ray, no pneumothorax on x-ray, successful placement and tip termination  Adverse Events:  NoneTermination Site: superior vena cava        Ailyn Reinoso MD   [facility] General Surgery PGY2    6/17/2024    "

## 2024-06-17 NOTE — PLAN OF CARE
Problem: Adult Inpatient Plan of Care  Goal: Plan of Care Review  Outcome: Progressing  Goal: Patient-Specific Goal (Individualized)  Outcome: Progressing  Goal: Absence of Hospital-Acquired Illness or Injury  Outcome: Progressing  Goal: Optimal Comfort and Wellbeing  Outcome: Progressing  Goal: Readiness for Transition of Care  Outcome: Progressing     Problem: Wound  Goal: Optimal Coping  Outcome: Progressing  Goal: Optimal Functional Ability  Outcome: Progressing  Goal: Absence of Infection Signs and Symptoms  Outcome: Progressing  Goal: Improved Oral Intake  Outcome: Progressing  Goal: Optimal Pain Control and Function  Outcome: Progressing  Goal: Skin Health and Integrity  Outcome: Progressing  Goal: Optimal Wound Healing  Outcome: Progressing     Problem: Sepsis/Septic Shock  Goal: Optimal Coping  Outcome: Progressing  Goal: Absence of Bleeding  Outcome: Progressing  Goal: Blood Glucose Level Within Targeted Range  Outcome: Progressing  Goal: Absence of Infection Signs and Symptoms  Outcome: Progressing  Goal: Optimal Nutrition Intake  Outcome: Progressing     Problem: Infection  Goal: Absence of Infection Signs and Symptoms  Outcome: Progressing     Problem: Fall Injury Risk  Goal: Absence of Fall and Fall-Related Injury  Outcome: Progressing

## 2024-06-17 NOTE — PROGRESS NOTES
Pharmacist Renal Dose Adjustment Note    Greer Kahn is a 58 y.o. female being treated with the medication zosyn    Patient Data:    Vital Signs (Most Recent):  Temp: 98.6 °F (37 °C) (06/17/24 1121)  Pulse: 78 (06/17/24 1121)  Resp: 16 (06/17/24 0740)  BP: 99/60 (06/17/24 1121)  SpO2: (!) 91 % (06/17/24 1121) Vital Signs (72h Range):  Temp:  [97 °F (36.1 °C)-99.9 °F (37.7 °C)]   Pulse:  []   Resp:  [11-23]   BP: ()/(49-88)   SpO2:  [88 %-100 %]      Recent Labs   Lab 06/15/24  0440 06/16/24  0942 06/17/24  0337   CREATININE 2.77* 4.53* 5.27*     Serum creatinine: 5.27 mg/dL (H) 06/17/24 0337  Estimated creatinine clearance: 15 mL/min (A)    Medication:zosyn dose: 4.5g frequency q8h will be changed to medication:zosyn dose:4.5g frequency:q12h    Pharmacist's Name: Kayla Villarreal  Pharmacist's Extension: [phone]

## 2024-06-17 NOTE — PROGRESS NOTES
[facility] - 9th Floor Med Surg  Nephrology  Progress Note    Patient Name: Greer Kahn  MRN: [id number]  Admission Date: 6/13/2024  Hospital Length of Stay: 4 days  Attending Provider: Shante Jackson MD   Primary Care Physician: Jesica, Primary Doctor  Principal Problem:Perineal abscess      Subjective:     This is a 58-year-old female admitted on 06/13/2024 with fever 102 reporting possible UTI, boils to right thigh and left buttock.  Lactic acidosis present on admission with WBC 27.0, BUN 9.7, creatinine 0.98.  CT abdomen and pelvis with contrast showed area of cellulitis involving the perineum on the left side extending to the medial gluteal fold and areas of concern within the labia on the left side.  Also noted to have right lower lobe atelectasis via CT chest.  Patient was initiated on vancomycin and Rocephin in ER.    On 06/14 she underwent debridement of left groin and perineum.  Postoperative course complicated by nausea, vomiting and constipation.  She is also developed AUTUMN with rapidly worsening renal indices. Cr 0.98 --> 1.76--> 2.77--> 4.53 at the time of consultation. Urinalysis with low grade proteinuria and no blood.     Interval History: In the past 24 hours Cr has continued to worsen and she has become anuric. Renal US with no evidence of retention, mass, or structural abnormalities. R kidney 10 cm, L kidney 9 cm. Of note, Vanc trough was 45 on 6/15 and has since decreased to 37 today. She denies nausea or vomiting. She is lethargic and appears     Review of patient's allergies indicates:  No Known Allergies  Current Facility-Administered Medications   Medication Frequency    acetaminophen tablet 650 mg Q4H PRN    albuterol-ipratropium 2.5 mg-0.5 mg/3 mL nebulizer solution 3 mL Q4H PRN    aluminum-magnesium hydroxide-simethicone 200-200-20 mg/5 mL suspension 30 mL QID PRN    bisacodyL suppository 10 mg Daily PRN    calcium carbonate 200 mg calcium (500 mg) chewable tablet 1,000 mg TID  PRN    clindamycin in D5W 600 mg/50 mL IVPB 600 mg Q8H    dextrose 10% bolus 125 mL 125 mL PRN    dextrose 10% bolus 250 mL 250 mL PRN    enoxaparin injection 30 mg Daily    glucagon (human recombinant) injection 1 mg PRN    glucose chewable tablet 16 g PRN    glucose chewable tablet 24 g PRN    ibuprofen tablet 400 mg Q6H PRN    insulin aspart U-100 injection 0-15 Units Q4H PRN    insulin aspart U-100 injection 10 Units TIDWM    insulin glargine U-100 (Lantus) injection 30 Units QHS    lactated ringers bolus 1,000 mL Once    lactated ringers bolus 1,000 mL Once    lactated ringers infusion Continuous    melatonin tablet 6 mg Nightly PRN    metoclopramide injection 10 mg Q8H PRN    mupirocin 2 % ointment BID    piperacillin-tazobactam (ZOSYN) 4.5 g in dextrose 5 % in water (D5W) 100 mL IVPB (MB+) Q12H    polyethylene glycol packet 17 g BID PRN    prochlorperazine injection Soln 2.5 mg Q6H PRN    prochlorperazine injection Soln 5 mg Q6H PRN    QUEtiapine tablet 25 mg Nightly PRN    senna-docusate 8.6-50 mg per tablet 2 tablet BID PRN    sodium chloride 0.9% flush 10 mL PRN    sodium chloride 0.9% flush 10 mL Q6H    And    sodium chloride 0.9% flush 10 mL PRN    vancomycin - pharmacy to dose pharmacy to manage frequency       Objective:     Vital Signs (Most Recent):  Temp: 98.6 °F (37 °C) (06/17/24 1121)  Pulse: 78 (06/17/24 1121)  Resp: 16 (06/17/24 0740)  BP: 99/60 (06/17/24 1121)  SpO2: (!) 91 % (06/17/24 1121) Vital Signs (24h Range):  Temp:  [98.2 °F (36.8 °C)-98.7 °F (37.1 °C)] 98.6 °F (37 °C)  Pulse:  [77-81] 78  Resp:  [16-20] 16  SpO2:  [91 %-100 %] 91 %  BP: ()/(60-76) 99/60     Weight: 108.9 kg (240 lb 1.3 oz) (06/17/24 0740)  Body mass index is 37.6 kg/m².  Body surface area is 2.27 meters squared.    No intake/output data recorded.    Physical Exam  Constitutional:       General: She is not in acute distress.     Appearance: She is obese.   HENT:      Head: Atraumatic.      Mouth/Throat:       Mouth: Mucous membranes are moist.   Eyes:      Extraocular Movements: Extraocular movements intact.      Conjunctiva/sclera: Conjunctivae normal.   Cardiovascular:      Rate and Rhythm: Normal rate and regular rhythm.   Pulmonary:      Effort: Pulmonary effort is normal.      Breath sounds: Normal breath sounds.   Abdominal:      General: There is no distension.      Palpations: Abdomen is soft.   Genitourinary:     Comments: Urinary catheter scant tea colored urine   Musculoskeletal:         General: No swelling.   Skin:     General: Skin is warm.   Neurological:      Comments: lethargic         Significant Labs:sureBMP:   Recent Labs   Lab 06/14/24  0415 06/15/24  0440 06/17/24  0337   *   < > 133*   K 3.6   < > 3.8   CL 97*   < > 97*   CO2 22   < > 23   BUN 16.9   < > 33.0*   CREATININE 1.76*   < > 5.27*   CALCIUM 8.4   < > 8.3*   MG 1.60  --   --     < > = values in this interval not displayed.     CBC:   Recent Labs   Lab 06/17/24  0337   WBC 22.65  22.65*   RBC 2.62*   HGB 7.9*   HCT 23.7*      MCV 90.5   MCH 30.2   MCHC 33.3     Microbiology Results (last 7 days)       Procedure Component Value Units Date/Time    Blood culture #1 **CANNOT BE ORDERED STAT** [[id number]]  (Normal) Collected: 06/13/24 0832    Order Status: Completed Specimen: Blood from Hand, Left Updated: 06/16/24 1300     Blood Culture No Growth At 72 Hours    Blood culture #2 **CANNOT BE ORDERED STAT** [[id number]]  (Normal) Collected: 06/13/24 0832    Order Status: Completed Specimen: Blood from Antecubital, Left Updated: 06/16/24 1300     Blood Culture No Growth At 72 Hours    Wound Culture [[id number]]  (Abnormal)  (Susceptibility) Collected: 06/13/24 0917    Order Status: Completed Specimen: Abscess from Perineum Updated: 06/15/24 0903     Wound Culture Moderate Methicillin resistant Staphylococcus aureus          Specimen (24h ago, onward)      None          Recent Labs   Lab 06/13/24  1459   COLORU Dark Yellow   PHUR 6.0    PROTEINUA 30*   BACTERIA Few*   NITRITE Negative   LEUKOCYTESUR Negative   UROBILINOGEN 4.0*       Significant Imaging:    Procedure Component Value Units Date/Time   US Retroperitoneal Complete [[id number]] Resulted: 06/17/24 1111   Order Status: Completed Updated: 06/17/24 1114   Narrative:     EXAMINATION:  US RETROPERITONEAL COMPLETE    CLINICAL HISTORY:  AUTUMN;    COMPARISON:  13 June 2024    FINDINGS:  Grayscale and color Doppler sonographic evaluation of the kidneys and urinary bladder.    The right kidney measures 10 cm. The left kidney measures 9 cm.   No hydronephrosis.  Grossly normal renal parenchymal echogenicity.    Huff in the urinary bladder.   Impression:       No significant sonographic abnormality of the kidneys.      Electronically signed by: Issa Luna  Date: 06/17/2024  Time: 11:11       Assessment/Plan:     Acute Renal failure now anuric - multifactorial secondary to sepsis, contrast exposure, NSAID use inpatient, vancomycin toxicity  Perineal Cellulitis with necrotizing component   Poor oral intake   DM II uncontrolled - A1C 11  Worsening anemia       - renal function continues to worsen and now she is anuric.  Patient would benefit from initiation of hemodialysis.  I will consult General surgery to place temporary dialysis catheter.  She will begin treatment today and will dialyze for 3 consecutive days.  We will continue to monitor for renal recovery and stopped dialysis when clinically appropriate.    MALCOLM Gorman  Nephrology  [facility] - 9th Floor Med Surg

## 2024-06-17 NOTE — PROGRESS NOTES
Acute Care Surgery   Progress Note  Admit Date: 6/13/2024  HD#4  POD#3 Days Post-Op    Subjective:   Interval history:    No acute events  Afebrile and vitals in normal limits  Pain mostly controlled  Continues with nausea and vomiting today    Home Meds:  Current Outpatient Medications   Medication Instructions    albuterol (PROVENTIL/VENTOLIN HFA) 90 mcg/actuation inhaler 2 puffs, Inhalation, Every 4-6 hours PRN    amLODIPine (NORVASC) 10 mg, Oral, Daily    atorvastatin (LIPITOR) 20 mg, Oral, Daily    famotidine (PEPCID) 20 mg, Oral, 2 times daily    glimepiride (AMARYL) 4 mg, Oral, With breakfast    hydroCHLOROthiazide (HYDRODIURIL) 25 mg, Oral, Daily    insulin glargine U-100 (Lantus) 50 Units, Subcutaneous, Nightly    meclizine (ANTIVERT) 25 mg, Oral, 3 times daily    metFORMIN (GLUCOPHAGE) 1,000 mg, Oral, 2 times daily    metoprolol tartrate (LOPRESSOR) 25 mg, Oral, 2 times daily    pioglitazone (ACTOS) 30 mg, Oral, Daily      Scheduled Meds:   clindamycin IV (PEDS and ADULTS)  600 mg Intravenous Q8H    enoxaparin  30 mg Subcutaneous Daily    insulin aspart U-100  10 Units Subcutaneous TIDWM    insulin glargine U-100  30 Units Subcutaneous QHS    lactated ringers  1,000 mL Intravenous Once    lactated ringers  1,000 mL Intravenous Once    mupirocin   Nasal BID    piperacillin-tazobactam (Zosyn) IV (PEDS and ADULTS) (extended infusion is not appropriate)  4.5 g Intravenous Q12H    sodium chloride 0.9%  10 mL Intravenous Q6H     Continuous Infusions:      PRN Meds:  Current Facility-Administered Medications:     acetaminophen, 650 mg, Oral, Q4H PRN    albuterol-ipratropium, 3 mL, Nebulization, Q4H PRN    aluminum-magnesium hydroxide-simethicone, 30 mL, Oral, QID PRN    bisacodyL, 10 mg, Rectal, Daily PRN    calcium carbonate, 1,000 mg, Oral, TID PRN    dextrose 10%, 12.5 g, Intravenous, PRN    dextrose 10%, 25 g, Intravenous, PRN    glucagon (human recombinant), 1 mg, Intramuscular, PRN    glucose, 16 g,  "Oral, PRN    glucose, 24 g, Oral, PRN    heparin (porcine), 1,000 Units, Intravenous, PRN    ibuprofen, 400 mg, Oral, Q6H PRN    insulin aspart U-100, 0-15 Units, Subcutaneous, Q4H PRN    melatonin, 6 mg, Oral, Nightly PRN    metoclopramide, 10 mg, Intravenous, Q8H PRN    polyethylene glycol, 17 g, Oral, BID PRN    prochlorperazine, 2.5 mg, Intravenous, Q6H PRN    prochlorperazine, 5 mg, Intravenous, Q6H PRN    QUEtiapine, 25 mg, Oral, Nightly PRN    senna-docusate 8.6-50 mg, 2 tablet, Oral, BID PRN    sodium chloride 0.9%, 10 mL, Intravenous, PRN    Flushing PICC/Midline Protocol, , , Until Discontinued **AND** sodium chloride 0.9%, 10 mL, Intravenous, Q6H **AND** sodium chloride 0.9%, 10 mL, Intravenous, PRN    Pharmacy to dose Vancomycin consult, , , Once **AND** vancomycin - pharmacy to dose, , Intravenous, pharmacy to manage frequency     Objective:     VITAL SIGNS: 24 HR MIN & MAX LAST   Temp  Min: 98.2 °F (36.8 °C)  Max: 98.7 °F (37.1 °C)  98.6 °F (37 °C)   BP  Min: 99/60  Max: 117/74  99/60    Pulse  Min: 77  Max: 81  78    Resp  Min: 16  Max: 18  16    SpO2  Min: 91 %  Max: 100 %  (!) 91 %      HT: 5' 7" (170.2 cm)  WT: 108.9 kg (240 lb 1.3 oz)  BMI: 37.6     Intake/output:  Intake/Output - Last 3 Shifts         06/15 0700  06/16 0659 06/16 0700 06/17 0659 06/17 0700 06/18 0659    I.V. (mL/kg)       IV Piggyback       Total Intake(mL/kg)       Urine (mL/kg/hr) 200 (0.1) 0 (0)     Total Output 200 0     Net -200 0                    Intake/Output Summary (Last 24 hours) at 6/17/2024 1537  Last data filed at 6/16/2024 2207  Gross per 24 hour   Intake --   Output 0 ml   Net 0 ml           Lines/drains/airway:       Peripheral IV - Single Lumen 06/13/24 0802 20 G Anterior;Proximal;Right Forearm (Active)   Site Assessment Clean;Dry;Intact 06/15/24 0600   Extremity Assessment Distal to IV No abnormal discoloration 06/15/24 0600   Line Status Infusing 06/15/24 0600   Dressing Status Clean;Dry;Intact 06/15/24 " "0600   Dressing Intervention Integrity maintained 06/15/24 0600   Number of days: 2            Urethral Catheter 06/14/24 Latex 16 Fr. (Active)   Site Assessment Clean;Intact 06/15/24 0600   Collection Container Urimeter 06/15/24 0600   Securement Method secured to top of thigh w/ adhesive device 06/15/24 0600   Catheter Care Performed yes 06/15/24 0600   Reason for Continuing Urinary Catheterization Post operative 06/15/24 0600   CAUTI Prevention Bundle Securement Device in place with 1" slack;Intact seal between catheter & drainage tubing;Drainage bag/urimeter off the floor;Sheeting clip in use;No dependent loops or kinks;Drainage bag/urimeter not overfilled (<2/3 full);Drainage bag/urimeter below bladder 06/14/24 2000   Number of days: 1       Physical examination:  Gen: NAD, AAOx3, answering questions appropriately  CV: RR  Resp: NWOB  Ext: moving all extremities spontaneously and purposefully  Neuro: CN II-XII grossly intact  Skin/wounds: R groin with packing in place. No significant surrounding erythema. Dressing changed - some devitalized subcutaneous fat noted in wound. No grossly necrotic tissue remaining    Labs:  Renal:  Recent Labs     06/15/24  0440 06/16/24  0942 06/17/24 0337   BUN 21.2* 26.3* 33.0*   CREATININE 2.77* 4.53* 5.27*     No results for input(s): "LACTIC" in the last 72 hours.  FENGI:  Recent Labs     06/15/24  0440 06/16/24  0942 06/17/24 0337   * 134* 133*   K 3.3* 3.6 3.8   CL 97* 97* 97*   CO2 23 22 23   CALCIUM 8.2* 8.6 8.3*   ALBUMIN  --   --  2.0*   BILITOT  --   --  3.7*   AST  --   --  27   ALKPHOS  --   --  123   ALT  --   --  32     Heme:  Recent Labs     06/15/24  0440 06/16/24  0826 06/17/24  0337   HGB 8.8* 9.1* 7.9*   HCT 28.1* 27.4* 23.7*    332 321     ID:  Recent Labs     06/16/24  0826 06/17/24  0337   WBC 27.63  27.63* 22.65  22.65*     CBG:  Recent Labs     06/15/24  0440 06/16/24  0942 06/17/24  0337   GLUCOSE 279* 222* 89      Cardiovascular:  No " "results for input(s): "TROPONINI", "CKTOTAL", "CKMB", "BNP" in the last 168 hours.  I have reviewed all pertinent lab results within the past 24 hours.    Imaging:  US Retroperitoneal Complete   Final Result      No significant sonographic abnormality of the kidneys.         Electronically signed by: Issa Luna   Date:    06/17/2024   Time:    11:11      US Abdomen Limited   Final Result      1. Mild hepatomegaly.   2. Status post cholecystectomy.  No biliary ductal dilatation.         Electronically signed by: Issa Luna   Date:    06/17/2024   Time:    09:58      CTA Chest Non-Coronary (PE Studies)   Final Result      No pulmonary embolism seen      Right lower lobe atelectasis and small right-sided pleural effusion         Electronically signed by: Ayush Barbour   Date:    06/13/2024   Time:    09:58      CT Abdomen Pelvis With IV Contrast NO Oral Contrast   Final Result      Area of cellulitis involving the perineum on the left side extending to the medial gluteal fold with a area of thickening and inflammatory changes in the labia on the left side with a area of hypoattenuation seen within the labia on the left side concerning for possible developing abscess.  Follow-up is recommended.      Right lower lobe atelectasis and right-sided pleural effusion         Electronically signed by: Ayush Barbour   Date:    06/13/2024   Time:    10:38         I have reviewed all pertinent imaging results/findings within the past 24 hours.    Micro/Path/Other:  Microbiology Results (last 7 days)       Procedure Component Value Units Date/Time    Blood culture #1 **CANNOT BE ORDERED STAT** [[id number]]  (Normal) Collected: 06/13/24 0832    Order Status: Completed Specimen: Blood from Hand, Left Updated: 06/17/24 1301     Blood Culture No Growth At 96 Hours    Blood culture #2 **CANNOT BE ORDERED STAT** [[id number]]  (Normal) Collected: 06/13/24 0832    Order Status: Completed Specimen: Blood from Antecubital, Left Updated: " 06/17/24 1301     Blood Culture No Growth At 96 Hours    Wound Culture [[id number]]  (Abnormal)  (Susceptibility) Collected: 06/13/24 0917    Order Status: Completed Specimen: Abscess from Perineum Updated: 06/15/24 0903     Wound Culture Moderate Methicillin resistant Staphylococcus aureus           Pathology Results  (Last 7 days)      None             Assessment & Plan:   59yo F with DM, HTN, obesity, and asthma, presenting with a R groin soft tissue infection. Initially, there was concern for NSTI and patient was taken to the OR urgently for debridement. She tolerated this well, and there was not evidence of NSTI. She is now s/p R groin incision, drainage, and debridement on 6/14.    - Wound re evaluated at bedside today, will need further debridement in OR tomorrow   - NPO midnight   - PRN pain control  - continue broad spectrum abx    Pamela Sr MD  [employer] General Surgery, PGY-1

## 2024-06-18 ENCOUNTER — ANESTHESIA EVENT (OUTPATIENT)
Dept: SURGERY | Facility: HOSPITAL | Age: 59
End: 2024-06-18

## 2024-06-18 ENCOUNTER — ANESTHESIA (OUTPATIENT)
Dept: SURGERY | Facility: HOSPITAL | Age: 59
End: 2024-06-18

## 2024-06-18 LAB
ABO + RH BLD: NORMAL
ABORH RETYPE: NORMAL
ALBUMIN SERPL-MCNC: 1.8 G/DL (ref 3.5–5)
ALBUMIN SERPL-MCNC: 1.9 G/DL (ref 3.5–5)
ALBUMIN/GLOB SERPL: 0.4 RATIO (ref 1.1–2)
ALBUMIN/GLOB SERPL: 0.5 RATIO (ref 1.1–2)
ALLENS TEST BLOOD GAS (OHS): YES
ALP SERPL-CCNC: 126 UNIT/L (ref 40–150)
ALP SERPL-CCNC: 131 UNIT/L (ref 40–150)
ALT SERPL-CCNC: 27 UNIT/L (ref 0–55)
ALT SERPL-CCNC: 30 UNIT/L (ref 0–55)
AMMONIA PLAS-MSCNC: 44.4 UMOL/L (ref 18–72)
ANION GAP SERPL CALC-SCNC: 13 MEQ/L
ANION GAP SERPL CALC-SCNC: 13 MEQ/L
AST SERPL-CCNC: 23 UNIT/L (ref 5–34)
AST SERPL-CCNC: 25 UNIT/L (ref 5–34)
BACTERIA BLD CULT: NORMAL
BACTERIA BLD CULT: NORMAL
BASE EXCESS BLD CALC-SCNC: 1.6 MMOL/L (ref -2–2)
BILIRUB SERPL-MCNC: 3.6 MG/DL
BILIRUB SERPL-MCNC: 3.7 MG/DL
BLD PROD TYP BPU: NORMAL
BLOOD GAS SAMPLE TYPE (OHS): ABNORMAL
BLOOD UNIT EXPIRATION DATE: NORMAL
BLOOD UNIT TYPE CODE: 8400
BUN SERPL-MCNC: 32 MG/DL (ref 9.8–20.1)
BUN SERPL-MCNC: 34.6 MG/DL (ref 9.8–20.1)
CA-I BLD-SCNC: 1.03 MMOL/L (ref 1.12–1.23)
CALCIUM SERPL-MCNC: 7.8 MG/DL (ref 8.4–10.2)
CALCIUM SERPL-MCNC: 8.6 MG/DL (ref 8.4–10.2)
CHLORIDE SERPL-SCNC: 95 MMOL/L (ref 98–107)
CHLORIDE SERPL-SCNC: 95 MMOL/L (ref 98–107)
CO2 BLDA-SCNC: 27 MMOL/L
CO2 SERPL-SCNC: 23 MMOL/L (ref 22–29)
CO2 SERPL-SCNC: 24 MMOL/L (ref 22–29)
COHGB MFR BLDA: 4.4 % (ref 0.5–1.5)
CREAT SERPL-MCNC: 4.79 MG/DL (ref 0.55–1.02)
CREAT SERPL-MCNC: 5.08 MG/DL (ref 0.55–1.02)
CREAT/UREA NIT SERPL: 7
CREAT/UREA NIT SERPL: 7
CROSSMATCH INTERPRETATION: NORMAL
DISPENSE STATUS: NORMAL
DRAWN BY BLOOD GAS (OHS): ABNORMAL
ERYTHROCYTE [DISTWIDTH] IN BLOOD BY AUTOMATED COUNT: 13.7 % (ref 11.5–17)
GFR SERPLBLD CREATININE-BSD FMLA CKD-EPI: 10 ML/MIN/1.73/M2
GFR SERPLBLD CREATININE-BSD FMLA CKD-EPI: 9 ML/MIN/1.73/M2
GLOBULIN SER-MCNC: 3.8 GM/DL (ref 2.4–3.5)
GLOBULIN SER-MCNC: 4.7 GM/DL (ref 2.4–3.5)
GLUCOSE SERPL-MCNC: 69 MG/DL (ref 74–100)
GLUCOSE SERPL-MCNC: 78 MG/DL (ref 74–100)
GRAM STN SPEC: NORMAL
GRAM STN SPEC: NORMAL
GROUP & RH: NORMAL
HBV SURFACE AB SER-ACNC: 2222.39 MIU/ML
HBV SURFACE AB SERPL IA-ACNC: REACTIVE M[IU]/ML
HCO3 BLDA-SCNC: 25.8 MMOL/L (ref 22–26)
HCT VFR BLD AUTO: 23.1 % (ref 37–47)
HGB BLD-MCNC: 7.6 G/DL (ref 12–16)
INDIRECT COOMBS: NORMAL
LACTATE SERPL-SCNC: 0.8 MMOL/L (ref 0.5–2.2)
MCH RBC QN AUTO: 30 PG (ref 27–31)
MCHC RBC AUTO-ENTMCNC: 32.9 G/DL (ref 33–36)
MCV RBC AUTO: 91.3 FL (ref 80–94)
METHGB MFR BLDA: 0.6 % (ref 0.4–1.5)
NRBC BLD AUTO-RTO: 0.4 %
O2 HB BLOOD GAS (OHS): 91.8 % (ref 94–97)
OHS QRS DURATION: 94 MS
OHS QTC CALCULATION: 461 MS
OXYHGB MFR BLDA: 8 G/DL (ref 12–16)
PCO2 BLDA: 38 MMHG (ref 35–45)
PH BLDA: 7.44 [PH] (ref 7.35–7.45)
PLATELET # BLD AUTO: 370 X10(3)/MCL (ref 130–400)
PMV BLD AUTO: 10.1 FL (ref 7.4–10.4)
PO2 BLDA: 66 MMHG (ref 80–100)
POCT GLUCOSE: 135 MG/DL (ref 70–110)
POCT GLUCOSE: 138 MG/DL (ref 70–110)
POCT GLUCOSE: 239 MG/DL (ref 70–110)
POCT GLUCOSE: 256 MG/DL (ref 70–110)
POCT GLUCOSE: 259 MG/DL (ref 70–110)
POCT GLUCOSE: 269 MG/DL (ref 70–110)
POCT GLUCOSE: 286 MG/DL (ref 70–110)
POCT GLUCOSE: 303 MG/DL (ref 70–110)
POCT GLUCOSE: 349 MG/DL (ref 70–110)
POCT GLUCOSE: 66 MG/DL (ref 70–110)
POCT GLUCOSE: 66 MG/DL (ref 70–110)
POCT GLUCOSE: 77 MG/DL (ref 70–110)
POCT GLUCOSE: 81 MG/DL (ref 70–110)
POTASSIUM BLOOD GAS (OHS): 3.9 MMOL/L (ref 3.5–5)
POTASSIUM SERPL-SCNC: 3.7 MMOL/L (ref 3.5–5.1)
POTASSIUM SERPL-SCNC: 4.1 MMOL/L (ref 3.5–5.1)
PROT SERPL-MCNC: 5.6 GM/DL (ref 6.4–8.3)
PROT SERPL-MCNC: 6.6 GM/DL (ref 6.4–8.3)
RBC # BLD AUTO: 2.53 X10(6)/MCL (ref 4.2–5.4)
SAMPLE SITE BLOOD GAS (OHS): ABNORMAL
SAO2 % BLDA: 93.5 %
SODIUM BLOOD GAS (OHS): 126 MMOL/L (ref 137–145)
SODIUM SERPL-SCNC: 131 MMOL/L (ref 136–145)
SODIUM SERPL-SCNC: 132 MMOL/L (ref 136–145)
SPECIMEN OUTDATE: NORMAL
UNIT NUMBER: NORMAL
VANCOMYCIN SERPL-MCNC: 33.6 UG/ML (ref 15–20)
WBC # BLD AUTO: 20.99 X10(3)/MCL (ref 4.5–11.5)

## 2024-06-18 PROCEDURE — 56405 I&D VULVA/PERINEAL ABSCESS: CPT | Mod: ,,, | Performed by: SURGERY

## 2024-06-18 PROCEDURE — 21400001 HC TELEMETRY ROOM

## 2024-06-18 PROCEDURE — 87077 CULTURE AEROBIC IDENTIFY: CPT | Performed by: SURGERY

## 2024-06-18 PROCEDURE — 25500020 PHARM REV CODE 255: Performed by: INTERNAL MEDICINE

## 2024-06-18 PROCEDURE — 27000207 HC ISOLATION

## 2024-06-18 PROCEDURE — 37000008 HC ANESTHESIA 1ST 15 MINUTES: Performed by: SURGERY

## 2024-06-18 PROCEDURE — 82140 ASSAY OF AMMONIA: CPT | Performed by: INTERNAL MEDICINE

## 2024-06-18 PROCEDURE — 36415 COLL VENOUS BLD VENIPUNCTURE: CPT

## 2024-06-18 PROCEDURE — 36000705 HC OR TIME LEV I EA ADD 15 MIN: Performed by: SURGERY

## 2024-06-18 PROCEDURE — 82803 BLOOD GASES ANY COMBINATION: CPT

## 2024-06-18 PROCEDURE — 86901 BLOOD TYPING SEROLOGIC RH(D): CPT | Performed by: INTERNAL MEDICINE

## 2024-06-18 PROCEDURE — 36600 WITHDRAWAL OF ARTERIAL BLOOD: CPT

## 2024-06-18 PROCEDURE — D9220A PRA ANESTHESIA: Mod: ,,, | Performed by: ANESTHESIOLOGY

## 2024-06-18 PROCEDURE — 80053 COMPREHEN METABOLIC PANEL: CPT | Performed by: INTERNAL MEDICINE

## 2024-06-18 PROCEDURE — 63600175 PHARM REV CODE 636 W HCPCS: Mod: JZ,JG | Performed by: INTERNAL MEDICINE

## 2024-06-18 PROCEDURE — 86923 COMPATIBILITY TEST ELECTRIC: CPT | Performed by: INTERNAL MEDICINE

## 2024-06-18 PROCEDURE — 37000009 HC ANESTHESIA EA ADD 15 MINS: Performed by: SURGERY

## 2024-06-18 PROCEDURE — 87205 SMEAR GRAM STAIN: CPT | Performed by: SURGERY

## 2024-06-18 PROCEDURE — 86850 RBC ANTIBODY SCREEN: CPT | Performed by: INTERNAL MEDICINE

## 2024-06-18 PROCEDURE — 85027 COMPLETE CBC AUTOMATED: CPT

## 2024-06-18 PROCEDURE — 25000003 PHARM REV CODE 250: Performed by: INTERNAL MEDICINE

## 2024-06-18 PROCEDURE — 93010 ELECTROCARDIOGRAM REPORT: CPT | Mod: ,,, | Performed by: INTERNAL MEDICINE

## 2024-06-18 PROCEDURE — 82962 GLUCOSE BLOOD TEST: CPT | Performed by: SURGERY

## 2024-06-18 PROCEDURE — 80053 COMPREHEN METABOLIC PANEL: CPT

## 2024-06-18 PROCEDURE — A4216 STERILE WATER/SALINE, 10 ML: HCPCS | Performed by: INTERNAL MEDICINE

## 2024-06-18 PROCEDURE — 80202 ASSAY OF VANCOMYCIN: CPT | Performed by: INTERNAL MEDICINE

## 2024-06-18 PROCEDURE — 63600175 PHARM REV CODE 636 W HCPCS: Performed by: INTERNAL MEDICINE

## 2024-06-18 PROCEDURE — 90935 HEMODIALYSIS ONE EVALUATION: CPT

## 2024-06-18 PROCEDURE — P9016 RBC LEUKOCYTES REDUCED: HCPCS | Performed by: INTERNAL MEDICINE

## 2024-06-18 PROCEDURE — 71000033 HC RECOVERY, INTIAL HOUR: Performed by: SURGERY

## 2024-06-18 PROCEDURE — 93005 ELECTROCARDIOGRAM TRACING: CPT

## 2024-06-18 PROCEDURE — 36000704 HC OR TIME LEV I 1ST 15 MIN: Performed by: SURGERY

## 2024-06-18 PROCEDURE — 99900035 HC TECH TIME PER 15 MIN (STAT)

## 2024-06-18 PROCEDURE — 36415 COLL VENOUS BLD VENIPUNCTURE: CPT | Performed by: INTERNAL MEDICINE

## 2024-06-18 PROCEDURE — 25000003 PHARM REV CODE 250: Performed by: NURSE ANESTHETIST, CERTIFIED REGISTERED

## 2024-06-18 PROCEDURE — 63600175 PHARM REV CODE 636 W HCPCS: Performed by: NURSE ANESTHETIST, CERTIFIED REGISTERED

## 2024-06-18 PROCEDURE — 83605 ASSAY OF LACTIC ACID: CPT | Performed by: INTERNAL MEDICINE

## 2024-06-18 PROCEDURE — 87186 SC STD MICRODIL/AGAR DIL: CPT | Performed by: SURGERY

## 2024-06-18 PROCEDURE — 87075 CULTR BACTERIA EXCEPT BLOOD: CPT | Performed by: SURGERY

## 2024-06-18 RX ORDER — ONDANSETRON HYDROCHLORIDE 2 MG/ML
INJECTION, SOLUTION INTRAVENOUS
Status: DISCONTINUED | OUTPATIENT
Start: 2024-06-18 | End: 2024-06-18

## 2024-06-18 RX ORDER — ROCURONIUM BROMIDE 10 MG/ML
INJECTION, SOLUTION INTRAVENOUS
Status: DISCONTINUED | OUTPATIENT
Start: 2024-06-18 | End: 2024-06-18

## 2024-06-18 RX ORDER — DEXTROSE MONOHYDRATE 100 MG/ML
INJECTION, SOLUTION INTRAVENOUS CONTINUOUS PRN
Status: DISCONTINUED | OUTPATIENT
Start: 2024-06-18 | End: 2024-06-18

## 2024-06-18 RX ORDER — HYDROCODONE BITARTRATE AND ACETAMINOPHEN 500; 5 MG/1; MG/1
TABLET ORAL
Status: DISCONTINUED | OUTPATIENT
Start: 2024-06-18 | End: 2024-07-09 | Stop reason: HOSPADM

## 2024-06-18 RX ORDER — CALCIUM CHLORIDE INJECTION 100 MG/ML
INJECTION, SOLUTION INTRAVENOUS
Status: DISCONTINUED | OUTPATIENT
Start: 2024-06-18 | End: 2024-06-18

## 2024-06-18 RX ORDER — FENTANYL CITRATE 50 UG/ML
INJECTION, SOLUTION INTRAMUSCULAR; INTRAVENOUS
Status: DISCONTINUED | OUTPATIENT
Start: 2024-06-18 | End: 2024-06-18

## 2024-06-18 RX ORDER — ACETAMINOPHEN 10 MG/ML
INJECTION, SOLUTION INTRAVENOUS
Status: DISCONTINUED | OUTPATIENT
Start: 2024-06-18 | End: 2024-06-18

## 2024-06-18 RX ORDER — PHENYLEPHRINE HYDROCHLORIDE 10 MG/ML
INJECTION INTRAVENOUS
Status: DISCONTINUED | OUTPATIENT
Start: 2024-06-18 | End: 2024-06-18

## 2024-06-18 RX ORDER — PROPOFOL 10 MG/ML
VIAL (ML) INTRAVENOUS
Status: DISCONTINUED | OUTPATIENT
Start: 2024-06-18 | End: 2024-06-18

## 2024-06-18 RX ORDER — ENOXAPARIN SODIUM 100 MG/ML
40 INJECTION SUBCUTANEOUS EVERY 24 HOURS
Status: DISCONTINUED | OUTPATIENT
Start: 2024-06-18 | End: 2024-06-26

## 2024-06-18 RX ORDER — ENOXAPARIN SODIUM 100 MG/ML
30 INJECTION SUBCUTANEOUS EVERY 24 HOURS
Status: CANCELLED | OUTPATIENT
Start: 2024-06-18 | End: 2024-06-26

## 2024-06-18 RX ADMIN — SODIUM CHLORIDE, PRESERVATIVE FREE 10 ML: 5 INJECTION INTRAVENOUS at 12:06

## 2024-06-18 RX ADMIN — SODIUM CHLORIDE: 9 INJECTION, SOLUTION INTRAVENOUS at 11:06

## 2024-06-18 RX ADMIN — DEXTROSE MONOHYDRATE: 100 INJECTION, SOLUTION INTRAVENOUS at 11:06

## 2024-06-18 RX ADMIN — PHENYLEPHRINE HYDROCHLORIDE 200 MCG: 10 INJECTION INTRAVENOUS at 11:06

## 2024-06-18 RX ADMIN — FENTANYL CITRATE 50 MCG: 50 INJECTION, SOLUTION INTRAMUSCULAR; INTRAVENOUS at 12:06

## 2024-06-18 RX ADMIN — SODIUM CHLORIDE, PRESERVATIVE FREE 10 ML: 5 INJECTION INTRAVENOUS at 05:06

## 2024-06-18 RX ADMIN — ACETAMINOPHEN 1000 MG: 10 INJECTION, SOLUTION INTRAVENOUS at 12:06

## 2024-06-18 RX ADMIN — CLINDAMYCIN PHOSPHATE 600 MG: 600 INJECTION, SOLUTION INTRAVENOUS at 11:06

## 2024-06-18 RX ADMIN — CLINDAMYCIN PHOSPHATE 600 MG: 600 INJECTION, SOLUTION INTRAVENOUS at 05:06

## 2024-06-18 RX ADMIN — PROPOFOL 120 MG: 10 INJECTION, EMULSION INTRAVENOUS at 11:06

## 2024-06-18 RX ADMIN — PHENYLEPHRINE HYDROCHLORIDE 100 MCG: 10 INJECTION INTRAVENOUS at 12:06

## 2024-06-18 RX ADMIN — SUGAMMADEX 240 MG: 100 INJECTION, SOLUTION INTRAVENOUS at 12:06

## 2024-06-18 RX ADMIN — PIPERACILLIN SODIUM AND TAZOBACTAM SODIUM 4.5 G: 4; .5 INJECTION, POWDER, LYOPHILIZED, FOR SOLUTION INTRAVENOUS at 05:06

## 2024-06-18 RX ADMIN — ENOXAPARIN SODIUM 40 MG: 40 INJECTION SUBCUTANEOUS at 05:06

## 2024-06-18 RX ADMIN — CALCIUM CHLORIDE INJECTION 0.5 G: 100 INJECTION, SOLUTION INTRAVENOUS at 11:06

## 2024-06-18 RX ADMIN — FENTANYL CITRATE 50 MCG: 50 INJECTION, SOLUTION INTRAMUSCULAR; INTRAVENOUS at 11:06

## 2024-06-18 RX ADMIN — ROCURONIUM BROMIDE 50 MG: 10 SOLUTION INTRAVENOUS at 11:06

## 2024-06-18 RX ADMIN — ONDANSETRON 4 MG: 2 INJECTION INTRAMUSCULAR; INTRAVENOUS at 12:06

## 2024-06-18 RX ADMIN — CLINDAMYCIN PHOSPHATE 600 MG: 600 INJECTION, SOLUTION INTRAVENOUS at 02:06

## 2024-06-18 RX ADMIN — PHENYLEPHRINE HYDROCHLORIDE 300 MCG: 10 INJECTION INTRAVENOUS at 11:06

## 2024-06-18 RX ADMIN — IOHEXOL 100 ML: 350 INJECTION, SOLUTION INTRAVENOUS at 07:06

## 2024-06-18 NOTE — TRANSFER OF CARE
"Anesthesia Transfer of Care Note    Patient: Greer Kahn    Procedure(s) Performed: Procedure(s) (LRB):  Incision and Drainage (Left)    Patient location: PACU    Anesthesia Type: general    Transport from OR: Transported from OR on room air with adequate spontaneous ventilation    Post pain: adequate analgesia    Post assessment: no apparent anesthetic complications    Post vital signs: stable    Level of consciousness: awake    Nausea/Vomiting: no nausea/vomiting    Complications: none    Transfer of care protocol was followed      Last vitals: Visit Vitals  /80 (BP Location: Right arm, Patient Position: Lying)   Pulse 72   Temp 37.1 °C (98.7 °F) (Axillary)   Resp 17   Ht 5' 7" (1.702 m)   Wt 108.9 kg (240 lb 1.3 oz)   SpO2 100%   Breastfeeding No   BMI 37.60 kg/m²     "

## 2024-06-18 NOTE — PLAN OF CARE
Problem: Adult Inpatient Plan of Care  Goal: Plan of Care Review  Outcome: Progressing  Flowsheets (Taken 6/18/2024 1115)  Plan of Care Reviewed With: patient  Goal: Absence of Hospital-Acquired Illness or Injury  Outcome: Progressing  Intervention: Identify and Manage Fall Risk  Flowsheets (Taken 6/18/2024 1115)  Safety Promotion/Fall Prevention:   assistive device/personal item within reach   diversional activities provided   high risk medications identified   medications reviewed   lighting adjusted   nonskid shoes/socks when out of bed   side rails raised x 2   instructed to call staff for mobility  Intervention: Prevent Skin Injury  Flowsheets (Taken 6/18/2024 1115)  Skin Protection:   incontinence pads utilized   transparent dressing maintained  Device Skin Pressure Protection:   absorbent pad utilized/changed   adhesive use limited   tubing/devices free from skin contact  Intervention: Prevent and Manage VTE (Venous Thromboembolism) Risk  Flowsheets (Taken 6/18/2024 1115)  VTE Prevention/Management:   remove, assess skin, and reapply sequential compression device   bleeding precations maintained   bleeding risk assessed   bleeding risk factor(s) identified, provider notified   dorsiflexion/plantar flexion performed   fluids promoted  Intervention: Prevent Infection  Flowsheets (Taken 6/18/2024 1115)  Infection Prevention:   cohorting utilized   environmental surveillance performed   equipment surfaces disinfected   hand hygiene promoted   personal protective equipment utilized   rest/sleep promoted   single patient room provided  Goal: Optimal Comfort and Wellbeing  Outcome: Progressing  Intervention: Monitor Pain and Promote Comfort  Flowsheets (Taken 6/18/2024 1115)  Pain Management Interventions:   pillow support provided   pain management plan reviewed with patient/caregiver   position adjusted   relaxation techniques promoted   quiet environment facilitated  Intervention: Provide Person-Centered  Care  Flowsheets (Taken 6/18/2024 1115)  Trust Relationship/Rapport:   care explained   choices provided   emotional support provided   questions answered   reassurance provided   empathic listening provided   questions encouraged   thoughts/feelings acknowledged     Problem: Wound  Goal: Optimal Coping  Outcome: Progressing  Intervention: Support Patient and Family Response  Flowsheets (Taken 6/18/2024 1115)  Supportive Measures:   active listening utilized   decision-making supported   self-care encouraged   verbalization of feelings encouraged  Family/Support System Care: self-care encouraged  Goal: Absence of Infection Signs and Symptoms  Outcome: Progressing  Intervention: Prevent or Manage Infection  Flowsheets (Taken 6/18/2024 1115)  Fever Reduction/Comfort Measures: lightweight bedding  Infection Management: aseptic technique maintained  Isolation Precautions:   precautions maintained   contact  Goal: Optimal Pain Control and Function  Outcome: Progressing  Intervention: Prevent or Manage Pain  Flowsheets (Taken 6/18/2024 1115)  Sleep/Rest Enhancement:   awakenings minimized   relaxation techniques promoted   regular sleep/rest pattern promoted   therapeutic touch utilized  Pain Management Interventions:   pillow support provided   pain management plan reviewed with patient/caregiver   position adjusted   relaxation techniques promoted   quiet environment facilitated  Goal: Skin Health and Integrity  Outcome: Progressing  Intervention: Optimize Skin Protection  Flowsheets (Taken 6/18/2024 1115)  Pressure Reduction Techniques:   frequent weight shift encouraged   positioned off wounds   pressure points protected   weight shift assistance provided  Skin Protection:   incontinence pads utilized   transparent dressing maintained  Goal: Optimal Wound Healing  Outcome: Progressing  Intervention: Promote Wound Healing  Flowsheets (Taken 6/18/2024 1115)  Sleep/Rest Enhancement:   awakenings minimized   relaxation  techniques promoted   regular sleep/rest pattern promoted   therapeutic touch utilized     Problem: Sepsis/Septic Shock  Goal: Optimal Coping  Outcome: Progressing  Intervention: Optimize Psychosocial Adjustment to Illness  Flowsheets (Taken 6/18/2024 1115)  Supportive Measures:   active listening utilized   decision-making supported   self-care encouraged   verbalization of feelings encouraged  Family/Support System Care: self-care encouraged  Goal: Absence of Bleeding  Outcome: Progressing  Intervention: Monitor and Manage Bleeding  Flowsheets (Taken 6/18/2024 1118)  Bleeding Precautions:   blood pressure closely monitored   monitored for signs of bleeding  Bleeding Management: blood products administered  Goal: Blood Glucose Level Within Targeted Range  Outcome: Progressing  Intervention: Optimize Glycemic Control  Flowsheets (Taken 6/18/2024 1118)  Glycemic Management: blood glucose monitored  Goal: Absence of Infection Signs and Symptoms  Outcome: Progressing  Intervention: Initiate Sepsis Management  Flowsheets (Taken 6/18/2024 1118)  Infection Prevention:   cohorting utilized   environmental surveillance performed   equipment surfaces disinfected   rest/sleep promoted   personal protective equipment utilized   hand hygiene promoted   single patient room provided  Infection Management: aseptic technique maintained  Stabilization Measures: blood products administered  Isolation Precautions:   precautions maintained   contact  Intervention: Promote Stabilization  Flowsheets (Taken 6/18/2024 1118)  Fever Reduction/Comfort Measures: lightweight bedding  Intervention: Promote Recovery  Flowsheets (Taken 6/18/2024 1118)  Sleep/Rest Enhancement:   awakenings minimized   regular sleep/rest pattern promoted   relaxation techniques promoted   therapeutic touch utilized     Problem: Infection  Goal: Absence of Infection Signs and Symptoms  Outcome: Progressing  Intervention: Prevent or Manage Infection  Flowsheets (Taken  6/18/2024 1118)  Fever Reduction/Comfort Measures: lightweight bedding  Infection Management: aseptic technique maintained  Isolation Precautions:   precautions maintained   contact     Problem: Fall Injury Risk  Goal: Absence of Fall and Fall-Related Injury  Outcome: Progressing  Intervention: Identify and Manage Contributors  Flowsheets (Taken 6/18/2024 1118)  Self-Care Promotion:   independence encouraged   BADL personal objects within reach   BADL personal routines maintained  Medication Review/Management:   medications reviewed   high-risk medications identified  Intervention: Promote Injury-Free Environment  Flowsheets (Taken 6/18/2024 1118)  Safety Promotion/Fall Prevention:   assistive device/personal item within reach   Fall Risk reviewed with patient/family   diversional activities provided   high risk medications identified   lighting adjusted   medications reviewed   nonskid shoes/socks when out of bed   side rails raised x 2   instructed to call staff for mobility     Problem: Skin Injury Risk Increased  Goal: Skin Health and Integrity  Outcome: Progressing  Intervention: Optimize Skin Protection  Flowsheets (Taken 6/18/2024 1118)  Pressure Reduction Techniques:   frequent weight shift encouraged   positioned off wounds   pressure points protected   weight shift assistance provided  Skin Protection:   incontinence pads utilized   transparent dressing maintained  Intervention: Promote and Optimize Oral Intake  Flowsheets (Taken 6/18/2024 1118)  Oral Nutrition Promotion: rest periods promoted     Problem: Hemodialysis  Goal: Safe, Effective Therapy Delivery  Outcome: Progressing  Intervention: Optimize Device Care and Function  Flowsheets (Taken 6/18/2024 1118)  Medication Review/Management:   medications reviewed   high-risk medications identified  Goal: Effective Tissue Perfusion  Outcome: Progressing  Intervention: Optimize Blood Flow  Flowsheets (Taken 6/18/2024 1118)  Stabilization Measures: blood  products administered  Goal: Absence of Infection Signs and Symptoms  Outcome: Progressing  Intervention: Prevent or Manage Infection  Flowsheets (Taken 6/18/2024 1118)  Infection Prevention:   cohorting utilized   environmental surveillance performed   equipment surfaces disinfected   rest/sleep promoted   personal protective equipment utilized   hand hygiene promoted   single patient room provided  Fever Reduction/Comfort Measures: lightweight bedding  Infection Management: aseptic technique maintained

## 2024-06-18 NOTE — BRIEF OP NOTE
[facility] - Periop Services  Brief Operative Note    SUMMARY     Surgery Date: 6/18/2024     Surgeons and Role:     * Arnaud Vital MD - Primary    Assisting Surgeon: Pamela Sr MD    Pre-op Diagnosis:  Perineal abscess [L02.215]    Post-op Diagnosis:  Post-Op Diagnosis Codes:     * Perineal abscess [L02.215]    Procedure(s) (LRB):  Incision and Drainage (Left)    Anesthesia: General    Implants:  * No implants in log *    Operative Findings: left groin wound debridement to fascia, minimal amount of necrotic tissue, wound 22 x 6 x 5 cm dressed with 1 betadine soaked kerlix, 1 dry kerlix, 2 abd pads, and Metaport tape    Estimated Blood Loss: 50cc    Estimated Blood Loss has been documented.         Specimens:   Specimen (24h ago, onward)      None            [id number]    Dispo:   Extubated in OR and taken to PACU in stable condition   Okay for regular diet   No plan for further debridement at this time   Will continue to follow along     Pamela Sr MD  [employer] General Surgery, PGY-1

## 2024-06-18 NOTE — PROGRESS NOTES
Attempted to see patient in rounds. She is off unit for surgical debridement. Will attempt to see during dialysis at a later time. Per nursing, patient was oriented to place and time prior to leaving unit. Described as lethargic by both nursing and MD.

## 2024-06-18 NOTE — PROGRESS NOTES
06/17/24 2150        Hemodialysis Catheter right internal jugular   No placement date or time found.   Location: right internal jugular   Verification by X-ray Yes   Site Assessment No drainage;No redness;No swelling;No warmth   Line Securement Device Secured with sutures   Dressing Type CHG impregnated dressing/sponge;Transparent (Tegaderm)   Dressing Status Clean;Dry;Intact   Dressing Intervention First dressing   Date on Dressing 06/17/24   Dressing Due to be Changed 06/19/24   Post-Hemodialysis Assessment   Blood Volume Processed (Liters) 30 L   Dialyzer Clearance Lightly streaked   Duration of Treatment 120 minutes   Additional Fluid Intake (mL) 500 mL   Total UF (mL) 500 mL   Net Fluid Removal 0   Patient Response to Treatment tolerated well   Post-Hemodialysis Comments tx cmoplete, pt reinfused, cvc deaccessed per p&p, new sterile caps applied

## 2024-06-18 NOTE — ANESTHESIA PROCEDURE NOTES
Intubation    Date/Time: 6/18/2024 11:45 AM    Performed by: Gurdeep Kahn CRNA  Authorized by: Yoel Park MD    Intubation:     Induction:  Rapid sequence induction    Intubated:  Postinduction    Mask Ventilation:  Easy mask (x1 breath)    Attempts:  2    Attempted By:  CRNA    Method of Intubation:  Direct    Blade:  Linton 2    Laryngeal View Grade: Grade III - only epiglottis visible      Attempted By (2nd Attempt):  CRNA    Method of Intubation (2nd Attempt):  Direct    Blade (2nd Attempt):  Linton 2    Laryngeal View Grade (2nd Attempt): Grade I - full view of cords      Difficult Airway Encountered?: No      Complications:  None    Airway Device:  Oral endotracheal tube    Airway Device Size:  7.5    Style/Cuff Inflation:  Cuffed    Inflation Amount (mL):  5    Tube secured:  22    Secured at:  The lips    Placement Verified By:  Capnometry    Complicating Factors:  None    Findings Post-Intubation:  BS equal bilateral and atraumatic/condition of teeth unchanged  Notes:      1st attempt grade 3 view with cricoid. Removed foam pillow. 2nd attempt Grade 1 view with cricoid.

## 2024-06-18 NOTE — PROGRESS NOTES
Pharmacist Renal Dose Adjustment Note    Greer Kahn is a 58 y.o. female being treated with the medication Zosyn    Patient Data:    Vital Signs (Most Recent):  Temp: 98.7 °F (37.1 °C) (06/18/24 0804)  Pulse: 73 (06/18/24 0804)  Resp: 17 (06/18/24 0712)  BP: 107/63 (06/18/24 0804)  SpO2: 99 % (06/18/24 0804) Vital Signs (72h Range):  Temp:  [97 °F (36.1 °C)-99.3 °F (37.4 °C)]   Pulse:  [72-88]   Resp:  [16-20]   BP: ()/(60-81)   SpO2:  [91 %-100 %]      Recent Labs   Lab 06/16/24  0942 06/17/24  0337 06/18/24 0224   CREATININE 4.53* 5.27* 4.79*     Serum creatinine: 4.79 mg/dL (H) 06/18/24 0224  Estimated creatinine clearance: 16.5 mL/min (A)    Zosyn 4.5 gm IVPB Q8H will be changed to Zosyn 4.5 gm IVPB Q12H based on patient's eCrCl < 20 mL/min per pharmacy protocol    Pharmacist's Name: Matilda Riojas  Pharmacist's Extension: [phone]

## 2024-06-18 NOTE — PROGRESS NOTES
[facility] - 9th Floor [facility] MEDICINE ~ PROGRESS NOTE        CHIEF COMPLAINT   Hospital follow up    HOSPITAL COURSE   58 year old female with morbid obesity, poorly-controlled diabetes mellitus type 2, presented with peroneal painful lesion draining pus and septic shock: Febrile 101, tachycardic, hypotensive, [place] [address]. Imaging demonstrated cellulitis involving the perineum on the left side extending to the medial gluteal fold with inflammatory changes in the labia. Concerning for possible abscess. Evaluated by surgery and underwent emergent intervention for suspected necrotizing fasciitis. Operative findings include mild necrotizing soft tissue infection for a perineal abscess.. Wound approximately 14 x 3 cm.     Today  Saw her this morning and she was worried about having to be put on dialysis.  Otherwise discussed increasing her oral intake.  Surgery managing the wound.        OBJECTIVE/PHYSICAL EXAM     VITAL SIGNS (MOST RECENT):  Temp: 98.7 °F (37.1 °C) (06/18/24 0804)  Pulse: 73 (06/18/24 0804)  Resp: 17 (06/18/24 0712)  BP: 107/63 (06/18/24 0804)  SpO2: 99 % (06/18/24 0804) VITAL SIGNS (24 HOUR RANGE):  Temp:  [98.6 °F (37 °C)-98.8 °F (37.1 °C)] 98.7 °F (37.1 °C)  Pulse:  [73-79] 73  Resp:  [17-20] 17  SpO2:  [91 %-99 %] 99 %  BP: ()/(60-75) 107/63   GENERAL:  No acute issues this morning  HEENT:  CHEST: Clear to auscultation bilaterally  HEART: S1, S2, no appreciable murmur  ABDOMEN: Soft, nontender, BS +  MSK: Warm, no lower extremity edema, no clubbing or cyanosis  NEUROLOGIC:  Responding appropriately   INTEGUMENTARY:  PSYCHIATRY:        ASSESSMENT/PLAN   Perineal cellulitis with necrotizing infection  Severe sepsis secondary to above   Acute kidney injury  Uncontrolled type 2 diabetes mellitus with hyperglycemia          General surgery following.      Continue clindamycin, Zosyn, vancomycin.  Plan to deescalate once she stabilizes.  Cultures showing MRSA.  There is  also significant contamination of stool in the area so broad-spectrum indicated.  Nephrology following.  Planning to initiate dialysis today  Date of service June 17    DVT prophylaxis:  Lovenox 40    Anticipated discharge and disposition:   __________________________________________________________________________    NUTRITIONAL STATUS     Patient meets ASPEN criteria for   malnutrition of   per RD assessment as evidenced by:                       A minimum of two characteristics is recommended for diagnosis of either severe or non-severe malnutrition.     LABS/MICRO/MEDS/DIAGNOSTICS       LABS  Recent Labs     06/18/24  0757   *   K 4.1   CO2 23   BUN 34.6*   CREATININE 5.08*   GLUCOSE 69*   CALCIUM 7.8*   ALKPHOS 126   AST 23   ALT 27   ALBUMIN 1.8*     Recent Labs     06/18/24  0757   WBC 20.99*   RBC 2.53*   HCT 23.1*   MCV 91.3          MICROBIOLOGY  Microbiology Results (last 7 days)       Procedure Component Value Units Date/Time    Blood culture #1 **CANNOT BE ORDERED STAT** [[id number]]  (Normal) Collected: 06/13/24 0832    Order Status: Completed Specimen: Blood from Hand, Left Updated: 06/17/24 1301     Blood Culture No Growth At 96 Hours    Blood culture #2 **CANNOT BE ORDERED STAT** [[id number]]  (Normal) Collected: 06/13/24 0832    Order Status: Completed Specimen: Blood from Antecubital, Left Updated: 06/17/24 1301     Blood Culture No Growth At 96 Hours    Wound Culture [[id number]]  (Abnormal)  (Susceptibility) Collected: 06/13/24 0917    Order Status: Completed Specimen: Abscess from Perineum Updated: 06/15/24 0903     Wound Culture Moderate Methicillin resistant Staphylococcus aureus               MEDICATIONS   clindamycin IV (PEDS and ADULTS)  600 mg Intravenous Q8H    enoxaparin  40 mg Subcutaneous Daily    insulin aspart U-100  10 Units Subcutaneous TIDWM    insulin glargine U-100  30 Units Subcutaneous QHS    lactated ringers  1,000 mL Intravenous Once    lactated ringers  1,000  mL Intravenous Once    mupirocin   Nasal BID    piperacillin-tazobactam (Zosyn) IV (PEDS and ADULTS) (extended infusion is not appropriate)  4.5 g Intravenous Q12H    sodium chloride 0.9%  10 mL Intravenous Q6H         INFUSIONS         DIAGNOSTIC TESTS  CT Head Without Contrast   Final Result      1. No acute intracranial abnormality.   2. Chronic microvascular ischemic changes.         Electronically signed by: Marlin Gonzalez   Date:    06/18/2024   Time:    07:43      CT Chest Abdomen Pelvis With IV Contrast (XPD) NO Oral Contrast   Final Result      Interval development of gas within the left perineum.  Necrotizing fasciitis is not excluded.  There is mild haziness about the duodenum.  Mild duodenitis is not excluded.  This possibly relates to mild stranding seen in the retroperitoneum which may be inflammatory.  Small bilateral effusions.      Possible necrotizing fasciitis reported to Prashanth prior to interpretation.         Electronically signed by: Ez Nevarez   Date:    06/18/2024   Time:    07:51      X-Ray Chest 1 View   Final Result      As above.         Electronically signed by: Ez Nevarez   Date:    06/18/2024   Time:    07:17      X-Ray Chest 1 View   Final Result      Optimal placement of hemodialysis catheter.         Electronically signed by: Garland Bowie   Date:    06/17/2024   Time:    18:14      US Retroperitoneal Complete   Final Result      No significant sonographic abnormality of the kidneys.         Electronically signed by: Issa Luna   Date:    06/17/2024   Time:    11:11      US Abdomen Limited   Final Result      1. Mild hepatomegaly.   2. Status post cholecystectomy.  No biliary ductal dilatation.         Electronically signed by: Issa Luna   Date:    06/17/2024   Time:    09:58      CTA Chest Non-Coronary (PE Studies)   Final Result      No pulmonary embolism seen      Right lower lobe atelectasis and small right-sided pleural effusion         Electronically signed  by: Ayush Barbour   Date:    06/13/2024   Time:    09:58      CT Abdomen Pelvis With IV Contrast NO Oral Contrast   Final Result      Area of cellulitis involving the perineum on the left side extending to the medial gluteal fold with a area of thickening and inflammatory changes in the labia on the left side with a area of hypoattenuation seen within the labia on the left side concerning for possible developing abscess.  Follow-up is recommended.      Right lower lobe atelectasis and right-sided pleural effusion         Electronically signed by: Ayush Barbour   Date:    06/13/2024   Time:    10:38           Echo    Result Date: 6/15/2024    Left Ventricle: There is normal systolic function with a visually   estimated ejection fraction of 55 - 60%.              Case related differential diagnoses have been reviewed; assessment and plan has been documented. I have personally reviewed the labs and test results that are currently available; I have reviewed the patients medication list. I have reviewed the consulting providers recommendations. I have reviewed or attempted to review medical records based upon their availability.  All of the patient's and/or family's questions have been addressed and answered to the best of my ability.  I will continue to monitor closely and make adjustments to medical management as needed.  This document was created using M*Modal Fluency Direct.  Transcription errors may have been made.  Please contact me if any questions may rise regarding documentation to clarify transcription.        Cristopher Butterfield MD   Internal Medicine  Department of Hospital Medicine  [facility] - 9th Floor Med Surg

## 2024-06-18 NOTE — PROGRESS NOTES
[facility] - 9th Floor [facility] MEDICINE ~ PROGRESS NOTE        CHIEF COMPLAINT   Hospital follow up    HOSPITAL COURSE   58 year old female with morbid obesity, poorly-controlled diabetes mellitus type 2, presented with peroneal painful lesion draining pus and septic shock: Febrile 101, tachycardic, hypotensive, [place] of [address]. Imaging demonstrated cellulitis involving the perineum on the left side extending to the medial gluteal fold with inflammatory changes in the labia. Concerning for possible abscess. Evaluated by surgery and underwent emergent intervention for suspected necrotizing fasciitis. Operative findings include mild necrotizing soft tissue infection for a perineal abscess.. Wound approximately 14 x 3 cm.     Today  Seen and examined this morning.  She was very lethargic could barely keep her eyes open, did not follow commands or answer questions.  RRT was called.  Workup included labs and imaging.  So far imaging is noting development of gas in the left perineum and discuss with Dr. Vital this morning plan to take her back to the operating room.  We will continue with broad-spectrum antibiotics.        OBJECTIVE/PHYSICAL EXAM     VITAL SIGNS (MOST RECENT):  Temp: 98.7 °F (37.1 °C) (06/18/24 0804)  Pulse: 73 (06/18/24 0804)  Resp: 17 (06/18/24 0712)  BP: 107/63 (06/18/24 0804)  SpO2: 99 % (06/18/24 0804) VITAL SIGNS (24 HOUR RANGE):  Temp:  [98.6 °F (37 °C)-98.8 °F (37.1 °C)] 98.7 °F (37.1 °C)  Pulse:  [73-79] 73  Resp:  [17-20] 17  SpO2:  [91 %-99 %] 99 %  BP: ()/(60-75) 107/63   GENERAL:  Very lethargic, does not follow commands  HEENT:  CHEST: Clear to auscultation bilaterally  HEART: S1, S2, no appreciable murmur  ABDOMEN: Soft, nontender, BS +  MSK: Warm, no lower extremity edema, no clubbing or cyanosis  NEUROLOGIC:  Very lethargic, did not follow commands or keep eyes open   INTEGUMENTARY:  PSYCHIATRY:        ASSESSMENT/PLAN   Perineal cellulitis with necrotizing  infection  Severe sepsis secondary to above   Acute kidney injury  Uncontrolled type 2 diabetes mellitus with hyperglycemia   Acute metabolic encephalopathy versus medication induced drowsy       General surgery following.  Planning to take back to the operating room today.    Continue clindamycin, Zosyn, vancomycin.  Plan to deescalate once she stabilizes.  Cultures showing MRSA.  There is also significant contamination of stool in the area so broad-spectrum indicated.  Nephrology following.  Initiated dialysis June 17.  Stat imaging and lab work this morning is stable otherwise.  Plan to transfuse 1 unit of blood as well.  Suspect her encephalopathy could be due to the infection or just very drowsy from the melatonin and Seroquel she received last night in the setting of poor renal clearance.  Discontinue all sedative medications.  I did note elevated bilirubin with mostly direct, monitor for now, ultrasound does not show any obstruction.    DVT prophylaxis:  Lovenox 40    Critical care time spent: 45 minutes   Critical care diagnosis:  Acute encephalopathy requiring RRT in the setting of sepsis with necrotizing fasciitis    Anticipated discharge and disposition:   __________________________________________________________________________    NUTRITIONAL STATUS     Patient meets ASPEN criteria for   malnutrition of   per RD assessment as evidenced by:                       A minimum of two characteristics is recommended for diagnosis of either severe or non-severe malnutrition.     LABS/MICRO/MEDS/DIAGNOSTICS       LABS  Recent Labs     06/18/24  0757   *   K 4.1   CO2 23   BUN 34.6*   CREATININE 5.08*   GLUCOSE 69*   CALCIUM 7.8*   ALKPHOS 126   AST 23   ALT 27   ALBUMIN 1.8*     Recent Labs     06/18/24  0757   WBC 20.99*   RBC 2.53*   HCT 23.1*   MCV 91.3          MICROBIOLOGY  Microbiology Results (last 7 days)       Procedure Component Value Units Date/Time    Blood culture #1 **CANNOT BE ORDERED  STAT** [[id number]]  (Normal) Collected: 06/13/24 0832    Order Status: Completed Specimen: Blood from Hand, Left Updated: 06/17/24 1301     Blood Culture No Growth At 96 Hours    Blood culture #2 **CANNOT BE ORDERED STAT** [[id number]]  (Normal) Collected: 06/13/24 0832    Order Status: Completed Specimen: Blood from Antecubital, Left Updated: 06/17/24 1301     Blood Culture No Growth At 96 Hours    Wound Culture [[id number]]  (Abnormal)  (Susceptibility) Collected: 06/13/24 0917    Order Status: Completed Specimen: Abscess from Perineum Updated: 06/15/24 0903     Wound Culture Moderate Methicillin resistant Staphylococcus aureus               MEDICATIONS   clindamycin IV (PEDS and ADULTS)  600 mg Intravenous Q8H    enoxaparin  30 mg Subcutaneous Daily    insulin aspart U-100  10 Units Subcutaneous TIDWM    insulin glargine U-100  30 Units Subcutaneous QHS    lactated ringers  1,000 mL Intravenous Once    lactated ringers  1,000 mL Intravenous Once    mupirocin   Nasal BID    piperacillin-tazobactam (Zosyn) IV (PEDS and ADULTS) (extended infusion is not appropriate)  4.5 g Intravenous Q12H    sodium chloride 0.9%  10 mL Intravenous Q6H         INFUSIONS         DIAGNOSTIC TESTS  CT Head Without Contrast   Final Result      1. No acute intracranial abnormality.   2. Chronic microvascular ischemic changes.         Electronically signed by: Marlin Gonzalez   Date:    06/18/2024   Time:    07:43      CT Chest Abdomen Pelvis With IV Contrast (XPD) NO Oral Contrast   Final Result      Interval development of gas within the left perineum.  Necrotizing fasciitis is not excluded.  There is mild haziness about the duodenum.  Mild duodenitis is not excluded.  This possibly relates to mild stranding seen in the retroperitoneum which may be inflammatory.  Small bilateral effusions.      Possible necrotizing fasciitis reported to Prashanth prior to interpretation.         Electronically signed by: Ez Nevarez    Date:    06/18/2024   Time:    07:51      X-Ray Chest 1 View   Final Result      As above.         Electronically signed by: Ez Nevarez   Date:    06/18/2024   Time:    07:17      X-Ray Chest 1 View   Final Result      Optimal placement of hemodialysis catheter.         Electronically signed by: Garland Bowie   Date:    06/17/2024   Time:    18:14      US Retroperitoneal Complete   Final Result      No significant sonographic abnormality of the kidneys.         Electronically signed by: Issa Luna   Date:    06/17/2024   Time:    11:11      US Abdomen Limited   Final Result      1. Mild hepatomegaly.   2. Status post cholecystectomy.  No biliary ductal dilatation.         Electronically signed by: Issa Luna   Date:    06/17/2024   Time:    09:58      CTA Chest Non-Coronary (PE Studies)   Final Result      No pulmonary embolism seen      Right lower lobe atelectasis and small right-sided pleural effusion         Electronically signed by: Ayush Barbour   Date:    06/13/2024   Time:    09:58      CT Abdomen Pelvis With IV Contrast NO Oral Contrast   Final Result      Area of cellulitis involving the perineum on the left side extending to the medial gluteal fold with a area of thickening and inflammatory changes in the labia on the left side with a area of hypoattenuation seen within the labia on the left side concerning for possible developing abscess.  Follow-up is recommended.      Right lower lobe atelectasis and right-sided pleural effusion         Electronically signed by: yAush Barbour   Date:    06/13/2024   Time:    10:38           Echo    Result Date: 6/15/2024    Left Ventricle: There is normal systolic function with a visually   estimated ejection fraction of 55 - 60%.              Case related differential diagnoses have been reviewed; assessment and plan has been documented. I have personally reviewed the labs and test results that are currently available; I have reviewed the patients medication  list. I have reviewed the consulting providers recommendations. I have reviewed or attempted to review medical records based upon their availability.  All of the patient's and/or family's questions have been addressed and answered to the best of my ability.  I will continue to monitor closely and make adjustments to medical management as needed.  This document was created using M*Modal Fluency Direct.  Transcription errors may have been made.  Please contact me if any questions may rise regarding documentation to clarify transcription.        Cristopher Butterfield MD   Internal Medicine  Department of Hospital Medicine  [facility] - 9th Floor Med Surg

## 2024-06-18 NOTE — PROGRESS NOTES
Acute Care Surgery   Progress Note  Admit Date: 6/13/2024  HD#5  POD#Day of Surgery    Subjective:   Interval history:  On exam this morning, patient non responsive other than opening her eyes to sternal rub, last known normal was at 11pm last night per nurse. Rapid response called.     Home Meds:  Current Outpatient Medications   Medication Instructions    albuterol (PROVENTIL/VENTOLIN HFA) 90 mcg/actuation inhaler 2 puffs, Inhalation, Every 4-6 hours PRN    amLODIPine (NORVASC) 10 mg, Oral, Daily    atorvastatin (LIPITOR) 20 mg, Oral, Daily    famotidine (PEPCID) 20 mg, Oral, 2 times daily    glimepiride (AMARYL) 4 mg, Oral, With breakfast    hydroCHLOROthiazide (HYDRODIURIL) 25 mg, Oral, Daily    insulin glargine U-100 (Lantus) 50 Units, Subcutaneous, Nightly    meclizine (ANTIVERT) 25 mg, Oral, 3 times daily    metFORMIN (GLUCOPHAGE) 1,000 mg, Oral, 2 times daily    metoprolol tartrate (LOPRESSOR) 25 mg, Oral, 2 times daily    pioglitazone (ACTOS) 30 mg, Oral, Daily      Scheduled Meds:   clindamycin IV (PEDS and ADULTS)  600 mg Intravenous Q8H    enoxaparin  40 mg Subcutaneous Daily    insulin aspart U-100  10 Units Subcutaneous TIDWM    insulin glargine U-100  30 Units Subcutaneous QHS    lactated ringers  1,000 mL Intravenous Once    lactated ringers  1,000 mL Intravenous Once    mupirocin   Nasal BID    piperacillin-tazobactam (Zosyn) IV (PEDS and ADULTS) (extended infusion is not appropriate)  4.5 g Intravenous Q12H    sodium chloride 0.9%  10 mL Intravenous Q6H     Continuous Infusions:      PRN Meds:  Current Facility-Administered Medications:     0.9%  NaCl infusion (for blood administration), , Intravenous, Q24H PRN    acetaminophen, 650 mg, Oral, Q4H PRN    albuterol-ipratropium, 3 mL, Nebulization, Q4H PRN    aluminum-magnesium hydroxide-simethicone, 30 mL, Oral, QID PRN    bisacodyL, 10 mg, Rectal, Daily PRN    calcium carbonate, 1,000 mg, Oral, TID PRN    dextrose 10%, 12.5 g, Intravenous, PRN     "dextrose 10%, 25 g, Intravenous, PRN    glucagon (human recombinant), 1 mg, Intramuscular, PRN    glucose, 16 g, Oral, PRN    glucose, 24 g, Oral, PRN    heparin (porcine), 3,000 Units, Intravenous, PRN    ibuprofen, 400 mg, Oral, Q6H PRN    insulin aspart U-100, 0-15 Units, Subcutaneous, Q4H PRN    metoclopramide, 10 mg, Intravenous, Q8H PRN    polyethylene glycol, 17 g, Oral, BID PRN    prochlorperazine, 2.5 mg, Intravenous, Q6H PRN    prochlorperazine, 5 mg, Intravenous, Q6H PRN    senna-docusate 8.6-50 mg, 2 tablet, Oral, BID PRN    sodium chloride 0.9%, 10 mL, Intravenous, PRN    Flushing PICC/Midline Protocol, , , Until Discontinued **AND** sodium chloride 0.9%, 10 mL, Intravenous, Q6H **AND** sodium chloride 0.9%, 10 mL, Intravenous, PRN    Pharmacy to dose Vancomycin consult, , , Once **AND** vancomycin - pharmacy to dose, , Intravenous, pharmacy to manage frequency     Objective:     VITAL SIGNS: 24 HR MIN & MAX LAST   Temp  Min: 98.7 °F (37.1 °C)  Max: 98.8 °F (37.1 °C)  98.7 °F (37.1 °C)   BP  Min: 101/67  Max: 128/80  128/80    Pulse  Min: 72  Max: 79  72    Resp  Min: 17  Max: 20  17    SpO2  Min: 92 %  Max: 100 %  100 %      HT: 5' 7" (170.2 cm)  WT: 108.9 kg (240 lb 1.3 oz)  BMI: 37.6     Intake/output:  Intake/Output - Last 3 Shifts         06/16 0700  06/17 0659 06/17 0700  06/18 0659 06/18 0700 06/19 0659    I.V. (mL/kg)  1796.7 (16.5)     Other  500     IV Piggyback  705.6 300    Total Intake(mL/kg)  3002.3 (27.6) 300 (2.8)    Urine (mL/kg/hr) 0 (0)  50 (0.1)    Other  500     Stool   0    Total Output 0 500 50    Net 0 +2502.3 +250           Stool Occurrence   1 x            Intake/Output Summary (Last 24 hours) at 6/18/2024 1321  Last data filed at 6/18/2024 1309  Gross per 24 hour   Intake 3302.27 ml   Output 550 ml   Net 2752.27 ml           Lines/drains/airway:       Peripheral IV - Single Lumen 06/13/24 0802 20 G Anterior;Proximal;Right Forearm (Active)   Site Assessment Clean;Dry;Intact " "06/15/24 0600   Extremity Assessment Distal to IV No abnormal discoloration 06/15/24 0600   Line Status Infusing 06/15/24 0600   Dressing Status Clean;Dry;Intact 06/15/24 0600   Dressing Intervention Integrity maintained 06/15/24 0600   Number of days: 2            Urethral Catheter 06/14/24 Latex 16 Fr. (Active)   Site Assessment Clean;Intact 06/15/24 0600   Collection Container Urimeter 06/15/24 0600   Securement Method secured to top of thigh w/ adhesive device 06/15/24 0600   Catheter Care Performed yes 06/15/24 0600   Reason for Continuing Urinary Catheterization Post operative 06/15/24 0600   CAUTI Prevention Bundle Securement Device in place with 1" slack;Intact seal between catheter & drainage tubing;Drainage bag/urimeter off the floor;Sheeting clip in use;No dependent loops or kinks;Drainage bag/urimeter not overfilled (<2/3 full);Drainage bag/urimeter below bladder 06/14/24 2000   Number of days: 1       Physical examination:  Gen: ill appearing, no verbal response to sternal rub   CV: RR  Resp: NWOB  Ext: moving all extremities spontaneously and purposefully  Neuro: CN II-XII grossly intact  Skin/wounds: R groin with packing in place.    Labs:  Renal:  Recent Labs     06/16/24  0942 06/17/24 0337 06/18/24 0224 06/18/24  0757   BUN 26.3* 33.0* 32.0* 34.6*   CREATININE 4.53* 5.27* 4.79* 5.08*     No results for input(s): "LACTIC" in the last 72 hours.  FENGI:  Recent Labs     06/16/24  0942 06/17/24  0337 06/18/24 0224 06/18/24  0757   * 133* 132* 131*   K 3.6 3.8 3.7 4.1   CL 97* 97* 95* 95*   CO2 22 23 24 23   CALCIUM 8.6 8.3* 8.6 7.8*   ALBUMIN  --  2.0* 1.9* 1.8*   BILITOT  --  3.7* 3.6* 3.7*   AST  --  27 25 23   ALKPHOS  --  123 131 126   ALT  --  32 30 27     Heme:  Recent Labs     06/16/24  0826 06/17/24 0337 06/18/24  0757   HGB 9.1* 7.9* 7.6*   HCT 27.4* 23.7* 23.1*    321 370     ID:  Recent Labs     06/16/24  0826 06/17/24 0337 06/18/24  0757   WBC 27.63  27.63* 22.65  22.65* " "20.99*     CBG:  Recent Labs     06/16/24  0942 06/17/24  0337 06/18/24  0224 06/18/24  0757   GLUCOSE 222* 89 78 69*      Cardiovascular:  No results for input(s): "TROPONINI", "CKTOTAL", "CKMB", "BNP" in the last 168 hours.  I have reviewed all pertinent lab results within the past 24 hours.    Imaging:  CT Head Without Contrast   Final Result      1. No acute intracranial abnormality.   2. Chronic microvascular ischemic changes.         Electronically signed by: Marlin Gonzalez   Date:    06/18/2024   Time:    07:43      CT Chest Abdomen Pelvis With IV Contrast (XPD) NO Oral Contrast   Final Result      Interval development of gas within the left perineum.  Necrotizing fasciitis is not excluded.  There is mild haziness about the duodenum.  Mild duodenitis is not excluded.  This possibly relates to mild stranding seen in the retroperitoneum which may be inflammatory.  Small bilateral effusions.      Possible necrotizing fasciitis reported to Prashanth prior to interpretation.         Electronically signed by: Ez Nevarez   Date:    06/18/2024   Time:    07:51      X-Ray Chest 1 View   Final Result      As above.         Electronically signed by: Ez Nevarez   Date:    06/18/2024   Time:    07:17      X-Ray Chest 1 View   Final Result      Optimal placement of hemodialysis catheter.         Electronically signed by: Garland Bowie   Date:    06/17/2024   Time:    18:14      US Retroperitoneal Complete   Final Result      No significant sonographic abnormality of the kidneys.         Electronically signed by: Issa Luna   Date:    06/17/2024   Time:    11:11      US Abdomen Limited   Final Result      1. Mild hepatomegaly.   2. Status post cholecystectomy.  No biliary ductal dilatation.         Electronically signed by: Issa Luna   Date:    06/17/2024   Time:    09:58      CTA Chest Non-Coronary (PE Studies)   Final Result      No pulmonary embolism seen      Right lower lobe atelectasis and small right-sided " pleural effusion         Electronically signed by: Ayush Barbour   Date:    06/13/2024   Time:    09:58      CT Abdomen Pelvis With IV Contrast NO Oral Contrast   Final Result      Area of cellulitis involving the perineum on the left side extending to the medial gluteal fold with a area of thickening and inflammatory changes in the labia on the left side with a area of hypoattenuation seen within the labia on the left side concerning for possible developing abscess.  Follow-up is recommended.      Right lower lobe atelectasis and right-sided pleural effusion         Electronically signed by: Ayush Barbour   Date:    06/13/2024   Time:    10:38         I have reviewed all pertinent imaging results/findings within the past 24 hours.    Micro/Path/Other:  Microbiology Results (last 7 days)       Procedure Component Value Units Date/Time    Blood culture #1 **CANNOT BE ORDERED STAT** [[id number]]  (Normal) Collected: 06/13/24 0832    Order Status: Completed Specimen: Blood from Hand, Left Updated: 06/18/24 1300     Blood Culture No Growth at 5 days    Blood culture #2 **CANNOT BE ORDERED STAT** [[id number]]  (Normal) Collected: 06/13/24 0832    Order Status: Completed Specimen: Blood from Antecubital, Left Updated: 06/18/24 1300     Blood Culture No Growth at 5 days    Tissue Culture - Aerobic [[id number]] Collected: 06/18/24 1223    Order Status: Sent Specimen: Tissue from Groin Updated: 06/18/24 1233    Gram Stain [[id number]] Collected: 06/18/24 1223    Order Status: Sent Specimen: Tissue from Groin Updated: 06/18/24 1233    Fungal Culture [[id number]] Collected: 06/18/24 1223    Order Status: Sent Specimen: Tissue from Groin Updated: 06/18/24 1233    Anaerobic Culture [[id number]] Collected: 06/18/24 1223    Order Status: Sent Specimen: Tissue from Groin     Wound Culture [[id number]]  (Abnormal)  (Susceptibility) Collected: 06/13/24 0917    Order Status: Completed Specimen: Abscess from Perineum Updated: 06/15/24  0903     Wound Culture Moderate Methicillin resistant Staphylococcus aureus           Pathology Results  (Last 7 days)      None             Assessment & Plan:   57yo F with DM, HTN, obesity, and asthma, presenting with a R groin soft tissue infection. Initially, there was concern for NSTI and patient was taken to the OR urgently for debridement. She tolerated this well, and there was not evidence of NSTI. She is now s/p R groin incision, drainage, and debridement on 6/14.    - Plan for OR Today to re evaluate groin wound   - NPO   - PRN pain control  - continue broad spectrum abx    Pamela Sr MD  [facility] General Surgery, PGY-1

## 2024-06-18 NOTE — NURSING
Went into pt room with sabra OTERO to evaluate her wound. Pt lethargic, not answering to  voice but responding to vigorous stimulation. RRT called, MD notified and Dr. Butterfield at bedside. Pt received Seroquel and melatonin with night meds. Vital signs and glucose stable. MD placed STAT orders for labs, CXR, and CT head. Report given to oncoming nurse. Will continue monitoring pt

## 2024-06-18 NOTE — ANESTHESIA PREPROCEDURE EVALUATION
2024  Greer Kahn is a 58 y.o., female.  58 year old female with morbid obesity, poorly-controlled diabetes mellitus type 2, presented with peroneal painful lesion draining pus and septic shock: Febrile 101, tachycardic, hypotensive, [place]BC of [address]. Imaging demonstrated cellulitis involving the perineum on the left side extending to the medial gluteal fold with inflammatory changes in the labia. Concerning for possible abscess. Evaluated by surgery and underwent emergent intervention for suspected necrotizing fasciitis. Operative findings include mild necrotizing soft tissue infection for a perineal abscess.. Wound approximately 14 x 3 cm.      Today  Seen and examined this morning.  She was very lethargic could barely keep her eyes open, did not follow commands or answer questions.  RRT was called.    Workup included labs and imaging.   Suspect her encephalopathy could be due to the infection or just very drowsy from the melatonin and Seroquel she received last night in the setting of poor renal clearance.  Discontinue all sedative medications.  I did note elevated bilirubin with mostly direct, monitor for now, ultrasound does not show any obstruction       So far imaging is noting development of gas in the left perineum and discuss with Dr. Vital this morning plan to take her back to the operating room.  We will continue with broad-spectrum antibiotics.  Diagnosis:   Perineal abscess [L02.215]   Pre-op diagnosis: Perineal abscess [L02.215]   Pt. Is more responsive after given, D5 IVF. CB      The pt. [facility] for the noted procedure under [facility].  Case: [id number] Date/Time: 24   Procedure:   Incision and Drainage (Left) - LEFT GROIN   Anesthesia type: General         PMHx:  No specialty history recorded    Other Medical History   Diabetes mellitus Hypertension   Asthma    morbid  obesity, poorly-controlled diabetes mellitus type 2, presented with peroneal painful lesion draining pus and septic shock        PSHx:  Previous Perineal/Groin I&D        Vital signs:        Lab Data:      Echo:  Result Date: 6/15/2024    Left Ventricle: There is normal systolic function with a visually   estimated ejection fraction of 55 - 60%.    EKG:      Pre-op Assessment    I have reviewed the Patient Summary Reports.     I have reviewed the Nursing Notes. I have reviewed the NPO Status.   I have reviewed the Medications.     Review of Systems  Anesthesia Hx:  No problems with previous Anesthesia                Social:  Non-Smoker       Hematology/Oncology:  Hematology Normal   Oncology Normal                                   EENT/Dental:  EENT/Dental Normal           Cardiovascular:  Exercise tolerance: good   Hypertension                Functional Capacity good / => 4 METS                         Pulmonary:  Pulmonary Normal                       Renal/:  Chronic Renal Disease, CKD                Hepatic/GI:  Hepatic/GI Normal                 Musculoskeletal:  Musculoskeletal Normal                Neurological:      Headaches                                 Endocrine:  Diabetes, poorly controlled           Dermatological:  Skin Normal    Psych:  Psychiatric Normal                    Physical Exam  General: Alert, Oriented, Well nourished and Cooperative    Airway:  Mallampati: II   Mouth Opening: Normal  TM Distance: Normal  Tongue: Normal  Neck ROM: Normal ROM    Dental:  Partial Dentures    Chest/Lungs:  Clear to auscultation, Normal Respiratory Rate    Heart:  Rate: Normal  Rhythm: Regular Rhythm        Anesthesia Plan  Type of Anesthesia, risks & benefits discussed:    Anesthesia Type: Gen ETT  Intra-op Monitoring Plan: Standard ASA Monitors  Post Op Pain Control Plan: IV/PO Opioids PRN  Induction:  IV and Inhalation  Airway Plan: Direct  Informed Consent: Informed consent signed with the Patient and  all parties understand the risks and agree with anesthesia plan.  All questions answered. Patient consented to blood products? Yes  ASA Score: 4  Day of Surgery Review of History & Physical: H&P Update referred to the surgeon/provider.    Ready For Surgery From Anesthesia Perspective.     .

## 2024-06-18 NOTE — PROGRESS NOTES
Pharmacokinetic Assessment Follow Up: IV Vancomycin    Vancomycin serum concentration assessment(s):  The random level was drawn correctly and can be used to guide therapy at this time. The measurement is above the desired definitive target range of 15 to 20 mcg/mL.    Vancomycin Regimen Plan:  Patient remains supra-therapeutic.  HD today per nephro  Re-dose when the random level is less than 20 mcg/mL, next level to be drawn at 0430 on 06/19 with AM labs      Drug levels (last 3 results):  Recent Labs   Lab Result Units 06/15/24  0935 06/17/24  0337 06/18/24  0224   Vancomycin Random ug/ml 45.3* 37.5* 33.6*       Vancomycin Administrations:  vancomycin given in the last 96 hours                     vancomycin 1.5 g in dextrose 5 % 250 mL IVPB (ready to mix) (mg) 1,500 mg New Bag 06/15/24 0026     1,500 mg New Bag 06/14/24 1105                    Pharmacy will continue to follow and monitor vancomycin.    Please contact pharmacy at extension [phone] for questions regarding this assessment.    Thank you for the consult,   Matilda Riojas       Patient brief summary:  Greer Kahn is a 58 y.o. female initiated on antimicrobial therapy with IV Vancomycin for treatment of skin & soft tissue infection    The patient's current regimen is pulse-dosed    Drug Allergies:   Review of patient's allergies indicates:  No Known Allergies    Actual Body Weight:  Wt Readings from Last 1 Encounters:   06/17/24 108.9 kg (240 lb 1.3 oz)       Renal Function:   Estimated Creatinine Clearance: 16.5 mL/min (A) (based on SCr of 4.79 mg/dL (H)).,     Dialysis Method (if applicable):  intermittent HD x 1 today    CBC (last 72 hours):  Recent Labs   Lab Result Units 06/16/24  0826 06/17/24  0337   WBC x10(3)/mcL 27.63  27.63* 22.65  22.65*   Hgb g/dL 9.1* 7.9*   Hct % 27.4* 23.7*   Platelet x10(3)/mcL 332 321   Monocytes % % 7 8   Eosinophils % % 1 1       Metabolic Panel (last 72 hours):  Recent Labs   Lab Result Units 06/16/24  0942  06/17/24  0337 06/18/24  0224   Sodium mmol/L 134* 133* 132*   Potassium mmol/L 3.6 3.8 3.7   Chloride mmol/L 97* 97* 95*   CO2 mmol/L 22 23 24   Glucose mg/dL 222* 89 78   Blood Urea Nitrogen mg/dL 26.3* 33.0* 32.0*   Creatinine mg/dL 4.53* 5.27* 4.79*   Albumin g/dL  --  2.0* 1.9*   Bilirubin Total mg/dL  --  3.7* 3.6*   ALP unit/L  --  123 131   AST unit/L  --  27 25   ALT unit/L  --  32 30       Microbiologic Results:  Microbiology Results (last 7 days)       Procedure Component Value Units Date/Time    Blood culture #1 **CANNOT BE ORDERED STAT** [[id number]]  (Normal) Collected: 06/13/24 0832    Order Status: Completed Specimen: Blood from Hand, Left Updated: 06/17/24 1301     Blood Culture No Growth At 96 Hours    Blood culture #2 **CANNOT BE ORDERED STAT** [[id number]]  (Normal) Collected: 06/13/24 0832    Order Status: Completed Specimen: Blood from Antecubital, Left Updated: 06/17/24 1301     Blood Culture No Growth At 96 Hours    Wound Culture [[id number]]  (Abnormal)  (Susceptibility) Collected: 06/13/24 0917    Order Status: Completed Specimen: Abscess from Perineum Updated: 06/15/24 0903     Wound Culture Moderate Methicillin resistant Staphylococcus aureus

## 2024-06-18 NOTE — CODE/ RAPID DOCUMENTATION
RRT called for lethargy, pt responds to vigorous stimulation. Was lethargic throughout night after receiving seroquel and melatonin.

## 2024-06-19 LAB
ABS NEUT (OLG): 21.36 X10(3)/MCL (ref 2.1–9.2)
ALBUMIN SERPL-MCNC: 1.8 G/DL (ref 3.5–5)
ALBUMIN/GLOB SERPL: 0.5 RATIO (ref 1.1–2)
ALP SERPL-CCNC: 129 UNIT/L (ref 40–150)
ALT SERPL-CCNC: 22 UNIT/L (ref 0–55)
ANION GAP SERPL CALC-SCNC: 9 MEQ/L
AST SERPL-CCNC: 17 UNIT/L (ref 5–34)
BASOPHILS NFR BLD MANUAL: 0.27 X10(3)/MCL (ref 0–0.2)
BASOPHILS NFR BLD MANUAL: 1 %
BILIRUB SERPL-MCNC: 3.3 MG/DL
BUN SERPL-MCNC: 25.1 MG/DL (ref 9.8–20.1)
CALCIUM SERPL-MCNC: 7.7 MG/DL (ref 8.4–10.2)
CHLORIDE SERPL-SCNC: 100 MMOL/L (ref 98–107)
CO2 SERPL-SCNC: 23 MMOL/L (ref 22–29)
CREAT SERPL-MCNC: 4.03 MG/DL (ref 0.55–1.02)
CREAT/UREA NIT SERPL: 6
EOSINOPHIL NFR BLD MANUAL: 0.53 X10(3)/MCL (ref 0–0.9)
EOSINOPHIL NFR BLD MANUAL: 2 %
ERYTHROCYTE [DISTWIDTH] IN BLOOD BY AUTOMATED COUNT: 15.2 % (ref 11.5–17)
GFR SERPLBLD CREATININE-BSD FMLA CKD-EPI: 12 ML/MIN/1.73/M2
GLOBULIN SER-MCNC: 3.8 GM/DL (ref 2.4–3.5)
GLUCOSE SERPL-MCNC: 81 MG/DL (ref 74–100)
HCT VFR BLD AUTO: 24.5 % (ref 37–47)
HGB BLD-MCNC: 8.3 G/DL (ref 12–16)
INSTRUMENT WBC (OLG): 26.7 X10(3)/MCL
LYMPHOCYTES NFR BLD MANUAL: 1.33 X10(3)/MCL
LYMPHOCYTES NFR BLD MANUAL: 5 %
MCH RBC QN AUTO: 29.9 PG (ref 27–31)
MCHC RBC AUTO-ENTMCNC: 33.9 G/DL (ref 33–36)
MCV RBC AUTO: 88.1 FL (ref 80–94)
METAMYELOCYTES NFR BLD MANUAL: 4 %
MONOCYTES NFR BLD MANUAL: 1.87 X10(3)/MCL (ref 0.1–1.3)
MONOCYTES NFR BLD MANUAL: 7 %
MYELOCYTES NFR BLD MANUAL: 1 %
NEUTROPHILS NFR BLD MANUAL: 80 %
NRBC BLD AUTO-RTO: 0.2 %
PLATELET # BLD AUTO: 386 X10(3)/MCL (ref 130–400)
PLATELET # BLD EST: NORMAL 10*3/UL
PMV BLD AUTO: 9.8 FL (ref 7.4–10.4)
POCT GLUCOSE: 149 MG/DL (ref 70–110)
POCT GLUCOSE: 209 MG/DL (ref 70–110)
POLYCHROMASIA BLD QL SMEAR: ABNORMAL
POTASSIUM SERPL-SCNC: 4 MMOL/L (ref 3.5–5.1)
PROT SERPL-MCNC: 5.6 GM/DL (ref 6.4–8.3)
RBC # BLD AUTO: 2.78 X10(6)/MCL (ref 4.2–5.4)
RBC MORPH BLD: ABNORMAL
SODIUM SERPL-SCNC: 132 MMOL/L (ref 136–145)
VANCOMYCIN SERPL-MCNC: 23.9 UG/ML (ref 15–20)
WBC # BLD AUTO: 26.72 X10(3)/MCL (ref 4.5–11.5)

## 2024-06-19 PROCEDURE — 0JBB0ZZ EXCISION OF PERINEUM SUBCUTANEOUS TISSUE AND FASCIA, OPEN APPROACH: ICD-10-PCS | Performed by: SURGERY

## 2024-06-19 PROCEDURE — 25000003 PHARM REV CODE 250: Performed by: INTERNAL MEDICINE

## 2024-06-19 PROCEDURE — 63600175 PHARM REV CODE 636 W HCPCS: Performed by: INTERNAL MEDICINE

## 2024-06-19 PROCEDURE — 80053 COMPREHEN METABOLIC PANEL: CPT | Performed by: INTERNAL MEDICINE

## 2024-06-19 PROCEDURE — 90935 HEMODIALYSIS ONE EVALUATION: CPT

## 2024-06-19 PROCEDURE — A4216 STERILE WATER/SALINE, 10 ML: HCPCS | Performed by: INTERNAL MEDICINE

## 2024-06-19 PROCEDURE — 85027 COMPLETE CBC AUTOMATED: CPT | Performed by: INTERNAL MEDICINE

## 2024-06-19 PROCEDURE — 80202 ASSAY OF VANCOMYCIN: CPT | Performed by: INTERNAL MEDICINE

## 2024-06-19 PROCEDURE — 25000003 PHARM REV CODE 250: Performed by: NURSE PRACTITIONER

## 2024-06-19 PROCEDURE — 63600175 PHARM REV CODE 636 W HCPCS: Mod: JZ,JG | Performed by: INTERNAL MEDICINE

## 2024-06-19 PROCEDURE — 63600175 PHARM REV CODE 636 W HCPCS: Performed by: NURSE PRACTITIONER

## 2024-06-19 PROCEDURE — 21400001 HC TELEMETRY ROOM

## 2024-06-19 PROCEDURE — 27000207 HC ISOLATION

## 2024-06-19 PROCEDURE — 97162 PT EVAL MOD COMPLEX 30 MIN: CPT

## 2024-06-19 PROCEDURE — 36415 COLL VENOUS BLD VENIPUNCTURE: CPT | Performed by: INTERNAL MEDICINE

## 2024-06-19 RX ORDER — LOPERAMIDE HYDROCHLORIDE 2 MG/1
2 CAPSULE ORAL ONCE
Status: COMPLETED | OUTPATIENT
Start: 2024-06-19 | End: 2024-06-19

## 2024-06-19 RX ADMIN — PIPERACILLIN SODIUM AND TAZOBACTAM SODIUM 4.5 G: 4; .5 INJECTION, POWDER, LYOPHILIZED, FOR SOLUTION INTRAVENOUS at 08:06

## 2024-06-19 RX ADMIN — HEPARIN SODIUM 3000 UNITS: 1000 INJECTION, SOLUTION INTRAVENOUS; SUBCUTANEOUS at 03:06

## 2024-06-19 RX ADMIN — SODIUM CHLORIDE, PRESERVATIVE FREE 10 ML: 5 INJECTION INTRAVENOUS at 05:06

## 2024-06-19 RX ADMIN — IBUPROFEN 400 MG: 400 TABLET, FILM COATED ORAL at 05:06

## 2024-06-19 RX ADMIN — MUPIROCIN: 20 OINTMENT TOPICAL at 09:06

## 2024-06-19 RX ADMIN — CLINDAMYCIN PHOSPHATE 600 MG: 600 INJECTION, SOLUTION INTRAVENOUS at 05:06

## 2024-06-19 RX ADMIN — LOPERAMIDE HYDROCHLORIDE 2 MG: 2 CAPSULE ORAL at 09:06

## 2024-06-19 RX ADMIN — ENOXAPARIN SODIUM 40 MG: 40 INJECTION SUBCUTANEOUS at 05:06

## 2024-06-19 RX ADMIN — PIPERACILLIN SODIUM AND TAZOBACTAM SODIUM 4.5 G: 4; .5 INJECTION, POWDER, LYOPHILIZED, FOR SOLUTION INTRAVENOUS at 05:06

## 2024-06-19 RX ADMIN — SODIUM CHLORIDE, PRESERVATIVE FREE 10 ML: 5 INJECTION INTRAVENOUS at 12:06

## 2024-06-19 NOTE — PT/OT/SLP PROGRESS
Occupational Therapy      Patient Name:  Greer Kahn   MRN:  [id number]    OT orders received, pt chart reviewed. Patient not seen today for evaluation secondary to pt off floor for dialysis. Will follow-up 6/20.    6/19/2024

## 2024-06-19 NOTE — PLAN OF CARE
Problem: Physical Therapy  Goal: Physical Therapy Goal  Description: Goals to be met by: 2024     Patient will increase functional independence with mobility by performin. Supine to sit with MInimal Assistance  2. Sit to supine with MInimal Assistance  3. Sitting at edge of bed x10 minutes with Stand-by Assistance  4. Transfer and gait TBD pending further assessment.     Outcome: Progressing

## 2024-06-19 NOTE — PROGRESS NOTES
[facility] - 9th Floor Med Surg  Nephrology  Progress Note    Patient Name: Greer Kahn  MRN: [id number]  Admission Date: 6/13/2024  Hospital Length of Stay: 6 days  Attending Provider: Shante Jackson MD   Primary Care Physician: No, Primary Doctor  Principal Problem:Perineal abscess      Subjective:     This is a 58-year-old female admitted on 06/13/2024 with fever 102 reporting possible UTI, boils to right thigh and left buttock.  Lactic acidosis present on admission with WBC 27.0, BUN 9.7, creatinine 0.98.  CT abdomen and pelvis with contrast showed area of cellulitis involving the perineum on the left side extending to the medial gluteal fold and areas of concern within the labia on the left side.  Also noted to have right lower lobe atelectasis via CT chest.  Patient was initiated on vancomycin and Rocephin in ER.    On 06/14 she underwent debridement of left groin and perineum.  Postoperative course complicated by nausea, vomiting and constipation.  She is also developed AUTUMN with rapidly worsening renal indices. Cr 0.98 --> 1.76--> 2.77--> 4.53 at the time of consultation. Urinalysis with low grade proteinuria and no blood.     Interval History:  She became anuric with symptoms of uremia on 6/17 and dialysis was initiated. She is now on dialysis for third treatment. Pt underwent second debridement of perineal wound yesterday. Endorses one episode of emesis today.     Review of patient's allergies indicates:  No Known Allergies  Current Facility-Administered Medications   Medication Frequency    0.9%  NaCl infusion (for blood administration) Q24H PRN    acetaminophen tablet 650 mg Q4H PRN    albuterol-ipratropium 2.5 mg-0.5 mg/3 mL nebulizer solution 3 mL Q4H PRN    aluminum-magnesium hydroxide-simethicone 200-200-20 mg/5 mL suspension 30 mL QID PRN    bisacodyL suppository 10 mg Daily PRN    calcium carbonate 200 mg calcium (500 mg) chewable tablet 1,000 mg TID PRN    clindamycin in D5W 600  mg/50 mL IVPB 600 mg Q8H    dextrose 10% bolus 125 mL 125 mL PRN    dextrose 10% bolus 250 mL 250 mL PRN    enoxaparin injection 40 mg Daily    glucagon (human recombinant) injection 1 mg PRN    glucose chewable tablet 16 g PRN    glucose chewable tablet 24 g PRN    heparin (porcine) injection 3,000 Units PRN    ibuprofen tablet 400 mg Q6H PRN    insulin aspart U-100 injection 0-15 Units Q4H PRN    lactated ringers bolus 1,000 mL Once    lactated ringers bolus 1,000 mL Once    metoclopramide injection 10 mg Q8H PRN    mupirocin 2 % ointment BID    piperacillin-tazobactam (ZOSYN) 4.5 g in dextrose 5 % in water (D5W) 100 mL IVPB (MB+) Q12H    polyethylene glycol packet 17 g BID PRN    prochlorperazine injection Soln 2.5 mg Q6H PRN    prochlorperazine injection Soln 5 mg Q6H PRN    senna-docusate 8.6-50 mg per tablet 2 tablet BID PRN    sodium chloride 0.9% flush 10 mL PRN    sodium chloride 0.9% flush 10 mL Q6H    And    sodium chloride 0.9% flush 10 mL PRN    vancomycin - pharmacy to dose pharmacy to manage frequency       Objective:     Vital Signs (Most Recent):  Temp: 98.2 °F (36.8 °C) (06/19/24 1124)  Pulse: 75 (06/19/24 1124)  Resp: 20 (06/19/24 1124)  BP: 130/78 (06/19/24 1124)  SpO2: 95 % (06/19/24 1124) Vital Signs (24h Range):  Temp:  [97.2 °F (36.2 °C)-99.5 °F (37.5 °C)] 98.2 °F (36.8 °C)  Pulse:  [67-78] 75  Resp:  [10-22] 20  SpO2:  [92 %-100 %] 95 %  BP: ()/(38-78) 130/78     Weight: 108.9 kg (240 lb 1.3 oz) (06/17/24 0740)  Body mass index is 37.6 kg/m².  Body surface area is 2.27 meters squared.    I/O last 3 completed shifts:  In: 4102.3 [I.V.:1796.7; Blood:300; Other:1000; IV Piggyback:1005.6]  Out: 2050 [Urine:50; Other:2000]    Physical Exam  Constitutional:       General: She is not in acute distress.     Appearance: She is obese.   HENT:      Head: Atraumatic.      Mouth/Throat:      Mouth: Mucous membranes are moist.   Eyes:      Extraocular Movements: Extraocular movements intact.       Conjunctiva/sclera: Conjunctivae normal.   Neck:      Comments: Right chest wall CVC  Cardiovascular:      Rate and Rhythm: Normal rate and regular rhythm.   Pulmonary:      Effort: Pulmonary effort is normal.      Breath sounds: Normal breath sounds.   Abdominal:      General: There is no distension.      Palpations: Abdomen is soft.   Genitourinary:     Comments: Urinary catheter scant tea colored urine   Musculoskeletal:         General: No swelling.   Skin:     General: Skin is warm.   Neurological:      General: No focal deficit present.      Mental Status: She is oriented to person, place, and time.      Comments:            Significant Labs:sureBMP:   Recent Labs   Lab 06/14/24  0415 06/15/24  0440 06/19/24  0517   *   < > 132*   K 3.6   < > 4.0   CL 97*   < > 100   CO2 22   < > 23   BUN 16.9   < > 25.1*   CREATININE 1.76*   < > 4.03*   CALCIUM 8.4   < > 7.7*   MG 1.60  --   --     < > = values in this interval not displayed.     CBC:   Recent Labs   Lab 06/19/24  0517   WBC 26.7  26.72*   RBC 2.78*   HGB 8.3*   HCT 24.5*      MCV 88.1   MCH 29.9   MCHC 33.9     Microbiology Results (last 7 days)       Procedure Component Value Units Date/Time    Gram Stain [[id number]] Collected: 06/18/24 1223    Order Status: Completed Specimen: Tissue from Groin Updated: 06/18/24 1431     GRAM STAIN Few WBC observed      Rare Gram positive cocci    Anaerobic Culture [[id number]] Collected: 06/18/24 1223    Order Status: Sent Specimen: Tissue from Groin Updated: 06/18/24 1407    Blood culture #1 **CANNOT BE ORDERED STAT** [[id number]]  (Normal) Collected: 06/13/24 0832    Order Status: Completed Specimen: Blood from Hand, Left Updated: 06/18/24 1300     Blood Culture No Growth at 5 days    Blood culture #2 **CANNOT BE ORDERED STAT** [[id number]]  (Normal) Collected: 06/13/24 0832    Order Status: Completed Specimen: Blood from Antecubital, Left Updated: 06/18/24 1300     Blood Culture No Growth at 5 days     Tissue Culture - Aerobic [[id number]] Collected: 06/18/24 1223    Order Status: Resulted Specimen: Tissue from Groin Updated: 06/18/24 1233    Fungal Culture [[id number]] Collected: 06/18/24 1223    Order Status: Sent Specimen: Tissue from Groin Updated: 06/18/24 1233    Wound Culture [[id number]]  (Abnormal)  (Susceptibility) Collected: 06/13/24 0917    Order Status: Completed Specimen: Abscess from Perineum Updated: 06/15/24 0903     Wound Culture Moderate Methicillin resistant Staphylococcus aureus          Specimen (24h ago, onward)      None          Recent Labs   Lab 06/13/24  1459   COLORU Dark Yellow   PHUR 6.0   PROTEINUA 30*   BACTERIA Few*   NITRITE Negative   LEUKOCYTESUR Negative   UROBILINOGEN 4.0*       Significant Imaging:    Procedure Component Value Units Date/Time   US Retroperitoneal Complete [[id number]] Resulted: 06/17/24 1111   Order Status: Completed Updated: 06/17/24 1114   Narrative:     EXAMINATION:  US RETROPERITONEAL COMPLETE    CLINICAL HISTORY:  AUTUMN;    COMPARISON:  13 June 2024    FINDINGS:  Grayscale and color Doppler sonographic evaluation of the kidneys and urinary bladder.    The right kidney measures 10 cm. The left kidney measures 9 cm.   No hydronephrosis.  Grossly normal renal parenchymal echogenicity.    Huff in the urinary bladder.   Impression:       No significant sonographic abnormality of the kidneys.      Electronically signed by: Issa Luna  Date: 06/17/2024  Time: 11:11       Assessment/Plan:     Acute Renal failure now anuric - multifactorial secondary to sepsis, contrast exposure, NSAID use inpatient, vancomycin toxicity  -dialysis initiated 6/17  Perineal Cellulitis with necrotizing component   Poor oral intake   DM II uncontrolled - A1C 11  Worsening anemia     Patient is on dialysis now tolerating third dialysis treatment.   Plan to hold dialysis tomorrow. We will monitor and assess if further dialysis is needed on Friday     Sari Reveles  AGNP  Nephrology  [facility] - 9th Floor Med Surg

## 2024-06-19 NOTE — PROGRESS NOTES
[facility] - 9th Floor [facility] MEDICINE ~ PROGRESS NOTE        CHIEF COMPLAINT   Hospital follow up    HOSPITAL COURSE   58 year old female with morbid obesity, poorly-controlled diabetes mellitus type 2, presented with peroneal painful lesion draining pus and septic shock: Febrile 101, tachycardic, hypotensive, WBC of 6[address]. Imaging demonstrated cellulitis involving the perineum on the left side extending to the medial gluteal fold with inflammatory changes in the labia. Concerning for possible abscess. Evaluated by surgery and underwent emergent intervention for suspected necrotizing fasciitis. Operative findings include mild necrotizing soft tissue infection for a perineal abscess.. Wound approximately 14 x 3 cm.   Was again taken to the operating room due to CT findings noting necrotizing infection operating room on June 18.  June 18 she also was noted to be very very lethargic minimally responsive workup was negative, suspected to be secondary to insomnia medications in the setting of poor renal function.    Today  Operative report is not back yet but I am told that there was minimal amount of necrotic tissue nothing significant.  Leukocytosis remains.  She is much more alert and responding appropriately.  No other complaints besides the left groin area wound        OBJECTIVE/PHYSICAL EXAM     VITAL SIGNS (MOST RECENT):  Temp: 98.5 °F (36.9 °C) (06/19/24 0729)  Pulse: 76 (06/19/24 0729)  Resp: 20 (06/19/24 0729)  BP: 107/67 (06/19/24 0729)  SpO2: (!) 93 % (06/19/24 0729) VITAL SIGNS (24 HOUR RANGE):  Temp:  [97.2 °F (36.2 °C)-99.5 °F (37.5 °C)] 98.5 °F (36.9 °C)  Pulse:  [67-78] 76  Resp:  [10-22] 20  SpO2:  [92 %-100 %] 93 %  BP: ()/(38-80) 107/67   GENERAL:  Awake and in no acute distress, obesity  HEENT:  CHEST: Clear to auscultation bilaterally  HEART: S1, S2, no appreciable murmur  ABDOMEN: Soft, nontender, BS +  MSK: Warm, no lower extremity edema, no clubbing or  cyanosis  NEUROLOGIC:     INTEGUMENTARY:  PSYCHIATRY:        ASSESSMENT/PLAN   Perineal cellulitis with necrotizing infection  Severe sepsis secondary to above   Acute kidney injury  Uncontrolled type 2 diabetes mellitus with hyperglycemia   Acute metabolic encephalopathy versus medication induced drowsy       General surgery following.  Planning to take back to the operating room today.    Continue clindamycin, Zosyn, vancomycin.  Plan to deescalate once she stabilizes.  Cultures showing MRSA.  There is also significant contamination of stool in the area so broad-spectrum indicated.  Nephrology following.  Initiated dialysis June 17.  Elevated bilirubin, suspect possibly from cholestasis from sepsis.  Ultrasound was negative and CT abdomen also negative for ductal dilation.  Avoid sedative medications given profound lethargy with melatonin and Seroquel.  Can try adding back once she stabilizes at lower doses.  Discontinue her scheduled insulin short-acting and long-acting for now until her blood sugars start to come back up.  Insulin sliding scale for now.    DVT prophylaxis:  Lovenox 40    Critical care time spent: 45 minutes   Critical care diagnosis:  Ongoing sepsis from cellulitis and fasciitis.    Anticipated discharge and disposition:   __________________________________________________________________________    NUTRITIONAL STATUS     Patient meets ASPEN criteria for   malnutrition of   per RD assessment as evidenced by:                       A minimum of two characteristics is recommended for diagnosis of either severe or non-severe malnutrition.     LABS/MICRO/MEDS/DIAGNOSTICS       LABS  Recent Labs     06/19/24  0517   *   K 4.0   CO2 23   BUN 25.1*   CREATININE 4.03*   GLUCOSE 81   CALCIUM 7.7*   ALKPHOS 129   AST 17   ALT 22   ALBUMIN 1.8*     Recent Labs     06/19/24  0517   WBC 26.7  26.72*   RBC 2.78*   HCT 24.5*   MCV 88.1          MICROBIOLOGY  Microbiology Results (last 7 days)        Procedure Component Value Units Date/Time    Gram Stain [[id number]] Collected: 06/18/24 1223    Order Status: Completed Specimen: Tissue from Groin Updated: 06/18/24 1431     GRAM STAIN Few WBC observed      Rare Gram positive cocci    Anaerobic Culture [[id number]] Collected: 06/18/24 1223    Order Status: Sent Specimen: Tissue from Groin Updated: 06/18/24 1407    Blood culture #1 **CANNOT BE ORDERED STAT** [[id number]]  (Normal) Collected: 06/13/24 0832    Order Status: Completed Specimen: Blood from Hand, Left Updated: 06/18/24 1300     Blood Culture No Growth at 5 days    Blood culture #2 **CANNOT BE ORDERED STAT** [[id number]]  (Normal) Collected: 06/13/24 0832    Order Status: Completed Specimen: Blood from Antecubital, Left Updated: 06/18/24 1300     Blood Culture No Growth at 5 days    Tissue Culture - Aerobic [[id number]] Collected: 06/18/24 1223    Order Status: Resulted Specimen: Tissue from Groin Updated: 06/18/24 1233    Fungal Culture [[id number]] Collected: 06/18/24 1223    Order Status: Sent Specimen: Tissue from Groin Updated: 06/18/24 1233    Wound Culture [[id number]]  (Abnormal)  (Susceptibility) Collected: 06/13/24 0917    Order Status: Completed Specimen: Abscess from Perineum Updated: 06/15/24 0903     Wound Culture Moderate Methicillin resistant Staphylococcus aureus               MEDICATIONS   clindamycin IV (PEDS and ADULTS)  600 mg Intravenous Q8H    enoxaparin  40 mg Subcutaneous Daily    insulin aspart U-100  10 Units Subcutaneous TIDWM    insulin glargine U-100  30 Units Subcutaneous QHS    lactated ringers  1,000 mL Intravenous Once    lactated ringers  1,000 mL Intravenous Once    mupirocin   Nasal BID    piperacillin-tazobactam (Zosyn) IV (PEDS and ADULTS) (extended infusion is not appropriate)  4.5 g Intravenous Q12H    sodium chloride 0.9%  10 mL Intravenous Q6H         INFUSIONS         DIAGNOSTIC TESTS  CT Head Without Contrast   Final Result      1. No acute intracranial  abnormality.   2. Chronic microvascular ischemic changes.         Electronically signed by: Marlin Gonzalez   Date:    06/18/2024   Time:    07:43      CT Chest Abdomen Pelvis With IV Contrast (XPD) NO Oral Contrast   Final Result      Interval development of gas within the left perineum.  Necrotizing fasciitis is not excluded.  There is mild haziness about the duodenum.  Mild duodenitis is not excluded.  This possibly relates to mild stranding seen in the retroperitoneum which may be inflammatory.  Small bilateral effusions.      Possible necrotizing fasciitis reported to Prashanth prior to interpretation.         Electronically signed by: Ez Nevarez   Date:    06/18/2024   Time:    07:51      X-Ray Chest 1 View   Final Result      As above.         Electronically signed by: Ez Nevarez   Date:    06/18/2024   Time:    07:17      X-Ray Chest 1 View   Final Result      Optimal placement of hemodialysis catheter.         Electronically signed by: Garland Bowie   Date:    06/17/2024   Time:    18:14      US Retroperitoneal Complete   Final Result      No significant sonographic abnormality of the kidneys.         Electronically signed by: Issa Luna   Date:    06/17/2024   Time:    11:11      US Abdomen Limited   Final Result      1. Mild hepatomegaly.   2. Status post cholecystectomy.  No biliary ductal dilatation.         Electronically signed by: Issa Luna   Date:    06/17/2024   Time:    09:58      CTA Chest Non-Coronary (PE Studies)   Final Result      No pulmonary embolism seen      Right lower lobe atelectasis and small right-sided pleural effusion         Electronically signed by: Ayush Barbour   Date:    06/13/2024   Time:    09:58      CT Abdomen Pelvis With IV Contrast NO Oral Contrast   Final Result      Area of cellulitis involving the perineum on the left side extending to the medial gluteal fold with a area of thickening and inflammatory changes in the labia on the left side with a area of  hypoattenuation seen within the labia on the left side concerning for possible developing abscess.  Follow-up is recommended.      Right lower lobe atelectasis and right-sided pleural effusion         Electronically signed by: Ayush Barbour   Date:    06/13/2024   Time:    10:38           Echo    Result Date: 6/15/2024    Left Ventricle: There is normal systolic function with a visually   estimated ejection fraction of 55 - 60%.              Case related differential diagnoses have been reviewed; assessment and plan has been documented. I have personally reviewed the labs and test results that are currently available; I have reviewed the patients medication list. I have reviewed the consulting providers recommendations. I have reviewed or attempted to review medical records based upon their availability.  All of the patient's and/or family's questions have been addressed and answered to the best of my ability.  I will continue to monitor closely and make adjustments to medical management as needed.  This document was created using M*Modal Fluency Direct.  Transcription errors may have been made.  Please contact me if any questions may rise regarding documentation to clarify transcription.        Cristopher Butterfield MD   Internal Medicine  Department of Hospital Medicine [facility] - 9th Floor Med Surg

## 2024-06-19 NOTE — OP NOTE
Date of operation: June 17, 2024     Surgeon:  Arnaud Vital MD    CoSurgeon: Pamela Sr MD    Operation: Excisional debridement of left perineum groin wound, wound washout     Indications: 58-year-old female who had prior excisional debridement of left groin/perineal wound for necrotizing soft tissue infection     Preop diagnosis:  Necrotizing soft tissue wound infection left groin     Postop diagnosis: Same     Blood loss:  20 cc     Complications: None     Disposition: Stable satisfactory to PACU     Specimens: Tissue sent for culture from wound     Details of operation: The dimensions of the wounds are as follows, preoperatively, wound was proximally 15 by 5 cm and 5 cm deep, open skin and subcutaneous tissue     The wound was irrigated copiously with saline, circumferentially the wound was debrided with a 10. Skin knife and curved Bridges scissors, the adductor fascia was excised and sent for tissue culture     Ultimately we debrided an area of a proximally 17 x 6 cm x 6 cm deep, there was no obviously necrotic tissue were obviously necrosis tissue and it was mostly healthy appearing fat however the some deep fascia was exposed with slightly grayish discoloration, a 5 x 5 cm square-shaped portion of the fascia was taken and sent for tissue culture     The wound was copiously irrigated with experience antimicrobial solution, a the wound was packed with with Kerlix gauze and other sterile dressings     Patient tolerated procedure well was extubated and brought to the PACU in stable condition thank you

## 2024-06-19 NOTE — NURSING
[facility] - 9th Floor Med Surg  Wound Care    Patient Name:  Greer aKhn   MRN:  [id number]  Date: 6/19/2024  Diagnosis: Perineal abscess    History:     Past Medical History:   Diagnosis Date    Asthma     Diabetes mellitus     Hypertension        Social History     Socioeconomic History    Marital status: Single   Tobacco Use    Smoking status: Never    Smokeless tobacco: Never   Substance and Sexual Activity    Alcohol use: Never    Drug use: Never    Sexual activity: Not Currently     Social Determinants of Health     Financial Resource Strain: High Risk (6/14/2024)    Overall Financial Resource Strain (CARDIA)     Difficulty of Paying Living Expenses: Hard   Food Insecurity: Food Insecurity Present (6/14/2024)    Hunger Vital Sign     Worried About Running Out of Food in the Last Year: Often true     Ran Out of Food in the Last Year: Never true   Transportation Needs: No Transportation Needs (6/14/2024)    TRANSPORTATION NEEDS     Transportation : No   Physical Activity: Unknown (6/14/2024)    Exercise Vital Sign     Days of Exercise per Week: 0 days   Stress: Stress Concern Present (6/14/2024)    [employer] - Occupational Stress Questionnaire     Feeling of Stress : To some extent   Housing Stability: High Risk (6/14/2024)    Housing Stability Vital Sign     Unable to Pay for Housing in the Last Year: Yes     Homeless in the Last Year: No       Precautions:     Allergies as of 06/13/2024    (No Known Allergies)       [facility] Assessment Details/Treatment     Wound care follow up. She is now s/p R groin incision, drainage, and debridement on 6/14. S/p further debridement in OR 6/18. Surgery team evaluated and Wound repacked this morning, no need for further debridement today. They will continue to evaluate wound daily to assess for further debridement. Nursing to continue with preventative measures. Will follow up.     06/19/2024

## 2024-06-19 NOTE — NURSING
06/18/24 2210        Hemodialysis Catheter right internal jugular   No placement date or time found.   Location: right internal jugular   Line Necessity Review CRRT/HD   Site Assessment No drainage;No redness;No swelling;No warmth   Line Securement Device Secured with sutures   Dressing Type CHG impregnated dressing/sponge;Central line dressing   Dressing Status Clean;Dry;Intact   Dressing Intervention Integrity maintained   Date on Dressing 06/18/24   Dressing Due to be Changed 06/19/24   Venous Patency/Care flushed w/o difficulty;blood return present;intermittent infusion cap changed;normal saline locked;deaccessed   Arterial Patency/Care flushed w/o difficulty;blood return present;intermittent infusion cap changed;normal saline locked;deaccessed   Post-Hemodialysis Assessment   Blood Volume Processed (Liters) 54.3 L   Dialyzer Clearance Lightly streaked   Duration of Treatment 180 minutes   Additional Fluid Intake (mL) 500 mL   Total UF (mL) 1500 mL   Net Fluid Removal 1000   Patient Response to Treatment Tolerated well   Post-Hemodialysis Comments Treatment completed. NET fluid removed = 1 liter. No issues during tx. No complaints.

## 2024-06-19 NOTE — NURSING
06/19/24 1615   Post-Hemodialysis Assessment   Rinseback Volume (mL) 250 mL   Blood Volume Processed (Liters) 62.1 L   Dialyzer Clearance Moderately streaked   Additional Fluid Intake (mL) 250 mL  (Normal saline bolus for BP support)   Total UF (mL) 1445 mL   Net Fluid Removal 695   Patient Response to Treatment Tolerated well. Ultrafiltration limited by episodes of asymptomatic hypotension. Nephrology aware   Post-Hemodialysis Comments Blood rinsed back per P&P. Lines capped and secured.d

## 2024-06-19 NOTE — PT/OT/SLP EVAL
Physical Therapy Evaluation    Patient Name:  Greer Kahn   MRN:  [id number]    Recommendations:     Discharge therapy intensity: Moderate Intensity Therapy   Discharge Equipment Recommendations:  (TBD)   Barriers to discharge: Inaccessible home, Impaired mobility, and Ongoing medical needs    Assessment:     Greer Kahn is a 58 y.o. female admitted with a medical diagnosis of groin infection s/p urgent I&D 6/14 and 6/18, severe sepsis, acute metabolic encephalopathy.  She presents with the following impairments/functional limitations: weakness, impaired endurance, impaired functional mobility, impaired balance, impaired cognition, impaired self care skills, decreased lower extremity function, pain, impaired skin, edema . Pt with poor activity tolerance due to 10/10 pain in groin area, required 2 person max assist for bed mobility. Oriented x4 but delayed verbal responses/command following. Was previously independent without equipment, lives with her daughter. Will progress as able.    Rehab Prognosis: Good; patient would benefit from acute skilled PT services to address these deficits and reach maximum level of function.    Recent Surgery: Procedure(s) (LRB):  Incision and Drainage (Left) 1 Day Post-Op    Plan:     During this hospitalization, patient would benefit from acute PT services 5 x/week to address the identified rehab impairments via gait training, therapeutic activities, therapeutic exercises, neuromuscular re-education and progress toward the following goals:    Plan of Care Expires:  07/19/24    Subjective     Chief Complaint: pain  Patient/Family Comments/goals: PLOF  Pain/Comfort:  Pain Rating 1: 10/10  Location - Side 1: Left  Location 1: groin  Pain Addressed 1: Reposition, Distraction, Nurse notified  Pain Rating Post-Intervention 1: 10/10    Patients cultural, spiritual, Mosque conflicts given the current situation: no    Living Environment:  Pt lives with her daughter, [facility] with 4 JUANITO  with R railing.  Prior to admission, patients level of function was independent.  Equipment used at home: none.  DME owned (not currently used): none.  Upon discharge, patient will have assistance from daughter, unknown if .    Objective:     Communicated with RN prior to session.  Patient found supine with peripheral IV, castaneda catheter, SCD  upon PT entry to room.    General Precautions: Standard, contact  Orthopedic Precautions:    Braces:    Respiratory Status: Room air  Blood Pressure: NT      Exams:  Cognitive Exam:  Patient is oriented to Person, Place, Time, and Situation  RLE Strength: LOKI formally with MMT, appears at least 2/5  LLE Strength: LOKI formally with MMT, appears at least 2/5 limited by pain  Skin integrity:  known groin wound      Functional Mobility:  Bed Mobility:     Scooting: total assistance  Supine to Sit: maximal assistance and of 2 persons  Sit to Supine: maximal assistance and of 2 persons  Balance: Sitting balance = maxA due to posterior lean, avoiding pressure to groin area      AM-PAC 6 CLICK MOBILITY  Total Score:8       Treatment & Education:  Unable to tolerate sitting EOB due to severe pain at wound site, requesting to lie down after several minutes of attempting to achieve upright sitting position    Patient provided with verbal education education regarding PT role/goals/POC, fall prevention, safety awareness, and discharge/DME recommendations.  Understanding was verbalized.     Patient left HOB elevated with all lines intact, call button in reach, and RN notified.    GOALS:   Multidisciplinary Problems       Physical Therapy Goals          Problem: Physical Therapy    Goal Priority Disciplines Outcome Goal Variances Interventions   Physical Therapy Goal     PT, PT/OT Progressing     Description: Goals to be met by: 2024     Patient will increase functional independence with mobility by performin. Supine to sit with MInimal Assistance  2. Sit to supine with  MInimal Assistance  3. Sitting at edge of bed x10 minutes with Stand-by Assistance  4. Transfer and gait TBD pending further assessment.                          History:     Past Medical History:   Diagnosis Date    Asthma     Diabetes mellitus     Hypertension        Past Surgical History:   Procedure Laterality Date    INCISION AND DRAINAGE OF ABSCESS N/A 6/14/2024    Procedure: Incision and Drainage;  Surgeon: Papa Cesar Jr., MD;  Location: [facility];  Service: General;  Laterality: N/A;  DEBRIDEMENT SOFT TISSUE - PERINEUM    INCISION AND DRAINAGE, LOWER EXTREMITY Left 6/18/2024    Procedure: Incision and Drainage;  Surgeon: Arnaud Vital MD;  Location: [facility];  Service: General;  Laterality: Left;  LEFT GROIN       Time Tracking:     PT Received On: 06/19/24  PT Start Time: 1126     PT Stop Time: 1158  PT Total Time (min): 32 min     Billable Minutes: Evaluation MOD      06/19/2024

## 2024-06-19 NOTE — PLAN OF CARE
Problem: Adult Inpatient Plan of Care  Goal: Plan of Care Review  Outcome: Progressing  Goal: Patient-Specific Goal (Individualized)  Outcome: Progressing  Goal: Absence of Hospital-Acquired Illness or Injury  Outcome: Progressing  Goal: Optimal Comfort and Wellbeing  Outcome: Progressing  Goal: Readiness for Transition of Care  Outcome: Progressing     Problem: Wound  Goal: Optimal Coping  Outcome: Progressing  Goal: Optimal Functional Ability  Outcome: Progressing  Goal: Absence of Infection Signs and Symptoms  Outcome: Progressing  Goal: Improved Oral Intake  Outcome: Progressing  Goal: Optimal Pain Control and Function  Outcome: Progressing  Goal: Skin Health and Integrity  Outcome: Progressing  Goal: Optimal Wound Healing  Outcome: Progressing     Problem: Sepsis/Septic Shock  Goal: Optimal Coping  Outcome: Progressing  Goal: Absence of Bleeding  Outcome: Progressing  Goal: Blood Glucose Level Within Targeted Range  Outcome: Progressing  Goal: Absence of Infection Signs and Symptoms  Outcome: Progressing  Goal: Optimal Nutrition Intake  Outcome: Progressing     Problem: Infection  Goal: Absence of Infection Signs and Symptoms  Outcome: Progressing     Problem: Fall Injury Risk  Goal: Absence of Fall and Fall-Related Injury  Outcome: Progressing     Problem: Skin Injury Risk Increased  Goal: Skin Health and Integrity  Outcome: Progressing     Problem: Hemodialysis  Goal: Safe, Effective Therapy Delivery  Outcome: Progressing  Goal: Effective Tissue Perfusion  Outcome: Progressing  Goal: Absence of Infection Signs and Symptoms  Outcome: Progressing

## 2024-06-19 NOTE — PROGRESS NOTES
Acute Care Surgery   Progress Note  Admit Date: 6/13/2024  HD#6  POD#1 Day Post-Op    Subjective:   Interval history:  AF SBP slightly low   Awake and oriented this morning   Pain well controlled   Does continue to have BM in the bed     Home Meds:  Current Outpatient Medications   Medication Instructions    albuterol (PROVENTIL/VENTOLIN HFA) 90 mcg/actuation inhaler 2 puffs, Inhalation, Every 4-6 hours PRN    amLODIPine (NORVASC) 10 mg, Oral, Daily    atorvastatin (LIPITOR) 20 mg, Oral, Daily    famotidine (PEPCID) 20 mg, Oral, 2 times daily    glimepiride (AMARYL) 4 mg, Oral, With breakfast    hydroCHLOROthiazide (HYDRODIURIL) 25 mg, Oral, Daily    insulin glargine U-100 (Lantus) 50 Units, Subcutaneous, Nightly    meclizine (ANTIVERT) 25 mg, Oral, 3 times daily    metFORMIN (GLUCOPHAGE) 1,000 mg, Oral, 2 times daily    metoprolol tartrate (LOPRESSOR) 25 mg, Oral, 2 times daily    pioglitazone (ACTOS) 30 mg, Oral, Daily      Scheduled Meds:   clindamycin IV (PEDS and ADULTS)  600 mg Intravenous Q8H    enoxaparin  40 mg Subcutaneous Daily    lactated ringers  1,000 mL Intravenous Once    lactated ringers  1,000 mL Intravenous Once    mupirocin   Nasal BID    piperacillin-tazobactam (Zosyn) IV (PEDS and ADULTS) (extended infusion is not appropriate)  4.5 g Intravenous Q12H    sodium chloride 0.9%  10 mL Intravenous Q6H     Continuous Infusions:      PRN Meds:  Current Facility-Administered Medications:     0.9%  NaCl infusion (for blood administration), , Intravenous, Q24H PRN    acetaminophen, 650 mg, Oral, Q4H PRN    albuterol-ipratropium, 3 mL, Nebulization, Q4H PRN    aluminum-magnesium hydroxide-simethicone, 30 mL, Oral, QID PRN    bisacodyL, 10 mg, Rectal, Daily PRN    calcium carbonate, 1,000 mg, Oral, TID PRN    dextrose 10%, 12.5 g, Intravenous, PRN    dextrose 10%, 25 g, Intravenous, PRN    glucagon (human recombinant), 1 mg, Intramuscular, PRN    glucose, 16 g, Oral, PRN    glucose, 24 g, Oral, PRN     "heparin (porcine), 3,000 Units, Intravenous, PRN    ibuprofen, 400 mg, Oral, Q6H PRN    insulin aspart U-100, 0-15 Units, Subcutaneous, Q4H PRN    metoclopramide, 10 mg, Intravenous, Q8H PRN    polyethylene glycol, 17 g, Oral, BID PRN    prochlorperazine, 2.5 mg, Intravenous, Q6H PRN    prochlorperazine, 5 mg, Intravenous, Q6H PRN    senna-docusate 8.6-50 mg, 2 tablet, Oral, BID PRN    sodium chloride 0.9%, 10 mL, Intravenous, PRN    Flushing PICC/Midline Protocol, , , Until Discontinued **AND** sodium chloride 0.9%, 10 mL, Intravenous, Q6H **AND** sodium chloride 0.9%, 10 mL, Intravenous, PRN    Pharmacy to dose Vancomycin consult, , , Once **AND** vancomycin - pharmacy to dose, , Intravenous, pharmacy to manage frequency     Objective:     VITAL SIGNS: 24 HR MIN & MAX LAST   Temp  Min: 97.2 °F (36.2 °C)  Max: 99.5 °F (37.5 °C)  98.5 °F (36.9 °C)   BP  Min: 71/47  Max: 128/80  107/67    Pulse  Min: 67  Max: 78  76    Resp  Min: 10  Max: 22  20    SpO2  Min: 92 %  Max: 100 %  (!) 93 %      HT: 5' 7" (170.2 cm)  WT: 108.9 kg (240 lb 1.3 oz)  BMI: 37.6     Intake/output:  Intake/Output - Last 3 Shifts         06/17 0700 06/18 0659 06/18 0700 06/19 0659 06/19 0700 06/20 0659    I.V. (mL/kg) 1796.7 (16.5)      Blood  300     Other 500 500     IV Piggyback 705.6 300     Total Intake(mL/kg) 3002.3 (27.6) 1100 (10.1)     Urine (mL/kg/hr)  50 (0)     Other 500 1500     Stool  0     Total Output 500 1550     Net +2502.3 -450            Stool Occurrence  2 x             Intake/Output Summary (Last 24 hours) at 6/19/2024 0906  Last data filed at 6/18/2024 2210  Gross per 24 hour   Intake 1100 ml   Output 1550 ml   Net -450 ml           Lines/drains/airway:       Peripheral IV - Single Lumen 06/13/24 0802 20 G Anterior;Proximal;Right Forearm (Active)   Site Assessment Clean;Dry;Intact 06/15/24 0600   Extremity Assessment Distal to IV No abnormal discoloration 06/15/24 0600   Line Status Infusing 06/15/24 0600   Dressing " "Status Clean;Dry;Intact 06/15/24 0600   Dressing Intervention Integrity maintained 06/15/24 0600   Number of days: 2            Urethral Catheter 06/14/24 Latex 16 Fr. (Active)   Site Assessment Clean;Intact 06/15/24 0600   Collection Container Urimeter 06/15/24 0600   Securement Method secured to top of thigh w/ adhesive device 06/15/24 0600   Catheter Care Performed yes 06/15/24 0600   Reason for Continuing Urinary Catheterization Post operative 06/15/24 0600   CAUTI Prevention Bundle Securement Device in place with 1" slack;Intact seal between catheter & drainage tubing;Drainage bag/urimeter off the floor;Sheeting clip in use;No dependent loops or kinks;Drainage bag/urimeter not overfilled (<2/3 full);Drainage bag/urimeter below bladder 06/14/24 2000   Number of days: 1       Physical examination:  Gen: ill appearing, no verbal response to sternal rub   CV: RR  Resp: NWOB  Ext: moving all extremities spontaneously and purposefully  Neuro: CN II-XII grossly intact  Skin/wounds: R groin with packing in place.    Labs:  Renal:  Recent Labs     06/17/24 0337 06/18/24 0224 06/18/24 0757 06/19/24  0517   BUN 33.0* 32.0* 34.6* 25.1*   CREATININE 5.27* 4.79* 5.08* 4.03*     No results for input(s): "LACTIC" in the last 72 hours.  FENGI:  Recent Labs     06/17/24 0337 06/18/24 0224 06/18/24 0757 06/19/24  0517   * 132* 131* 132*   K 3.8 3.7 4.1 4.0   CL 97* 95* 95* 100   CO2 23 24 23 23   CALCIUM 8.3* 8.6 7.8* 7.7*   ALBUMIN 2.0* 1.9* 1.8* 1.8*   BILITOT 3.7* 3.6* 3.7* 3.3*   AST 27 25 23 17   ALKPHOS 123 131 126 129   ALT 32 30 27 22     Heme:  Recent Labs     06/17/24 0337 06/18/24 0757 06/19/24  0517   HGB 7.9* 7.6* 8.3*   HCT 23.7* 23.1* 24.5*    370 386     ID:  Recent Labs     06/17/24  0337 06/18/24  0757 06/19/24  0517   WBC 22.65  22.65* 20.99* 26.7  26.72*     CBG:  Recent Labs     06/17/24  0337 06/18/24  0224 06/18/24  0757 06/19/24  0517   GLUCOSE 89 78 69* 81      Cardiovascular:  No " "results for input(s): "TROPONINI", "CKTOTAL", "CKMB", "BNP" in the last 168 hours.  I have reviewed all pertinent lab results within the past 24 hours.    Imaging:  CT Head Without Contrast   Final Result      1. No acute intracranial abnormality.   2. Chronic microvascular ischemic changes.         Electronically signed by: Marlin Gonzalez   Date:    06/18/2024   Time:    07:43      CT Chest Abdomen Pelvis With IV Contrast (XPD) NO Oral Contrast   Final Result      Interval development of gas within the left perineum.  Necrotizing fasciitis is not excluded.  There is mild haziness about the duodenum.  Mild duodenitis is not excluded.  This possibly relates to mild stranding seen in the retroperitoneum which may be inflammatory.  Small bilateral effusions.      Possible necrotizing fasciitis reported to Prashanth prior to interpretation.         Electronically signed by: Ez Nevarez   Date:    06/18/2024   Time:    07:51      X-Ray Chest 1 View   Final Result      As above.         Electronically signed by: Ez Nevarez   Date:    06/18/2024   Time:    07:17      X-Ray Chest 1 View   Final Result      Optimal placement of hemodialysis catheter.         Electronically signed by: Garland Bowie   Date:    06/17/2024   Time:    18:14      US Retroperitoneal Complete   Final Result      No significant sonographic abnormality of the kidneys.         Electronically signed by: Issa Luna   Date:    06/17/2024   Time:    11:11      US Abdomen Limited   Final Result      1. Mild hepatomegaly.   2. Status post cholecystectomy.  No biliary ductal dilatation.         Electronically signed by: Issa Luna   Date:    06/17/2024   Time:    09:58      CTA Chest Non-Coronary (PE Studies)   Final Result      No pulmonary embolism seen      Right lower lobe atelectasis and small right-sided pleural effusion         Electronically signed by: Ayush Barbour   Date:    06/13/2024   Time:    09:58      CT Abdomen Pelvis With IV " Contrast NO Oral Contrast   Final Result      Area of cellulitis involving the perineum on the left side extending to the medial gluteal fold with a area of thickening and inflammatory changes in the labia on the left side with a area of hypoattenuation seen within the labia on the left side concerning for possible developing abscess.  Follow-up is recommended.      Right lower lobe atelectasis and right-sided pleural effusion         Electronically signed by: Ayush Barbour   Date:    06/13/2024   Time:    10:38         I have reviewed all pertinent imaging results/findings within the past 24 hours.    Micro/Path/Other:  Microbiology Results (last 7 days)       Procedure Component Value Units Date/Time    Gram Stain [[id number]] Collected: 06/18/24 1223    Order Status: Completed Specimen: Tissue from Groin Updated: 06/18/24 1431     GRAM STAIN Few WBC observed      Rare Gram positive cocci    Anaerobic Culture [[id number]] Collected: 06/18/24 1223    Order Status: Sent Specimen: Tissue from Groin Updated: 06/18/24 1407    Blood culture #1 **CANNOT BE ORDERED STAT** [[id number]]  (Normal) Collected: 06/13/24 0832    Order Status: Completed Specimen: Blood from Hand, Left Updated: 06/18/24 1300     Blood Culture No Growth at 5 days    Blood culture #2 **CANNOT BE ORDERED STAT** [[id number]]  (Normal) Collected: 06/13/24 0832    Order Status: Completed Specimen: Blood from Antecubital, Left Updated: 06/18/24 1300     Blood Culture No Growth at 5 days    Tissue Culture - Aerobic [[id number]] Collected: 06/18/24 1223    Order Status: Resulted Specimen: Tissue from Groin Updated: 06/18/24 1233    Fungal Culture [[id number]] Collected: 06/18/24 1223    Order Status: Sent Specimen: Tissue from Groin Updated: 06/18/24 1233    Wound Culture [[id number]]  (Abnormal)  (Susceptibility) Collected: 06/13/24 0917    Order Status: Completed Specimen: Abscess from Perineum Updated: 06/15/24 0903     Wound Culture Moderate  Methicillin resistant Staphylococcus aureus           Pathology Results  (Last 7 days)      None             Assessment & Plan:   57yo F with DM, HTN, obesity, and asthma, presenting with a R groin soft tissue infection. Initially, there was concern for NSTI and patient was taken to the OR urgently for debridement. She tolerated this well, and there was not evidence of NSTI. She is now s/p R groin incision, drainage, and debridement on 6/14. S/p further debridement in OR 6/18.     - Wound repacked this morning, no need for further debridement today. Will continue to evaluate wound daily to assess for further debridement   - PRN pain control  - Continue antibiotics, follow up new wound cultures   - Recommend PT/OT and bedside commode to avoid stool contamination of wound   - Recommend holding stool softeners     Pamlea Sr MD  [employer] General Surgery, PGY-1

## 2024-06-19 NOTE — NURSING
Pt returned to room 906 from HD. Pt asleep but arouses to voice. VSS. Linen assessed for soilage. Pt has no complaints at this time. Will continue monitoring pt.    Pt returned from HD without tele box #15. Called CT and surgery, they did not find it. Attempted to call dialysis but no one was available. Will try contact someone in dialysis in the AM to look for it.

## 2024-06-19 NOTE — ANESTHESIA POSTPROCEDURE EVALUATION
Anesthesia Post Evaluation    Patient: Greer Kahn    Procedure(s) Performed: Procedure(s) (LRB):  Incision and Drainage (Left)    Final Anesthesia Type: general      Patient location during evaluation: PACU  Patient participation: Yes- Able to Participate  Level of consciousness: awake and alert and oriented  Post-procedure vital signs: reviewed and stable  Pain management: adequate  Airway patency: patent  SHARON mitigation strategies: Verification of full reversal of neuromuscular block  PONV status at discharge: No PONV  Anesthetic complications: no      Cardiovascular status: blood pressure returned to baseline and stable  Respiratory status: spontaneous ventilation and unassisted  Hydration status: euvolemic  Follow-up not needed.  Comments: [facility]              Vitals Value Taken Time   /78 06/19/24 1124   Temp 36.8 °C (98.2 °F) 06/19/24 1124   Pulse 75 06/19/24 1124   Resp 20 06/19/24 1124   SpO2 95 % 06/19/24 1124         Event Time   Out of Recovery 06/18/2024 13:50:00         Pain/Ailyn Score: Pain Rating Prior to Med Admin: 10 (6/19/2024  5:42 AM)  Pain Rating Post Med Admin: 0 (6/18/2024  7:12 AM)  Ailyn Score: 8 (6/18/2024  2:00 PM)

## 2024-06-20 LAB
POCT GLUCOSE: 142 MG/DL (ref 70–110)
POCT GLUCOSE: 192 MG/DL (ref 70–110)
POCT GLUCOSE: 219 MG/DL (ref 70–110)
VANCOMYCIN SERPL-MCNC: 18.9 UG/ML (ref 15–20)

## 2024-06-20 PROCEDURE — 97166 OT EVAL MOD COMPLEX 45 MIN: CPT

## 2024-06-20 PROCEDURE — 25000003 PHARM REV CODE 250: Performed by: INTERNAL MEDICINE

## 2024-06-20 PROCEDURE — 27000221 HC OXYGEN, UP TO 24 HOURS

## 2024-06-20 PROCEDURE — 94760 N-INVAS EAR/PLS OXIMETRY 1: CPT

## 2024-06-20 PROCEDURE — 63600175 PHARM REV CODE 636 W HCPCS: Performed by: INTERNAL MEDICINE

## 2024-06-20 PROCEDURE — 36415 COLL VENOUS BLD VENIPUNCTURE: CPT | Performed by: INTERNAL MEDICINE

## 2024-06-20 PROCEDURE — 21400001 HC TELEMETRY ROOM

## 2024-06-20 PROCEDURE — 27000207 HC ISOLATION

## 2024-06-20 PROCEDURE — 80202 ASSAY OF VANCOMYCIN: CPT | Performed by: INTERNAL MEDICINE

## 2024-06-20 PROCEDURE — 63600175 PHARM REV CODE 636 W HCPCS: Performed by: HOSPITALIST

## 2024-06-20 PROCEDURE — 97530 THERAPEUTIC ACTIVITIES: CPT

## 2024-06-20 PROCEDURE — 97116 GAIT TRAINING THERAPY: CPT

## 2024-06-20 PROCEDURE — 25000003 PHARM REV CODE 250: Performed by: STUDENT IN AN ORGANIZED HEALTH CARE EDUCATION/TRAINING PROGRAM

## 2024-06-20 PROCEDURE — 63600175 PHARM REV CODE 636 W HCPCS: Mod: JZ,JG | Performed by: INTERNAL MEDICINE

## 2024-06-20 PROCEDURE — A4216 STERILE WATER/SALINE, 10 ML: HCPCS | Performed by: INTERNAL MEDICINE

## 2024-06-20 RX ORDER — HYDROCODONE BITARTRATE AND ACETAMINOPHEN 5; 325 MG/1; MG/1
1 TABLET ORAL EVERY 6 HOURS PRN
Status: DISCONTINUED | OUTPATIENT
Start: 2024-06-20 | End: 2024-06-20

## 2024-06-20 RX ORDER — TRAMADOL HYDROCHLORIDE 50 MG/1
50 TABLET ORAL EVERY 8 HOURS PRN
Status: DISCONTINUED | OUTPATIENT
Start: 2024-06-20 | End: 2024-07-09 | Stop reason: HOSPADM

## 2024-06-20 RX ADMIN — PIPERACILLIN SODIUM AND TAZOBACTAM SODIUM 4.5 G: 4; .5 INJECTION, POWDER, LYOPHILIZED, FOR SOLUTION INTRAVENOUS at 09:06

## 2024-06-20 RX ADMIN — CLINDAMYCIN PHOSPHATE 600 MG: 600 INJECTION, SOLUTION INTRAVENOUS at 09:06

## 2024-06-20 RX ADMIN — SODIUM CHLORIDE, PRESERVATIVE FREE 10 ML: 5 INJECTION INTRAVENOUS at 05:06

## 2024-06-20 RX ADMIN — SODIUM CHLORIDE, PRESERVATIVE FREE 10 ML: 5 INJECTION INTRAVENOUS at 11:06

## 2024-06-20 RX ADMIN — CLINDAMYCIN PHOSPHATE 600 MG: 600 INJECTION, SOLUTION INTRAVENOUS at 12:06

## 2024-06-20 RX ADMIN — INSULIN ASPART 3 UNITS: 100 INJECTION, SOLUTION INTRAVENOUS; SUBCUTANEOUS at 10:06

## 2024-06-20 RX ADMIN — MUPIROCIN: 20 OINTMENT TOPICAL at 10:06

## 2024-06-20 RX ADMIN — SODIUM CHLORIDE, PRESERVATIVE FREE 10 ML: 5 INJECTION INTRAVENOUS at 12:06

## 2024-06-20 RX ADMIN — CLINDAMYCIN PHOSPHATE 600 MG: 600 INJECTION, SOLUTION INTRAVENOUS at 06:06

## 2024-06-20 RX ADMIN — TRAMADOL HYDROCHLORIDE 50 MG: 50 TABLET, COATED ORAL at 11:06

## 2024-06-20 RX ADMIN — INSULIN ASPART 6 UNITS: 100 INJECTION, SOLUTION INTRAVENOUS; SUBCUTANEOUS at 06:06

## 2024-06-20 RX ADMIN — METOCLOPRAMIDE 10 MG: 5 INJECTION, SOLUTION INTRAMUSCULAR; INTRAVENOUS at 07:06

## 2024-06-20 RX ADMIN — ENOXAPARIN SODIUM 40 MG: 40 INJECTION SUBCUTANEOUS at 05:06

## 2024-06-20 RX ADMIN — IBUPROFEN 400 MG: 400 TABLET, FILM COATED ORAL at 09:06

## 2024-06-20 RX ADMIN — SODIUM CHLORIDE, PRESERVATIVE FREE 10 ML: 5 INJECTION INTRAVENOUS at 06:06

## 2024-06-20 RX ADMIN — INSULIN ASPART 6 UNITS: 100 INJECTION, SOLUTION INTRAVENOUS; SUBCUTANEOUS at 12:06

## 2024-06-20 NOTE — PROGRESS NOTES
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  Uncontrolled T2DM with hyperglycemia  Acute Renal failure now anuric - multifactorial secondary to sepsis, contrast exposure, NSAID use inpatient, vancomycin toxicity  -dialysis initiated 6/17    Relevant Medical History: obesity BMI 37, poorly controlled T2DM with last A1c 13 in April 2024     Nutrition-Related Medications: Scheduled Medications:  clindamycin IV (PEDS and ADULTS), 600 mg, Q8H  enoxaparin, 40 mg, Daily  lactated ringers, 1,000 mL, Once  lactated ringers, 1,000 mL, Once  mupirocin, , BID  piperacillin-tazobactam (Zosyn) IV (PEDS and ADULTS) (extended infusion is not appropriate), 4.5 g, Q12H  sodium chloride 0.9%, 10 mL, Q6H    Continuous Infusions:   PRN Medications:    clindamycin in D5W 600 mg/50 mL IVPB 600 mg    enoxaparin injection 40 mg    lactated ringers bolus 1,000 mL    lactated ringers bolus 1,000 mL    mupirocin 2 % ointment    piperacillin-tazobactam (ZOSYN) 4.5 g in dextrose 5 % in water (D5W) 100 mL IVPB (MB+)    sodium chloride 0.9% flush 10 mL        Nutrition-Related Labs:  Recent Labs   Lab 06/13/24  2347 06/14/24  0415 06/14/24  0415 06/15/24  0440 06/16/24  0826 06/16/24  0942 06/17/24  0337 06/18/24  0224 06/18/24  0757 06/19/24  0517   NA  --  129*  --  133*  --  134* 133* 132* 131* 132*   K  --  3.6  --  3.3*  --  3.6 3.8 3.7 4.1 4.0   CALCIUM  --  8.4  --  8.2*  --  8.6  "8.3* 8.6 7.8* 7.7*   PHOS  --  2.8  --   --   --   --   --   --   --   --    MG  --  1.60  --   --   --   --   --   --   --   --    CO2  --  22  --  23  --  22 23 24 23 23   BUN  --  16.9  --  21.2*  --  26.3* 33.0* 32.0* 34.6* 25.1*   CREATININE  --  1.76*  --  2.77*  --  4.53* 5.27* 4.79* 5.08* 4.03*   EGFRNORACEVR  --  33  --  19  --  11 9 10 9 12   GLUCOSE  --  484*  --  279*  --  222* 89 78 69* 81   BILITOT  --  2.6*  --   --   --   --  3.7* 3.6* 3.7* 3.3*   ALKPHOS  --  173*  --   --   --   --  123 131 126 129   ALT  --  23  --   --   --   --  32 30 27 22   AST  --  19  --   --   --   --  27 25 23 17   ALBUMIN  --  2.3*  --   --   --   --  2.0* 1.9* 1.8* 1.8*   .10*  --   --   --   --   --   --   --   --   --    HGBA1C  --  11.0*  --   --   --   --   --   --   --   --    AMMONIA  --   --   --   --   --   --   --   --  44.4  --    WBC  --  26.41  26.41*   < > 25.03* 27.63  27.63*  --  22.65  22.65*  --  20.99* 26.7  26.72*   HGB  --  10.5*  --  8.8* 9.1*  --  7.9*  --  7.6* 8.3*   HCT  --  32.7*  --  28.1* 27.4*  --  23.7*  --  23.1* 24.5*    < > = values in this interval not displayed.        Diet Order: Diet diabetic 1800 Calorie  Oral Supplement Order: none  Appetite/Oral Intake: good/% of meals  Factors Affecting Nutritional Intake: none identified  Food/Yazidism/Cultural Preferences: none reported  Food Allergies: no known food allergies    Skin Integrity: wound  Wound(s):       Comments    6/20/24 pt eating lunch, diet advanced from clear liquid this morning, so far tolerating. Reported some n/v very early this morning but feeling better now. Had been NPO/CL mostly since admit due to n/v. Currently on dialysis x 3 sessions with plans to possibly continue per nephrology. Weight gain noted in EMR, some most likely due to fluid.    Anthropometrics    Height: 5' 7" (170.2 cm) Height Method: Stated  Last Weight: 125.9 kg (277 lb 9 oz) (06/20/24 0542) Weight Method: Bed Scale  BMI " (Calculated): 43.5  BMI Classification: obese grade III (BMI >/=40)     Ideal Body Weight (IBW), Female: 135 lb     % Ideal Body Weight, Female (lb): 177.84 %                             Usual Weight Provided By: patient denies unintentional weight loss    Wt Readings from Last 5 Encounters:   06/20/24 125.9 kg (277 lb 9 oz)   04/08/24 111.1 kg (245 lb)   05/01/23 104.3 kg (230 lb)   03/19/19 110 kg (242 lb 8.1 oz)     Weight Change(s) Since Admission:  Admit Weight: 108.9 kg (240 lb) (06/13/24 0734)      Patient Education    Not applicable.    Monitoring & Evaluation     Dietitian will monitor food and beverage intake, electrolyte/renal panel, glucose/endocrine profile, and gastrointestinal profile.  Nutrition Risk/Follow-Up: low (follow-up in 5-7 days)  Patients assigned 'low nutrition risk' status do not qualify for a full nutritional assessment but will be monitored and re-evaluated in a 5-7 day time period. Please consult if re-evaluation needed sooner.

## 2024-06-20 NOTE — PT/OT/SLP EVAL
Occupational Therapy  Evaluation    Name: Greer Kahn  MRN: [id number]  Admitting Diagnosis: perineal abscess   Recent Surgery: Procedure(s) (LRB):  Incision and Drainage (Left) 2 Days Post-Op    Recommendations:     Discharge therapy intensity: Moderate Intensity Therapy   Discharge Equipment Recommendations:  to be determined by next level of care  Barriers to discharge:  Decreased caregiver support    Assessment:     Greer Kahn is a 58 y.o. female with a medical diagnosis of perineal cellulitis with necrotizing infection s/p urgent I&D 6/14 and 6/18, severe sepsis, AUTUMN, acute metabolic encephalopathy. She presents with the following performance deficits affecting function: weakness, impaired endurance, impaired cardiopulmonary response to activity, impaired balance, pain, gait instability, impaired functional mobility, impaired self care skills, decreased lower extremity function.     Pt with good tolerance to OT evaluation, presents with pain in sitting position due to wound at perineal region. Overall required Mod A x2 for bed mobility and sit>stand from EOB, Min A x2 for functional mobility using RW with cues for RW mgmt and upright posture. Pt requires Max A for lower body ADLs at this time due to pain and wounds. Communicated transfer status to RN for safe transfers <> BSC, RN ordering bariatric commode for room. Recommending moderate intensity therapy upon discharge.    Rehab Prognosis: Good; patient would benefit from acute skilled OT services to address these deficits and reach maximum level of function.       Plan:     Patient to be seen 4 x/week to address the above listed problems via self-care/home management, therapeutic activities, therapeutic exercises  Plan of Care Expires: 07/19/24  Plan of Care Reviewed with: patient    Subjective     Chief Complaint: pain in left perineal region  Patient/Family Comments/goals: to get up and walk    Occupational Profile:  Living Environment: lives with  daughter in [facility], [address] with MARISELA duffy  Previous level of function: independent  Roles and Routines: mother, grandmother, works as a sitter for elderly  Equipment Used at Home: none  Assistance upon Discharge: family, sister may be able to provide 24/7 assist but limited physical help    Pain/Comfort:  Pain Rating 1: 8/10  Location - Side 1: Left  Location 1: groin  Pain Addressed 1: Reposition, Distraction, Cessation of Activity  Pain Rating Post-Intervention 1: 8/10    Patients cultural, spiritual, Rastafarian conflicts given the current situation: no    Objective:     OT communicated with RN prior to session.      Patient was found HOB elevated with peripheral IV, castaneda catheter, SCD, telemetry, pulse ox (continuous), oxygen upon OT entry to room.    General Precautions: Standard, fall, contact  Orthopedic Precautions: N/A  Braces: N/A    Vital Signs: Sp02: 2 L nasal canula upon arrival, removed for mobility and noted 81-97%    Bed Mobility:    Patient completed Supine to Sit with moderate assistance x2  Patient completed Sit to Supine with moderate assistance x2    Functional Mobility/Transfers:  Patient completed Sit <> Stand Transfer with moderate assistance x2 with rolling walker from EOB  Pt required Max A x2 for sit>stand from bedside chair due to lower surface and increased pain  Functional Mobility: Min A x2 for mobility ~40 ft (seated rest MCC) using RW, decreased pace and required cues for upright posture    Activities of Daily Living:  Lower Body Dressing: maximal assistance for donning socks    Functional Cognition:  Orientation: oriented to Person, Place, Time, and Situation  Safety Awareness: WFL    Visual Perceptual Skills:  Intact    Upper Extremity Function:  Right Upper Extremity:   Strength: WFL  Sensation: WFL    Left Upper Extremity:  Strength: WFL  Sensation: WFL    Balance:   Static sitting balance: CGA  Dynamic sitting balance:CGA  Static standing balance:min A     Therapeutic  Positioning  Risk for acquired pressure injuries is decreased due to ability to get to BSC/toilet with assist.    OT interventions performed during the course of today's session:   Education was provided on benefits of and recommendations for therapeutic positioning    Skin assessment: all bony prominences were assessed    Findings: no redness or breakdown noted known area of altered skin integrity at perineal region (dressing intact)    OT recommendations for therapeutic positioning throughout hospitalization:   Follow [facility] Pressure Injury Prevention Protocol  Geomat recommended for sacral protection while UIC    Additional Treatment:  Therapeutic Activity: ambulation within hallway for increased endurance needed for mobility to and from bathroom commode and ADLs    Patient Education:  Patient and family were provided with verbal education education regarding OT role/goals/POC, fall prevention, safety awareness, and Discharge/DME recommendations.  Understanding was verbalized.     Patient left HOB elevated with all lines intact, call button in reach, and RN notified.    GOALS:   Multidisciplinary Problems       Occupational Therapy Goals          Problem: Occupational Therapy    Goal Priority Disciplines Outcome Interventions   Occupational Therapy Goal     OT, PT/OT Progressing    Description: Goals to be met by: 7/20/24     Patient will increase functional independence with ADLs by performing:    UE Dressing with Stand-by Assistance.  LE Dressing with Stand-by Assistance.  Grooming while standing at sink with Stand-by Assistance.  Toileting from toilet with Stand-by Assistance for hygiene and clothing management.   Toilet transfer to toilet with Stand-by Assistance.                         History:     Past Medical History:   Diagnosis Date    Asthma     Diabetes mellitus     Hypertension          Past Surgical History:   Procedure Laterality Date    INCISION AND DRAINAGE OF ABSCESS N/A 6/14/2024    Procedure:  Incision and Drainage;  Surgeon: Papa Cesar Jr., MD;  Location: [facility];  Service: General;  Laterality: N/A;  DEBRIDEMENT SOFT TISSUE - PERINEUM    INCISION AND DRAINAGE, LOWER EXTREMITY Left 6/18/2024    Procedure: Incision and Drainage;  Surgeon: Arnaud Vital MD;  Location: [facility];  Service: General;  Laterality: Left;  LEFT GROIN       Time Tracking:     OT Date of Treatment: 06/20/24  OT Start Time: 1158  OT Stop Time: 1233  OT Total Time (min): 35 min    Billable Minutes:Evaluation 20 mins  Therapeutic Activity 15 mins    6/20/2024

## 2024-06-20 NOTE — PT/OT/SLP PROGRESS
Physical Therapy Treatment    Patient Name:  Greer Kahn   MRN:  [id number]    Recommendations:     Discharge therapy intensity: Moderate Intensity Therapy   Discharge Equipment Recommendations:  (TBD)  Barriers to discharge: Impaired mobility and Ongoing medical needs    Assessment:     Greer Kahn is a 58 y.o. female admitted with a medical diagnosis of . female admitted with a medical diagnosis of groin infection s/p urgent I&D 6/14 and 6/18, severe sepsis, acute metabolic encephalopathy. She presents with the following impairments/functional limitations: weakness, gait instability, impaired endurance, impaired balance, impaired functional mobility, pain, decreased lower extremity function, decreased ROM. Pt with improvements in mobility this date. Pt able to perform bed mobility ModAx2, sit to stands with ModAx2, and ambulated in room with Min A x2. Pt with intermittent bouts of dizziness throughout session; SpO2 monitored - brief desaturation on room air but returned to WNL with rest.     Rehab Prognosis: Good; patient would benefit from acute skilled PT services to address these deficits and reach maximum level of function.    Recent Surgery: Procedure(s) (LRB):  Incision and Drainage (Left) 2 Days Post-Op    Plan:     During this hospitalization, patient would benefit from acute PT services 5 x/week to address the identified rehab impairments via gait training, therapeutic activities, therapeutic exercises, neuromuscular re-education and progress toward the following goals:    Plan of Care Expires:  07/19/24    Subjective     Chief Complaint: L groin   Patient/Family Comments/goals: Home  Pain/Comfort:  Pain Rating 1:  (no verbal pain rating stated)  Location - Side 1: Left  Location 1: groin  Pain Addressed 1: Reposition, Distraction      Objective:     Communicated with RN prior to session.  Patient found supine with peripheral IV, SCD, castaneda catheter upon PT entry to room.     General Precautions:  Standard, contact  Orthopedic Precautions:    Braces:    Respiratory Status: Nasal cannula, flow 2 L/min upon arrival. Room air for ambulation; SpO2 81-95% during ambulation.   Blood Pressure: NT  Skin Integrity: Wound L groin       Functional Mobility:  Bed Mobility:     Supine to Sit: moderate assistance x2    Sit to Supine: moderate assistance x2   Transfers:     Sit to Stand from EOB:  moderate assistance with rolling walker from EOB  Sit to Stand from Bed side chair: max A x2; increased difficulty due to L groin pain and weakness.   Gait  Pt amb with RW  20ft + 20ft Min Ax2 in room. Pt amb with decreased gait speed and hesitation between steps. VC provided for upright posture and walker negotiation.      Therapeutic Activities/Exercises:    Education:  Patient provided with verbal education education regarding PT role/goals/POC, safety awareness, and discharge/DME recommendations.  Understanding was verbalized.     Patient left supine with all lines intact, call button in reach, and RN notified    GOALS:   Multidisciplinary Problems       Physical Therapy Goals          Problem: Physical Therapy    Goal Priority Disciplines Outcome Goal Variances Interventions   Physical Therapy Goal     PT, PT/OT Progressing     Description: Goals to be met by: 2024     Patient will increase functional independence with mobility by performin. Supine to sit with MInimal Assistance  2. Sit to supine with MInimal Assistance  3. Sitting at edge of bed x10 minutes with Stand-by Assistance  4. Pt with perform sit to stand transfer with RW Mod I.   5. Pt able to amb 150 ft with RW mod I.                          Time Tracking:     PT Received On: 24  PT Start Time: 1159     PT Stop Time: 1236  PT Total Time (min): 37 min     Billable Minutes: Gait Training 1 and Therapeutic Activity 1  Co tx with OT this date due to limited activity tolerance and need for 2  skilled therapists.    Treatment Type: Treatment  PT/PTA:  PT     Number of PTA visits since last PT visit: 1 06/20/2024

## 2024-06-20 NOTE — PLAN OF CARE
Problem: Occupational Therapy  Goal: Occupational Therapy Goal  Description: Goals to be met by: 7/20/24     Patient will increase functional independence with ADLs by performing:    UE Dressing with Stand-by Assistance.  LE Dressing with Stand-by Assistance.  Grooming while standing at sink with Stand-by Assistance.  Toileting from toilet with Stand-by Assistance for hygiene and clothing management.   Toilet transfer to toilet with Stand-by Assistance.    Outcome: Progressing

## 2024-06-20 NOTE — PROGRESS NOTES
Inpatient Nutrition Assessment    Admit Date: 6/13/2024   Total duration of encounter: 7 days   Patient Age: 58 y.o.    Nutrition Recommendation/Prescription     Continue 1800 calorie diabetic diet as tolerated  If n/v continue, consider starting PPN for supplemental nutrition: Clinimix 4.25/5% @ 75ml/hr (612 kcal, 77 gm protein, 90 gm dextrose, 1800ml fluid)    Communication of Recommendations: reviewed with patient and reviewed with family    Nutrition Assessment     Malnutrition Assessment/Nutrition-Focused Physical Exam    Malnutrition Context: acute illness or injury (06/20/24 1324)  Malnutrition Level:  (does not meet criteria) (06/20/24 1324)  Energy Intake (Malnutrition): less than or equal to 50% for greater than or equal to 5 days (06/20/24 1324)  Weight Loss (Malnutrition):  (does not meet criteria) (06/20/24 1324)  Subcutaneous Fat (Malnutrition):  (does not meet criteria) (06/20/24 1324)           Muscle Mass (Malnutrition):  (does not meet criteria) (06/20/24 1324)                                   A minimum of two characteristics is recommended for diagnosis of either severe or non-severe malnutrition.    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  Uncontrolled T2DM with hyperglycemia  Acute Renal failure now anuric - multifactorial secondary to sepsis, contrast exposure, NSAID use inpatient, vancomycin toxicity  -dialysis initiated 6/17    Relevant Medical History: obesity BMI 37, poorly controlled T2DM with last A1c 13 in April 2024     Scheduled Medications:  clindamycin IV (PEDS and ADULTS), 600 mg, Q8H  enoxaparin, 40 mg, Daily  lactated ringers,  1,000 mL, Once  lactated ringers, 1,000 mL, Once  mupirocin, , BID  piperacillin-tazobactam (Zosyn) IV (PEDS and ADULTS) (extended infusion is not appropriate), 4.5 g, Q12H  sodium chloride 0.9%, 10 mL, Q6H    Continuous Infusions:   PRN Medications:  0.9%  NaCl infusion (for blood administration), , Q24H PRN  acetaminophen, 650 mg, Q4H PRN  albuterol-ipratropium, 3 mL, Q4H PRN  aluminum-magnesium hydroxide-simethicone, 30 mL, QID PRN  bisacodyL, 10 mg, Daily PRN  calcium carbonate, 1,000 mg, TID PRN  dextrose 10%, 12.5 g, PRN  dextrose 10%, 25 g, PRN  glucagon (human recombinant), 1 mg, PRN  glucose, 16 g, PRN  glucose, 24 g, PRN  heparin (porcine), 3,000 Units, PRN  ibuprofen, 400 mg, Q6H PRN  insulin aspart U-100, 0-15 Units, Q4H PRN  metoclopramide, 10 mg, Q8H PRN  polyethylene glycol, 17 g, BID PRN  prochlorperazine, 2.5 mg, Q6H PRN  prochlorperazine, 5 mg, Q6H PRN  senna-docusate 8.6-50 mg, 2 tablet, BID PRN  sodium chloride 0.9%, 10 mL, PRN  sodium chloride 0.9%, 10 mL, PRN  traMADoL, 50 mg, Q8H PRN  vancomycin - pharmacy to dose, , pharmacy to manage frequency    Calorie Containing IV Medications: no significant kcals from medications at this time    Recent Labs   Lab 06/13/24  2347 06/14/24  0415 06/14/24  0415 06/15/24  0440 06/16/24  0826 06/16/24  0942 06/17/24  0337 06/18/24  0224 06/18/24  0757 06/19/24  0517   NA  --  129*  --  133*  --  134* 133* 132* 131* 132*   K  --  3.6  --  3.3*  --  3.6 3.8 3.7 4.1 4.0   CALCIUM  --  8.4  --  8.2*  --  8.6 8.3* 8.6 7.8* 7.7*   PHOS  --  2.8  --   --   --   --   --   --   --   --    MG  --  1.60  --   --   --   --   --   --   --   --    CO2  --  22  --  23  --  22 23 24 23 23   BUN  --  16.9  --  21.2*  --  26.3* 33.0* 32.0* 34.6* 25.1*   CREATININE  --  1.76*  --  2.77*  --  4.53* 5.27* 4.79* 5.08* 4.03*   EGFRNORACEVR  --  33  --  19  --  11 9 10 9 12   GLUCOSE  --  484*  --  279*  --  222* 89 78 69* 81   BILITOT  --  2.6*  --   --   --   --  3.7* 3.6* 3.7*  "3.3*   ALKPHOS  --  173*  --   --   --   --  123 131 126 129   ALT  --  23  --   --   --   --  32 30 27 22   AST  --  19  --   --   --   --  27 25 23 17   ALBUMIN  --  2.3*  --   --   --   --  2.0* 1.9* 1.8* 1.8*   .10*  --   --   --   --   --   --   --   --   --    HGBA1C  --  11.0*  --   --   --   --   --   --   --   --    AMMONIA  --   --   --   --   --   --   --   --  44.4  --    WBC  --  26.41  26.41*   < > 25.03* 27.63  27.63*  --  22.65  22.65*  --  20.99* 26.7  26.72*   HGB  --  10.5*  --  8.8* 9.1*  --  7.9*  --  7.6* 8.3*   HCT  --  32.7*  --  28.1* 27.4*  --  23.7*  --  23.1* 24.5*    < > = values in this interval not displayed.     Nutrition Orders:  Diet diabetic 1800 Calorie      Appetite/Oral Intake: good/% of meals  Factors Affecting Nutritional Intake: none identified  Social Needs Impacting Access to Food: none identified  Food/Episcopal/Cultural Preferences: none reported  Food Allergies: no known food allergies  Last Bowel Movement: 06/19/24  Wound(s):      Comments    6/20/24 pt eating lunch, diet advanced from clear liquid this morning, so far tolerating. Reported some n/v very early this morning but feeling better now. Had been NPO/CL mostly since admit due to n/v. Currently on dialysis x 3 sessions with plans to possibly continue per nephrology. Weight gain noted in EMR, some most likely due to fluid.     Anthropometrics    Height: 5' 7.01" (170.2 cm), Height Method: Stated  Last Weight: 125.9 kg (277 lb 9 oz) (06/20/24 0542), Weight Method: Bed Scale  BMI (Calculated): 43.5  BMI Classification: obese grade III (BMI >/=40)     Ideal Body Weight (IBW), Female: 135.05 lb     % Ideal Body Weight, Female (lb): 177.84 %                             Usual Weight Provided By: patient denies unintentional weight loss    Wt Readings from Last 5 Encounters:   06/20/24 125.9 kg (277 lb 9 oz)   04/08/24 111.1 kg (245 lb)   05/01/23 104.3 kg (230 lb)   03/19/19 110 kg (242 lb 8.1 oz) "     Weight Change(s) Since Admission:   Wt Readings from Last 1 Encounters:   06/20/24 0542 125.9 kg (277 lb 9 oz)   06/17/24 0740 108.9 kg (240 lb 1.3 oz)   06/13/24 0734 108.9 kg (240 lb)   Admit Weight: 108.9 kg (240 lb) (06/13/24 0734), Weight Method: Stated    Estimated Needs    Weight Used For Calorie Calculations: 125.9 kg (277 lb 9 oz)  Energy Calorie Requirements (kcal): 1876 kcal (no stress factor)  Energy Need Method: Blackford-St Jeor  Weight Used For Protein Calculations: 125.9 kg (277 lb 9 oz)  Protein Requirements: 100gm (0.8gm/kg)  Fluid Requirements (mL): 1000ml + output (renal on dialysis)        Enteral Nutrition     Patient not receiving enteral nutrition at this time.    Parenteral Nutrition     Patient not receiving parenteral nutrition support at this time.    Evaluation of Received Nutrient Intake    Calories: meeting estimated needs  Protein: meeting estimated needs    Patient Education     Not applicable.    Nutrition Diagnosis     PES: Inadequate oral intake related to acute illness as evidenced by <50% est energy needs for >/= 5 days. (new)         Nutrition Interventions     Intervention(s): modified composition of meals/snacks and collaboration with other providers    Goal: Meet greater than 80% of nutritional needs by follow-up. (new)  Goal: Consume % of meals/snacks by follow-up. (new)    Nutrition Goals & Monitoring     Dietitian will monitor: food and beverage intake, electrolyte/renal panel, glucose/endocrine profile, and gastrointestinal profile  Discharge planning: continue 1800 calorie diabetic diet  Nutrition Risk/Follow-Up: moderate (follow-up in 3-5 days)   Please consult if re-assessment needed sooner.

## 2024-06-20 NOTE — PROGRESS NOTES
"[facility] Hospital Medicine Progress Note      Chief Complaint: 1 day history of fever and multiple painful lesions to her thighs and buttocks       HPI: (personally reviewed by me and is documented from initial H&P)     58-year-old female whose history includes hypertension, diabetes, asthma and dyslipidemia.     Presented to the ED with complaints of a 1 day history of fever and multiple painful lesions to her thighs and buttocks. First noticed them approximately 3 days ago.      VS on arrival: T 101, P 124, R 18, B/P 189/132, Sats 94% on room air.  Initial labs include WBC 27.06 hemoglobin 11.8, hematocrit 35.8, platelets 264, sodium 131, potassium 3.8, chloride 94, bicarb 23, BUN 9.7, creatinine 0.98, glucose 353 and otherwise unremarkable.  Lactic acid 1.4.  COVID and flu not detected.  UA with significant proteinuria, glucosuria, ketones and few bacteria and yeast.  No white blood cells noted.     T abdomen and pelvis with contrast shows an area of cellulitis involving the perineum on the left side extending to the medial gluteal fold with an area of thickening and inflammatory changes in the labia on the left side with an area of hypoattenuation seen within the labia on the left side concerning for possible developing abscess.  CT chest with contrast shows a right lower lobe atelectasis and small right-sided pleural effusion with no evidence of pulmonary embolism.  Medications given in the ER include vancomycin and Rocephin.      Interval History:      Sister present at bedside.  Patient is very uninterested in discussing her care.  When I asked, what are her medical problems she tells me "whatever is on the paper in the computer".    She has no interest in providing me details about her medical care, history and or current issues.     Objective Assessment:  Physical Exam      Vital signs have been personally reviewed by me   General: Appears comfortable, no acute " distress.  Poor communication.  Rude.   Awake, alert oriented.     Integumentary: Warm, dry, intact.  Musculoskeletal: Purposeful movement noted.     Respiratory: No accessory muscle use. Breath sounds are equal.  Cardiovascular: Regular rate.       VITAL SIGNS: 24 HRS MIN & MAX LAST   Temp  Min: 98.2 °F (36.8 °C)  Max: 98.5 °F (36.9 °C) 98.4 °F (36.9 °C)   BP  Min: 114/72  Max: 150/76 114/72   Pulse  Min: 71  Max: 81  71   Resp  Min: 19  Max: 20 20   SpO2  Min: 92 %  Max: 99 % 98 %     CT Chest Abdomen Pelvis With IV Contrast (XPD) NO Oral Contrast  Narrative: EXAMINATION:  CT CHEST ABDOMEN PELVIS WITH IV CONTRAST (XPD)    CLINICAL HISTORY:  Sepsis;Sepsis, unspecified organism    TECHNIQUE:  Axial images of the chest, abdomen, and pelvis were obtained With Contrast. Sagittal and coronal reconstructed images were available for review.    Automatic exposure control was utilized to reduce the patient's radiation dose.    DLP = 1724    COMPARISON:  06/13/2024    FINDINGS:  Small bilateral pleural effusions right greater than left.  No acute osseous abnormality.  The bladder is decompressed around a Huff catheter.  There is no mediastinal adenopathy.  The by lateral axillar clear.  The heart demonstrates a normal CT appearance.  Status post cholecystectomy.  Bilateral adrenal glands, kidneys, spleen, are unremarkable.  There is mild haziness about the duodenum which may be related to motion artifact.  Mild duodenitis is not excluded.  There is no free air.  There is gas noted within the soft tissues of the left perineum.  Necrotizing fasciitis is not excluded.  Impression: Interval development of gas within the left perineum.  Necrotizing fasciitis is not excluded.  There is mild haziness about the duodenum.  Mild duodenitis is not excluded.  This possibly relates to mild stranding seen in the retroperitoneum which may be inflammatory.  Small bilateral effusions.    Possible necrotizing fasciitis reported to Prashanth  prior to interpretation.    Electronically signed by: Ez Nevarez  Date:    06/18/2024  Time:    07:51  CT Head Without Contrast  Narrative: EXAMINATION:  CT HEAD WITHOUT CONTRAST    CLINICAL HISTORY:  Mental status change, unknown cause;    TECHNIQUE:  Axial scans were obtained from skull base to the vertex.    Coronal and sagittal reconstructions obtained from the axial data.    Automatic exposure control was utilized to limit radiation dose.    Contrast: None    Radiation Dose:    Total DLP: 1011 mGy*cm    COMPARISON:  MRI brain dated 04/30/2023    FINDINGS:  There is no acute intracranial hemorrhage or edema. The gray-white matter differentiation is preserved.  Scattered hypodensities in the subcortical white matter likely represent chronic microvascular ischemic changes.    There is no mass effect or midline shift. The ventricles and sulci are normal in size. The basal cisterns are patent. There is no abnormal extra-axial fluid collection.  There is no definite displaced fracture.  Impression: 1. No acute intracranial abnormality.  2. Chronic microvascular ischemic changes.    Electronically signed by: Marlin Gonzalez  Date:    06/18/2024  Time:    07:43  X-Ray Chest 1 View  Narrative: EXAMINATION:  XR CHEST 1 VIEW    CLINICAL HISTORY:  Unresponsive;  Cutaneous abscess of perineum    TECHNIQUE:  Single view of the chest    COMPARISON:  06/17/2024    FINDINGS:  Right-sided central line projects over the right atrium.  Prominent interstitial markings throughout the right similar to prior.  The left hemithorax is clear.  Impression: As above.    Electronically signed by: Ez Nevarez  Date:    06/18/2024  Time:    07:17    Recent Labs   Lab 06/17/24  0337 06/18/24  0757 06/19/24  0517   WBC 22.65  22.65* 20.99* 26.7  26.72*   RBC 2.62* 2.53* 2.78*   HGB 7.9* 7.6* 8.3*   HCT 23.7* 23.1* 24.5*   MCV 90.5 91.3 88.1   MCH 30.2 30.0 29.9   MCHC 33.3 32.9* 33.9   RDW 13.1 13.7 15.2    370 386   MPV 10.1  10.1 9.8       Recent Labs   Lab 06/14/24  0415 06/15/24  0440 06/18/24  0224 06/18/24  0707 06/18/24  0757 06/19/24  0517   *   < > 132*  --  131* 132*   K 3.6   < > 3.7  --  4.1 4.0   CL 97*   < > 95*  --  95* 100   CO2 22   < > 24  --  23 23   BUN 16.9   < > 32.0*  --  34.6* 25.1*   CREATININE 1.76*   < > 4.79*  --  5.08* 4.03*   CALCIUM 8.4   < > 8.6  --  7.8* 7.7*   PH  --   --   --  7.440  --   --    MG 1.60  --   --   --   --   --    ALBUMIN 2.3*   < > 1.9*  --  1.8* 1.8*   ALKPHOS 173*   < > 131  --  126 129   ALT 23   < > 30  --  27 22   AST 19   < > 25  --  23 17   BILITOT 2.6*   < > 3.6*  --  3.7* 3.3*    < > = values in this interval not displayed.     Microbiology Results (last 7 days)       Procedure Component Value Units Date/Time    Anaerobic Culture [[id number]] Collected: 06/18/24 1223    Order Status: Completed Specimen: Tissue from Groin Updated: 06/20/24 0839     Anaerobe Culture No Anaerobes Isolated    Tissue Culture - Aerobic [[id number]]  (Abnormal) Collected: 06/18/24 1223    Order Status: Completed Specimen: Tissue from Groin Updated: 06/20/24 0713     Tissue - Aerobic Culture Few Staphylococcus aureus    Gram Stain [[id number]] Collected: 06/18/24 1223    Order Status: Completed Specimen: Tissue from Groin Updated: 06/18/24 1431     GRAM STAIN Few WBC observed      Rare Gram positive cocci    Blood culture #1 **CANNOT BE ORDERED STAT** [[id number]]  (Normal) Collected: 06/13/24 0832    Order Status: Completed Specimen: Blood from Hand, Left Updated: 06/18/24 1300     Blood Culture No Growth at 5 days    Blood culture #2 **CANNOT BE ORDERED STAT** [[id number]]  (Normal) Collected: 06/13/24 0832    Order Status: Completed Specimen: Blood from Antecubital, Left Updated: 06/18/24 1300     Blood Culture No Growth at 5 days    Fungal Culture [[id number]] Collected: 06/18/24 1223    Order Status: Sent Specimen: Tissue from Groin Updated: 06/18/24 1233    Wound Culture [[id number]]   (Abnormal)  (Susceptibility) Collected: 06/13/24 0917    Order Status: Completed Specimen: Abscess from Perineum Updated: 06/15/24 0903     Wound Culture Moderate Methicillin resistant Staphylococcus aureus               Medications for Hospital Course         Scheduled Med:   clindamycin IV (PEDS and ADULTS)  600 mg Intravenous Q8H    enoxaparin  40 mg Subcutaneous Daily    lactated ringers  1,000 mL Intravenous Once    lactated ringers  1,000 mL Intravenous Once    mupirocin   Nasal BID    piperacillin-tazobactam (Zosyn) IV (PEDS and ADULTS) (extended infusion is not appropriate)  4.5 g Intravenous Q12H    sodium chloride 0.9%  10 mL Intravenous Q6H      Continuous Infusions:       PRN Meds:    Current Facility-Administered Medications:     0.9%  NaCl infusion (for blood administration), , Intravenous, Q24H PRN    acetaminophen, 650 mg, Oral, Q4H PRN    albuterol-ipratropium, 3 mL, Nebulization, Q4H PRN    aluminum-magnesium hydroxide-simethicone, 30 mL, Oral, QID PRN    bisacodyL, 10 mg, Rectal, Daily PRN    calcium carbonate, 1,000 mg, Oral, TID PRN    dextrose 10%, 12.5 g, Intravenous, PRN    dextrose 10%, 25 g, Intravenous, PRN    glucagon (human recombinant), 1 mg, Intramuscular, PRN    glucose, 16 g, Oral, PRN    glucose, 24 g, Oral, PRN    heparin (porcine), 3,000 Units, Intravenous, PRN    ibuprofen, 400 mg, Oral, Q6H PRN    insulin aspart U-100, 0-15 Units, Subcutaneous, Q4H PRN    metoclopramide, 10 mg, Intravenous, Q8H PRN    polyethylene glycol, 17 g, Oral, BID PRN    prochlorperazine, 2.5 mg, Intravenous, Q6H PRN    prochlorperazine, 5 mg, Intravenous, Q6H PRN    senna-docusate 8.6-50 mg, 2 tablet, Oral, BID PRN    sodium chloride 0.9%, 10 mL, Intravenous, PRN    Flushing PICC/Midline Protocol, , , Until Discontinued **AND** sodium chloride 0.9%, 10 mL, Intravenous, Q6H **AND** sodium chloride 0.9%, 10 mL, Intravenous, PRN    Pharmacy to dose Vancomycin consult, , , Once **AND** vancomycin - pharmacy  to dose, , Intravenous, pharmacy to manage frequency     Assessment and Plan      Perineal cellulitis with necrotizing infection  Uncontrolled type 2 diabetes mellitus with hyperglycemia   Acute Renal failure  DM II uncontrolled  Anemia      Above present on admission         Anticipated discharge and Disposition when medically stable:    Therapy: PT/OT/Speech    Nutrition: diabetic      ___________________________________________________________________________________________________________________________________    ----Perineal Cellulitis with necrotizing component, Excisional debridement of left perineum groin wound, wound washout     ---acute renal failure, new dialysis, tolerating.     ---uncontrolled hyperglycemia, continue insulin therapy., adjust as needed to maintain fasting blood glucose <180  ---morbidly obese   Continue supportive care  Continue checking vital signs q4hrs.  Reviewed and restarted appropriate home medications.     DVT prophylaxis initiated   Nurse notified to page me if any changes occur     Consults: general surgeon   I have personally reviewed the specialist documentation and/or have spoken to the specialist with regard to the care of this patient; recommendations are noted above.     _______________________________________________________________________________________________________________________________      I have spent 40 minutes on the day of the visit; time spent includes face to face time and non-face to face time preparing to see the patient (eg, review of tests), independently reviewing and interpreting medical records, both past and current; documenting clinical information in the electronic or other health record, and communicating results to the patient/family/caregiver and care coordinator and nursing team.      All diagnosis and differential diagnosis have been reviewed,  interpreted and communicated appropriately to care team. assessment and plan has been  documented; I have personally reviewed the labs and test results that are presently available and pertinent to this hospital course; I have reviewed medical records based upon their availability.    All of the patient's questions have been  addressed and answered. Patient's is agreeable to the above stated plan.   I will continue to monitor closely and make adjustments to medical management as needed.    Deanne Scott, DO   06/20/2024     This note was created with the assistance of Dragon voice recognition software. There may be transcription errors as a result of using this technology however minimal. Effort has been made to assure accuracy of transcription but any obvious errors or omissions should be clarified with the author of the document.

## 2024-06-20 NOTE — PLAN OF CARE
Problem: Adult Inpatient Plan of Care  Goal: Plan of Care Review  Outcome: Progressing  Goal: Patient-Specific Goal (Individualized)  Outcome: Progressing  Goal: Absence of Hospital-Acquired Illness or Injury  Outcome: Progressing  Goal: Optimal Comfort and Wellbeing  Outcome: Progressing  Goal: Readiness for Transition of Care  Outcome: Progressing     Problem: Wound  Goal: Optimal Coping  Outcome: Progressing  Goal: Optimal Functional Ability  Outcome: Progressing  Goal: Absence of Infection Signs and Symptoms  Outcome: Progressing  Goal: Improved Oral Intake  Outcome: Progressing  Goal: Optimal Pain Control and Function  Outcome: Progressing  Goal: Skin Health and Integrity  Outcome: Progressing  Goal: Optimal Wound Healing  Outcome: Progressing     Problem: Sepsis/Septic Shock  Goal: Optimal Coping  Outcome: Progressing  Goal: Absence of Bleeding  Outcome: Progressing  Goal: Blood Glucose Level Within Targeted Range  Outcome: Progressing  Goal: Absence of Infection Signs and Symptoms  Outcome: Progressing  Goal: Optimal Nutrition Intake  Outcome: Progressing     Problem: Infection  Goal: Absence of Infection Signs and Symptoms  Outcome: Progressing     Problem: Fall Injury Risk  Goal: Absence of Fall and Fall-Related Injury  Outcome: Progressing     Problem: Skin Injury Risk Increased  Goal: Skin Health and Integrity  Outcome: Progressing     Problem: Hemodialysis  Goal: Safe, Effective Therapy Delivery  Outcome: Progressing  Goal: Effective Tissue Perfusion  Outcome: Progressing  Goal: Absence of Infection Signs and Symptoms  Outcome: Progressing     Problem: Bariatric Environmental Safety  Goal: Safety Maintained with Care  Outcome: Progressing

## 2024-06-20 NOTE — PROGRESS NOTES
Acute Care Surgery   Progress Note  Admit Date: 6/13/2024  HD#7  POD#2 Days Post-Op    Subjective:   Interval history:  Afebrile vitals in normal limits  Awake and oriented this morning   Pain well controlled     Home Meds:  Current Outpatient Medications   Medication Instructions    albuterol (PROVENTIL/VENTOLIN HFA) 90 mcg/actuation inhaler 2 puffs, Inhalation, Every 4-6 hours PRN    amLODIPine (NORVASC) 10 mg, Oral, Daily    atorvastatin (LIPITOR) 20 mg, Oral, Daily    famotidine (PEPCID) 20 mg, Oral, 2 times daily    glimepiride (AMARYL) 4 mg, Oral, With breakfast    hydroCHLOROthiazide (HYDRODIURIL) 25 mg, Oral, Daily    insulin glargine U-100 (Lantus) 50 Units, Subcutaneous, Nightly    meclizine (ANTIVERT) 25 mg, Oral, 3 times daily    metFORMIN (GLUCOPHAGE) 1,000 mg, Oral, 2 times daily    metoprolol tartrate (LOPRESSOR) 25 mg, Oral, 2 times daily    pioglitazone (ACTOS) 30 mg, Oral, Daily      Scheduled Meds:   clindamycin IV (PEDS and ADULTS)  600 mg Intravenous Q8H    enoxaparin  40 mg Subcutaneous Daily    lactated ringers  1,000 mL Intravenous Once    lactated ringers  1,000 mL Intravenous Once    mupirocin   Nasal BID    piperacillin-tazobactam (Zosyn) IV (PEDS and ADULTS) (extended infusion is not appropriate)  4.5 g Intravenous Q12H    sodium chloride 0.9%  10 mL Intravenous Q6H     Continuous Infusions:      PRN Meds:  Current Facility-Administered Medications:     0.9%  NaCl infusion (for blood administration), , Intravenous, Q24H PRN    acetaminophen, 650 mg, Oral, Q4H PRN    albuterol-ipratropium, 3 mL, Nebulization, Q4H PRN    aluminum-magnesium hydroxide-simethicone, 30 mL, Oral, QID PRN    bisacodyL, 10 mg, Rectal, Daily PRN    calcium carbonate, 1,000 mg, Oral, TID PRN    dextrose 10%, 12.5 g, Intravenous, PRN    dextrose 10%, 25 g, Intravenous, PRN    glucagon (human recombinant), 1 mg, Intramuscular, PRN    glucose, 16 g, Oral, PRN    glucose, 24 g, Oral, PRN    heparin (porcine), 3,000  "Units, Intravenous, PRN    ibuprofen, 400 mg, Oral, Q6H PRN    insulin aspart U-100, 0-15 Units, Subcutaneous, Q4H PRN    metoclopramide, 10 mg, Intravenous, Q8H PRN    polyethylene glycol, 17 g, Oral, BID PRN    prochlorperazine, 2.5 mg, Intravenous, Q6H PRN    prochlorperazine, 5 mg, Intravenous, Q6H PRN    senna-docusate 8.6-50 mg, 2 tablet, Oral, BID PRN    sodium chloride 0.9%, 10 mL, Intravenous, PRN    Flushing PICC/Midline Protocol, , , Until Discontinued **AND** sodium chloride 0.9%, 10 mL, Intravenous, Q6H **AND** sodium chloride 0.9%, 10 mL, Intravenous, PRN    Pharmacy to dose Vancomycin consult, , , Once **AND** vancomycin - pharmacy to dose, , Intravenous, pharmacy to manage frequency     Objective:     VITAL SIGNS: 24 HR MIN & MAX LAST   Temp  Min: 98.2 °F (36.8 °C)  Max: 98.5 °F (36.9 °C)  98.4 °F (36.9 °C)   BP  Min: 107/67  Max: 150/76  114/72    Pulse  Min: 71  Max: 81  71    Resp  Min: 19  Max: 20  20    SpO2  Min: 92 %  Max: 99 %  98 %      HT: 5' 7" (170.2 cm)  WT: 125.9 kg (277 lb 9 oz)  BMI: 43.5     Intake/output:  Intake/Output - Last 3 Shifts         06/18 0700 06/19 0659 06/19 0700 06/20 0659 06/20 0700  06/21 0659    P.O.  120     I.V. (mL/kg)       Blood 300      Other 500 250     IV Piggyback 300      Total Intake(mL/kg) 1100 (10.1) 370 (2.9)     Urine (mL/kg/hr) 50 (0) 100 (0)     Emesis/NG output  1     Other 1500 1445     Stool 0 0     Total Output 1550 1546     Net -450 -1176            Stool Occurrence 2 x 4 x             Intake/Output Summary (Last 24 hours) at 6/20/2024 0725  Last data filed at 6/20/2024 0542  Gross per 24 hour   Intake 370 ml   Output 1546 ml   Net -1176 ml           Lines/drains/airway:       Peripheral IV - Single Lumen 06/13/24 0802 20 G Anterior;Proximal;Right Forearm (Active)   Site Assessment Clean;Dry;Intact 06/15/24 0600   Extremity Assessment Distal to IV No abnormal discoloration 06/15/24 0600   Line Status Infusing 06/15/24 0600   Dressing Status " "Clean;Dry;Intact 06/15/24 0600   Dressing Intervention Integrity maintained 06/15/24 0600   Number of days: 2            Urethral Catheter 06/14/24 Latex 16 Fr. (Active)   Site Assessment Clean;Intact 06/15/24 0600   Collection Container Urimeter 06/15/24 0600   Securement Method secured to top of thigh w/ adhesive device 06/15/24 0600   Catheter Care Performed yes 06/15/24 0600   Reason for Continuing Urinary Catheterization Post operative 06/15/24 0600   CAUTI Prevention Bundle Securement Device in place with 1" slack;Intact seal between catheter & drainage tubing;Drainage bag/urimeter off the floor;Sheeting clip in use;No dependent loops or kinks;Drainage bag/urimeter not overfilled (<2/3 full);Drainage bag/urimeter below bladder 06/14/24 2000   Number of days: 1       Physical examination:  Gen: Awake, alert, oriented  CV: RR  Resp: NWOB  Ext: moving all extremities spontaneously and purposefully  Neuro: CN II-XII grossly intact  Skin/wounds: R groin with packing in place.    Labs:  Renal:  Recent Labs     06/18/24 0224 06/18/24 0757 06/19/24 0517   BUN 32.0* 34.6* 25.1*   CREATININE 4.79* 5.08* 4.03*     No results for input(s): "LACTIC" in the last 72 hours.  FENGI:  Recent Labs     06/18/24 0224 06/18/24 0757 06/19/24  0517   * 131* 132*   K 3.7 4.1 4.0   CL 95* 95* 100   CO2 24 23 23   CALCIUM 8.6 7.8* 7.7*   ALBUMIN 1.9* 1.8* 1.8*   BILITOT 3.6* 3.7* 3.3*   AST 25 23 17   ALKPHOS 131 126 129   ALT 30 27 22     Heme:  Recent Labs     06/18/24 0757 06/19/24  0517   HGB 7.6* 8.3*   HCT 23.1* 24.5*    386     ID:  Recent Labs     06/18/24 0757 06/19/24  0517   WBC 20.99* 26.7  26.72*     CBG:  Recent Labs     06/18/24 0224 06/18/24 0757 06/19/24  0517   GLUCOSE 78 69* 81      Cardiovascular:  No results for input(s): "TROPONINI", "CKTOTAL", "CKMB", "BNP" in the last 168 hours.  I have reviewed all pertinent lab results within the past 24 hours.    Imaging:  CT Head Without Contrast "   Final Result      1. No acute intracranial abnormality.   2. Chronic microvascular ischemic changes.         Electronically signed by: Marlin Gonzalez   Date:    06/18/2024   Time:    07:43      CT Chest Abdomen Pelvis With IV Contrast (XPD) NO Oral Contrast   Final Result      Interval development of gas within the left perineum.  Necrotizing fasciitis is not excluded.  There is mild haziness about the duodenum.  Mild duodenitis is not excluded.  This possibly relates to mild stranding seen in the retroperitoneum which may be inflammatory.  Small bilateral effusions.      Possible necrotizing fasciitis reported to Prashanth prior to interpretation.         Electronically signed by: Ez Nevarez   Date:    06/18/2024   Time:    07:51      X-Ray Chest 1 View   Final Result      As above.         Electronically signed by: Ez Nevarez   Date:    06/18/2024   Time:    07:17      X-Ray Chest 1 View   Final Result      Optimal placement of hemodialysis catheter.         Electronically signed by: Garland Bowie   Date:    06/17/2024   Time:    18:14      US Retroperitoneal Complete   Final Result      No significant sonographic abnormality of the kidneys.         Electronically signed by: Issa Luna   Date:    06/17/2024   Time:    11:11      US Abdomen Limited   Final Result      1. Mild hepatomegaly.   2. Status post cholecystectomy.  No biliary ductal dilatation.         Electronically signed by: Issa Luna   Date:    06/17/2024   Time:    09:58      CTA Chest Non-Coronary (PE Studies)   Final Result      No pulmonary embolism seen      Right lower lobe atelectasis and small right-sided pleural effusion         Electronically signed by: Ayush Barbour   Date:    06/13/2024   Time:    09:58      CT Abdomen Pelvis With IV Contrast NO Oral Contrast   Final Result      Area of cellulitis involving the perineum on the left side extending to the medial gluteal fold with a area of thickening and inflammatory changes in  the labia on the left side with a area of hypoattenuation seen within the labia on the left side concerning for possible developing abscess.  Follow-up is recommended.      Right lower lobe atelectasis and right-sided pleural effusion         Electronically signed by: Ayush Barbour   Date:    06/13/2024   Time:    10:38         I have reviewed all pertinent imaging results/findings within the past 24 hours.    Micro/Path/Other:  Microbiology Results (last 7 days)       Procedure Component Value Units Date/Time    Tissue Culture - Aerobic [[id number]]  (Abnormal) Collected: 06/18/24 1223    Order Status: Completed Specimen: Tissue from Groin Updated: 06/20/24 0713     Tissue - Aerobic Culture Few Staphylococcus aureus    Gram Stain [[id number]] Collected: 06/18/24 1223    Order Status: Completed Specimen: Tissue from Groin Updated: 06/18/24 1431     GRAM STAIN Few WBC observed      Rare Gram positive cocci    Anaerobic Culture [[id number]] Collected: 06/18/24 1223    Order Status: Sent Specimen: Tissue from Groin Updated: 06/18/24 1407    Blood culture #1 **CANNOT BE ORDERED STAT** [[id number]]  (Normal) Collected: 06/13/24 0832    Order Status: Completed Specimen: Blood from Hand, Left Updated: 06/18/24 1300     Blood Culture No Growth at 5 days    Blood culture #2 **CANNOT BE ORDERED STAT** [[id number]]  (Normal) Collected: 06/13/24 0832    Order Status: Completed Specimen: Blood from Antecubital, Left Updated: 06/18/24 1300     Blood Culture No Growth at 5 days    Fungal Culture [[id number]] Collected: 06/18/24 1223    Order Status: Sent Specimen: Tissue from Groin Updated: 06/18/24 1233    Wound Culture [[id number]]  (Abnormal)  (Susceptibility) Collected: 06/13/24 0917    Order Status: Completed Specimen: Abscess from Perineum Updated: 06/15/24 0903     Wound Culture Moderate Methicillin resistant Staphylococcus aureus           Pathology Results  (Last 7 days)      None             Assessment & Plan:   57yo F  with DM, HTN, obesity, and asthma, presenting with a R groin soft tissue infection. Initially, there was concern for NSTI and patient was taken to the OR urgently for debridement. She tolerated this well, and there was not evidence of NSTI. She is now s/p R groin incision, drainage, and debridement on 6/14. S/p further debridement in OR 6/18.     - Will return to examine wound and replace packing  - PRN pain control  - Continue antibiotics, follow up new wound cultures   - Recommend PT/OT and bedside commode to avoid stool contamination of wound   - Recommend holding stool softeners     Any Saxena MD  [employer] General Surgery, PGY-4

## 2024-06-20 NOTE — PLAN OF CARE
Problem: Physical Therapy  Goal: Physical Therapy Goal  Description: Goals to be met by: 2024     Patient will increase functional independence with mobility by performin. Supine to sit with MInimal Assistance  2. Sit to supine with MInimal Assistance  3. Sitting at edge of bed x10 minutes with Stand-by Assistance  4. Pt with perform sit to stand transfer with RW Mod I.   5. Pt able to amb 150 ft with RW mod I.     Outcome: Progressing

## 2024-06-21 ENCOUNTER — ANESTHESIA EVENT (OUTPATIENT)
Dept: SURGERY | Facility: HOSPITAL | Age: 59
End: 2024-06-21

## 2024-06-21 ENCOUNTER — ANESTHESIA (OUTPATIENT)
Dept: SURGERY | Facility: HOSPITAL | Age: 59
End: 2024-06-21

## 2024-06-21 LAB
ALBUMIN SERPL-MCNC: 1.7 G/DL (ref 3.5–5)
BACTERIA SPEC ANAEROBE CULT: NORMAL
BACTERIA TISS AEROBE CULT: ABNORMAL
BUN SERPL-MCNC: 24.1 MG/DL (ref 9.8–20.1)
CALCIUM SERPL-MCNC: 7.6 MG/DL (ref 8.4–10.2)
CHLORIDE SERPL-SCNC: 98 MMOL/L (ref 98–107)
CO2 SERPL-SCNC: 21 MMOL/L (ref 22–29)
CREAT SERPL-MCNC: 4.79 MG/DL (ref 0.55–1.02)
ERYTHROCYTE [DISTWIDTH] IN BLOOD BY AUTOMATED COUNT: 16.3 % (ref 11.5–17)
GFR SERPLBLD CREATININE-BSD FMLA CKD-EPI: 10 ML/MIN/1.73/M2
GLUCOSE SERPL-MCNC: 216 MG/DL (ref 74–100)
HCT VFR BLD AUTO: 24.7 % (ref 37–47)
HGB BLD-MCNC: 8.1 G/DL (ref 12–16)
MCH RBC QN AUTO: 30.3 PG (ref 27–31)
MCHC RBC AUTO-ENTMCNC: 32.8 G/DL (ref 33–36)
MCV RBC AUTO: 92.5 FL (ref 80–94)
NRBC BLD AUTO-RTO: 0.3 %
PHOSPHATE SERPL-MCNC: 4.4 MG/DL (ref 2.3–4.7)
PLATELET # BLD AUTO: 412 X10(3)/MCL (ref 130–400)
PMV BLD AUTO: 9.6 FL (ref 7.4–10.4)
POCT GLUCOSE: 168 MG/DL (ref 70–110)
POCT GLUCOSE: 191 MG/DL (ref 70–110)
POCT GLUCOSE: 253 MG/DL (ref 70–110)
POTASSIUM SERPL-SCNC: 4.4 MMOL/L (ref 3.5–5.1)
RBC # BLD AUTO: 2.67 X10(6)/MCL (ref 4.2–5.4)
SODIUM SERPL-SCNC: 134 MMOL/L (ref 136–145)
VANCOMYCIN SERPL-MCNC: 18 UG/ML (ref 15–20)
WBC # BLD AUTO: 30.52 X10(3)/MCL (ref 4.5–11.5)

## 2024-06-21 PROCEDURE — 5A09357 ASSISTANCE WITH RESPIRATORY VENTILATION, LESS THAN 24 CONSECUTIVE HOURS, CONTINUOUS POSITIVE AIRWAY PRESSURE: ICD-10-PCS | Performed by: INTERNAL MEDICINE

## 2024-06-21 PROCEDURE — 80202 ASSAY OF VANCOMYCIN: CPT | Performed by: STUDENT IN AN ORGANIZED HEALTH CARE EDUCATION/TRAINING PROGRAM

## 2024-06-21 PROCEDURE — 27201423 OPTIME MED/SURG SUP & DEVICES STERILE SUPPLY: Performed by: SURGERY

## 2024-06-21 PROCEDURE — 36415 COLL VENOUS BLD VENIPUNCTURE: CPT | Performed by: STUDENT IN AN ORGANIZED HEALTH CARE EDUCATION/TRAINING PROGRAM

## 2024-06-21 PROCEDURE — 63600175 PHARM REV CODE 636 W HCPCS: Performed by: INTERNAL MEDICINE

## 2024-06-21 PROCEDURE — 36000706: Performed by: SURGERY

## 2024-06-21 PROCEDURE — 63600175 PHARM REV CODE 636 W HCPCS: Performed by: NURSE PRACTITIONER

## 2024-06-21 PROCEDURE — 63600175 PHARM REV CODE 636 W HCPCS: Mod: JZ,JG | Performed by: INTERNAL MEDICINE

## 2024-06-21 PROCEDURE — 71000039 HC RECOVERY, EACH ADD'L HOUR: Performed by: SURGERY

## 2024-06-21 PROCEDURE — 63600175 PHARM REV CODE 636 W HCPCS: Performed by: STUDENT IN AN ORGANIZED HEALTH CARE EDUCATION/TRAINING PROGRAM

## 2024-06-21 PROCEDURE — 27000207 HC ISOLATION

## 2024-06-21 PROCEDURE — 63600175 PHARM REV CODE 636 W HCPCS: Performed by: HOSPITALIST

## 2024-06-21 PROCEDURE — 94760 N-INVAS EAR/PLS OXIMETRY 1: CPT

## 2024-06-21 PROCEDURE — 25000242 PHARM REV CODE 250 ALT 637 W/ HCPCS: Performed by: ANESTHESIOLOGY

## 2024-06-21 PROCEDURE — 21400001 HC TELEMETRY ROOM

## 2024-06-21 PROCEDURE — 27100171 HC OXYGEN HIGH FLOW UP TO 24 HOURS

## 2024-06-21 PROCEDURE — 37000009 HC ANESTHESIA EA ADD 15 MINS: Performed by: SURGERY

## 2024-06-21 PROCEDURE — 94660 CPAP INITIATION&MGMT: CPT

## 2024-06-21 PROCEDURE — 85027 COMPLETE CBC AUTOMATED: CPT

## 2024-06-21 PROCEDURE — 90935 HEMODIALYSIS ONE EVALUATION: CPT

## 2024-06-21 PROCEDURE — 25000003 PHARM REV CODE 250: Performed by: INTERNAL MEDICINE

## 2024-06-21 PROCEDURE — 25000003 PHARM REV CODE 250: Performed by: NURSE ANESTHETIST, CERTIFIED REGISTERED

## 2024-06-21 PROCEDURE — 36000707: Performed by: SURGERY

## 2024-06-21 PROCEDURE — 25000003 PHARM REV CODE 250: Performed by: STUDENT IN AN ORGANIZED HEALTH CARE EDUCATION/TRAINING PROGRAM

## 2024-06-21 PROCEDURE — 71000033 HC RECOVERY, INTIAL HOUR: Performed by: SURGERY

## 2024-06-21 PROCEDURE — 36415 COLL VENOUS BLD VENIPUNCTURE: CPT

## 2024-06-21 PROCEDURE — 80069 RENAL FUNCTION PANEL: CPT | Performed by: STUDENT IN AN ORGANIZED HEALTH CARE EDUCATION/TRAINING PROGRAM

## 2024-06-21 PROCEDURE — A4216 STERILE WATER/SALINE, 10 ML: HCPCS | Performed by: INTERNAL MEDICINE

## 2024-06-21 PROCEDURE — 27000190 HC CPAP FULL FACE MASK W/VALVE

## 2024-06-21 PROCEDURE — 63600175 PHARM REV CODE 636 W HCPCS: Mod: JZ,JG | Performed by: SURGERY

## 2024-06-21 PROCEDURE — 37000008 HC ANESTHESIA 1ST 15 MINUTES: Performed by: SURGERY

## 2024-06-21 PROCEDURE — 63600175 PHARM REV CODE 636 W HCPCS: Performed by: ANESTHESIOLOGY

## 2024-06-21 PROCEDURE — 63600175 PHARM REV CODE 636 W HCPCS: Performed by: NURSE ANESTHETIST, CERTIFIED REGISTERED

## 2024-06-21 PROCEDURE — 99900035 HC TECH TIME PER 15 MIN (STAT)

## 2024-06-21 PROCEDURE — 17999 UNLISTD PX SKN MUC MEMB SUBQ: CPT | Mod: ,,, | Performed by: SURGERY

## 2024-06-21 RX ORDER — IBUPROFEN 200 MG
24 TABLET ORAL
Status: DISCONTINUED | OUTPATIENT
Start: 2024-06-21 | End: 2024-07-09 | Stop reason: HOSPADM

## 2024-06-21 RX ORDER — HYDROMORPHONE HYDROCHLORIDE 2 MG/ML
0.2 INJECTION, SOLUTION INTRAMUSCULAR; INTRAVENOUS; SUBCUTANEOUS EVERY 5 MIN PRN
Status: DISCONTINUED | OUTPATIENT
Start: 2024-06-21 | End: 2024-06-21 | Stop reason: HOSPADM

## 2024-06-21 RX ORDER — PROPOFOL 10 MG/ML
VIAL (ML) INTRAVENOUS
Status: DISCONTINUED | OUTPATIENT
Start: 2024-06-21 | End: 2024-06-21

## 2024-06-21 RX ORDER — DIPHENHYDRAMINE HYDROCHLORIDE 50 MG/ML
25 INJECTION INTRAMUSCULAR; INTRAVENOUS EVERY 6 HOURS PRN
Status: DISCONTINUED | OUTPATIENT
Start: 2024-06-21 | End: 2024-06-21 | Stop reason: HOSPADM

## 2024-06-21 RX ORDER — SODIUM CHLORIDE, SODIUM GLUCONATE, SODIUM ACETATE, POTASSIUM CHLORIDE AND MAGNESIUM CHLORIDE 30; 37; 368; 526; 502 MG/100ML; MG/100ML; MG/100ML; MG/100ML; MG/100ML
INJECTION, SOLUTION INTRAVENOUS CONTINUOUS
Status: CANCELLED | OUTPATIENT
Start: 2024-06-21 | End: 2024-07-21

## 2024-06-21 RX ORDER — ONDANSETRON HYDROCHLORIDE 2 MG/ML
4 INJECTION, SOLUTION INTRAVENOUS DAILY PRN
Status: DISCONTINUED | OUTPATIENT
Start: 2024-06-21 | End: 2024-06-21 | Stop reason: HOSPADM

## 2024-06-21 RX ORDER — ONDANSETRON HYDROCHLORIDE 2 MG/ML
INJECTION, SOLUTION INTRAMUSCULAR; INTRAVENOUS
Status: DISCONTINUED | OUTPATIENT
Start: 2024-06-21 | End: 2024-06-21

## 2024-06-21 RX ORDER — DEXAMETHASONE SODIUM PHOSPHATE 4 MG/ML
INJECTION, SOLUTION INTRA-ARTICULAR; INTRALESIONAL; INTRAMUSCULAR; INTRAVENOUS; SOFT TISSUE
Status: DISCONTINUED | OUTPATIENT
Start: 2024-06-21 | End: 2024-06-21

## 2024-06-21 RX ORDER — MIDAZOLAM HYDROCHLORIDE 2 MG/2ML
2 INJECTION, SOLUTION INTRAMUSCULAR; INTRAVENOUS ONCE AS NEEDED
Status: CANCELLED | OUTPATIENT
Start: 2024-06-21 | End: 2035-11-18

## 2024-06-21 RX ORDER — NALOXONE HCL 0.4 MG/ML
VIAL (ML) INJECTION
Status: DISPENSED
Start: 2024-06-21 | End: 2024-06-22

## 2024-06-21 RX ORDER — ALBUTEROL SULFATE 0.83 MG/ML
2.5 SOLUTION RESPIRATORY (INHALATION) ONCE
Status: COMPLETED | OUTPATIENT
Start: 2024-06-21 | End: 2024-06-21

## 2024-06-21 RX ORDER — ROCURONIUM BROMIDE 10 MG/ML
INJECTION, SOLUTION INTRAVENOUS
Status: DISCONTINUED | OUTPATIENT
Start: 2024-06-21 | End: 2024-06-21

## 2024-06-21 RX ORDER — IBUPROFEN 200 MG
16 TABLET ORAL
Status: DISCONTINUED | OUTPATIENT
Start: 2024-06-21 | End: 2024-07-09 | Stop reason: HOSPADM

## 2024-06-21 RX ORDER — LIDOCAINE HYDROCHLORIDE 20 MG/ML
INJECTION, SOLUTION EPIDURAL; INFILTRATION; INTRACAUDAL; PERINEURAL
Status: DISCONTINUED | OUTPATIENT
Start: 2024-06-21 | End: 2024-06-21

## 2024-06-21 RX ORDER — PHENYLEPHRINE HCL IN 0.9% NACL 1 MG/10 ML
SYRINGE (ML) INTRAVENOUS
Status: DISCONTINUED | OUTPATIENT
Start: 2024-06-21 | End: 2024-06-21

## 2024-06-21 RX ORDER — LIDOCAINE HYDROCHLORIDE 10 MG/ML
1 INJECTION, SOLUTION EPIDURAL; INFILTRATION; INTRACAUDAL; PERINEURAL ONCE
Status: CANCELLED | OUTPATIENT
Start: 2024-06-21 | End: 2024-06-21

## 2024-06-21 RX ORDER — BUPIVACAINE HYDROCHLORIDE 2.5 MG/ML
INJECTION, SOLUTION EPIDURAL; INFILTRATION; INTRACAUDAL
Status: DISCONTINUED | OUTPATIENT
Start: 2024-06-21 | End: 2024-06-21 | Stop reason: HOSPADM

## 2024-06-21 RX ORDER — INSULIN GLARGINE 100 [IU]/ML
10 INJECTION, SOLUTION SUBCUTANEOUS NIGHTLY
Status: DISCONTINUED | OUTPATIENT
Start: 2024-06-21 | End: 2024-06-27

## 2024-06-21 RX ORDER — LABETALOL HYDROCHLORIDE 5 MG/ML
10 INJECTION, SOLUTION INTRAVENOUS ONCE
Status: COMPLETED | OUTPATIENT
Start: 2024-06-21 | End: 2024-06-21

## 2024-06-21 RX ORDER — INSULIN ASPART 100 [IU]/ML
0-15 INJECTION, SOLUTION INTRAVENOUS; SUBCUTANEOUS
Status: DISCONTINUED | OUTPATIENT
Start: 2024-06-22 | End: 2024-07-09 | Stop reason: HOSPADM

## 2024-06-21 RX ORDER — FENTANYL CITRATE 50 UG/ML
INJECTION, SOLUTION INTRAMUSCULAR; INTRAVENOUS
Status: DISCONTINUED | OUTPATIENT
Start: 2024-06-21 | End: 2024-06-21

## 2024-06-21 RX ORDER — GLUCAGON 1 MG
1 KIT INJECTION
Status: DISCONTINUED | OUTPATIENT
Start: 2024-06-21 | End: 2024-07-09 | Stop reason: HOSPADM

## 2024-06-21 RX ORDER — METOCLOPRAMIDE HYDROCHLORIDE 5 MG/ML
10 INJECTION INTRAMUSCULAR; INTRAVENOUS EVERY 10 MIN PRN
Status: DISCONTINUED | OUTPATIENT
Start: 2024-06-21 | End: 2024-06-21 | Stop reason: HOSPADM

## 2024-06-21 RX ORDER — ONDANSETRON 4 MG/1
4 TABLET, ORALLY DISINTEGRATING ORAL ONCE
Status: CANCELLED | OUTPATIENT
Start: 2024-06-21 | End: 2024-06-21

## 2024-06-21 RX ORDER — GLYCOPYRROLATE 0.2 MG/ML
INJECTION INTRAMUSCULAR; INTRAVENOUS
Status: DISCONTINUED | OUTPATIENT
Start: 2024-06-21 | End: 2024-06-21

## 2024-06-21 RX ADMIN — VANCOMYCIN HYDROCHLORIDE 500 MG: 500 INJECTION, POWDER, LYOPHILIZED, FOR SOLUTION INTRAVENOUS at 08:06

## 2024-06-21 RX ADMIN — PIPERACILLIN SODIUM AND TAZOBACTAM SODIUM 4.5 G: 4; .5 INJECTION, POWDER, LYOPHILIZED, FOR SOLUTION INTRAVENOUS at 10:06

## 2024-06-21 RX ADMIN — PIPERACILLIN SODIUM AND TAZOBACTAM SODIUM 4.5 G: 4; .5 INJECTION, POWDER, LYOPHILIZED, FOR SOLUTION INTRAVENOUS at 09:06

## 2024-06-21 RX ADMIN — SODIUM CHLORIDE, PRESERVATIVE FREE 10 ML: 5 INJECTION INTRAVENOUS at 05:06

## 2024-06-21 RX ADMIN — SODIUM CHLORIDE, SODIUM GLUCONATE, SODIUM ACETATE, POTASSIUM CHLORIDE AND MAGNESIUM CHLORIDE: 526; 502; 368; 37; 30 INJECTION, SOLUTION INTRAVENOUS at 10:06

## 2024-06-21 RX ADMIN — HYDROMORPHONE HYDROCHLORIDE 0.2 MG: 2 INJECTION INTRAMUSCULAR; INTRAVENOUS; SUBCUTANEOUS at 12:06

## 2024-06-21 RX ADMIN — CLINDAMYCIN PHOSPHATE 600 MG: 600 INJECTION, SOLUTION INTRAVENOUS at 01:06

## 2024-06-21 RX ADMIN — ROCURONIUM BROMIDE 50 MG: 10 SOLUTION INTRAVENOUS at 10:06

## 2024-06-21 RX ADMIN — ALBUTEROL SULFATE 2.5 MG: 2.5 SOLUTION RESPIRATORY (INHALATION) at 01:06

## 2024-06-21 RX ADMIN — ROCURONIUM BROMIDE 50 MG: 10 SOLUTION INTRAVENOUS at 11:06

## 2024-06-21 RX ADMIN — DEXAMETHASONE SODIUM PHOSPHATE 4 MG: 4 INJECTION, SOLUTION INTRA-ARTICULAR; INTRALESIONAL; INTRAMUSCULAR; INTRAVENOUS; SOFT TISSUE at 10:06

## 2024-06-21 RX ADMIN — FENTANYL CITRATE 100 MCG: 50 INJECTION, SOLUTION INTRAMUSCULAR; INTRAVENOUS at 10:06

## 2024-06-21 RX ADMIN — SODIUM CHLORIDE, PRESERVATIVE FREE 10 ML: 5 INJECTION INTRAVENOUS at 06:06

## 2024-06-21 RX ADMIN — PIPERACILLIN SODIUM AND TAZOBACTAM SODIUM 4.5 G: 4; .5 INJECTION, POWDER, LYOPHILIZED, FOR SOLUTION INTRAVENOUS at 08:06

## 2024-06-21 RX ADMIN — ONDANSETRON 4 MG: 2 INJECTION INTRAMUSCULAR; INTRAVENOUS at 10:06

## 2024-06-21 RX ADMIN — TRAMADOL HYDROCHLORIDE 50 MG: 50 TABLET, COATED ORAL at 07:06

## 2024-06-21 RX ADMIN — Medication 100 MCG: at 11:06

## 2024-06-21 RX ADMIN — CLINDAMYCIN PHOSPHATE 600 MG: 600 INJECTION, SOLUTION INTRAVENOUS at 06:06

## 2024-06-21 RX ADMIN — SUGAMMADEX 200 MG: 100 INJECTION, SOLUTION INTRAVENOUS at 12:06

## 2024-06-21 RX ADMIN — LIDOCAINE HYDROCHLORIDE 50 MG: 20 INJECTION, SOLUTION INTRAVENOUS at 10:06

## 2024-06-21 RX ADMIN — INSULIN GLARGINE 10 UNITS: 100 INJECTION, SOLUTION SUBCUTANEOUS at 08:06

## 2024-06-21 RX ADMIN — ENOXAPARIN SODIUM 40 MG: 40 INJECTION SUBCUTANEOUS at 06:06

## 2024-06-21 RX ADMIN — METOCLOPRAMIDE 10 MG: 5 INJECTION, SOLUTION INTRAMUSCULAR; INTRAVENOUS at 07:06

## 2024-06-21 RX ADMIN — LABETALOL HYDROCHLORIDE 10 MG: 5 INJECTION, SOLUTION INTRAVENOUS at 02:06

## 2024-06-21 RX ADMIN — HUMAN INSULIN 3 UNITS: 100 INJECTION, SOLUTION SUBCUTANEOUS at 10:06

## 2024-06-21 RX ADMIN — HEPARIN SODIUM 3000 UNITS: 1000 INJECTION, SOLUTION INTRAVENOUS; SUBCUTANEOUS at 05:06

## 2024-06-21 RX ADMIN — GLYCOPYRROLATE 0.2 MG: 0.2 INJECTION INTRAMUSCULAR; INTRAVENOUS at 10:06

## 2024-06-21 RX ADMIN — PROPOFOL 150 MG: 10 INJECTION, EMULSION INTRAVENOUS at 10:06

## 2024-06-21 RX ADMIN — SODIUM CHLORIDE, PRESERVATIVE FREE 10 ML: 5 INJECTION INTRAVENOUS at 12:06

## 2024-06-21 RX ADMIN — CLINDAMYCIN PHOSPHATE 600 MG: 600 INJECTION, SOLUTION INTRAVENOUS at 08:06

## 2024-06-21 RX ADMIN — SUGAMMADEX 200 MG: 100 INJECTION, SOLUTION INTRAVENOUS at 11:06

## 2024-06-21 NOTE — BRIEF OP NOTE
[facility] - Periop Services  Brief Operative Note    SUMMARY     Surgery Date: 6/21/2024     Surgeons and Role:     * Arnaud Vital MD - Primary    Assisting Surgeon: Pamela Sr, PGY1    Pre-op Diagnosis:  Perineal abscess [L02.215]    Post-op Diagnosis:  Post-Op Diagnosis Codes:     * Perineal abscess [L02.215]    Procedure(s) (LRB):  DEBRIDEMENT, WOUND (Left)    Anesthesia: General    Implants:  * No implants in log *    Operative Findings: clean wound bed with granulation tissue     Estimated Blood Loss: minimal     Estimated Blood Loss has been documented.         Specimens:   Specimen (24h ago, onward)      None            [id number]    Dispo: extubated in OR and brought to PACU in stable condition   Okay for diet   Do not suspect wound to be source of infection, recommend further infectious work up for rising leukocytosis     Pamela Sr MD  [employer] General Surgery, PGY-1

## 2024-06-21 NOTE — PT/OT/SLP PROGRESS
Physical Therapy      Patient Name:  Greer Kahn   MRN:  [id number]    Patient not seen today x2 attempts 1056 pt off floor for debridement & 1342 pt still in recovery. Will follow-up as schedule allows.

## 2024-06-21 NOTE — PROGRESS NOTES
[facility] Medicine Progress Note      Chief Complaint: 1 day history of fever and multiple painful lesions to her thighs and buttocks       HPI: (personally reviewed by me and is documented from initial H&P)     58-year-old female whose history includes hypertension, diabetes, asthma and dyslipidemia.     Presented to the ED with complaints of a 1 day history of fever and multiple painful lesions to her thighs and buttocks. First noticed them approximately 3 days ago.      VS on arrival: T 101, P 124, R 18, B/P 189/132, Sats 94% on room air.  Initial labs include WBC 27.06 hemoglobin 11.8, hematocrit 35.8, platelets 264, sodium 131, potassium 3.8, chloride 94, bicarb 23, BUN 9.7, creatinine 0.98, glucose 353 and otherwise unremarkable.  Lactic acid 1.4.  COVID and flu not detected.  UA with significant proteinuria, glucosuria, ketones and few bacteria and yeast.  No white blood cells noted.     T abdomen and pelvis with contrast shows an area of cellulitis involving the perineum on the left side extending to the medial gluteal fold with an area of thickening and inflammatory changes in the labia on the left side with an area of hypoattenuation seen within the labia on the left side concerning for possible developing abscess.  CT chest with contrast shows a right lower lobe atelectasis and small right-sided pleural effusion with no evidence of pulmonary embolism.  Medications given in the ER include vancomycin and Rocephin.      Interval History:      No overnight events reported.  No new complaints reported.  Patient tells me she is going for another surgery this morning.  She seems a little bit nervous but overall doing okay.  No family present at bedside during my evaluation.    Objective Assessment:  Physical Exam      Vital signs have been personally reviewed by me   General: Appears comfortable, no acute distress.  Much better mood today.  Huff catheter in place  Awake,  alert oriented.     Integumentary: Warm, dry, intact.  Musculoskeletal: Purposeful movement noted.     Respiratory: No accessory muscle use. Breath sounds are equal.  Cardiovascular: Regular rate.       VITAL SIGNS: 24 HRS MIN & MAX LAST   Temp  Min: 97.6 °F (36.4 °C)  Max: 98.4 °F (36.9 °C) 97.9 °F (36.6 °C)   BP  Min: 106/68  Max: 147/77 (!) 147/77   Pulse  Min: 72  Max: 80  79   Resp  Min: 18  Max: 20 18   SpO2  Min: 93 %  Max: 98 % 96 %     CT Chest Abdomen Pelvis With IV Contrast (XPD) NO Oral Contrast  Narrative: EXAMINATION:  CT CHEST ABDOMEN PELVIS WITH IV CONTRAST (XPD)    CLINICAL HISTORY:  Sepsis;Sepsis, unspecified organism    TECHNIQUE:  Axial images of the chest, abdomen, and pelvis were obtained With Contrast. Sagittal and coronal reconstructed images were available for review.    Automatic exposure control was utilized to reduce the patient's radiation dose.    DLP = 1724    COMPARISON:  06/13/2024    FINDINGS:  Small bilateral pleural effusions right greater than left.  No acute osseous abnormality.  The bladder is decompressed around a Huff catheter.  There is no mediastinal adenopathy.  The by lateral axillar clear.  The heart demonstrates a normal CT appearance.  Status post cholecystectomy.  Bilateral adrenal glands, kidneys, spleen, are unremarkable.  There is mild haziness about the duodenum which may be related to motion artifact.  Mild duodenitis is not excluded.  There is no free air.  There is gas noted within the soft tissues of the left perineum.  Necrotizing fasciitis is not excluded.  Impression: Interval development of gas within the left perineum.  Necrotizing fasciitis is not excluded.  There is mild haziness about the duodenum.  Mild duodenitis is not excluded.  This possibly relates to mild stranding seen in the retroperitoneum which may be inflammatory.  Small bilateral effusions.    Possible necrotizing fasciitis reported to Prashanth prior to interpretation.    Electronically  signed by: Ez Nevarez  Date:    06/18/2024  Time:    07:51  CT Head Without Contrast  Narrative: EXAMINATION:  CT HEAD WITHOUT CONTRAST    CLINICAL HISTORY:  Mental status change, unknown cause;    TECHNIQUE:  Axial scans were obtained from skull base to the vertex.    Coronal and sagittal reconstructions obtained from the axial data.    Automatic exposure control was utilized to limit radiation dose.    Contrast: None    Radiation Dose:    Total DLP: 1011 mGy*cm    COMPARISON:  MRI brain dated 04/30/2023    FINDINGS:  There is no acute intracranial hemorrhage or edema. The gray-white matter differentiation is preserved.  Scattered hypodensities in the subcortical white matter likely represent chronic microvascular ischemic changes.    There is no mass effect or midline shift. The ventricles and sulci are normal in size. The basal cisterns are patent. There is no abnormal extra-axial fluid collection.  There is no definite displaced fracture.  Impression: 1. No acute intracranial abnormality.  2. Chronic microvascular ischemic changes.    Electronically signed by: Marlin Gonzalez  Date:    06/18/2024  Time:    07:43  X-Ray Chest 1 View  Narrative: EXAMINATION:  XR CHEST 1 VIEW    CLINICAL HISTORY:  Unresponsive;  Cutaneous abscess of perineum    TECHNIQUE:  Single view of the chest    COMPARISON:  06/17/2024    FINDINGS:  Right-sided central line projects over the right atrium.  Prominent interstitial markings throughout the right similar to prior.  The left hemithorax is clear.  Impression: As above.    Electronically signed by: Ez Nevarez  Date:    06/18/2024  Time:    07:17    Recent Labs   Lab 06/18/24  0757 06/19/24  0517 06/21/24  0621   WBC 20.99* 26.7  26.72* 30.52*   RBC 2.53* 2.78* 2.67*   HGB 7.6* 8.3* 8.1*   HCT 23.1* 24.5* 24.7*   MCV 91.3 88.1 92.5   MCH 30.0 29.9 30.3   MCHC 32.9* 33.9 32.8*   RDW 13.7 15.2 16.3    386 412*   MPV 10.1 9.8 9.6       Recent Labs   Lab 06/18/24  0224  06/18/24  0707 06/18/24  0757 06/19/24  0517   *  --  131* 132*   K 3.7  --  4.1 4.0   CL 95*  --  95* 100   CO2 24  --  23 23   BUN 32.0*  --  34.6* 25.1*   CREATININE 4.79*  --  5.08* 4.03*   CALCIUM 8.6  --  7.8* 7.7*   PH  --  7.440  --   --    ALBUMIN 1.9*  --  1.8* 1.8*   ALKPHOS 131  --  126 129   ALT 30  --  27 22   AST 25  --  23 17   BILITOT 3.6*  --  3.7* 3.3*     Microbiology Results (last 7 days)       Procedure Component Value Units Date/Time    Anaerobic Culture [[id number]] Collected: 06/18/24 1223    Order Status: Completed Specimen: Tissue from Groin Updated: 06/20/24 0839     Anaerobe Culture No Anaerobes Isolated    Tissue Culture - Aerobic [[id number]]  (Abnormal) Collected: 06/18/24 1223    Order Status: Completed Specimen: Tissue from Groin Updated: 06/20/24 0713     Tissue - Aerobic Culture Few Staphylococcus aureus    Gram Stain [[id number]] Collected: 06/18/24 1223    Order Status: Completed Specimen: Tissue from Groin Updated: 06/18/24 1431     GRAM STAIN Few WBC observed      Rare Gram positive cocci    Blood culture #1 **CANNOT BE ORDERED STAT** [[id number]]  (Normal) Collected: 06/13/24 0832    Order Status: Completed Specimen: Blood from Hand, Left Updated: 06/18/24 1300     Blood Culture No Growth at 5 days    Blood culture #2 **CANNOT BE ORDERED STAT** [[id number]]  (Normal) Collected: 06/13/24 0832    Order Status: Completed Specimen: Blood from Antecubital, Left Updated: 06/18/24 1300     Blood Culture No Growth at 5 days    Fungal Culture [[id number]] Collected: 06/18/24 1223    Order Status: Sent Specimen: Tissue from Groin Updated: 06/18/24 1233    Wound Culture [[id number]]  (Abnormal)  (Susceptibility) Collected: 06/13/24 0917    Order Status: Completed Specimen: Abscess from Perineum Updated: 06/15/24 0903     Wound Culture Moderate Methicillin resistant Staphylococcus aureus               Medications for Hospital Course         Scheduled Med:   clindamycin IV (PEDS and  ADULTS)  600 mg Intravenous Q8H    enoxaparin  40 mg Subcutaneous Daily    lactated ringers  1,000 mL Intravenous Once    lactated ringers  1,000 mL Intravenous Once    mupirocin   Nasal BID    piperacillin-tazobactam (Zosyn) IV (PEDS and ADULTS) (extended infusion is not appropriate)  4.5 g Intravenous Q12H    sodium chloride 0.9%  10 mL Intravenous Q6H     PRN Meds:    Current Facility-Administered Medications:     0.9%  NaCl infusion (for blood administration), , Intravenous, Q24H PRN    acetaminophen, 650 mg, Oral, Q4H PRN    albuterol-ipratropium, 3 mL, Nebulization, Q4H PRN    aluminum-magnesium hydroxide-simethicone, 30 mL, Oral, QID PRN    bisacodyL, 10 mg, Rectal, Daily PRN    calcium carbonate, 1,000 mg, Oral, TID PRN    dextrose 10%, 12.5 g, Intravenous, PRN    dextrose 10%, 25 g, Intravenous, PRN    glucagon (human recombinant), 1 mg, Intramuscular, PRN    glucose, 16 g, Oral, PRN    glucose, 24 g, Oral, PRN    heparin (porcine), 3,000 Units, Intravenous, PRN    ibuprofen, 400 mg, Oral, Q6H PRN    insulin aspart U-100, 0-15 Units, Subcutaneous, Q4H PRN    metoclopramide, 10 mg, Intravenous, Q8H PRN    polyethylene glycol, 17 g, Oral, BID PRN    prochlorperazine, 2.5 mg, Intravenous, Q6H PRN    prochlorperazine, 5 mg, Intravenous, Q6H PRN    senna-docusate 8.6-50 mg, 2 tablet, Oral, BID PRN    sodium chloride 0.9%, 10 mL, Intravenous, PRN    Flushing PICC/Midline Protocol, , , Until Discontinued **AND** sodium chloride 0.9%, 10 mL, Intravenous, Q6H **AND** sodium chloride 0.9%, 10 mL, Intravenous, PRN    traMADoL, 50 mg, Oral, Q8H PRN    Pharmacy to dose Vancomycin consult, , , Once **AND** vancomycin - pharmacy to dose, , Intravenous, pharmacy to manage frequency     Assessment and Plan      Perineal cellulitis with necrotizing infection  Uncontrolled type 2 diabetes mellitus with hyperglycemia   Acute Renal failure  DM II uncontrolled  Anemia  Above present on admission         Anticipated discharge and  Disposition when medically stable:    Therapy: PT/OT/Speech    Nutrition: diabetic      ___________________________________________________________________________________________________________________________________    ----Perineal Cellulitis with necrotizing component, Excisional debridement of left perineum groin wound, wound washout   Anaerobic culture negative.  Tissue culture with few staff.  Currently on vancomycin, clindamycin and Zosyn.   Given the extent of patient's infection and plans for surgical debridement, will continue antibiotic therapy as is, follow-up on final culture results and tailor appropriately.     ---acute renal failure, new dialysis, tolerating.     ---uncontrolled hyperglycemia, added 10 units of long-acting insulin therapy at bedtime. maintain fasting blood glucose <180  ---morbidly obese   Continue supportive care  Continue checking vital signs q4hrs.  Reviewed and restarted appropriate home medications.     DVT prophylaxis initiated   Nurse notified to page me if any changes occur     Consults: general surgeon   I have personally reviewed the specialist documentation and/or have spoken to the specialist with regard to the care of this patient; recommendations are noted above.     _______________________________________________________________________________________________________________________________      I have spent 40 minutes on the day of the visit; time spent includes face to face time and non-face to face time preparing to see the patient (eg, review of tests), independently reviewing and interpreting medical records, both past and current; documenting clinical information in the electronic or other health record, and communicating results to the patient/family/caregiver and care coordinator and nursing team.      All diagnosis and differential diagnosis have been reviewed,  interpreted and communicated appropriately to care team. assessment and plan has been  documented; I have personally reviewed the labs and test results that are presently available and pertinent to this hospital course; I have reviewed medical records based upon their availability.    All of the patient's questions have been  addressed and answered. Patient's is agreeable to the above stated plan.   I will continue to monitor closely and make adjustments to medical management as needed.    Deanne Scott, DO   06/21/2024     This note was created with the assistance of Dragon voice recognition software. There may be transcription errors as a result of using this technology however minimal. Effort has been made to assure accuracy of transcription but any obvious errors or omissions should be clarified with the author of the document.

## 2024-06-21 NOTE — PROGRESS NOTES
Nephrology consult follow up note    HPI:      Greer Kahn is a 58 y.o. female admitted on 06/13/2024 with fever 102 reporting possible UTI, boils to right thigh and left buttock.  Lactic acidosis present on admission with WBC 27.0, BUN 9.7, creatinine 0.98.  CT abdomen and pelvis with contrast showed area of cellulitis involving the perineum on the left side extending to the medial gluteal fold and areas of concern within the labia on the left side.  Also noted to have right lower lobe atelectasis via CT chest.  Patient was initiated on vancomycin and Rocephin in ER.     On 06/14 she underwent debridement of left groin and perineum.  Postoperative course complicated by nausea, vomiting and constipation.  She is also developed AUTUMN with rapidly worsening renal indices. Cr 0.98 --> 1.76--> 2.77--> 4.53 at the time of consultation. Urinalysis with low grade proteinuria and no blood.     Patient developed worsening renal function, and anuria.  She was initiated on hemodialysis 06/17/2024.    Interval history:     No acute events overnight.  No new complaints.  Says that her appetite has improved.  She was supposed to go for debridement today.  No chest pain, shortness of breath, vomiting, or lower extremity edema.  She has had intermittent nausea.     Review of Systems:       Past medical, family, surgical, and social history reviewed and unchanged from initial consult note.     Objective:       VITAL SIGNS: 24 HR MIN & MAX LAST    Temp  Min: 97.6 °F (36.4 °C)  Max: 98.4 °F (36.9 °C)  97.9 °F (36.6 °C)        BP  Min: 106/68  Max: 147/77  (!) 147/77     Pulse  Min: 72  Max: 80  79     Resp  Min: 18  Max: 20  18    SpO2  Min: 93 %  Max: 98 %  96 %      GEN:  Well-appearing AAF  CV: RRR +S1,S2 without murmur  PULM: CTAB, unlabored  ABD: Soft, NT/ND abdomen with NABS  EXT: No cyanosis or edema  SKIN: Warm and dry  PSYCH: Awake, alert and appropriately conversant.   Dialysis access:  Right IJ Vas-Cath             Component Value Date/Time     (L) 06/21/2024 0416     (L) 06/19/2024 0517     (L) 06/26/2020 0441     06/25/2020 0615    K 4.4 06/21/2024 0416    K 4.0 06/19/2024 0517    K 3.7 06/26/2020 0441    K 3.5 06/25/2020 0615    CO2 21 (L) 06/21/2024 0416    CO2 23 06/19/2024 0517    CO2 26 06/26/2020 0441    CO2 27 06/25/2020 0615    BUN 24.1 (H) 06/21/2024 0416    BUN 25.1 (H) 06/19/2024 0517    BUN 12 06/26/2020 0441    BUN 9 06/25/2020 0615    CREATININE 4.79 (H) 06/21/2024 0416    CREATININE 4.03 (H) 06/19/2024 0517    CREATININE 0.68 06/26/2020 0441    CREATININE 0.58 06/25/2020 0615    CALCIUM 7.6 (L) 06/21/2024 0416    CALCIUM 7.7 (L) 06/19/2024 0517    CALCIUM 8.2 (L) 06/26/2020 0441    CALCIUM 7.7 (L) 06/25/2020 0615    PHOS 4.4 06/21/2024 0416            Component Value Date/Time    WBC 30.52 (H) 06/21/2024 0621    WBC 26.72 (H) 06/19/2024 0517    WBC 26.7 06/19/2024 0517    WBC 22.65 06/17/2024 0337    HGB 8.1 (L) 06/21/2024 0621    HGB 8.3 (L) 06/19/2024 0517    HCT 24.7 (L) 06/21/2024 0621    HCT 24.5 (L) 06/19/2024 0517    HCT 42.0 04/16/2019 2328    HCT 42.0 04/16/2019 2305     (H) 06/21/2024 0621     06/19/2024 0517         Imaging reviewed      Assessment / Plan:       Active Hospital Problems    Diagnosis  POA    *Perineal abscess [L02.215]  Yes    Sepsis [A41.9]  Yes      Resolved Hospital Problems   No resolved problems to display.       Acute Renal failure now anuric - multifactorial secondary to sepsis, contrast exposure, NSAID use inpatient, vancomycin toxicity  -dialysis initiated 6/17  Perineal Cellulitis with necrotizing component   Poor oral intake   DM II uncontrolled - A1C 11  Worsening anemia     Plan:  No sign of renal recovery.  We will plan to continue patient on MWF hemodialysis schedule for now.  Hemodialysis today with goal 2 L UF as tolerated.    Mehul Lambert DO  Nephrology  [facility]  Clinic number: [phone]

## 2024-06-21 NOTE — ANESTHESIA PREPROCEDURE EVALUATION
2024  Greer Kahn is a 58 y.o., Morbidly Obese, poorly controlled Diabetic female presented on 24 with Large Perineal Abscess.  58 year old female with morbid obesity, poorly-controlled diabetes mellitus type 2, presented with peroneal painful lesion draining pus and septic shock: Febrile 101, tachycardic, hypotensive, [place]BC of [address]. Imaging demonstrated cellulitis involving the perineum on the left side extending to the medial gluteal fold with inflammatory changes in the labia. Concerning for possible abscess. Evaluated by surgery and underwent emergent intervention for suspected necrotizing fasciitis. Operative findings include mild necrotizing soft tissue infection for a perineal abscess.. Wound approximately 14 x 3 cm.               So far imaging is noting development of gas in the left perineum and discuss with Dr. Vital this morning plan to take her back to the operating room.  We will continue with broad-spectrum antibiotics.     Initially, there was concern for NSTI and patient was taken to the OR urgently for debridement. She tolerated this well, and there was not evidence of NSTI. She is now s/p R groin incision, drainage, and debridement on . S/p further debridement in OR .       ----Perineal Cellulitis with necrotizing component, Excisional debridement of left perineum groin wound, wound washout      ---acute renal failure, new dialysis, tolerating.      ---uncontrolled hyperglycemia, continue insulin therapy., adjust as needed to maintain fasting blood glucose <180  ---morbidly obese    Diagnosis:   Perineal abscess [L02.215]   Pre-op diagnosis: Perineal abscess [L02.215]   Pt. Is more responsive after given, D5 IVF. CB        The pt. [facility] for the noted procedure under [facility].  Case: [id number] Date/Time: 24 1139   Procedure: DEBRIDEMENT, WOUND (Left) -  left groin, lithotomy   Anesthesia type: General       PMHx:        Vital signs:        Lab Data:              Echo:        EKG:          Pre-op Assessment    I have reviewed the Patient Summary Reports.     I have reviewed the Nursing Notes. I have reviewed the NPO Status.   I have reviewed the Medications.     Review of Systems  Anesthesia Hx:  No problems with previous Anesthesia                Social:  Non-Smoker       Hematology/Oncology:  Hematology Normal   Oncology Normal                                   EENT/Dental:  EENT/Dental Normal           Cardiovascular:  Exercise tolerance: good   Hypertension                Functional Capacity good / => 4 METS                         Pulmonary:    Asthma                    Renal/:  Chronic Renal Disease, CKD                Hepatic/GI:  Hepatic/GI Normal                 Musculoskeletal:  Musculoskeletal Normal                Neurological:      Headaches                                 Endocrine:  Diabetes, poorly controlled         Morbid Obesity / BMI > 40  Dermatological:  Skin Normal    Psych:  Psychiatric Normal                    Physical Exam  General: Alert, Oriented, Well nourished and Cooperative    Airway:  Mallampati: II   Mouth Opening: Normal  TM Distance: Normal  Tongue: Normal  Neck ROM: Normal ROM    Dental:  Edentulous    Chest/Lungs:  Clear to auscultation, Normal Respiratory Rate    Heart:  Rate: Normal  Rhythm: Regular Rhythm        Anesthesia Plan  Type of Anesthesia, risks & benefits discussed:    Anesthesia Type: Gen ETT  Intra-op Monitoring Plan: Standard ASA Monitors  Post Op Pain Control Plan: IV/PO Opioids PRN  Induction:  IV and Inhalation  Airway Plan: Direct  Informed Consent: Informed consent signed with the Patient and all parties understand the risks and agree with anesthesia plan.  All questions answered. Patient consented to blood products? Yes  ASA Score: 3  Day of Surgery Review of History & Physical: H&P Update referred to  the surgeon/provider.    Ready For Surgery From Anesthesia Perspective.     .

## 2024-06-21 NOTE — PROGRESS NOTES
Acute Care Surgery   Progress Note  Admit Date: 6/13/2024  HD#8  POD#3 Days Post-Op    Subjective:   Interval history:  AF HDS   Alert and oriented, pain well controlled   Tolerating diet without nausea or vomiting     Home Meds:  Current Outpatient Medications   Medication Instructions    albuterol (PROVENTIL/VENTOLIN HFA) 90 mcg/actuation inhaler 2 puffs, Inhalation, Every 4-6 hours PRN    amLODIPine (NORVASC) 10 mg, Oral, Daily    atorvastatin (LIPITOR) 20 mg, Oral, Daily    famotidine (PEPCID) 20 mg, Oral, 2 times daily    glimepiride (AMARYL) 4 mg, Oral, With breakfast    hydroCHLOROthiazide (HYDRODIURIL) 25 mg, Oral, Daily    insulin glargine U-100 (Lantus) 50 Units, Subcutaneous, Nightly    meclizine (ANTIVERT) 25 mg, Oral, 3 times daily    metFORMIN (GLUCOPHAGE) 1,000 mg, Oral, 2 times daily    metoprolol tartrate (LOPRESSOR) 25 mg, Oral, 2 times daily    pioglitazone (ACTOS) 30 mg, Oral, Daily      Scheduled Meds:   clindamycin IV (PEDS and ADULTS)  600 mg Intravenous Q8H    enoxaparin  40 mg Subcutaneous Daily    lactated ringers  1,000 mL Intravenous Once    lactated ringers  1,000 mL Intravenous Once    mupirocin   Nasal BID    piperacillin-tazobactam (Zosyn) IV (PEDS and ADULTS) (extended infusion is not appropriate)  4.5 g Intravenous Q12H    sodium chloride 0.9%  10 mL Intravenous Q6H     Continuous Infusions:      PRN Meds:  Current Facility-Administered Medications:     0.9%  NaCl infusion (for blood administration), , Intravenous, Q24H PRN    acetaminophen, 650 mg, Oral, Q4H PRN    albuterol-ipratropium, 3 mL, Nebulization, Q4H PRN    aluminum-magnesium hydroxide-simethicone, 30 mL, Oral, QID PRN    bisacodyL, 10 mg, Rectal, Daily PRN    calcium carbonate, 1,000 mg, Oral, TID PRN    dextrose 10%, 12.5 g, Intravenous, PRN    dextrose 10%, 25 g, Intravenous, PRN    glucagon (human recombinant), 1 mg, Intramuscular, PRN    glucose, 16 g, Oral, PRN    glucose, 24 g, Oral, PRN    heparin (porcine),  "3,000 Units, Intravenous, PRN    ibuprofen, 400 mg, Oral, Q6H PRN    insulin aspart U-100, 0-15 Units, Subcutaneous, Q4H PRN    metoclopramide, 10 mg, Intravenous, Q8H PRN    polyethylene glycol, 17 g, Oral, BID PRN    prochlorperazine, 2.5 mg, Intravenous, Q6H PRN    prochlorperazine, 5 mg, Intravenous, Q6H PRN    senna-docusate 8.6-50 mg, 2 tablet, Oral, BID PRN    sodium chloride 0.9%, 10 mL, Intravenous, PRN    Flushing PICC/Midline Protocol, , , Until Discontinued **AND** sodium chloride 0.9%, 10 mL, Intravenous, Q6H **AND** sodium chloride 0.9%, 10 mL, Intravenous, PRN    traMADoL, 50 mg, Oral, Q8H PRN    Pharmacy to dose Vancomycin consult, , , Once **AND** vancomycin - pharmacy to dose, , Intravenous, pharmacy to manage frequency     Objective:     VITAL SIGNS: 24 HR MIN & MAX LAST   Temp  Min: 97.6 °F (36.4 °C)  Max: 98.4 °F (36.9 °C)  98.4 °F (36.9 °C)   BP  Min: 106/68  Max: 146/84  115/73    Pulse  Min: 72  Max: 80  77    Resp  Min: 18  Max: 20  18    SpO2  Min: 93 %  Max: 98 %  (!) 93 %      HT: 5' 7.01" (170.2 cm)  WT: 125.9 kg (277 lb 9 oz)  BMI: 43.5     Intake/output:  Intake/Output - Last 3 Shifts         06/19 0700 06/20 0659 06/20 0700 06/21 0659 06/21 0700 06/22 0659    P.O. 120 240     Blood       Other 250      IV Piggyback       Total Intake(mL/kg) 370 (2.9) 240 (1.9)     Urine (mL/kg/hr) 100 (0) 100 (0)     Emesis/NG output 1      Other 1445      Stool 0 0     Total Output 1546 100     Net -1176 +140            Urine Occurrence  0 x     Stool Occurrence 4 x 2 x             Intake/Output Summary (Last 24 hours) at 6/21/2024 0727  Last data filed at 6/20/2024 2137  Gross per 24 hour   Intake 240 ml   Output 100 ml   Net 140 ml           Lines/drains/airway:       Peripheral IV - Single Lumen 06/13/24 0802 20 G Anterior;Proximal;Right Forearm (Active)   Site Assessment Clean;Dry;Intact 06/15/24 0600   Extremity Assessment Distal to IV No abnormal discoloration 06/15/24 0600   Line Status " "Infusing 06/15/24 0600   Dressing Status Clean;Dry;Intact 06/15/24 0600   Dressing Intervention Integrity maintained 06/15/24 0600   Number of days: 2            Urethral Catheter 06/14/24 Latex 16 Fr. (Active)   Site Assessment Clean;Intact 06/15/24 0600   Collection Container Urimeter 06/15/24 0600   Securement Method secured to top of thigh w/ adhesive device 06/15/24 0600   Catheter Care Performed yes 06/15/24 0600   Reason for Continuing Urinary Catheterization Post operative 06/15/24 0600   CAUTI Prevention Bundle Securement Device in place with 1" slack;Intact seal between catheter & drainage tubing;Drainage bag/urimeter off the floor;Sheeting clip in use;No dependent loops or kinks;Drainage bag/urimeter not overfilled (<2/3 full);Drainage bag/urimeter below bladder 06/14/24 2000   Number of days: 1       Physical examination:  Gen: Awake, alert, oriented  CV: RR  Resp: NWOB  Ext: moving all extremities spontaneously and purposefully  Neuro: CN II-XII grossly intact  Skin/wounds: groin wound with packing in place.    Labs:  Renal:  Recent Labs     06/18/24 0757 06/19/24  0517   BUN 34.6* 25.1*   CREATININE 5.08* 4.03*     No results for input(s): "LACTIC" in the last 72 hours.  FENGI:  Recent Labs     06/18/24 0757 06/19/24  0517   * 132*   K 4.1 4.0   CL 95* 100   CO2 23 23   CALCIUM 7.8* 7.7*   ALBUMIN 1.8* 1.8*   BILITOT 3.7* 3.3*   AST 23 17   ALKPHOS 126 129   ALT 27 22     Heme:  Recent Labs     06/18/24 0757 06/19/24  0517 06/21/24  0621   HGB 7.6* 8.3* 8.1*   HCT 23.1* 24.5* 24.7*    386 412*     ID:  Recent Labs     06/18/24 0757 06/19/24  0517 06/21/24  0621   WBC 20.99* 26.7  26.72* 30.52*     CBG:  Recent Labs     06/18/24 0757 06/19/24  0517   GLUCOSE 69* 81      Cardiovascular:  No results for input(s): "TROPONINI", "CKTOTAL", "CKMB", "BNP" in the last 168 hours.  I have reviewed all pertinent lab results within the past 24 hours.    Imaging:  CT Head Without Contrast   Final " Result      1. No acute intracranial abnormality.   2. Chronic microvascular ischemic changes.         Electronically signed by: Marlin Gonzalez   Date:    06/18/2024   Time:    07:43      CT Chest Abdomen Pelvis With IV Contrast (XPD) NO Oral Contrast   Final Result      Interval development of gas within the left perineum.  Necrotizing fasciitis is not excluded.  There is mild haziness about the duodenum.  Mild duodenitis is not excluded.  This possibly relates to mild stranding seen in the retroperitoneum which may be inflammatory.  Small bilateral effusions.      Possible necrotizing fasciitis reported to Prashanth prior to interpretation.         Electronically signed by: Ez Nevarez   Date:    06/18/2024   Time:    07:51      X-Ray Chest 1 View   Final Result      As above.         Electronically signed by: Ez Nevarez   Date:    06/18/2024   Time:    07:17      X-Ray Chest 1 View   Final Result      Optimal placement of hemodialysis catheter.         Electronically signed by: Garland Bowie   Date:    06/17/2024   Time:    18:14      US Retroperitoneal Complete   Final Result      No significant sonographic abnormality of the kidneys.         Electronically signed by: Issa Luna   Date:    06/17/2024   Time:    11:11      US Abdomen Limited   Final Result      1. Mild hepatomegaly.   2. Status post cholecystectomy.  No biliary ductal dilatation.         Electronically signed by: Issa Luna   Date:    06/17/2024   Time:    09:58      CTA Chest Non-Coronary (PE Studies)   Final Result      No pulmonary embolism seen      Right lower lobe atelectasis and small right-sided pleural effusion         Electronically signed by: Ayush Barbour   Date:    06/13/2024   Time:    09:58      CT Abdomen Pelvis With IV Contrast NO Oral Contrast   Final Result      Area of cellulitis involving the perineum on the left side extending to the medial gluteal fold with a area of thickening and inflammatory changes in the labia  on the left side with a area of hypoattenuation seen within the labia on the left side concerning for possible developing abscess.  Follow-up is recommended.      Right lower lobe atelectasis and right-sided pleural effusion         Electronically signed by: Ayush Barbour   Date:    06/13/2024   Time:    10:38         I have reviewed all pertinent imaging results/findings within the past 24 hours.    Micro/Path/Other:  Microbiology Results (last 7 days)       Procedure Component Value Units Date/Time    Anaerobic Culture [[id number]] Collected: 06/18/24 1223    Order Status: Completed Specimen: Tissue from Groin Updated: 06/20/24 0839     Anaerobe Culture No Anaerobes Isolated    Tissue Culture - Aerobic [[id number]]  (Abnormal) Collected: 06/18/24 1223    Order Status: Completed Specimen: Tissue from Groin Updated: 06/20/24 0713     Tissue - Aerobic Culture Few Staphylococcus aureus    Gram Stain [[id number]] Collected: 06/18/24 1223    Order Status: Completed Specimen: Tissue from Groin Updated: 06/18/24 1431     GRAM STAIN Few WBC observed      Rare Gram positive cocci    Blood culture #1 **CANNOT BE ORDERED STAT** [[id number]]  (Normal) Collected: 06/13/24 0832    Order Status: Completed Specimen: Blood from Hand, Left Updated: 06/18/24 1300     Blood Culture No Growth at 5 days    Blood culture #2 **CANNOT BE ORDERED STAT** [[id number]]  (Normal) Collected: 06/13/24 0832    Order Status: Completed Specimen: Blood from Antecubital, Left Updated: 06/18/24 1300     Blood Culture No Growth at 5 days    Fungal Culture [[id number]] Collected: 06/18/24 1223    Order Status: Sent Specimen: Tissue from Groin Updated: 06/18/24 1233    Wound Culture [[id number]]  (Abnormal)  (Susceptibility) Collected: 06/13/24 0917    Order Status: Completed Specimen: Abscess from Perineum Updated: 06/15/24 0903     Wound Culture Moderate Methicillin resistant Staphylococcus aureus           Pathology Results  (Last 7 days)      None              Assessment & Plan:   59yo F with DM, HTN, obesity, and asthma, presenting with a R groin soft tissue infection. Initially, there was concern for NSTI and patient was taken to the OR urgently for debridement. She tolerated this well, and there was not evidence of NSTI. She is now s/p R groin incision, drainage, and debridement on 6/14. S/p further debridement in OR 6/18.     - To OR today for eval of wound and possible further debridement as patients leukocytosis continues to up trend   - PRN pain control  - Continue antibiotics, follow up new wound cultures   - Recommend PT/OT and bedside commode to avoid stool contamination of wound   - Recommend holding stool softeners     Pamela Sr MD  [employer] General Surgery, PGY-1

## 2024-06-21 NOTE — NURSING
06/21/24 1804   Post-Hemodialysis Assessment   Rinseback Volume (mL) 250 mL   Blood Volume Processed (Liters) 67 L   Dialyzer Clearance Moderately streaked   Duration of Treatment 180 minutes   Total UF (mL) 2400 mL   Net Fluid Removal 2000

## 2024-06-21 NOTE — ANESTHESIA PROCEDURE NOTES
Intubation    Date/Time: 6/21/2024 10:45 AM    Performed by: Tito Wright CRNA  Authorized by: Yoel Park MD    Intubation:     Induction:  Intravenous    Intubated:  Postinduction    Mask Ventilation:  Easy mask    Attempts:  1    Attempted By:  CRNA    Method of Intubation:  Direct    Blade:  [place]inton 2    Laryngeal View Grade: Grade I - full view of cords      Difficult Airway Encountered?: No      Complications:  None    Airway Device:  Oral endotracheal tube    Airway Device Size:  7.5    Style/Cuff Inflation:  Cuffed    Tube secured:  22    Secured at:  The lips    Placement Verified By:  Capnometry    Complicating Factors:  None    Findings Post-Intubation:  BS equal bilateral

## 2024-06-21 NOTE — PT/OT/SLP PROGRESS
Attempted OT session this AM. Pt declined to participate 2/2 awaiting procedure. Will f/u as schedule permits.

## 2024-06-21 NOTE — PROGRESS NOTES
Pharmacokinetic Assessment Follow Up: IV Vancomycin    Vancomycin serum concentration assessment(s):    The random level was drawn correctly and can be used to guide therapy at this time. The measurement is within the desired definitive target range of 15 to 20 mcg/mL.    Vancomycin Regimen Plan:    Give Vancomycin 500 mg IV x 1 dose post HD today  Nex random level to be drawn on 06/24 at 0430 prior to next dialysis session  HD on MWF per nephrology    Drug levels (last 3 results):  Recent Labs   Lab Result Units 06/19/24  0517 06/20/24  0635 06/21/24  0416   Vancomycin Random ug/ml 23.9* 18.9 18.0       Pharmacy will continue to follow and monitor vancomycin.    Please contact pharmacy at extension [phone] for questions regarding this assessment.    Thank you for the consult,   Sandra Shay       Patient brief summary:  Greer Kahn is a 58 y.o. female initiated on antimicrobial therapy with IV Vancomycin for treatment of skin & soft tissue infection      Drug Allergies:   Review of patient's allergies indicates:  No Known Allergies    Actual Body Weight:   125.9 kg    Renal Function:   Estimated Creatinine Clearance: 17.9 mL/min (A) (based on SCr of 4.79 mg/dL (H)).,     Dialysis Method (if applicable):  intermittent HD on MWF    CBC (last 72 hours):  Recent Labs   Lab Result Units 06/19/24  0517 06/21/24  0621   WBC x10(3)/mcL 26.7  26.72* 30.52*   Hgb g/dL 8.3* 8.1*   Hct % 24.5* 24.7*   Platelet x10(3)/mcL 386 412*   Monocytes % % 7  --    Eosinophils % % 2  --    Basophils % % 1  --        Metabolic Panel (last 72 hours):  Recent Labs   Lab Result Units 06/19/24  0517 06/21/24  0416   Sodium mmol/L 132* 134*   Potassium mmol/L 4.0 4.4   Chloride mmol/L 100 98   CO2 mmol/L 23 21*   Glucose mg/dL 81 216*   Blood Urea Nitrogen mg/dL 25.1* 24.1*   Creatinine mg/dL 4.03* 4.79*   Albumin g/dL 1.8* 1.7*   Bilirubin Total mg/dL 3.3*  --    ALP unit/L 129  --    AST unit/L 17  --    ALT unit/L 22  --    Phosphorus  Level mg/dL  --  4.4       Vancomycin Administrations:  vancomycin given in the last 96 hours        No antibiotic orders with administrations found.                    Microbiologic Results:  Microbiology Results (last 7 days)       Procedure Component Value Units Date/Time    Anaerobic Culture [[id number]] Collected: 06/18/24 1223    Order Status: Completed Specimen: Tissue from Groin Updated: 06/21/24 0841     Anaerobe Culture No Anaerobes Isolated    Tissue Culture - Aerobic [[id number]]  (Abnormal) Collected: 06/18/24 1223    Order Status: Completed Specimen: Tissue from Groin Updated: 06/20/24 0713     Tissue - Aerobic Culture Few Staphylococcus aureus    Gram Stain [[id number]] Collected: 06/18/24 1223    Order Status: Completed Specimen: Tissue from Groin Updated: 06/18/24 1431     GRAM STAIN Few WBC observed      Rare Gram positive cocci    Blood culture #1 **CANNOT BE ORDERED STAT** [[id number]]  (Normal) Collected: 06/13/24 0832    Order Status: Completed Specimen: Blood from Hand, Left Updated: 06/18/24 1300     Blood Culture No Growth at 5 days    Blood culture #2 **CANNOT BE ORDERED STAT** [[id number]]  (Normal) Collected: 06/13/24 0832    Order Status: Completed Specimen: Blood from Antecubital, Left Updated: 06/18/24 1300     Blood Culture No Growth at 5 days    Fungal Culture [[id number]] Collected: 06/18/24 1223    Order Status: Sent Specimen: Tissue from Groin Updated: 06/18/24 1233    Wound Culture [[id number]]  (Abnormal)  (Susceptibility) Collected: 06/13/24 0917    Order Status: Completed Specimen: Abscess from Perineum Updated: 06/15/24 0903     Wound Culture Moderate Methicillin resistant Staphylococcus aureus

## 2024-06-21 NOTE — TRANSFER OF CARE
"Anesthesia Transfer of Care Note    Patient: Greer Kahn    Procedure(s) Performed: Procedure(s) (LRB):  DEBRIDEMENT, WOUND (Left)    Patient location: PACU    Anesthesia Type: general    Transport from OR: Transported from OR on room air with adequate spontaneous ventilation    Post pain: adequate analgesia    Post assessment: no apparent anesthetic complications    Post vital signs: stable    Level of consciousness: awake    Nausea/Vomiting: no nausea/vomiting    Complications: none    Transfer of care protocol was followed      Last vitals: Visit Vitals  BP (!) 155/84 (BP Location: Right arm, Patient Position: Lying)   Pulse 77   Temp 36.6 °C (97.9 °F) (Oral)   Resp 18   Ht 5' 7.01" (1.702 m)   Wt 125.9 kg (277 lb 9 oz)   SpO2 99%   Breastfeeding No   BMI 43.46 kg/m²     " Gastroesophageal reflux disease, esophagitis presence not specified

## 2024-06-22 LAB
ABS NEUT (OLG): 30.37 X10(3)/MCL (ref 2.1–9.2)
ANION GAP SERPL CALC-SCNC: 12 MEQ/L
ANISOCYTOSIS BLD QL SMEAR: ABNORMAL
BUN SERPL-MCNC: 17.5 MG/DL (ref 9.8–20.1)
CALCIUM SERPL-MCNC: 7.6 MG/DL (ref 8.4–10.2)
CHLORIDE SERPL-SCNC: 97 MMOL/L (ref 98–107)
CO2 SERPL-SCNC: 22 MMOL/L (ref 22–29)
CREAT SERPL-MCNC: 3.86 MG/DL (ref 0.55–1.02)
CREAT/UREA NIT SERPL: 5
ERYTHROCYTE [DISTWIDTH] IN BLOOD BY AUTOMATED COUNT: 16.6 % (ref 11.5–17)
GFR SERPLBLD CREATININE-BSD FMLA CKD-EPI: 13 ML/MIN/1.73/M2
GLUCOSE SERPL-MCNC: 223 MG/DL (ref 74–100)
HCT VFR BLD AUTO: 23 % (ref 37–47)
HGB BLD-MCNC: 7.7 G/DL (ref 12–16)
INSTRUMENT WBC (OLG): 35.31 X10(3)/MCL
LYMPHOCYTES NFR BLD MANUAL: 1.41 X10(3)/MCL
LYMPHOCYTES NFR BLD MANUAL: 4 %
MCH RBC QN AUTO: 30.4 PG (ref 27–31)
MCHC RBC AUTO-ENTMCNC: 33.5 G/DL (ref 33–36)
MCV RBC AUTO: 90.9 FL (ref 80–94)
METAMYELOCYTES NFR BLD MANUAL: 4 %
MONOCYTES NFR BLD MANUAL: 0.71 X10(3)/MCL (ref 0.1–1.3)
MONOCYTES NFR BLD MANUAL: 2 %
MYELOCYTES NFR BLD MANUAL: 4 %
NEUTROPHILS NFR BLD MANUAL: 86 %
NRBC BLD AUTO-RTO: 0.1 %
PLATELET # BLD AUTO: 487 X10(3)/MCL (ref 130–400)
PLATELET # BLD EST: ABNORMAL 10*3/UL
PMV BLD AUTO: 9.6 FL (ref 7.4–10.4)
POCT GLUCOSE: 231 MG/DL (ref 70–110)
POCT GLUCOSE: 241 MG/DL (ref 70–110)
POTASSIUM SERPL-SCNC: 5.5 MMOL/L (ref 3.5–5.1)
RBC # BLD AUTO: 2.53 X10(6)/MCL (ref 4.2–5.4)
RBC MORPH BLD: ABNORMAL
SODIUM SERPL-SCNC: 131 MMOL/L (ref 136–145)
TARGETS BLD QL SMEAR: ABNORMAL
TOXIC GRANULES BLD QL SMEAR: ABNORMAL
WBC # BLD AUTO: 35.31 X10(3)/MCL (ref 4.5–11.5)
WBC VACUOLES (OHS): ABNORMAL

## 2024-06-22 PROCEDURE — A4216 STERILE WATER/SALINE, 10 ML: HCPCS | Performed by: INTERNAL MEDICINE

## 2024-06-22 PROCEDURE — 63600175 PHARM REV CODE 636 W HCPCS: Performed by: HOSPITALIST

## 2024-06-22 PROCEDURE — 63600175 PHARM REV CODE 636 W HCPCS: Performed by: STUDENT IN AN ORGANIZED HEALTH CARE EDUCATION/TRAINING PROGRAM

## 2024-06-22 PROCEDURE — 25000003 PHARM REV CODE 250: Performed by: STUDENT IN AN ORGANIZED HEALTH CARE EDUCATION/TRAINING PROGRAM

## 2024-06-22 PROCEDURE — 63600175 PHARM REV CODE 636 W HCPCS: Performed by: INTERNAL MEDICINE

## 2024-06-22 PROCEDURE — 25000003 PHARM REV CODE 250: Performed by: INTERNAL MEDICINE

## 2024-06-22 PROCEDURE — 87040 BLOOD CULTURE FOR BACTERIA: CPT | Performed by: STUDENT IN AN ORGANIZED HEALTH CARE EDUCATION/TRAINING PROGRAM

## 2024-06-22 PROCEDURE — 63600175 PHARM REV CODE 636 W HCPCS: Mod: JZ,JG | Performed by: INTERNAL MEDICINE

## 2024-06-22 PROCEDURE — 80048 BASIC METABOLIC PNL TOTAL CA: CPT | Performed by: STUDENT IN AN ORGANIZED HEALTH CARE EDUCATION/TRAINING PROGRAM

## 2024-06-22 PROCEDURE — 36415 COLL VENOUS BLD VENIPUNCTURE: CPT | Performed by: STUDENT IN AN ORGANIZED HEALTH CARE EDUCATION/TRAINING PROGRAM

## 2024-06-22 PROCEDURE — 21400001 HC TELEMETRY ROOM

## 2024-06-22 PROCEDURE — 25000003 PHARM REV CODE 250: Performed by: NURSE PRACTITIONER

## 2024-06-22 PROCEDURE — 27000207 HC ISOLATION

## 2024-06-22 PROCEDURE — 85027 COMPLETE CBC AUTOMATED: CPT | Performed by: STUDENT IN AN ORGANIZED HEALTH CARE EDUCATION/TRAINING PROGRAM

## 2024-06-22 RX ADMIN — CLINDAMYCIN PHOSPHATE 600 MG: 600 INJECTION, SOLUTION INTRAVENOUS at 12:06

## 2024-06-22 RX ADMIN — ENOXAPARIN SODIUM 40 MG: 40 INJECTION SUBCUTANEOUS at 04:06

## 2024-06-22 RX ADMIN — SODIUM ZIRCONIUM CYCLOSILICATE 10 G: 10 POWDER, FOR SUSPENSION ORAL at 04:06

## 2024-06-22 RX ADMIN — PIPERACILLIN SODIUM AND TAZOBACTAM SODIUM 4.5 G: 4; .5 INJECTION, POWDER, LYOPHILIZED, FOR SOLUTION INTRAVENOUS at 09:06

## 2024-06-22 RX ADMIN — TRAMADOL HYDROCHLORIDE 50 MG: 50 TABLET, COATED ORAL at 04:06

## 2024-06-22 RX ADMIN — SODIUM CHLORIDE, PRESERVATIVE FREE 10 ML: 5 INJECTION INTRAVENOUS at 12:06

## 2024-06-22 RX ADMIN — SODIUM CHLORIDE, PRESERVATIVE FREE 10 ML: 5 INJECTION INTRAVENOUS at 06:06

## 2024-06-22 RX ADMIN — CLINDAMYCIN PHOSPHATE 600 MG: 600 INJECTION, SOLUTION INTRAVENOUS at 08:06

## 2024-06-22 RX ADMIN — METOCLOPRAMIDE 10 MG: 5 INJECTION, SOLUTION INTRAMUSCULAR; INTRAVENOUS at 07:06

## 2024-06-22 RX ADMIN — SODIUM CHLORIDE, PRESERVATIVE FREE 10 ML: 5 INJECTION INTRAVENOUS at 05:06

## 2024-06-22 RX ADMIN — INSULIN GLARGINE 10 UNITS: 100 INJECTION, SOLUTION SUBCUTANEOUS at 09:06

## 2024-06-22 RX ADMIN — CLINDAMYCIN PHOSPHATE 600 MG: 600 INJECTION, SOLUTION INTRAVENOUS at 04:06

## 2024-06-22 RX ADMIN — SODIUM ZIRCONIUM CYCLOSILICATE 10 G: 10 POWDER, FOR SUSPENSION ORAL at 09:06

## 2024-06-22 NOTE — ANESTHESIA RELEASE NOTE
"Anesthesia Release from PACU Note    Patient: Greer Kahn    Procedure(s) Performed: Procedure(s) (LRB):  DEBRIDEMENT, WOUND (Left)    Anesthesia type: {PROCEDURES; ANE POST ANESTHESIA TYPE:}    Post pain: {FINDINGS; ANE POST PAIN:}    Post assessment: {ASSESSMENT; ANE POST:}    Last Vitals: Visit Vitals  /67   Pulse 74   Temp 36.6 °C (97.8 °F) (Oral)   Resp 18   Ht 5' 7.01" (1.702 m)   Wt 125.9 kg (277 lb 9 oz)   SpO2 (!) 93%   Breastfeeding No   BMI 43.46 kg/m²       Post vital signs: {DESC; ANE POST VITALS:::"stable"}    Level of consciousness: {FINDINGS; ANE POST LEVEL OF CONSCIOUSNESS:}    Nausea/Vomiting: {Nausea/vomitin}    Complications: {FINDINGS; ANE POST COMPLICATIONS:}    Airway Patency: {OHS FINDINGS; AN POST AIRWAY PATENCY:26961::"patent"}    Respiratory: {OHS ASSESSMENT; AN RESPIRATORY:70496::"unassisted"}    Cardiovascular: {OHS ASSESSMENT; AN CARDIOVASCULAR:93895::"stable","blood pressure at baseline"}    Hydration: {OHS FINDINGS; AN POST HYDRATION:17613::"euvolemic"}  "

## 2024-06-22 NOTE — ANESTHESIA POSTPROCEDURE EVALUATION
Anesthesia Post Evaluation    Patient: Greer Kahn    Procedure(s) Performed: Procedure(s) (LRB):  DEBRIDEMENT, WOUND (Left)    Final Anesthesia Type: general      Patient location during evaluation: PACU  Patient participation: Yes- Able to Participate  Level of consciousness: awake and alert and oriented  Post-procedure vital signs: reviewed and stable  Pain management: adequate  Airway patency: patent  SHARON mitigation strategies: Verification of full reversal of neuromuscular block  PONV status at discharge: No PONV  Anesthetic complications: no      Cardiovascular status: blood pressure returned to baseline and stable  Respiratory status: spontaneous ventilation and unassisted  Hydration status: euvolemic  Follow-up not needed.  Comments: [facility]              Vitals Value Taken Time   /67 06/21/24 1444   Temp 36.6 °C (97.8 °F) 06/21/24 1414   Pulse 74 06/21/24 1448   Resp 18 06/21/24 1911   SpO2 93 % 06/21/24 1448         Event Time   Out of Recovery 06/21/2024 14:20:00         Pain/Ailyn Score: Pain Rating Prior to Med Admin: 3 (6/21/2024  7:11 PM)  Pain Rating Post Med Admin: 2 (6/20/2024 12:01 PM)  Ailyn Score: 9 (6/21/2024  2:20 PM)

## 2024-06-22 NOTE — PROGRESS NOTES
[facility] Hospital Medicine Progress Note      Chief Complaint: 1 day history of fever and multiple painful lesions to her thighs and buttocks       HPI: (personally reviewed by me and is documented from initial H&P)     58-year-old female whose history includes hypertension, diabetes, asthma and dyslipidemia.     Presented to the ED with complaints of a 1 day history of fever and multiple painful lesions to her thighs and buttocks. First noticed them approximately 3 days ago.      VS on arrival: T 101, P 124, R 18, B/P 189/132, Sats 94% on room air.  Initial labs include WBC 27.06 hemoglobin 11.8, hematocrit 35.8, platelets 264, sodium 131, potassium 3.8, chloride 94, bicarb 23, BUN 9.7, creatinine 0.98, glucose 353 and otherwise unremarkable.  Lactic acid 1.4.  COVID and flu not detected.  UA with significant proteinuria, glucosuria, ketones and few bacteria and yeast.  No white blood cells noted.     T abdomen and pelvis with contrast shows an area of cellulitis involving the perineum on the left side extending to the medial gluteal fold with an area of thickening and inflammatory changes in the labia on the left side with an area of hypoattenuation seen within the labia on the left side concerning for possible developing abscess.  CT chest with contrast shows a right lower lobe atelectasis and small right-sided pleural effusion with no evidence of pulmonary embolism.  Medications given in the ER include vancomycin and Rocephin.      Interval History:      No overnight events reported.  No new complaints reported.    No family present at bedside during my evaluation.    On yesterday, 06/21/2024 patient underwent repeat wound washout/debridement.  No wound VAC applied    Objective Assessment:  Physical Exam      Vital signs have been personally reviewed by me   General: Appears comfortable, no acute distress.   castaneda catheter in place  Awake, alert oriented.     Integumentary: Warm,  dry, intact.  Musculoskeletal: Purposeful movement noted.     Respiratory: No accessory muscle use. Breath sounds are equal.  Cardiovascular: Regular rate.       VITAL SIGNS: 24 HRS MIN & MAX LAST   Temp  Min: 97.8 °F (36.6 °C)  Max: 98.8 °F (37.1 °C) 98.6 °F (37 °C)   BP  Min: 102/67  Max: 190/88 107/68   Pulse  Min: 70  Max: 99  81   Resp  Min: 11  Max: 36 19   SpO2  Min: 67 %  Max: 100 % (!) 93 %     CT Chest Abdomen Pelvis With IV Contrast (XPD) NO Oral Contrast  Narrative: EXAMINATION:  CT CHEST ABDOMEN PELVIS WITH IV CONTRAST (XPD)    CLINICAL HISTORY:  Sepsis;Sepsis, unspecified organism    TECHNIQUE:  Axial images of the chest, abdomen, and pelvis were obtained With Contrast. Sagittal and coronal reconstructed images were available for review.    Automatic exposure control was utilized to reduce the patient's radiation dose.    DLP = 1724    COMPARISON:  06/13/2024    FINDINGS:  Small bilateral pleural effusions right greater than left.  No acute osseous abnormality.  The bladder is decompressed around a Huff catheter.  There is no mediastinal adenopathy.  The by lateral axillar clear.  The heart demonstrates a normal CT appearance.  Status post cholecystectomy.  Bilateral adrenal glands, kidneys, spleen, are unremarkable.  There is mild haziness about the duodenum which may be related to motion artifact.  Mild duodenitis is not excluded.  There is no free air.  There is gas noted within the soft tissues of the left perineum.  Necrotizing fasciitis is not excluded.  Impression: Interval development of gas within the left perineum.  Necrotizing fasciitis is not excluded.  There is mild haziness about the duodenum.  Mild duodenitis is not excluded.  This possibly relates to mild stranding seen in the retroperitoneum which may be inflammatory.  Small bilateral effusions.    Possible necrotizing fasciitis reported to Prashanth prior to interpretation.    Electronically signed by: Ez  Geri  Date:    06/18/2024  Time:    07:51  CT Head Without Contrast  Narrative: EXAMINATION:  CT HEAD WITHOUT CONTRAST    CLINICAL HISTORY:  Mental status change, unknown cause;    TECHNIQUE:  Axial scans were obtained from skull base to the vertex.    Coronal and sagittal reconstructions obtained from the axial data.    Automatic exposure control was utilized to limit radiation dose.    Contrast: None    Radiation Dose:    Total DLP: 1011 mGy*cm    COMPARISON:  MRI brain dated 04/30/2023    FINDINGS:  There is no acute intracranial hemorrhage or edema. The gray-white matter differentiation is preserved.  Scattered hypodensities in the subcortical white matter likely represent chronic microvascular ischemic changes.    There is no mass effect or midline shift. The ventricles and sulci are normal in size. The basal cisterns are patent. There is no abnormal extra-axial fluid collection.  There is no definite displaced fracture.  Impression: 1. No acute intracranial abnormality.  2. Chronic microvascular ischemic changes.    Electronically signed by: Marlin Gonzalez  Date:    06/18/2024  Time:    07:43  X-Ray Chest 1 View  Narrative: EXAMINATION:  XR CHEST 1 VIEW    CLINICAL HISTORY:  Unresponsive;  Cutaneous abscess of perineum    TECHNIQUE:  Single view of the chest    COMPARISON:  06/17/2024    FINDINGS:  Right-sided central line projects over the right atrium.  Prominent interstitial markings throughout the right similar to prior.  The left hemithorax is clear.  Impression: As above.    Electronically signed by: Ez Nevarez  Date:    06/18/2024  Time:    07:17    Recent Labs   Lab 06/18/24  0757 06/19/24  0517 06/21/24  0621   WBC 20.99* 26.7  26.72* 30.52*   RBC 2.53* 2.78* 2.67*   HGB 7.6* 8.3* 8.1*   HCT 23.1* 24.5* 24.7*   MCV 91.3 88.1 92.5   MCH 30.0 29.9 30.3   MCHC 32.9* 33.9 32.8*   RDW 13.7 15.2 16.3    386 412*   MPV 10.1 9.8 9.6       Recent Labs   Lab 06/18/24  0224 06/18/24  0707  06/18/24  0757 06/19/24  0517 06/21/24  0416   *  --  131* 132* 134*   K 3.7  --  4.1 4.0 4.4   CL 95*  --  95* 100 98   CO2 24  --  23 23 21*   BUN 32.0*  --  34.6* 25.1* 24.1*   CREATININE 4.79*  --  5.08* 4.03* 4.79*   CALCIUM 8.6  --  7.8* 7.7* 7.6*   PH  --  7.440  --   --   --    ALBUMIN 1.9*  --  1.8* 1.8* 1.7*   ALKPHOS 131  --  126 129  --    ALT 30  --  27 22  --    AST 25  --  23 17  --    BILITOT 3.6*  --  3.7* 3.3*  --      Microbiology Results (last 7 days)       Procedure Component Value Units Date/Time    Tissue Culture - Aerobic [[id number]]  (Abnormal)  (Susceptibility) Collected: 06/18/24 1223    Order Status: Completed Specimen: Tissue from Groin Updated: 06/21/24 1102     Tissue - Aerobic Culture Few Methicillin resistant Staphylococcus aureus    Anaerobic Culture [[id number]] Collected: 06/18/24 1223    Order Status: Completed Specimen: Tissue from Groin Updated: 06/21/24 0841     Anaerobe Culture No Anaerobes Isolated    Gram Stain [[id number]] Collected: 06/18/24 1223    Order Status: Completed Specimen: Tissue from Groin Updated: 06/18/24 1431     GRAM STAIN Few WBC observed      Rare Gram positive cocci    Blood culture #1 **CANNOT BE ORDERED STAT** [[id number]]  (Normal) Collected: 06/13/24 0832    Order Status: Completed Specimen: Blood from Hand, Left Updated: 06/18/24 1300     Blood Culture No Growth at 5 days    Blood culture #2 **CANNOT BE ORDERED STAT** [[id number]]  (Normal) Collected: 06/13/24 0832    Order Status: Completed Specimen: Blood from Antecubital, Left Updated: 06/18/24 1300     Blood Culture No Growth at 5 days    Fungal Culture [[id number]] Collected: 06/18/24 1223    Order Status: Sent Specimen: Tissue from Groin Updated: 06/18/24 1233               Medications for Hospital Course         Scheduled Med:   clindamycin IV (PEDS and ADULTS)  600 mg Intravenous Q8H    enoxaparin  40 mg Subcutaneous Daily    insulin glargine U-100  10 Units Subcutaneous QHS     lactated ringers  1,000 mL Intravenous Once    lactated ringers  1,000 mL Intravenous Once    piperacillin-tazobactam (Zosyn) IV (PEDS and ADULTS) (extended infusion is not appropriate)  4.5 g Intravenous Q12H    sodium chloride 0.9%  10 mL Intravenous Q6H     PRN Meds:    Current Facility-Administered Medications:     0.9%  NaCl infusion (for blood administration), , Intravenous, Q24H PRN    acetaminophen, 650 mg, Oral, Q4H PRN    albuterol-ipratropium, 3 mL, Nebulization, Q4H PRN    aluminum-magnesium hydroxide-simethicone, 30 mL, Oral, QID PRN    bisacodyL, 10 mg, Rectal, Daily PRN    calcium carbonate, 1,000 mg, Oral, TID PRN    dextrose 10%, 12.5 g, Intravenous, PRN    dextrose 10%, 12.5 g, Intravenous, PRN    dextrose 10%, 25 g, Intravenous, PRN    dextrose 10%, 25 g, Intravenous, PRN    glucagon (human recombinant), 1 mg, Intramuscular, PRN    glucagon (human recombinant), 1 mg, Intramuscular, PRN    glucose, 16 g, Oral, PRN    glucose, 16 g, Oral, PRN    glucose, 24 g, Oral, PRN    glucose, 24 g, Oral, PRN    heparin (porcine), 3,000 Units, Intravenous, PRN    ibuprofen, 400 mg, Oral, Q6H PRN    insulin aspart U-100, 0-15 Units, Subcutaneous, QID (AC + HS) PRN    metoclopramide, 10 mg, Intravenous, Q8H PRN    polyethylene glycol, 17 g, Oral, BID PRN    prochlorperazine, 2.5 mg, Intravenous, Q6H PRN    prochlorperazine, 5 mg, Intravenous, Q6H PRN    senna-docusate 8.6-50 mg, 2 tablet, Oral, BID PRN    sodium chloride 0.9%, 10 mL, Intravenous, PRN    Flushing PICC/Midline Protocol, , , Until Discontinued **AND** sodium chloride 0.9%, 10 mL, Intravenous, Q6H **AND** sodium chloride 0.9%, 10 mL, Intravenous, PRN    traMADoL, 50 mg, Oral, Q8H PRN    Pharmacy to dose Vancomycin consult, , , Once **AND** vancomycin - pharmacy to dose, , Intravenous, pharmacy to manage frequency     Assessment and Plan      Perineal cellulitis with necrotizing infection  Uncontrolled type 2 diabetes mellitus with hyperglycemia    Acute Renal failure  DM II uncontrolled  Anemia  Above present on admission         Anticipated discharge and Disposition when medically stable:ltac vs tcu     Therapy: PT/OT/Speech    Nutrition: diabetic      ___________________________________________________________________________________________________________________________________    ----Perineal Cellulitis with necrotizing component, Excisional debridement of left perineum groin wound, wound washoutx2; most recent being 06/21/2024.  Anaerobic culture negative.  Tissue culture with few stah.  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     ---acute renal failure, new dialysis, tolerating.     ---uncontrolled hyperglycemia, added 10 units of long-acting insulin therapy at bedtime. maintain fasting blood glucose <180  ---morbidly obese   Continue supportive care  Continue checking vital signs q4hrs.  Reviewed and restarted appropriate home medications.   Continue PT/OT as tolerated patient will likely need LTAC or TCU for continued wound care, IV antibiotic therapy and  rehabilitation; will discuss with CM on Monday discharge planning.     DVT prophylaxis initiated   Nurse notified to page me if any changes occur     Consults: general surgeon   I have personally reviewed the specialist documentation and/or have spoken to the specialist with regard to the care of this patient; recommendations are noted above.     _______________________________________________________________________________________________________________________________      I have spent 40 minutes on the day of the visit; time spent includes face to face time and non-face to face time preparing to see the patient (eg, review of tests), independently reviewing and  interpreting medical records, both past and current; documenting clinical information in the electronic or other health record, and communicating results to the patient/family/caregiver and care coordinator and nursing team.      All diagnosis and differential diagnosis have been reviewed,  interpreted and communicated appropriately to care team. assessment and plan has been documented; I have personally reviewed the labs and test results that are presently available and pertinent to this hospital course; I have reviewed medical records based upon their availability.    All of the patient's questions have been  addressed and answered. Patient's is agreeable to the above stated plan.   I will continue to monitor closely and make adjustments to medical management as needed.    Deanne Scott, DO   06/22/2024     This note was created with the assistance of Dragon voice recognition software. There may be transcription errors as a result of using this technology however minimal. Effort has been made to assure accuracy of transcription but any obvious errors or omissions should be clarified with the author of the document.

## 2024-06-22 NOTE — PROGRESS NOTES
Acute Care Surgery   Progress Note  Admit Date: 6/13/2024  HD#9  POD#1 Day Post-Op    Subjective:   Interval history:  Pain controlled  Tolerating diet some nausea but no emesis  Making urine  No BM, +flatus    Home Meds:  Current Outpatient Medications   Medication Instructions    albuterol (PROVENTIL/VENTOLIN HFA) 90 mcg/actuation inhaler 2 puffs, Inhalation, Every 4-6 hours PRN    amLODIPine (NORVASC) 10 mg, Oral, Daily    atorvastatin (LIPITOR) 20 mg, Oral, Daily    famotidine (PEPCID) 20 mg, Oral, 2 times daily    glimepiride (AMARYL) 4 mg, Oral, With breakfast    hydroCHLOROthiazide (HYDRODIURIL) 25 mg, Oral, Daily    insulin glargine U-100 (Lantus) 50 Units, Subcutaneous, Nightly    meclizine (ANTIVERT) 25 mg, Oral, 3 times daily    metFORMIN (GLUCOPHAGE) 1,000 mg, Oral, 2 times daily    metoprolol tartrate (LOPRESSOR) 25 mg, Oral, 2 times daily    pioglitazone (ACTOS) 30 mg, Oral, Daily      Scheduled Meds:   clindamycin IV (PEDS and ADULTS)  600 mg Intravenous Q8H    enoxaparin  40 mg Subcutaneous Daily    insulin glargine U-100  10 Units Subcutaneous QHS    lactated ringers  1,000 mL Intravenous Once    lactated ringers  1,000 mL Intravenous Once    piperacillin-tazobactam (Zosyn) IV (PEDS and ADULTS) (extended infusion is not appropriate)  4.5 g Intravenous Q12H    sodium chloride 0.9%  10 mL Intravenous Q6H     Continuous Infusions:  PRN Meds:  Current Facility-Administered Medications:     0.9%  NaCl infusion (for blood administration), , Intravenous, Q24H PRN    acetaminophen, 650 mg, Oral, Q4H PRN    albuterol-ipratropium, 3 mL, Nebulization, Q4H PRN    aluminum-magnesium hydroxide-simethicone, 30 mL, Oral, QID PRN    bisacodyL, 10 mg, Rectal, Daily PRN    calcium carbonate, 1,000 mg, Oral, TID PRN    dextrose 10%, 12.5 g, Intravenous, PRN    dextrose 10%, 12.5 g, Intravenous, PRN    dextrose 10%, 25 g, Intravenous, PRN    dextrose 10%, 25 g, Intravenous, PRN    glucagon (human recombinant), 1 mg,  "Intramuscular, PRN    glucagon (human recombinant), 1 mg, Intramuscular, PRN    glucose, 16 g, Oral, PRN    glucose, 16 g, Oral, PRN    glucose, 24 g, Oral, PRN    glucose, 24 g, Oral, PRN    heparin (porcine), 3,000 Units, Intravenous, PRN    ibuprofen, 400 mg, Oral, Q6H PRN    insulin aspart U-100, 0-15 Units, Subcutaneous, QID (AC + HS) PRN    metoclopramide, 10 mg, Intravenous, Q8H PRN    polyethylene glycol, 17 g, Oral, BID PRN    prochlorperazine, 2.5 mg, Intravenous, Q6H PRN    prochlorperazine, 5 mg, Intravenous, Q6H PRN    senna-docusate 8.6-50 mg, 2 tablet, Oral, BID PRN    sodium chloride 0.9%, 10 mL, Intravenous, PRN    Flushing PICC/Midline Protocol, , , Until Discontinued **AND** sodium chloride 0.9%, 10 mL, Intravenous, Q6H **AND** sodium chloride 0.9%, 10 mL, Intravenous, PRN    traMADoL, 50 mg, Oral, Q8H PRN    Pharmacy to dose Vancomycin consult, , , Once **AND** vancomycin - pharmacy to dose, , Intravenous, pharmacy to manage frequency     Objective:     VITAL SIGNS: 24 HR MIN & MAX LAST   Temp  Min: 97.8 °F (36.6 °C)  Max: 98.8 °F (37.1 °C)  98.6 °F (37 °C)   BP  Min: 102/67  Max: 190/88  107/68    Pulse  Min: 70  Max: 99  81    Resp  Min: 11  Max: 36  19    SpO2  Min: 67 %  Max: 100 %  (!) 93 %      HT: 5' 7.01" (170.2 cm)  WT: 125.9 kg (277 lb 9 oz)  BMI: 43.5     Intake/output:  Intake/Output - Last 3 Shifts         06/20 0700 06/21 0659 06/21 0700 06/22 0659 06/22 0700 06/23 0659    P.O. 240 150     Other       IV Piggyback  50     Total Intake(mL/kg) 240 (1.9) 200 (1.6)     Urine (mL/kg/hr) 100 (0) 675 (0.2)     Emesis/NG output       Other  2400     Stool 0      Total Output 100 3075     Net +140 -2875            Urine Occurrence 0 x      Stool Occurrence 2 x              Intake/Output Summary (Last 24 hours) at 6/22/2024 0802  Last data filed at 6/22/2024 0540  Gross per 24 hour   Intake 200 ml   Output 3075 ml   Net -2875 ml           Lines/drains/airway:       Hemodialysis Catheter " "right internal jugular (Active)   Line Necessity Review CRRT/HD 06/21/24 2000   Verification by X-ray Yes 06/17/24 2150   Site Assessment No drainage;No redness;No swelling;No warmth 06/21/24 2000   Line Securement Device Secured with sutureless device 06/21/24 2000   Dressing Type Central line dressing 06/21/24 2000   Dressing Status Clean;Dry;Intact 06/21/24 2000   Dressing Intervention Integrity maintained 06/21/24 2000   Date on Dressing 06/19/24 06/20/24 2000   Dressing Due to be Changed 06/19/24 06/18/24 2240   Venous Patency/Care flushed w/o difficulty 06/21/24 2000   Arterial Patency/Care flushed w/o difficulty 06/21/24 2000   Number of days:             Peripheral IV - Single Lumen 06/21/24 1039 (Active)   Number of days: 0            Midline Catheter - Single Lumen 06/16/24 1330 Left basilic vein (medial side of arm) (Active)   Site Assessment Clean;Dry;Intact;No redness;No swelling 06/22/24 0607   IV Device Securement catheter securement device 06/22/24 0607   Line Status Infusing 06/22/24 0607   Dressing Type CHG impregnated dressing/sponge 06/22/24 0607   Dressing Status Clean;Dry;Intact 06/22/24 0607   Dressing Intervention Integrity maintained 06/22/24 0607   Dressing Change Due 06/23/24 06/20/24 2000   Number of days: 5            Urethral Catheter 06/14/24 Latex 16 Fr. (Active)   Site Assessment Clean;Intact 06/21/24 2000   Collection Container Standard drainage bag 06/21/24 2000   Securement Method secured to top of thigh w/ adhesive device 06/21/24 2000   Catheter Care Performed yes 06/21/24 0800   Reason for Continuing Urinary Catheterization Non-healing sacral/perineal wound;Post operative 06/21/24 2000   CAUTI Prevention Bundle Securement Device in place with 1" slack;Intact seal between catheter & drainage tubing;Drainage bag/urimeter off the floor;Sheeting clip in use;No dependent loops or kinks;Drainage bag/urimeter not overfilled (<2/3 full);Drainage bag/urimeter below bladder 06/21/24 2000 " "  Output (mL) 75 mL 06/21/24 1313   Number of days: 8       Physical examination:  Gen: NAD  CV: RR  Resp: NWOB  Abd: S/NT/ND  Ext: moving all extremities spontaneously and purposefully  Neuro: alert  Skin/wounds: groin wound dressing taken down notable for some fibrinous exudate, no foul smelling drainage, no new areas of necrosis     Labs:  Renal:  Recent Labs     06/21/24 0416   BUN 24.1*   CREATININE 4.79*     No results for input(s): "LACTIC" in the last 72 hours.  FENGI:  Recent Labs     06/21/24 0416   *   K 4.4   CL 98   CO2 21*   CALCIUM 7.6*   PHOS 4.4   ALBUMIN 1.7*     Heme:  Recent Labs     06/21/24 0621   HGB 8.1*   HCT 24.7*   *     ID:  Recent Labs     06/21/24 0621   WBC 30.52*     CBG:  Recent Labs     06/21/24 0416   GLUCOSE 216*      Cardiovascular:  No results for input(s): "TROPONINI", "CKTOTAL", "CKMB", "BNP" in the last 168 hours.  I have reviewed all pertinent lab results within the past 24 hours.    Imaging:  CT Head Without Contrast   Final Result      1. No acute intracranial abnormality.   2. Chronic microvascular ischemic changes.         Electronically signed by: Marlin Gonzalez   Date:    06/18/2024   Time:    07:43      CT Chest Abdomen Pelvis With IV Contrast (XPD) NO Oral Contrast   Final Result      Interval development of gas within the left perineum.  Necrotizing fasciitis is not excluded.  There is mild haziness about the duodenum.  Mild duodenitis is not excluded.  This possibly relates to mild stranding seen in the retroperitoneum which may be inflammatory.  Small bilateral effusions.      Possible necrotizing fasciitis reported to Prashanth prior to interpretation.         Electronically signed by: Ez Nevarez   Date:    06/18/2024   Time:    07:51      X-Ray Chest 1 View   Final Result      As above.         Electronically signed by: Ez Nevarez   Date:    06/18/2024   Time:    07:17      X-Ray Chest 1 View   Final Result      Optimal placement of " hemodialysis catheter.         Electronically signed by: Garland Bowie   Date:    06/17/2024   Time:    18:14      US Retroperitoneal Complete   Final Result      No significant sonographic abnormality of the kidneys.         Electronically signed by: Issa Luna   Date:    06/17/2024   Time:    11:11      US Abdomen Limited   Final Result      1. Mild hepatomegaly.   2. Status post cholecystectomy.  No biliary ductal dilatation.         Electronically signed by: Issa Luna   Date:    06/17/2024   Time:    09:58      CTA Chest Non-Coronary (PE Studies)   Final Result      No pulmonary embolism seen      Right lower lobe atelectasis and small right-sided pleural effusion         Electronically signed by: Ayush Barbour   Date:    06/13/2024   Time:    09:58      CT Abdomen Pelvis With IV Contrast NO Oral Contrast   Final Result      Area of cellulitis involving the perineum on the left side extending to the medial gluteal fold with a area of thickening and inflammatory changes in the labia on the left side with a area of hypoattenuation seen within the labia on the left side concerning for possible developing abscess.  Follow-up is recommended.      Right lower lobe atelectasis and right-sided pleural effusion         Electronically signed by: Ayush Barbour   Date:    06/13/2024   Time:    10:38         I have reviewed all pertinent imaging results/findings within the past 24 hours.    Micro/Path/Other:  Microbiology Results (last 7 days)       Procedure Component Value Units Date/Time    Tissue Culture - Aerobic [[id number]]  (Abnormal)  (Susceptibility) Collected: 06/18/24 1223    Order Status: Completed Specimen: Tissue from Groin Updated: 06/21/24 1102     Tissue - Aerobic Culture Few Methicillin resistant Staphylococcus aureus    Anaerobic Culture [[id number]] Collected: 06/18/24 1223    Order Status: Completed Specimen: Tissue from Groin Updated: 06/21/24 0841     Anaerobe Culture No Anaerobes Isolated    Gram  Stain [[id number]] Collected: 06/18/24 1223    Order Status: Completed Specimen: Tissue from Groin Updated: 06/18/24 1431     GRAM STAIN Few WBC observed      Rare Gram positive cocci    Blood culture #1 **CANNOT BE ORDERED STAT** [[id number]]  (Normal) Collected: 06/13/24 0832    Order Status: Completed Specimen: Blood from Hand, Left Updated: 06/18/24 1300     Blood Culture No Growth at 5 days    Blood culture #2 **CANNOT BE ORDERED STAT** [[id number]]  (Normal) Collected: 06/13/24 0832    Order Status: Completed Specimen: Blood from Antecubital, Left Updated: 06/18/24 1300     Blood Culture No Growth at 5 days    Fungal Culture [[id number]] Collected: 06/18/24 1223    Order Status: Sent Specimen: Tissue from Groin Updated: 06/18/24 1233    Wound Culture [[id number]]  (Abnormal)  (Susceptibility) Collected: 06/13/24 0917    Order Status: Completed Specimen: Abscess from Perineum Updated: 06/15/24 0903     Wound Culture Moderate Methicillin resistant Staphylococcus aureus           Pathology Results  (Last 7 days)      None             Assessment & Plan:   59yo F with DM, HTN, obesity, and asthma, presenting with a R groin soft tissue infection. Initially, there was concern for NSTI and patient was taken to the OR urgently for debridement. She tolerated this well, and there was not evidence of NSTI. She is now s/p R groin incision, drainage, and debridement on 6/14, 6/18 and 6/21.   -afebrile, HDS  -WBC 25 from 30, cx 6/18 w/ MRSA susceptible to clindamycin. Agree with current abx regimen. Dressing taken down at the bedside and appears stable in appearance with some fibrinous material but no purulent or foul smelling drainage or new areas of necrosis. Based on wound exam today no indication to return to the operating room. Continue daily wet-to-dry dressing changes. Will obtain UA, blood Cx and CXR for complete workup of leukocytosis.    -diet as tolerated  -encourage PT/OT  -HD per nephro  -MMPC  -recommend  glucose < 180    Kimani Yang MD  [employer] General Surgery PGY-4

## 2024-06-23 LAB
ABS NEUT (OLG): 22.3 X10(3)/MCL (ref 2.1–9.2)
ALBUMIN SERPL-MCNC: 1.8 G/DL (ref 3.5–5)
ALBUMIN/GLOB SERPL: 0.4 RATIO (ref 1.1–2)
ALP SERPL-CCNC: 183 UNIT/L (ref 40–150)
ALT SERPL-CCNC: 15 UNIT/L (ref 0–55)
ANION GAP SERPL CALC-SCNC: 14 MEQ/L
AST SERPL-CCNC: 16 UNIT/L (ref 5–34)
BASOPHILS NFR BLD MANUAL: 0.29 X10(3)/MCL (ref 0–0.2)
BASOPHILS NFR BLD MANUAL: 1 %
BILIRUB SERPL-MCNC: 1.6 MG/DL
BUN SERPL-MCNC: 24.3 MG/DL (ref 9.8–20.1)
CALCIUM SERPL-MCNC: 7.4 MG/DL (ref 8.4–10.2)
CHLORIDE SERPL-SCNC: 95 MMOL/L (ref 98–107)
CO2 SERPL-SCNC: 22 MMOL/L (ref 22–29)
CREAT SERPL-MCNC: 4.89 MG/DL (ref 0.55–1.02)
CREAT/UREA NIT SERPL: 5
EOSINOPHIL NFR BLD MANUAL: 0.58 X10(3)/MCL (ref 0–0.9)
EOSINOPHIL NFR BLD MANUAL: 2 %
ERYTHROCYTE [DISTWIDTH] IN BLOOD BY AUTOMATED COUNT: 16 % (ref 11.5–17)
GFR SERPLBLD CREATININE-BSD FMLA CKD-EPI: 10 ML/MIN/1.73/M2
GLOBULIN SER-MCNC: 4.4 GM/DL (ref 2.4–3.5)
GLUCOSE SERPL-MCNC: 185 MG/DL (ref 74–100)
HCT VFR BLD AUTO: 21.7 % (ref 37–47)
HGB BLD-MCNC: 7.4 G/DL (ref 12–16)
INSTRUMENT WBC (OLG): 28.96 X10(3)/MCL
LYMPHOCYTES NFR BLD MANUAL: 12 %
LYMPHOCYTES NFR BLD MANUAL: 3.48 X10(3)/MCL
MCH RBC QN AUTO: 30.8 PG (ref 27–31)
MCHC RBC AUTO-ENTMCNC: 34.1 G/DL (ref 33–36)
MCV RBC AUTO: 90.4 FL (ref 80–94)
METAMYELOCYTES NFR BLD MANUAL: 3 %
MONOCYTES NFR BLD MANUAL: 1.45 X10(3)/MCL (ref 0.1–1.3)
MONOCYTES NFR BLD MANUAL: 5 %
MYELOCYTES NFR BLD MANUAL: 1 %
NEUTROPHILS NFR BLD MANUAL: 77 %
NRBC BLD AUTO-RTO: 0.2 %
PLATELET # BLD AUTO: 413 X10(3)/MCL (ref 130–400)
PLATELET # BLD EST: ABNORMAL 10*3/UL
PMV BLD AUTO: 9.5 FL (ref 7.4–10.4)
POCT GLUCOSE: 180 MG/DL (ref 70–110)
POCT GLUCOSE: 212 MG/DL (ref 70–110)
POCT GLUCOSE: 244 MG/DL (ref 70–110)
POCT GLUCOSE: 265 MG/DL (ref 70–110)
POTASSIUM SERPL-SCNC: 4.3 MMOL/L (ref 3.5–5.1)
PROT SERPL-MCNC: 6.2 GM/DL (ref 6.4–8.3)
RBC # BLD AUTO: 2.4 X10(6)/MCL (ref 4.2–5.4)
RBC MORPH BLD: NORMAL
SODIUM SERPL-SCNC: 131 MMOL/L (ref 136–145)
WBC # BLD AUTO: 28.96 X10(3)/MCL (ref 4.5–11.5)

## 2024-06-23 PROCEDURE — 63600175 PHARM REV CODE 636 W HCPCS: Performed by: STUDENT IN AN ORGANIZED HEALTH CARE EDUCATION/TRAINING PROGRAM

## 2024-06-23 PROCEDURE — 27000207 HC ISOLATION

## 2024-06-23 PROCEDURE — 63600175 PHARM REV CODE 636 W HCPCS: Performed by: INTERNAL MEDICINE

## 2024-06-23 PROCEDURE — A4216 STERILE WATER/SALINE, 10 ML: HCPCS | Performed by: INTERNAL MEDICINE

## 2024-06-23 PROCEDURE — 63600175 PHARM REV CODE 636 W HCPCS: Mod: JZ,JG | Performed by: INTERNAL MEDICINE

## 2024-06-23 PROCEDURE — 85027 COMPLETE CBC AUTOMATED: CPT | Performed by: NURSE PRACTITIONER

## 2024-06-23 PROCEDURE — 25000003 PHARM REV CODE 250: Performed by: INTERNAL MEDICINE

## 2024-06-23 PROCEDURE — 25000003 PHARM REV CODE 250: Performed by: NURSE PRACTITIONER

## 2024-06-23 PROCEDURE — 85007 BL SMEAR W/DIFF WBC COUNT: CPT | Performed by: NURSE PRACTITIONER

## 2024-06-23 PROCEDURE — 36415 COLL VENOUS BLD VENIPUNCTURE: CPT | Performed by: NURSE PRACTITIONER

## 2024-06-23 PROCEDURE — 21400001 HC TELEMETRY ROOM

## 2024-06-23 PROCEDURE — 80053 COMPREHEN METABOLIC PANEL: CPT | Performed by: NURSE PRACTITIONER

## 2024-06-23 PROCEDURE — 25000003 PHARM REV CODE 250: Performed by: STUDENT IN AN ORGANIZED HEALTH CARE EDUCATION/TRAINING PROGRAM

## 2024-06-23 RX ADMIN — TRAMADOL HYDROCHLORIDE 50 MG: 50 TABLET, COATED ORAL at 02:06

## 2024-06-23 RX ADMIN — PIPERACILLIN SODIUM AND TAZOBACTAM SODIUM 4.5 G: 4; .5 INJECTION, POWDER, LYOPHILIZED, FOR SOLUTION INTRAVENOUS at 08:06

## 2024-06-23 RX ADMIN — INSULIN ASPART 3 UNITS: 100 INJECTION, SOLUTION INTRAVENOUS; SUBCUTANEOUS at 12:06

## 2024-06-23 RX ADMIN — CLINDAMYCIN PHOSPHATE 600 MG: 600 INJECTION, SOLUTION INTRAVENOUS at 01:06

## 2024-06-23 RX ADMIN — ENOXAPARIN SODIUM 40 MG: 40 INJECTION SUBCUTANEOUS at 05:06

## 2024-06-23 RX ADMIN — SODIUM CHLORIDE, PRESERVATIVE FREE 10 ML: 5 INJECTION INTRAVENOUS at 06:06

## 2024-06-23 RX ADMIN — SODIUM ZIRCONIUM CYCLOSILICATE 10 G: 10 POWDER, FOR SUSPENSION ORAL at 08:06

## 2024-06-23 RX ADMIN — TRAMADOL HYDROCHLORIDE 50 MG: 50 TABLET, COATED ORAL at 10:06

## 2024-06-23 RX ADMIN — CLINDAMYCIN PHOSPHATE 600 MG: 600 INJECTION, SOLUTION INTRAVENOUS at 08:06

## 2024-06-23 RX ADMIN — INSULIN GLARGINE 10 UNITS: 100 INJECTION, SOLUTION SUBCUTANEOUS at 08:06

## 2024-06-23 RX ADMIN — SODIUM CHLORIDE, PRESERVATIVE FREE 10 ML: 5 INJECTION INTRAVENOUS at 01:06

## 2024-06-23 RX ADMIN — CLINDAMYCIN PHOSPHATE 600 MG: 600 INJECTION, SOLUTION INTRAVENOUS at 05:06

## 2024-06-23 RX ADMIN — SODIUM CHLORIDE, PRESERVATIVE FREE 10 ML: 5 INJECTION INTRAVENOUS at 12:06

## 2024-06-23 NOTE — PROGRESS NOTES
[facility] Hospital Medicine Progress Note      Chief Complaint: 1 day history of fever and multiple painful lesions to her thighs and buttocks       HPI: (personally reviewed by me and is documented from initial H&P)     58-year-old female whose history includes hypertension, diabetes, asthma and dyslipidemia.     Presented to the ED with complaints of a 1 day history of fever and multiple painful lesions to her thighs and buttocks. First noticed them approximately 3 days ago.      VS on arrival: T 101, P 124, R 18, B/P 189/132, Sats 94% on room air.  Initial labs include WBC 27.06 hemoglobin 11.8, hematocrit 35.8, platelets 264, sodium 131, potassium 3.8, chloride 94, bicarb 23, BUN 9.7, creatinine 0.98, glucose 353 and otherwise unremarkable.  Lactic acid 1.4.  COVID and flu not detected.  UA with significant proteinuria, glucosuria, ketones and few bacteria and yeast.  No white blood cells noted.     T abdomen and pelvis with contrast shows an area of cellulitis involving the perineum on the left side extending to the medial gluteal fold with an area of thickening and inflammatory changes in the labia on the left side with an area of hypoattenuation seen within the labia on the left side concerning for possible developing abscess.  CT chest with contrast shows a right lower lobe atelectasis and small right-sided pleural effusion with no evidence of pulmonary embolism.  Medications given in the ER include vancomycin and Rocephin.      Interval History:        On 06/21/2024 patient underwent repeat wound washout/debridement.  No wound VAC applied.  No overnight events reported.  No new complaints reported.    Pain is well controlled at this time.       Objective Assessment:  Physical Exam      Vital signs have been personally reviewed by me   General: Appears comfortable, no acute distress.   castaneda catheter in place-no hematuria noted.     Awake, alert oriented.     Integumentary:  Warm, dry, intact.  Musculoskeletal: Purposeful movement noted.     Respiratory: No accessory muscle use. Breath sounds are equal.  Cardiovascular: Regular rate.       VITAL SIGNS: 24 HRS MIN & MAX LAST   Temp  Min: 98 °F (36.7 °C)  Max: 99.1 °F (37.3 °C) 98 °F (36.7 °C)   BP  Min: 129/75  Max: 166/91 (!) 164/90   Pulse  Min: 80  Max: 91  87   Resp  Min: 18  Max: 18 18   SpO2  Min: 95 %  Max: 97 % 97 %     X-Ray Chest 1 View  Narrative: EXAMINATION:  XR CHEST 1 VIEW    CLINICAL HISTORY:  leukocytosis workup;    COMPARISON:  18 June 2024    FINDINGS:  Frontal view of the chest was obtained. Stable right-sided catheter.  The heart is not significantly enlarged.  Similar right hilar prominence and right basilar opacities.  No pneumothorax.  Impression: Similar appearance of the chest.    Electronically signed by: Issa Luna  Date:    06/22/2024  Time:    13:08    Recent Labs   Lab 06/21/24  0621 06/22/24  0902 06/23/24  0451   WBC 30.52* 35.31  35.31* 28.96  28.96*   RBC 2.67* 2.53* 2.40*   HGB 8.1* 7.7* 7.4*   HCT 24.7* 23.0* 21.7*   MCV 92.5 90.9 90.4   MCH 30.3 30.4 30.8   MCHC 32.8* 33.5 34.1   RDW 16.3 16.6 16.0   * 487* 413*   MPV 9.6 9.6 9.5       Recent Labs   Lab 06/18/24  0707 06/18/24  0757 06/19/24  0517 06/21/24  0416 06/22/24  0902 06/23/24  0451   NA  --  131* 132* 134* 131* 131*   K  --  4.1 4.0 4.4 5.5* 4.3   CL  --  95* 100 98 97* 95*   CO2  --  23 23 21* 22 22   BUN  --  34.6* 25.1* 24.1* 17.5 24.3*   CREATININE  --  5.08* 4.03* 4.79* 3.86* 4.89*   CALCIUM  --  7.8* 7.7* 7.6* 7.6* 7.4*   PH 7.440  --   --   --   --   --    ALBUMIN  --  1.8* 1.8* 1.7*  --  1.8*   ALKPHOS  --  126 129  --   --  183*   ALT  --  27 22  --   --  15   AST  --  23 17  --   --  16   BILITOT  --  3.7* 3.3*  --   --  1.6*     Microbiology Results (last 7 days)       Procedure Component Value Units Date/Time    Blood Culture [[id number]]  (Normal) Collected: 06/22/24 1203    Order Status: Completed Specimen: Blood  Updated: 06/23/24 1300     Blood Culture No Growth At 24 Hours    Tissue Culture - Aerobic [[id number]]  (Abnormal)  (Susceptibility) Collected: 06/18/24 1223    Order Status: Completed Specimen: Tissue from Groin Updated: 06/21/24 1102     Tissue - Aerobic Culture Few Methicillin resistant Staphylococcus aureus    Anaerobic Culture [[id number]] Collected: 06/18/24 1223    Order Status: Completed Specimen: Tissue from Groin Updated: 06/21/24 0841     Anaerobe Culture No Anaerobes Isolated    Gram Stain [[id number]] Collected: 06/18/24 1223    Order Status: Completed Specimen: Tissue from Groin Updated: 06/18/24 1431     GRAM STAIN Few WBC observed      Rare Gram positive cocci    Blood culture #1 **CANNOT BE ORDERED STAT** [[id number]]  (Normal) Collected: 06/13/24 0832    Order Status: Completed Specimen: Blood from Hand, Left Updated: 06/18/24 1300     Blood Culture No Growth at 5 days    Blood culture #2 **CANNOT BE ORDERED STAT** [[id number]]  (Normal) Collected: 06/13/24 0832    Order Status: Completed Specimen: Blood from Antecubital, Left Updated: 06/18/24 1300     Blood Culture No Growth at 5 days    Fungal Culture [[id number]] Collected: 06/18/24 1223    Order Status: Sent Specimen: Tissue from Groin Updated: 06/18/24 1233               Medications for Hospital Course         Scheduled Med:   clindamycin IV (PEDS and ADULTS)  600 mg Intravenous Q8H    enoxaparin  40 mg Subcutaneous Daily    insulin glargine U-100  10 Units Subcutaneous QHS    lactated ringers  1,000 mL Intravenous Once    lactated ringers  1,000 mL Intravenous Once    piperacillin-tazobactam (Zosyn) IV (PEDS and ADULTS) (extended infusion is not appropriate)  4.5 g Intravenous Q12H    sodium chloride 0.9%  10 mL Intravenous Q6H     PRN Meds:    Current Facility-Administered Medications:     0.9%  NaCl infusion (for blood administration), , Intravenous, Q24H PRN    acetaminophen, 650 mg, Oral, Q4H PRN    albuterol-ipratropium, 3 mL,  Nebulization, Q4H PRN    aluminum-magnesium hydroxide-simethicone, 30 mL, Oral, QID PRN    bisacodyL, 10 mg, Rectal, Daily PRN    calcium carbonate, 1,000 mg, Oral, TID PRN    dextrose 10%, 12.5 g, Intravenous, PRN    dextrose 10%, 12.5 g, Intravenous, PRN    dextrose 10%, 25 g, Intravenous, PRN    dextrose 10%, 25 g, Intravenous, PRN    glucagon (human recombinant), 1 mg, Intramuscular, PRN    glucagon (human recombinant), 1 mg, Intramuscular, PRN    glucose, 16 g, Oral, PRN    glucose, 16 g, Oral, PRN    glucose, 24 g, Oral, PRN    glucose, 24 g, Oral, PRN    heparin (porcine), 3,000 Units, Intravenous, PRN    ibuprofen, 400 mg, Oral, Q6H PRN    insulin aspart U-100, 0-15 Units, Subcutaneous, QID (AC + HS) PRN    metoclopramide, 10 mg, Intravenous, Q8H PRN    polyethylene glycol, 17 g, Oral, BID PRN    prochlorperazine, 2.5 mg, Intravenous, Q6H PRN    prochlorperazine, 5 mg, Intravenous, Q6H PRN    senna-docusate 8.6-50 mg, 2 tablet, Oral, BID PRN    sodium chloride 0.9%, 10 mL, Intravenous, PRN    Flushing PICC/Midline Protocol, , , Until Discontinued **AND** sodium chloride 0.9%, 10 mL, Intravenous, Q6H **AND** sodium chloride 0.9%, 10 mL, Intravenous, PRN    traMADoL, 50 mg, Oral, Q8H PRN    Pharmacy to dose Vancomycin consult, , , Once **AND** vancomycin - pharmacy to dose, , Intravenous, pharmacy to manage frequency     Assessment and Plan      Perineal cellulitis with necrotizing infection  Uncontrolled type 2 diabetes mellitus with hyperglycemia   Acute Renal failure  DM II uncontrolled  Anemia  Above present on admission         Anticipated discharge and Disposition when medically stable:ltac vs tcu     Therapy: PT/OT/Speech    Nutrition: diabetic      ___________________________________________________________________________________________________________________________________    ----Perineal Cellulitis with necrotizing component, Excisional debridement of left perineum groin wound, wound washoutx2;  most recent being 06/21/2024.  Anaerobic culture negative.  Tissue culture with few stah.  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     ---acute renal failure, new dialysis, tolerating.     ---uncontrolled hyperglycemia, added 10 units of long-acting insulin therapy at bedtime. maintain fasting blood glucose <180  ---morbidly obese   Continue supportive care  Continue checking vital signs q4hrs.  Reviewed and restarted appropriate home medications.   Continue PT/OT as tolerated patient will likely need LTAC or TCU for continued wound care, IV antibiotic therapy and  rehabilitation; will discuss with CM on Monday discharge planning.     DVT prophylaxis initiated   Nurse notified to page me if any changes occur     Consults: general surgeon   I have personally reviewed the specialist documentation and/or have spoken to the specialist with regard to the care of this patient; recommendations are noted above.     _______________________________________________________________________________________________________________________________      I have spent 40 minutes on the day of the visit; time spent includes face to face time and non-face to face time preparing to see the patient (eg, review of tests), independently reviewing and interpreting medical records, both past and current; documenting clinical information in the electronic or other health record, and communicating results to the patient/family/caregiver and care coordinator and nursing team.      All diagnosis and differential diagnosis have been reviewed,  interpreted and communicated appropriately to care team. assessment and plan has been documented; I have personally reviewed the labs and test results that are presently available and  pertinent to this hospital course; I have reviewed medical records based upon their availability.    All of the patient's questions have been  addressed and answered. Patient's is agreeable to the above stated plan.   I will continue to monitor closely and make adjustments to medical management as needed.    Deanne Scott, DO   06/23/2024     This note was created with the assistance of Dragon voice recognition software. There may be transcription errors as a result of using this technology however minimal. Effort has been made to assure accuracy of transcription but any obvious errors or omissions should be clarified with the author of the document.

## 2024-06-24 LAB
ABS NEUT (OLG): 18.79 X10(3)/MCL (ref 2.1–9.2)
ALBUMIN SERPL-MCNC: 1.7 G/DL (ref 3.5–5)
ALBUMIN/GLOB SERPL: 0.3 RATIO (ref 1.1–2)
ALP SERPL-CCNC: 198 UNIT/L (ref 40–150)
ALT SERPL-CCNC: 20 UNIT/L (ref 0–55)
ANION GAP SERPL CALC-SCNC: 14 MEQ/L
ANISOCYTOSIS BLD QL SMEAR: ABNORMAL
AST SERPL-CCNC: 24 UNIT/L (ref 5–34)
BILIRUB SERPL-MCNC: 1.5 MG/DL
BUN SERPL-MCNC: 28.6 MG/DL (ref 9.8–20.1)
CALCIUM SERPL-MCNC: 8 MG/DL (ref 8.4–10.2)
CHLORIDE SERPL-SCNC: 93 MMOL/L (ref 98–107)
CO2 SERPL-SCNC: 22 MMOL/L (ref 22–29)
CREAT SERPL-MCNC: 5.52 MG/DL (ref 0.55–1.02)
CREAT/UREA NIT SERPL: 5
EOSINOPHIL NFR BLD MANUAL: 0.45 X10(3)/MCL (ref 0–0.9)
EOSINOPHIL NFR BLD MANUAL: 2 %
ERYTHROCYTE [DISTWIDTH] IN BLOOD BY AUTOMATED COUNT: 16 % (ref 11.5–17)
GFR SERPLBLD CREATININE-BSD FMLA CKD-EPI: 8 ML/MIN/1.73/M2
GLOBULIN SER-MCNC: 5.2 GM/DL (ref 2.4–3.5)
GLUCOSE SERPL-MCNC: 152 MG/DL (ref 74–100)
HCT VFR BLD AUTO: 21 % (ref 37–47)
HGB BLD-MCNC: 7.1 G/DL (ref 12–16)
HYPOCHROMIA BLD QL SMEAR: ABNORMAL
INSTRUMENT WBC (OLG): 22.37 X10(3)/MCL
LYMPHOCYTES NFR BLD MANUAL: 1.12 X10(3)/MCL
LYMPHOCYTES NFR BLD MANUAL: 5 %
MACROCYTES BLD QL SMEAR: ABNORMAL
MCH RBC QN AUTO: 30.3 PG (ref 27–31)
MCHC RBC AUTO-ENTMCNC: 33.8 G/DL (ref 33–36)
MCV RBC AUTO: 89.7 FL (ref 80–94)
METAMYELOCYTES NFR BLD MANUAL: 3 %
MONOCYTES NFR BLD MANUAL: 1.12 X10(3)/MCL (ref 0.1–1.3)
MONOCYTES NFR BLD MANUAL: 5 %
MYELOCYTES NFR BLD MANUAL: 2 %
NEUTROPHILS NFR BLD MANUAL: 84 %
NRBC BLD AUTO-RTO: 0.1 %
PLATELET # BLD AUTO: 423 X10(3)/MCL (ref 130–400)
PLATELET # BLD EST: ABNORMAL 10*3/UL
PMV BLD AUTO: 9.7 FL (ref 7.4–10.4)
POCT GLUCOSE: 173 MG/DL (ref 70–110)
POCT GLUCOSE: 214 MG/DL (ref 70–110)
POCT GLUCOSE: 222 MG/DL (ref 70–110)
POIKILOCYTOSIS BLD QL SMEAR: ABNORMAL
POLYCHROMASIA BLD QL SMEAR: ABNORMAL
POTASSIUM SERPL-SCNC: 4.2 MMOL/L (ref 3.5–5.1)
PROT SERPL-MCNC: 6.9 GM/DL (ref 6.4–8.3)
RBC # BLD AUTO: 2.34 X10(6)/MCL (ref 4.2–5.4)
RBC MORPH BLD: ABNORMAL
SODIUM SERPL-SCNC: 129 MMOL/L (ref 136–145)
TARGETS BLD QL SMEAR: ABNORMAL
VANCOMYCIN SERPL-MCNC: 17.6 UG/ML (ref 15–20)
WBC # BLD AUTO: 22.37 X10(3)/MCL (ref 4.5–11.5)

## 2024-06-24 PROCEDURE — 63600175 PHARM REV CODE 636 W HCPCS: Performed by: INTERNAL MEDICINE

## 2024-06-24 PROCEDURE — 63600175 PHARM REV CODE 636 W HCPCS: Performed by: NURSE PRACTITIONER

## 2024-06-24 PROCEDURE — 27000207 HC ISOLATION

## 2024-06-24 PROCEDURE — 25000003 PHARM REV CODE 250: Performed by: INTERNAL MEDICINE

## 2024-06-24 PROCEDURE — 36415 COLL VENOUS BLD VENIPUNCTURE: CPT | Performed by: NURSE PRACTITIONER

## 2024-06-24 PROCEDURE — 80202 ASSAY OF VANCOMYCIN: CPT | Performed by: STUDENT IN AN ORGANIZED HEALTH CARE EDUCATION/TRAINING PROGRAM

## 2024-06-24 PROCEDURE — 63600175 PHARM REV CODE 636 W HCPCS: Performed by: HOSPITALIST

## 2024-06-24 PROCEDURE — 80100016 HC MAINTENANCE HEMODIALYSIS

## 2024-06-24 PROCEDURE — 21400001 HC TELEMETRY ROOM

## 2024-06-24 PROCEDURE — 85025 COMPLETE CBC W/AUTO DIFF WBC: CPT | Performed by: NURSE PRACTITIONER

## 2024-06-24 PROCEDURE — 80053 COMPREHEN METABOLIC PANEL: CPT | Performed by: NURSE PRACTITIONER

## 2024-06-24 PROCEDURE — A4216 STERILE WATER/SALINE, 10 ML: HCPCS | Performed by: INTERNAL MEDICINE

## 2024-06-24 PROCEDURE — 63600175 PHARM REV CODE 636 W HCPCS: Performed by: STUDENT IN AN ORGANIZED HEALTH CARE EDUCATION/TRAINING PROGRAM

## 2024-06-24 RX ORDER — HYDRALAZINE HYDROCHLORIDE 20 MG/ML
10 INJECTION INTRAMUSCULAR; INTRAVENOUS EVERY 4 HOURS PRN
Status: DISCONTINUED | OUTPATIENT
Start: 2024-06-24 | End: 2024-07-09 | Stop reason: HOSPADM

## 2024-06-24 RX ORDER — HEPARIN SODIUM 1000 [USP'U]/ML
2800 INJECTION, SOLUTION INTRAVENOUS; SUBCUTANEOUS
Status: DISCONTINUED | OUTPATIENT
Start: 2024-06-24 | End: 2024-07-09 | Stop reason: HOSPADM

## 2024-06-24 RX ADMIN — INSULIN ASPART 6 UNITS: 100 INJECTION, SOLUTION INTRAVENOUS; SUBCUTANEOUS at 10:06

## 2024-06-24 RX ADMIN — ENOXAPARIN SODIUM 40 MG: 40 INJECTION SUBCUTANEOUS at 04:06

## 2024-06-24 RX ADMIN — INSULIN GLARGINE 10 UNITS: 100 INJECTION, SOLUTION SUBCUTANEOUS at 10:06

## 2024-06-24 RX ADMIN — CLINDAMYCIN PHOSPHATE 600 MG: 600 INJECTION, SOLUTION INTRAVENOUS at 01:06

## 2024-06-24 RX ADMIN — CLINDAMYCIN PHOSPHATE 600 MG: 600 INJECTION, SOLUTION INTRAVENOUS at 08:06

## 2024-06-24 RX ADMIN — SODIUM CHLORIDE, PRESERVATIVE FREE 10 ML: 5 INJECTION INTRAVENOUS at 01:06

## 2024-06-24 RX ADMIN — PIPERACILLIN SODIUM AND TAZOBACTAM SODIUM 4.5 G: 4; .5 INJECTION, POWDER, LYOPHILIZED, FOR SOLUTION INTRAVENOUS at 11:06

## 2024-06-24 RX ADMIN — CLINDAMYCIN PHOSPHATE 600 MG: 600 INJECTION, SOLUTION INTRAVENOUS at 12:06

## 2024-06-24 RX ADMIN — SODIUM CHLORIDE, PRESERVATIVE FREE 10 ML: 5 INJECTION INTRAVENOUS at 12:06

## 2024-06-24 RX ADMIN — HEPARIN SODIUM 2800 UNITS: 1000 INJECTION INTRAVENOUS; SUBCUTANEOUS at 12:06

## 2024-06-24 RX ADMIN — VANCOMYCIN HYDROCHLORIDE 500 MG: 500 INJECTION, POWDER, LYOPHILIZED, FOR SOLUTION INTRAVENOUS at 10:06

## 2024-06-24 RX ADMIN — SODIUM CHLORIDE, PRESERVATIVE FREE 10 ML: 5 INJECTION INTRAVENOUS at 06:06

## 2024-06-24 RX ADMIN — PIPERACILLIN SODIUM AND TAZOBACTAM SODIUM 4.5 G: 4; .5 INJECTION, POWDER, LYOPHILIZED, FOR SOLUTION INTRAVENOUS at 02:06

## 2024-06-24 RX ADMIN — METOCLOPRAMIDE 10 MG: 5 INJECTION, SOLUTION INTRAMUSCULAR; INTRAVENOUS at 09:06

## 2024-06-24 RX ADMIN — INSULIN ASPART 3 UNITS: 100 INJECTION, SOLUTION INTRAVENOUS; SUBCUTANEOUS at 06:06

## 2024-06-24 RX ADMIN — HYDRALAZINE HYDROCHLORIDE 10 MG: 20 INJECTION, SOLUTION INTRAMUSCULAR; INTRAVENOUS at 12:06

## 2024-06-24 NOTE — PROGRESS NOTES
Nephrology consult follow up note    HPI:      Greer Kahn is a 58 y.o. female admitted on 06/13/2024 with fever 102 reporting possible UTI, boils to right thigh and left buttock.  Lactic acidosis present on admission with WBC 27.0, BUN 9.7, creatinine 0.98.  CT abdomen and pelvis with contrast showed area of cellulitis involving the perineum on the left side extending to the medial gluteal fold and areas of concern within the labia on the left side.  Also noted to have right lower lobe atelectasis via CT chest.  Patient was initiated on vancomycin and Rocephin in ER.     On 06/14 she underwent debridement of left groin and perineum.  Postoperative course complicated by nausea, vomiting and constipation.  She is also developed ATUUMN with rapidly worsening renal indices. Cr 0.98 --> 1.76--> 2.77--> 4.53 at the time of consultation. Urinalysis with low grade proteinuria and no blood.     Patient developed worsening renal function, and anuria.  She was initiated on hemodialysis 06/17/2024.    She has now undergone surgical debridement on 6/14, 6/18, and 6/21.     Interval history:     Patient on dialysis now with complaints of nausea. No evidence of renal recovery.      Review of Systems:       Past medical, family, surgical, and social history reviewed and unchanged from initial consult note.     Objective:       VITAL SIGNS: 24 HR MIN & MAX LAST    Temp  Min: 97.9 °F (36.6 °C)  Max: 99.4 °F (37.4 °C)  97.9 °F (36.6 °C)        BP  Min: 153/88  Max: 183/85  (!) 183/85     Pulse  Min: 76  Max: 91  78     Resp  Min: 16  Max: 22  16    SpO2  Min: 94 %  Max: 97 %  95 %      GEN:  Well-appearing AAF  CV: RRR +S1,S2 without murmur  PULM: CTAB, unlabored  ABD: Soft, NT/ND abdomen with NABS  EXT: No cyanosis or edema  SKIN: Warm and dry  PSYCH: Awake, alert and appropriately conversant.   Dialysis access:  Right IJ Vas-Cath          Component Value Date/Time     (L) 06/24/2024 0343     (L) 06/23/2024 0451    NA  135 (L) 06/26/2020 0441     06/25/2020 0615    K 4.2 06/24/2024 0343    K 4.3 06/23/2024 0451    K 3.7 06/26/2020 0441    K 3.5 06/25/2020 0615    CO2 22 06/24/2024 0343    CO2 22 06/23/2024 0451    CO2 26 06/26/2020 0441    CO2 27 06/25/2020 0615    BUN 28.6 (H) 06/24/2024 0343    BUN 24.3 (H) 06/23/2024 0451    BUN 12 06/26/2020 0441    BUN 9 06/25/2020 0615    CREATININE 5.52 (H) 06/24/2024 0343    CREATININE 4.89 (H) 06/23/2024 0451    CREATININE 0.68 06/26/2020 0441    CREATININE 0.58 06/25/2020 0615    CALCIUM 8.0 (L) 06/24/2024 0343    CALCIUM 7.4 (L) 06/23/2024 0451    CALCIUM 8.2 (L) 06/26/2020 0441    CALCIUM 7.7 (L) 06/25/2020 0615    PHOS 4.4 06/21/2024 0416            Component Value Date/Time    WBC 22.37 (H) 06/24/2024 0343    WBC 22.37 06/24/2024 0343    WBC 28.96 (H) 06/23/2024 0451    WBC 28.96 06/23/2024 0451    HGB 7.1 (L) 06/24/2024 0343    HGB 7.4 (L) 06/23/2024 0451    HCT 21.0 (L) 06/24/2024 0343    HCT 21.7 (L) 06/23/2024 0451    HCT 42.0 04/16/2019 2328    HCT 42.0 04/16/2019 2305     (H) 06/24/2024 0343     (H) 06/23/2024 0451       Imaging reviewed      Assessment / Plan:     Acute Renal failure now anuric - multifactorial secondary to sepsis, contrast exposure, NSAID use inpatient, vancomycin toxicity  -dialysis initiated 6/17  Perineal Cellulitis with necrotizing component - status post debridement 6/14, 6/18 and 6/21   Poor oral intake   DM II uncontrolled - A1C 11  Worsening anemia     Continue dialysis on MWF schedule   Patient will need outpatient setup as AUTUMN patient.  consulted.   She will need tunneled dialysis catheter.

## 2024-06-24 NOTE — PT/OT/SLP PROGRESS
Physical Therapy      Patient Name:  Greer Kahn   MRN:  [id number]    Patient not seen today secondary to pt off the floor for dialysis. Will follow-up as schedule allows.

## 2024-06-24 NOTE — NURSING
06/24/24 1218   Post-Hemodialysis Assessment   Blood Volume Processed (Liters) 54.1 L   Dialyzer Clearance Clear   Duration of Treatment 180 minutes   Net Fluid Removal 1000   Patient Response to Treatment pt tolerated tx well   Post-Hemodialysis Comments NAD noted, VSS, Net UF 1,000 mL

## 2024-06-24 NOTE — PROGRESS NOTES
Pharmacokinetic Assessment Follow Up: IV Vancomycin    Vancomycin serum concentration assessment(s):    The random level was drawn correctly and can be used to guide therapy at this time. The measurement is within the desired definitive target range of 15 to 20 mcg/mL.    Vancomycin Regimen Plan:    Give Vancomycin 500 mg IV x 1 dose post HD today.  Next random level to be drawn on 06/26 at 0430 prior to next dialysis session.  HD on MWF per nephrology.    Drug levels (last 3 results):  Recent Labs   Lab Result Units 06/24/24  0343   Vancomycin Random ug/ml 17.6       Pharmacy will continue to follow and monitor vancomycin.    Please contact pharmacy at extension [phone] for questions regarding this assessment.    Thank you for the consult,   Della Gonzalez       Patient brief summary:  Greer Kahn is a 58 y.o. female initiated on antimicrobial therapy with IV Vancomycin for treatment of sepsis.    Drug Allergies:   Review of patient's allergies indicates:  No Known Allergies    Actual Body Weight:   130.0 kg    Renal Function:   Estimated Creatinine Clearance: 15.8 mL/min (A) (based on SCr of 5.52 mg/dL (H)).,     Dialysis Method (if applicable):  intermittent HD on MWF    CBC (last 72 hours):  Recent Labs   Lab Result Units 06/22/24  0902 06/23/24  0451 06/24/24  0343   WBC x10(3)/mcL 35.31  35.31* 28.96  28.96* 22.37  22.37*   Hgb g/dL 7.7* 7.4* 7.1*   Hct % 23.0* 21.7* 21.0*   Platelet x10(3)/mcL 487* 413* 423*   Monocytes % % 2 5 5   Eosinophils % %  --  2 2   Basophils % %  --  1  --        Metabolic Panel (last 72 hours):  Recent Labs   Lab Result Units 06/22/24  0902 06/23/24  0451 06/24/24  0343   Sodium mmol/L 131* 131* 129*   Potassium mmol/L 5.5* 4.3 4.2   Chloride mmol/L 97* 95* 93*   CO2 mmol/L 22 22 22   Glucose mg/dL 223* 185* 152*   Blood Urea Nitrogen mg/dL 17.5 24.3* 28.6*   Creatinine mg/dL 3.86* 4.89* 5.52*   Albumin g/dL  --  1.8* 1.7*   Bilirubin Total mg/dL  --  1.6* 1.5   ALP unit/L   --  183* 198*   AST unit/L  --  16 24   ALT unit/L  --  15 20       Vancomycin Administrations:  vancomycin given in the last 96 hours                     vancomycin (VANCOCIN) 500 mg in D5W 100 mL IVPB (MB+) (mg) 500 mg New Bag 06/21/24 2016                    Microbiologic Results:  Microbiology Results (last 7 days)       Procedure Component Value Units Date/Time    Blood Culture [[id number]]  (Normal) Collected: 06/22/24 1203    Order Status: Completed Specimen: Blood Updated: 06/23/24 1300     Blood Culture No Growth At 24 Hours    Tissue Culture - Aerobic [[id number]]  (Abnormal)  (Susceptibility) Collected: 06/18/24 1223    Order Status: Completed Specimen: Tissue from Groin Updated: 06/21/24 1102     Tissue - Aerobic Culture Few Methicillin resistant Staphylococcus aureus    Anaerobic Culture [[id number]] Collected: 06/18/24 1223    Order Status: Completed Specimen: Tissue from Groin Updated: 06/21/24 0841     Anaerobe Culture No Anaerobes Isolated    Gram Stain [[id number]] Collected: 06/18/24 1223    Order Status: Completed Specimen: Tissue from Groin Updated: 06/18/24 1431     GRAM STAIN Few WBC observed      Rare Gram positive cocci    Blood culture #1 **CANNOT BE ORDERED STAT** [[id number]]  (Normal) Collected: 06/13/24 0832    Order Status: Completed Specimen: Blood from Hand, Left Updated: 06/18/24 1300     Blood Culture No Growth at 5 days    Blood culture #2 **CANNOT BE ORDERED STAT** [[id number]]  (Normal) Collected: 06/13/24 0832    Order Status: Completed Specimen: Blood from Antecubital, Left Updated: 06/18/24 1300     Blood Culture No Growth at 5 days    Fungal Culture [[id number]] Collected: 06/18/24 1223    Order Status: Sent Specimen: Tissue from Groin Updated: 06/18/24 1233

## 2024-06-24 NOTE — NURSING
[facility] - 9th Floor Med Surg  Wound Care    Patient Name:  Greer Kahn   MRN:  [id number]  Date: 6/24/2024  Diagnosis: Perineal abscess    History:     Past Medical History:   Diagnosis Date    Asthma     Diabetes mellitus     Hypertension        Social History     Socioeconomic History    Marital status: Single   Tobacco Use    Smoking status: Never    Smokeless tobacco: Never   Substance and Sexual Activity    Alcohol use: Never    Drug use: Never    Sexual activity: Not Currently     Social Determinants of Health     Financial Resource Strain: High Risk (6/14/2024)    Overall Financial Resource Strain (CARDIA)     Difficulty of Paying Living Expenses: Hard   Food Insecurity: Food Insecurity Present (6/14/2024)    Hunger Vital Sign     Worried About Running Out of Food in the Last Year: Often true     Ran Out of Food in the Last Year: Never true   Transportation Needs: No Transportation Needs (6/14/2024)    TRANSPORTATION NEEDS     Transportation : No   Physical Activity: Unknown (6/14/2024)    Exercise Vital Sign     Days of Exercise per Week: 0 days   Stress: Stress Concern Present (6/14/2024)    [employer] - Occupational Stress Questionnaire     Feeling of Stress : To some extent   Housing Stability: High Risk (6/14/2024)    Housing Stability Vital Sign     Unable to Pay for Housing in the Last Year: Yes     Homeless in the Last Year: No       Precautions:     Allergies as of 06/13/2024    (No Known Allergies)       WO Assessment Details/Treatment     Wound care follow up. Discussed with nurse Shannon Patient currently off of the floor at this time in dialysis. Dressings taking down and redressed this morning by surgery team. Nursing to continue with preventative measures. Will follow up.   06/24/2024

## 2024-06-24 NOTE — PROGRESS NOTES
Acute Care Surgery   Progress Note  Admit Date: 6/13/2024  HD#11  POD#3 Days Post-Op    Subjective:   Interval history:  Pain controlled  Tolerating diet some nausea but no emesis  Making urine    Home Meds:  Current Outpatient Medications   Medication Instructions    albuterol (PROVENTIL/VENTOLIN HFA) 90 mcg/actuation inhaler 2 puffs, Inhalation, Every 4-6 hours PRN    amLODIPine (NORVASC) 10 mg, Oral, Daily    atorvastatin (LIPITOR) 20 mg, Oral, Daily    famotidine (PEPCID) 20 mg, Oral, 2 times daily    glimepiride (AMARYL) 4 mg, Oral, With breakfast    hydroCHLOROthiazide (HYDRODIURIL) 25 mg, Oral, Daily    insulin glargine U-100 (Lantus) 50 Units, Subcutaneous, Nightly    meclizine (ANTIVERT) 25 mg, Oral, 3 times daily    metFORMIN (GLUCOPHAGE) 1,000 mg, Oral, 2 times daily    metoprolol tartrate (LOPRESSOR) 25 mg, Oral, 2 times daily    pioglitazone (ACTOS) 30 mg, Oral, Daily      Scheduled Meds:   clindamycin IV (PEDS and ADULTS)  600 mg Intravenous Q8H    enoxaparin  40 mg Subcutaneous Daily    insulin glargine U-100  10 Units Subcutaneous QHS    lactated ringers  1,000 mL Intravenous Once    lactated ringers  1,000 mL Intravenous Once    piperacillin-tazobactam (Zosyn) IV (PEDS and ADULTS) (extended infusion is not appropriate)  4.5 g Intravenous Q12H    sodium chloride 0.9%  10 mL Intravenous Q6H    vancomycin (VANCOCIN) IV (PEDS and ADULTS)  500 mg Intravenous Once     Continuous Infusions:  PRN Meds:  Current Facility-Administered Medications:     0.9%  NaCl infusion (for blood administration), , Intravenous, Q24H PRN    acetaminophen, 650 mg, Oral, Q4H PRN    albuterol-ipratropium, 3 mL, Nebulization, Q4H PRN    aluminum-magnesium hydroxide-simethicone, 30 mL, Oral, QID PRN    bisacodyL, 10 mg, Rectal, Daily PRN    calcium carbonate, 1,000 mg, Oral, TID PRN    dextrose 10%, 12.5 g, Intravenous, PRN    dextrose 10%, 12.5 g, Intravenous, PRN    dextrose 10%, 25 g, Intravenous, PRN    dextrose 10%, 25 g,  "Intravenous, PRN    glucagon (human recombinant), 1 mg, Intramuscular, PRN    glucagon (human recombinant), 1 mg, Intramuscular, PRN    glucose, 16 g, Oral, PRN    glucose, 16 g, Oral, PRN    glucose, 24 g, Oral, PRN    glucose, 24 g, Oral, PRN    heparin (porcine), 3,000 Units, Intravenous, PRN    hydrALAZINE, 10 mg, Intravenous, Q4H PRN    ibuprofen, 400 mg, Oral, Q6H PRN    insulin aspart U-100, 0-15 Units, Subcutaneous, QID (AC + HS) PRN    metoclopramide, 10 mg, Intravenous, Q8H PRN    polyethylene glycol, 17 g, Oral, BID PRN    prochlorperazine, 2.5 mg, Intravenous, Q6H PRN    prochlorperazine, 5 mg, Intravenous, Q6H PRN    senna-docusate 8.6-50 mg, 2 tablet, Oral, BID PRN    sodium chloride 0.9%, 10 mL, Intravenous, PRN    Flushing PICC/Midline Protocol, , , Until Discontinued **AND** sodium chloride 0.9%, 10 mL, Intravenous, Q6H **AND** sodium chloride 0.9%, 10 mL, Intravenous, PRN    traMADoL, 50 mg, Oral, Q8H PRN    Pharmacy to dose Vancomycin consult, , , Once **AND** vancomycin - pharmacy to dose, , Intravenous, pharmacy to manage frequency     Objective:     VITAL SIGNS: 24 HR MIN & MAX LAST   Temp  Min: 97.9 °F (36.6 °C)  Max: 99.4 °F (37.4 °C)  97.9 °F (36.6 °C)   BP  Min: 153/88  Max: 183/85  (!) 183/85    Pulse  Min: 76  Max: 91  78    Resp  Min: 16  Max: 22  16    SpO2  Min: 94 %  Max: 97 %  95 %      HT: 5' 7.01" (170.2 cm)  WT: 130 kg (286 lb 9.6 oz)  BMI: 44.9     Intake/output:  Intake/Output - Last 3 Shifts         06/22 0700  06/23 0659 06/23 0700 06/24 0659 06/24 0700 06/25 0659    P.O. 600 180     IV Piggyback       Total Intake(mL/kg) 600 (4.8) 180 (1.4)     Urine (mL/kg/hr) 125 (0) 50 (0)     Other       Total Output 125 50     Net +475 +130            Stool Occurrence  2 x             Intake/Output Summary (Last 24 hours) at 6/24/2024 0912  Last data filed at 6/24/2024 0438  Gross per 24 hour   Intake --   Output 50 ml   Net -50 ml           Lines/drains/airway:       Hemodialysis " "Catheter right internal jugular (Active)   Line Necessity Review CRRT/HD 06/21/24 2000   Verification by X-ray Yes 06/17/24 2150   Site Assessment No drainage;No redness;No swelling;No warmth 06/21/24 2000   Line Securement Device Secured with sutureless device 06/21/24 2000   Dressing Type Central line dressing 06/21/24 2000   Dressing Status Clean;Dry;Intact 06/21/24 2000   Dressing Intervention Integrity maintained 06/21/24 2000   Date on Dressing 06/19/24 06/20/24 2000   Dressing Due to be Changed 06/19/24 06/18/24 2240   Venous Patency/Care flushed w/o difficulty 06/21/24 2000   Arterial Patency/Care flushed w/o difficulty 06/21/24 2000   Number of days:             Peripheral IV - Single Lumen 06/21/24 1039 (Active)   Number of days: 0            Midline Catheter - Single Lumen 06/16/24 1330 Left basilic vein (medial side of arm) (Active)   Site Assessment Clean;Dry;Intact;No redness;No swelling 06/22/24 0607   IV Device Securement catheter securement device 06/22/24 0607   Line Status Infusing 06/22/24 0607   Dressing Type CHG impregnated dressing/sponge 06/22/24 0607   Dressing Status Clean;Dry;Intact 06/22/24 0607   Dressing Intervention Integrity maintained 06/22/24 0607   Dressing Change Due 06/23/24 06/20/24 2000   Number of days: 5            Urethral Catheter 06/14/24 Latex 16 Fr. (Active)   Site Assessment Clean;Intact 06/21/24 2000   Collection Container Standard drainage bag 06/21/24 2000   Securement Method secured to top of thigh w/ adhesive device 06/21/24 2000   Catheter Care Performed yes 06/21/24 0800   Reason for Continuing Urinary Catheterization Non-healing sacral/perineal wound;Post operative 06/21/24 2000   CAUTI Prevention Bundle Securement Device in place with 1" slack;Intact seal between catheter & drainage tubing;Drainage bag/urimeter off the floor;Sheeting clip in use;No dependent loops or kinks;Drainage bag/urimeter not overfilled (<2/3 full);Drainage bag/urimeter below bladder " "06/21/24 2000   Output (mL) 75 mL 06/21/24 1313   Number of days: 8       Physical examination:  Gen: NAD  CV: RR  Resp: NWOB  Abd: S/NT/ND  Ext: moving all extremities spontaneously and purposefully  Neuro: alert  Skin/wounds: groin wound dressing taken down notable for some fibrinous exudate, no foul smelling drainage, no new areas of necrosis     Labs:  Renal:  Recent Labs     06/22/24  0902 06/23/24  0451 06/24/24  0343   BUN 17.5 24.3* 28.6*   CREATININE 3.86* 4.89* 5.52*     No results for input(s): "LACTIC" in the last 72 hours.  FENGI:  Recent Labs     06/22/24 0902 06/23/24 0451 06/24/24  0343   * 131* 129*   K 5.5* 4.3 4.2   CL 97* 95* 93*   CO2 22 22 22   CALCIUM 7.6* 7.4* 8.0*   ALBUMIN  --  1.8* 1.7*   BILITOT  --  1.6* 1.5   AST  --  16 24   ALKPHOS  --  183* 198*   ALT  --  15 20     Heme:  Recent Labs     06/22/24  0902 06/23/24 0451 06/24/24  0343   HGB 7.7* 7.4* 7.1*   HCT 23.0* 21.7* 21.0*   * 413* 423*     ID:  Recent Labs     06/23/24 0451 06/24/24  0343   WBC 28.96  28.96* 22.37  22.37*     CBG:  Recent Labs     06/22/24  0902 06/23/24  0451 06/24/24  0343   GLUCOSE 223* 185* 152*      Cardiovascular:  No results for input(s): "TROPONINI", "CKTOTAL", "CKMB", "BNP" in the last 168 hours.  I have reviewed all pertinent lab results within the past 24 hours.    Imaging:  X-Ray Chest 1 View   Final Result      Similar appearance of the chest.         Electronically signed by: Issa Luna   Date:    06/22/2024   Time:    13:08      CT Head Without Contrast   Final Result      1. No acute intracranial abnormality.   2. Chronic microvascular ischemic changes.         Electronically signed by: Marlin Gonzalez   Date:    06/18/2024   Time:    07:43      CT Chest Abdomen Pelvis With IV Contrast (XPD) NO Oral Contrast   Final Result      Interval development of gas within the left perineum.  Necrotizing fasciitis is not excluded.  There is mild haziness about the duodenum.  Mild " duodenitis is not excluded.  This possibly relates to mild stranding seen in the retroperitoneum which may be inflammatory.  Small bilateral effusions.      Possible necrotizing fasciitis reported to Prashanth prior to interpretation.         Electronically signed by: Ez Nevarez   Date:    06/18/2024   Time:    07:51      X-Ray Chest 1 View   Final Result      As above.         Electronically signed by: Ez Nevarez   Date:    06/18/2024   Time:    07:17      X-Ray Chest 1 View   Final Result      Optimal placement of hemodialysis catheter.         Electronically signed by: Garland Bowie   Date:    06/17/2024   Time:    18:14      US Retroperitoneal Complete   Final Result      No significant sonographic abnormality of the kidneys.         Electronically signed by: Issa Luna   Date:    06/17/2024   Time:    11:11      US Abdomen Limited   Final Result      1. Mild hepatomegaly.   2. Status post cholecystectomy.  No biliary ductal dilatation.         Electronically signed by: Issa Luna   Date:    06/17/2024   Time:    09:58      CTA Chest Non-Coronary (PE Studies)   Final Result      No pulmonary embolism seen      Right lower lobe atelectasis and small right-sided pleural effusion         Electronically signed by: Ayush Barbour   Date:    06/13/2024   Time:    09:58      CT Abdomen Pelvis With IV Contrast NO Oral Contrast   Final Result      Area of cellulitis involving the perineum on the left side extending to the medial gluteal fold with a area of thickening and inflammatory changes in the labia on the left side with a area of hypoattenuation seen within the labia on the left side concerning for possible developing abscess.  Follow-up is recommended.      Right lower lobe atelectasis and right-sided pleural effusion         Electronically signed by: Ayush Barbour   Date:    06/13/2024   Time:    10:38         I have reviewed all pertinent imaging results/findings within the past 24  hours.    Micro/Path/Other:  Microbiology Results (last 7 days)       Procedure Component Value Units Date/Time    Blood Culture [[id number]]  (Normal) Collected: 06/22/24 1203    Order Status: Completed Specimen: Blood Updated: 06/23/24 1300     Blood Culture No Growth At 24 Hours    Tissue Culture - Aerobic [[id number]]  (Abnormal)  (Susceptibility) Collected: 06/18/24 1223    Order Status: Completed Specimen: Tissue from Groin Updated: 06/21/24 1102     Tissue - Aerobic Culture Few Methicillin resistant Staphylococcus aureus    Anaerobic Culture [[id number]] Collected: 06/18/24 1223    Order Status: Completed Specimen: Tissue from Groin Updated: 06/21/24 0841     Anaerobe Culture No Anaerobes Isolated    Gram Stain [[id number]] Collected: 06/18/24 1223    Order Status: Completed Specimen: Tissue from Groin Updated: 06/18/24 1431     GRAM STAIN Few WBC observed      Rare Gram positive cocci    Blood culture #1 **CANNOT BE ORDERED STAT** [[id number]]  (Normal) Collected: 06/13/24 0832    Order Status: Completed Specimen: Blood from Hand, Left Updated: 06/18/24 1300     Blood Culture No Growth at 5 days    Blood culture #2 **CANNOT BE ORDERED STAT** [[id number]]  (Normal) Collected: 06/13/24 0832    Order Status: Completed Specimen: Blood from Antecubital, Left Updated: 06/18/24 1300     Blood Culture No Growth at 5 days    Fungal Culture [[id number]] Collected: 06/18/24 1223    Order Status: Resulted Specimen: Tissue from Groin Updated: 06/18/24 1233           Pathology Results  (Last 7 days)      None             Assessment & Plan:   57yo F with DM, HTN, obesity, and asthma, presenting with a R groin soft tissue infection. Initially, there was concern for NSTI and patient was taken to the OR urgently for debridement. She tolerated this well, and there was not evidence of NSTI. She is now s/p R groin incision, drainage, and debridement on 6/14, 6/18 and 6/21.     - WBC continues to trend down  -glucose control  remains poor but is improving  -continue current abx - vanc, zosyn, clinda  -diet as tolerated  -encourage PT/OT  -HD per nephro  -wound care consulted for dressing changes, appreciate their assistance and recommendations  - Pt likely to need home health vs facility to assist with wound care in acute phase upon discharge  -recommend glucose < 180    Any Saxena MD  [employer] General Surgery PGY-4

## 2024-06-25 LAB
ALBUMIN SERPL-MCNC: 1.6 G/DL (ref 3.5–5)
ALBUMIN/GLOB SERPL: 0.4 RATIO (ref 1.1–2)
ALP SERPL-CCNC: 221 UNIT/L (ref 40–150)
ALT SERPL-CCNC: 23 UNIT/L (ref 0–55)
ANION GAP SERPL CALC-SCNC: 11 MEQ/L
AST SERPL-CCNC: 30 UNIT/L (ref 5–34)
BILIRUB SERPL-MCNC: 1.3 MG/DL
BUN SERPL-MCNC: 23.8 MG/DL (ref 9.8–20.1)
CALCIUM SERPL-MCNC: 7.3 MG/DL (ref 8.4–10.2)
CHLORIDE SERPL-SCNC: 98 MMOL/L (ref 98–107)
CO2 SERPL-SCNC: 21 MMOL/L (ref 22–29)
CREAT SERPL-MCNC: 4.05 MG/DL (ref 0.55–1.02)
CREAT/UREA NIT SERPL: 6
ERYTHROCYTE [DISTWIDTH] IN BLOOD BY AUTOMATED COUNT: 16.4 % (ref 11.5–17)
FUNGUS SPEC CULT: ABNORMAL
GFR SERPLBLD CREATININE-BSD FMLA CKD-EPI: 12 ML/MIN/1.73/M2
GLOBULIN SER-MCNC: 4.4 GM/DL (ref 2.4–3.5)
GLUCOSE SERPL-MCNC: 143 MG/DL (ref 74–100)
HCT VFR BLD AUTO: 21.1 % (ref 37–47)
HGB BLD-MCNC: 7 G/DL (ref 12–16)
MAGNESIUM SERPL-MCNC: 1.8 MG/DL (ref 1.6–2.6)
MCH RBC QN AUTO: 30 PG (ref 27–31)
MCHC RBC AUTO-ENTMCNC: 33.2 G/DL (ref 33–36)
MCV RBC AUTO: 90.6 FL (ref 80–94)
NRBC BLD AUTO-RTO: 0.3 %
PHOSPHATE SERPL-MCNC: 4.9 MG/DL (ref 2.3–4.7)
PLATELET # BLD AUTO: 401 X10(3)/MCL (ref 130–400)
PMV BLD AUTO: 9.4 FL (ref 7.4–10.4)
POCT GLUCOSE: 160 MG/DL (ref 70–110)
POCT GLUCOSE: 181 MG/DL (ref 70–110)
POCT GLUCOSE: 194 MG/DL (ref 70–110)
POTASSIUM SERPL-SCNC: 4.6 MMOL/L (ref 3.5–5.1)
PROT SERPL-MCNC: 6 GM/DL (ref 6.4–8.3)
RBC # BLD AUTO: 2.33 X10(6)/MCL (ref 4.2–5.4)
SODIUM SERPL-SCNC: 130 MMOL/L (ref 136–145)
WBC # BLD AUTO: 17.16 X10(3)/MCL (ref 4.5–11.5)

## 2024-06-25 PROCEDURE — 99222 1ST HOSP IP/OBS MODERATE 55: CPT | Mod: ,,, | Performed by: STUDENT IN AN ORGANIZED HEALTH CARE EDUCATION/TRAINING PROGRAM

## 2024-06-25 PROCEDURE — 63600175 PHARM REV CODE 636 W HCPCS

## 2024-06-25 PROCEDURE — 25000003 PHARM REV CODE 250: Performed by: INTERNAL MEDICINE

## 2024-06-25 PROCEDURE — 21400001 HC TELEMETRY ROOM

## 2024-06-25 PROCEDURE — 97530 THERAPEUTIC ACTIVITIES: CPT | Mod: CO

## 2024-06-25 PROCEDURE — 25000003 PHARM REV CODE 250: Performed by: NURSE PRACTITIONER

## 2024-06-25 PROCEDURE — 85027 COMPLETE CBC AUTOMATED: CPT

## 2024-06-25 PROCEDURE — 80053 COMPREHEN METABOLIC PANEL: CPT

## 2024-06-25 PROCEDURE — 83735 ASSAY OF MAGNESIUM: CPT

## 2024-06-25 PROCEDURE — 84100 ASSAY OF PHOSPHORUS: CPT

## 2024-06-25 PROCEDURE — A4216 STERILE WATER/SALINE, 10 ML: HCPCS | Performed by: INTERNAL MEDICINE

## 2024-06-25 PROCEDURE — 36415 COLL VENOUS BLD VENIPUNCTURE: CPT

## 2024-06-25 PROCEDURE — 97110 THERAPEUTIC EXERCISES: CPT | Mod: CQ

## 2024-06-25 PROCEDURE — 63600175 PHARM REV CODE 636 W HCPCS: Performed by: STUDENT IN AN ORGANIZED HEALTH CARE EDUCATION/TRAINING PROGRAM

## 2024-06-25 PROCEDURE — 25000003 PHARM REV CODE 250: Performed by: STUDENT IN AN ORGANIZED HEALTH CARE EDUCATION/TRAINING PROGRAM

## 2024-06-25 PROCEDURE — 63600175 PHARM REV CODE 636 W HCPCS: Performed by: NURSE PRACTITIONER

## 2024-06-25 PROCEDURE — 27000207 HC ISOLATION

## 2024-06-25 PROCEDURE — 63600175 PHARM REV CODE 636 W HCPCS: Mod: JZ,JG | Performed by: INTERNAL MEDICINE

## 2024-06-25 PROCEDURE — 63600175 PHARM REV CODE 636 W HCPCS: Performed by: INTERNAL MEDICINE

## 2024-06-25 RX ORDER — METOCLOPRAMIDE HYDROCHLORIDE 5 MG/ML
10 INJECTION INTRAMUSCULAR; INTRAVENOUS ONCE
Status: COMPLETED | OUTPATIENT
Start: 2024-06-25 | End: 2024-06-25

## 2024-06-25 RX ORDER — SODIUM CHLORIDE 9 MG/ML
INJECTION, SOLUTION INTRAVENOUS CONTINUOUS
Status: DISCONTINUED | OUTPATIENT
Start: 2024-06-25 | End: 2024-06-26

## 2024-06-25 RX ORDER — SELENIUM 50 MCG
1 TABLET ORAL DAILY
Status: DISCONTINUED | OUTPATIENT
Start: 2024-06-25 | End: 2024-07-09 | Stop reason: HOSPADM

## 2024-06-25 RX ADMIN — ERYTHROPOIETIN 10000 UNITS: 10000 INJECTION, SOLUTION INTRAVENOUS; SUBCUTANEOUS at 11:06

## 2024-06-25 RX ADMIN — SODIUM CHLORIDE, PRESERVATIVE FREE 10 ML: 5 INJECTION INTRAVENOUS at 11:06

## 2024-06-25 RX ADMIN — METOCLOPRAMIDE 10 MG: 5 INJECTION, SOLUTION INTRAMUSCULAR; INTRAVENOUS at 11:06

## 2024-06-25 RX ADMIN — PIPERACILLIN SODIUM AND TAZOBACTAM SODIUM 4.5 G: 4; .5 INJECTION, POWDER, LYOPHILIZED, FOR SOLUTION INTRAVENOUS at 11:06

## 2024-06-25 RX ADMIN — CLINDAMYCIN PHOSPHATE 600 MG: 600 INJECTION, SOLUTION INTRAVENOUS at 12:06

## 2024-06-25 RX ADMIN — CLINDAMYCIN PHOSPHATE 600 MG: 600 INJECTION, SOLUTION INTRAVENOUS at 10:06

## 2024-06-25 RX ADMIN — Medication 1 CAPSULE: at 11:06

## 2024-06-25 RX ADMIN — INSULIN GLARGINE 10 UNITS: 100 INJECTION, SOLUTION SUBCUTANEOUS at 10:06

## 2024-06-25 RX ADMIN — PIPERACILLIN SODIUM AND TAZOBACTAM SODIUM 4.5 G: 4; .5 INJECTION, POWDER, LYOPHILIZED, FOR SOLUTION INTRAVENOUS at 10:06

## 2024-06-25 RX ADMIN — ENOXAPARIN SODIUM 40 MG: 40 INJECTION SUBCUTANEOUS at 03:06

## 2024-06-25 RX ADMIN — SODIUM CHLORIDE: 9 INJECTION, SOLUTION INTRAVENOUS at 03:06

## 2024-06-25 RX ADMIN — CLINDAMYCIN PHOSPHATE 600 MG: 600 INJECTION, SOLUTION INTRAVENOUS at 05:06

## 2024-06-25 RX ADMIN — SODIUM CHLORIDE, PRESERVATIVE FREE 10 ML: 5 INJECTION INTRAVENOUS at 01:06

## 2024-06-25 RX ADMIN — SODIUM CHLORIDE, PRESERVATIVE FREE 10 ML: 5 INJECTION INTRAVENOUS at 05:06

## 2024-06-25 RX ADMIN — INSULIN ASPART 6 UNITS: 100 INJECTION, SOLUTION INTRAVENOUS; SUBCUTANEOUS at 10:06

## 2024-06-25 RX ADMIN — TRAMADOL HYDROCHLORIDE 50 MG: 50 TABLET, COATED ORAL at 10:06

## 2024-06-25 RX ADMIN — PROCHLORPERAZINE EDISYLATE 2.5 MG: 5 INJECTION INTRAMUSCULAR; INTRAVENOUS at 03:06

## 2024-06-25 NOTE — PROGRESS NOTES
[facility] - 9th Floor Med Surg  Wound Care    Patient Name:  Greer Kahn   MRN:  [id number]  Date: 6/25/2024  Diagnosis: Perineal abscess    History:     Past Medical History:   Diagnosis Date    Asthma     Diabetes mellitus     Hypertension        Social History     Socioeconomic History    Marital status: Single   Tobacco Use    Smoking status: Never    Smokeless tobacco: Never   Substance and Sexual Activity    Alcohol use: Never    Drug use: Never    Sexual activity: Not Currently     Social Determinants of Health     Financial Resource Strain: High Risk (6/14/2024)    Overall Financial Resource Strain (CARDIA)     Difficulty of Paying Living Expenses: Hard   Food Insecurity: Food Insecurity Present (6/14/2024)    Hunger Vital Sign     Worried About Running Out of Food in the Last Year: Often true     Ran Out of Food in the Last Year: Never true   Transportation Needs: No Transportation Needs (6/14/2024)    TRANSPORTATION NEEDS     Transportation : No   Physical Activity: Unknown (6/14/2024)    Exercise Vital Sign     Days of Exercise per Week: 0 days   Stress: Stress Concern Present (6/14/2024)    [employer] - Occupational Stress Questionnaire     Feeling of Stress : To some extent   Housing Stability: High Risk (6/14/2024)    Housing Stability Vital Sign     Unable to Pay for Housing in the Last Year: Yes     Homeless in the Last Year: No       Precautions:     Allergies as of 06/13/2024    (No Known Allergies)       WO Assessment Details/Treatment        06/25/24 1220   WOCN Assessment   Visit Date 06/25/24   Visit Time 1220   Consult Type New   Wound surgical   Intervention assessed;chart review;orders   Teaching on-going   Skin Interventions   Device Skin Pressure Protection absorbent pad utilized/changed        Wound 06/14/24 Incision Left Perineum vertical   Date First Assessed: 06/14/24   Primary Wound Type: Incision  Side: Left  Location: Perineum   Incision Type: vertical  Closure Method: Other (see comments)  Additional Comments: packed with kelix soaked betadine   Dressing Appearance Moist drainage   Drainage Amount Moderate   Drainage Characteristics/Odor Serosanguineous   Appearance Red;Yellow;Slough;Moist   Red (%), Wound Tissue Color 75 %   Yellow (%), Wound Tissue Color 25 %   Periwound Area Moist   Wound Edges Defined   Wound Length (cm) 18 cm   Wound Width (cm) 5 cm   Wound Depth (cm) 7 cm   Wound Volume (cm^3) 630 cm^3   Wound Surface Area (cm^2) 90 cm^2   Care Cleansed with:;Sterile normal saline   Dressing Applied;Gauze, wet to moist;Absorptive Pad       Wound care consulted for left groin wound.  S/p R groin incision, drainage, and debridement on 6/14, 6/18 and 6/21. Surgery team has been following. Treatment recommendations put into place. Left groin: Cleanse with vashe. Pack with vashe moistened 4x4, Abd pad, secure with medipore tape. Change BID and PRN soilage. No adult briefs while in bed. Nursing to continue with preventative measures. Will follow up.         06/25/2024

## 2024-06-25 NOTE — PROGRESS NOTES
Inpatient Nutrition Assessment    Admit Date: 6/13/2024   Total duration of encounter: 12 days   Patient Age: 58 y.o.    Nutrition Recommendation/Prescription     Continue 1800 kcal diabetic diet as tolerated.  Continue Novasource BID as tolerated (provides 475 kcal and 21.6 g pro per container).  Monitor wt, labs, and po intake.    Communication of Recommendations: reviewed with patient    Nutrition Assessment     Malnutrition Assessment/Nutrition-Focused Physical Exam    Malnutrition Context: acute illness or injury (06/20/24 1324)  Malnutrition Level:  (does not meet criteria) (06/20/24 1324)  Energy Intake (Malnutrition): less than or equal to 50% for greater than or equal to 5 days (06/20/24 1324)  Weight Loss (Malnutrition):  (does not meet criteria) (06/20/24 1324)  Subcutaneous Fat (Malnutrition):  (does not meet criteria) (06/20/24 1324)           Muscle Mass (Malnutrition):  (does not meet criteria) (06/20/24 1324)                                   A minimum of two characteristics is recommended for diagnosis of either severe or non-severe malnutrition.    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  Uncontrolled T2DM with hyperglycemia  Acute Renal failure now anuric - multifactorial secondary to sepsis, contrast exposure, NSAID use inpatient, vancomycin toxicity  -dialysis initiated 6/17    Relevant Medical History: obesity BMI 37, poorly controlled T2DM with last A1c 13 in April 2024     Scheduled Medications:  clindamycin IV (PEDS and ADULTS), 600 mg, Q8H  enoxaparin, 40 mg, Daily  epoetin malka, 10,000 Units, Once  insulin glargine U-100, 10 Units, QHS  lactated ringers, 1,000 mL, Once  lactated  ringers, 1,000 mL, Once  Lactobacillus acidophilus, 1 capsule, Daily  metoclopramide, 10 mg, Once  piperacillin-tazobactam (Zosyn) IV (PEDS and ADULTS) (extended infusion is not appropriate), 4.5 g, Q12H  sodium chloride 0.9%, 10 mL, Q6H    Continuous Infusions:   PRN Medications:  0.9%  NaCl infusion (for blood administration), , Q24H PRN  acetaminophen, 650 mg, Q4H PRN  albuterol-ipratropium, 3 mL, Q4H PRN  aluminum-magnesium hydroxide-simethicone, 30 mL, QID PRN  bisacodyL, 10 mg, Daily PRN  calcium carbonate, 1,000 mg, TID PRN  dextrose 10%, 12.5 g, PRN  dextrose 10%, 25 g, PRN  glucagon (human recombinant), 1 mg, PRN  glucose, 16 g, PRN  glucose, 24 g, PRN  heparin (porcine), 2,800 Units, PRN  hydrALAZINE, 10 mg, Q4H PRN  insulin aspart U-100, 0-15 Units, QID (AC + HS) PRN  metoclopramide, 10 mg, Q8H PRN  polyethylene glycol, 17 g, BID PRN  prochlorperazine, 2.5 mg, Q6H PRN  prochlorperazine, 5 mg, Q6H PRN  senna-docusate 8.6-50 mg, 2 tablet, BID PRN  sodium chloride 0.9%, 10 mL, PRN  sodium chloride 0.9%, 10 mL, PRN  traMADoL, 50 mg, Q8H PRN  vancomycin - pharmacy to dose, , pharmacy to manage frequency    Calorie Containing IV Medications: no significant kcals from medications at this time    Recent Labs   Lab 06/19/24  0517 06/21/24  0416 06/21/24  0621 06/22/24  0902 06/23/24  0451 06/24/24  0343 06/25/24  0601   * 134*  --  131* 131* 129* 130*   K 4.0 4.4  --  5.5* 4.3 4.2 4.6   CALCIUM 7.7* 7.6*  --  7.6* 7.4* 8.0* 7.3*   PHOS  --  4.4  --   --   --   --  4.9*   MG  --   --   --   --   --   --  1.80   CO2 23 21*  --  22 22 22 21*   BUN 25.1* 24.1*  --  17.5 24.3* 28.6* 23.8*   CREATININE 4.03* 4.79*  --  3.86* 4.89* 5.52* 4.05*   EGFRNORACEVR 12 10  --  13 10 8 12   GLUCOSE 81 216*  --  223* 185* 152* 143*   BILITOT 3.3*  --   --   --  1.6* 1.5 1.3   ALKPHOS 129  --   --   --  183* 198* 221*   ALT 22  --   --   --  15 20 23   AST 17  --   --   --  16 24 30   ALBUMIN 1.8* 1.7*  --   --  1.8* 1.7*  "1.6*   WBC 26.7  26.72*  --  30.52* 35.31  35.31* 28.96  28.96* 22.37  22.37* 17.16*   HGB 8.3*  --  8.1* 7.7* 7.4* 7.1* 7.0*   HCT 24.5*  --  24.7* 23.0* 21.7* 21.0* 21.1*     Nutrition Orders:  Diet diabetic 1800 Calorie  Dietary nutrition supplements Novasource Renal - Strawberry; BID    Appetite/Oral Intake: good/% of meals  Factors Affecting Nutritional Intake: none identified  Social Needs Impacting Access to Food: none identified  Food/Samaritan/Cultural Preferences: none reported  Food Allergies: no known food allergies  Last Bowel Movement: 24  Wound(s):      Comments    24 pt eating lunch, diet advanced from clear liquid this morning, so far tolerating. Reported some n/v very early this morning but feeling better now. Had been NPO/CL mostly since admit due to n/v. Currently on dialysis x 3 sessions with plans to possibly continue per nephrology. Weight gain noted in EMR, some most likely due to fluid.     24: Pt reports good appetite-states she is still having some n/v-believes it is because she is not getting enough food on her tray. RN added Novasource BID-will continue as tolerated.     Anthropometrics    Height: 5' 7.01" (170.2 cm), Height Method: Stated  Last Weight: 130 kg (286 lb 9.6 oz) (24 0626), Weight Method: Bed Scale  BMI (Calculated): 44.9  BMI Classification: obese grade III (BMI >/=40)     Ideal Body Weight (IBW), Female: 135.05 lb     % Ideal Body Weight, Female (lb): 177.84 %                    Usual Body Weight (UBW), k.09 kg  % Usual Body Weight: 119.42     Usual Weight Provided By: patient    Wt Readings from Last 5 Encounters:   24 130 kg (286 lb 9.6 oz)   24 111.1 kg (245 lb)   23 104.3 kg (230 lb)   19 110 kg (242 lb 8.1 oz)     Weight Change(s) Since Admission:   Wt Readings from Last 1 Encounters:   24 0626 130 kg (286 lb 9.6 oz)   24 0542 125.9 kg (277 lb 9 oz)   24 0740 108.9 kg (240 lb 1.3 oz) "   06/13/24 0734 108.9 kg (240 lb)   Admit Weight: 108.9 kg (240 lb) (06/13/24 0734), Weight Method: Stated    Estimated Needs    Weight Used For Calorie Calculations: 125.9 kg (277 lb 9 oz)  Energy Calorie Requirements (kcal): 1876 kcal (no stress factor)  Energy Need Method: [place]-St Jeor  Weight Used For Protein Calculations: 125.9 kg (277 lb 9 oz)  Protein Requirements: 100gm (0.8gm/kg)  Fluid Requirements (mL): 1000ml + output (renal on dialysis)        Enteral Nutrition     Patient not receiving enteral nutrition at this time.    Parenteral Nutrition     Patient not receiving parenteral nutrition support at this time.    Evaluation of Received Nutrient Intake    Calories: meeting estimated needs  Protein: meeting estimated needs    Patient Education     Not applicable.    Nutrition Diagnosis     PES: Inadequate energy intake related to acute illness as evidenced by <50% est energy needs for >/= 5 days. (resolved)         Nutrition Interventions     Intervention(s): modified composition of meals/snacks and collaboration with other providers    Goal: Meet greater than 80% of nutritional needs by follow-up. (goal met)  Goal: Consume % of meals/snacks by follow-up. (goal met)    Nutrition Goals & Monitoring     Dietitian will monitor: food and beverage intake, electrolyte/renal panel, glucose/endocrine profile, and gastrointestinal profile  Discharge planning: continue 1800 calorie diabetic diet and Novasource BID  Nutrition Risk/Follow-Up: moderate (follow-up in 3-5 days)   Please consult if re-assessment needed sooner.

## 2024-06-25 NOTE — PROGRESS NOTES
Acute Care Surgery   Progress Note  Admit Date: 6/13/2024  HD#12  POD#4 Days Post-Op    Subjective:   Interval history:  Pain controlled  Tolerating diet some nausea but no emesis  Making urine  Has not gotten out of bed with PT     Home Meds:  Current Outpatient Medications   Medication Instructions    albuterol (PROVENTIL/VENTOLIN HFA) 90 mcg/actuation inhaler 2 puffs, Inhalation, Every 4-6 hours PRN    amLODIPine (NORVASC) 10 mg, Oral, Daily    atorvastatin (LIPITOR) 20 mg, Oral, Daily    famotidine (PEPCID) 20 mg, Oral, 2 times daily    glimepiride (AMARYL) 4 mg, Oral, With breakfast    hydroCHLOROthiazide (HYDRODIURIL) 25 mg, Oral, Daily    insulin glargine U-100 (Lantus) 50 Units, Subcutaneous, Nightly    meclizine (ANTIVERT) 25 mg, Oral, 3 times daily    metFORMIN (GLUCOPHAGE) 1,000 mg, Oral, 2 times daily    metoprolol tartrate (LOPRESSOR) 25 mg, Oral, 2 times daily    pioglitazone (ACTOS) 30 mg, Oral, Daily      Scheduled Meds:   clindamycin IV (PEDS and ADULTS)  600 mg Intravenous Q8H    enoxaparin  40 mg Subcutaneous Daily    insulin glargine U-100  10 Units Subcutaneous QHS    lactated ringers  1,000 mL Intravenous Once    lactated ringers  1,000 mL Intravenous Once    piperacillin-tazobactam (Zosyn) IV (PEDS and ADULTS) (extended infusion is not appropriate)  4.5 g Intravenous Q12H    sodium chloride 0.9%  10 mL Intravenous Q6H     Continuous Infusions:  PRN Meds:  Current Facility-Administered Medications:     0.9%  NaCl infusion (for blood administration), , Intravenous, Q24H PRN    acetaminophen, 650 mg, Oral, Q4H PRN    albuterol-ipratropium, 3 mL, Nebulization, Q4H PRN    aluminum-magnesium hydroxide-simethicone, 30 mL, Oral, QID PRN    bisacodyL, 10 mg, Rectal, Daily PRN    calcium carbonate, 1,000 mg, Oral, TID PRN    dextrose 10%, 12.5 g, Intravenous, PRN    dextrose 10%, 25 g, Intravenous, PRN    glucagon (human recombinant), 1 mg, Intramuscular, PRN    glucose, 16 g, Oral, PRN    glucose,  "24 g, Oral, PRN    heparin (porcine), 2,800 Units, Intravenous, PRN    hydrALAZINE, 10 mg, Intravenous, Q4H PRN    insulin aspart U-100, 0-15 Units, Subcutaneous, QID (AC + HS) PRN    metoclopramide, 10 mg, Intravenous, Q8H PRN    polyethylene glycol, 17 g, Oral, BID PRN    prochlorperazine, 2.5 mg, Intravenous, Q6H PRN    prochlorperazine, 5 mg, Intravenous, Q6H PRN    senna-docusate 8.6-50 mg, 2 tablet, Oral, BID PRN    sodium chloride 0.9%, 10 mL, Intravenous, PRN    Flushing PICC/Midline Protocol, , , Until Discontinued **AND** sodium chloride 0.9%, 10 mL, Intravenous, Q6H **AND** sodium chloride 0.9%, 10 mL, Intravenous, PRN    traMADoL, 50 mg, Oral, Q8H PRN    Pharmacy to dose Vancomycin consult, , , Once **AND** vancomycin - pharmacy to dose, , Intravenous, pharmacy to manage frequency     Objective:     VITAL SIGNS: 24 HR MIN & MAX LAST   Temp  Min: 97.8 °F (36.6 °C)  Max: 98.8 °F (37.1 °C)  98.8 °F (37.1 °C)   BP  Min: 162/88  Max: 188/93  (!) 162/88    Pulse  Min: 78  Max: 98  98    Resp  Min: 18  Max: 20  20    SpO2  Min: 95 %  Max: 96 %  95 %      HT: 5' 7.01" (170.2 cm)  WT: 130 kg (286 lb 9.6 oz)  BMI: 44.9     Intake/output:  Intake/Output - Last 3 Shifts         06/23 0700  06/24 0659 06/24 0700  06/25 0659    P.O. 180     Total Intake(mL/kg) 180 (1.4)     Urine (mL/kg/hr) 50 (0) 1850 (0.6)    Stool  0    Total Output 50 1850    Net +130 -1850          Stool Occurrence 2 x 3 x            Intake/Output Summary (Last 24 hours) at 6/25/2024 0639  Last data filed at 6/25/2024 0611  Gross per 24 hour   Intake --   Output 1850 ml   Net -1850 ml           Lines/drains/airway:       Hemodialysis Catheter right internal jugular (Active)   Line Necessity Review CRRT/HD 06/21/24 2000   Verification by X-ray Yes 06/17/24 2150   Site Assessment No drainage;No redness;No swelling;No warmth 06/21/24 2000   Line Securement Device Secured with sutureless device 06/21/24 2000   Dressing Type Central line dressing " "06/21/24 2000   Dressing Status Clean;Dry;Intact 06/21/24 2000   Dressing Intervention Integrity maintained 06/21/24 2000   Date on Dressing 06/19/24 06/20/24 2000   Dressing Due to be Changed 06/19/24 06/18/24 2240   Venous Patency/Care flushed w/o difficulty 06/21/24 2000   Arterial Patency/Care flushed w/o difficulty 06/21/24 2000   Number of days:             Peripheral IV - Single Lumen 06/21/24 1039 (Active)   Number of days: 0            Midline Catheter - Single Lumen 06/16/24 1330 Left basilic vein (medial side of arm) (Active)   Site Assessment Clean;Dry;Intact;No redness;No swelling 06/22/24 0607   IV Device Securement catheter securement device 06/22/24 0607   Line Status Infusing 06/22/24 0607   Dressing Type CHG impregnated dressing/sponge 06/22/24 0607   Dressing Status Clean;Dry;Intact 06/22/24 0607   Dressing Intervention Integrity maintained 06/22/24 0607   Dressing Change Due 06/23/24 06/20/24 2000   Number of days: 5            Urethral Catheter 06/14/24 Latex 16 Fr. (Active)   Site Assessment Clean;Intact 06/21/24 2000   Collection Container Standard drainage bag 06/21/24 2000   Securement Method secured to top of thigh w/ adhesive device 06/21/24 2000   Catheter Care Performed yes 06/21/24 0800   Reason for Continuing Urinary Catheterization Non-healing sacral/perineal wound;Post operative 06/21/24 2000   CAUTI Prevention Bundle Securement Device in place with 1" slack;Intact seal between catheter & drainage tubing;Drainage bag/urimeter off the floor;Sheeting clip in use;No dependent loops or kinks;Drainage bag/urimeter not overfilled (<2/3 full);Drainage bag/urimeter below bladder 06/21/24 2000   Output (mL) 75 mL 06/21/24 1313   Number of days: 8       Physical examination:  Gen: NAD  CV: RR  Resp: NWOB  Abd: S/NT/ND  Ext: moving all extremities spontaneously and purposefully  Neuro: alert  Skin/wounds: groin wound with dressing in place      Labs:  Renal:  Recent Labs     06/22/24  0902 " "06/23/24  0451 06/24/24  0343 06/25/24  0601   BUN 17.5 24.3* 28.6* 23.8*   CREATININE 3.86* 4.89* 5.52* 4.05*     No results for input(s): "LACTIC" in the last 72 hours.  FENGI:  Recent Labs     06/22/24  0902 06/23/24  0451 06/24/24  0343 06/25/24  0601   * 131* 129* 130*   K 5.5* 4.3 4.2 4.6   CL 97* 95* 93* 98   CO2 22 22 22 21*   CALCIUM 7.6* 7.4* 8.0* 7.3*   MG  --   --   --  1.80   PHOS  --   --   --  4.9*   ALBUMIN  --  1.8* 1.7* 1.6*   BILITOT  --  1.6* 1.5 1.3   AST  --  16 24 30   ALKPHOS  --  183* 198* 221*   ALT  --  15 20 23     Heme:  Recent Labs     06/22/24  0902 06/23/24  0451 06/24/24  0343 06/25/24  0601   HGB 7.7* 7.4* 7.1* 7.0*   HCT 23.0* 21.7* 21.0* 21.1*   * 413* 423* 401*     ID:  Recent Labs     06/23/24  0451 06/24/24  0343 06/25/24  0601   WBC 28.96  28.96* 22.37  22.37* 17.16*     CBG:  Recent Labs     06/22/24  0902 06/23/24  0451 06/24/24  0343 06/25/24  0601   GLUCOSE 223* 185* 152* 143*      Cardiovascular:  No results for input(s): "TROPONINI", "CKTOTAL", "CKMB", "BNP" in the last 168 hours.  I have reviewed all pertinent lab results within the past 24 hours.    Imaging:  X-Ray Chest 1 View   Final Result      Similar appearance of the chest.         Electronically signed by: Issa Luna   Date:    06/22/2024   Time:    13:08      CT Head Without Contrast   Final Result      1. No acute intracranial abnormality.   2. Chronic microvascular ischemic changes.         Electronically signed by: Marlin Gonzalez   Date:    06/18/2024   Time:    07:43      CT Chest Abdomen Pelvis With IV Contrast (XPD) NO Oral Contrast   Final Result      Interval development of gas within the left perineum.  Necrotizing fasciitis is not excluded.  There is mild haziness about the duodenum.  Mild duodenitis is not excluded.  This possibly relates to mild stranding seen in the retroperitoneum which may be inflammatory.  Small bilateral effusions.      Possible necrotizing fasciitis reported " beto Alford prior to interpretation.         Electronically signed by: Ez Nevarez   Date:    06/18/2024   Time:    07:51      X-Ray Chest 1 View   Final Result      As above.         Electronically signed by: Ez Nevarez   Date:    06/18/2024   Time:    07:17      X-Ray Chest 1 View   Final Result      Optimal placement of hemodialysis catheter.         Electronically signed by: Garland Bowie   Date:    06/17/2024   Time:    18:14      US Retroperitoneal Complete   Final Result      No significant sonographic abnormality of the kidneys.         Electronically signed by: Issa Luna   Date:    06/17/2024   Time:    11:11      US Abdomen Limited   Final Result      1. Mild hepatomegaly.   2. Status post cholecystectomy.  No biliary ductal dilatation.         Electronically signed by: Issa Luna   Date:    06/17/2024   Time:    09:58      CTA Chest Non-Coronary (PE Studies)   Final Result      No pulmonary embolism seen      Right lower lobe atelectasis and small right-sided pleural effusion         Electronically signed by: Ayush Barbour   Date:    06/13/2024   Time:    09:58      CT Abdomen Pelvis With IV Contrast NO Oral Contrast   Final Result      Area of cellulitis involving the perineum on the left side extending to the medial gluteal fold with a area of thickening and inflammatory changes in the labia on the left side with a area of hypoattenuation seen within the labia on the left side concerning for possible developing abscess.  Follow-up is recommended.      Right lower lobe atelectasis and right-sided pleural effusion         Electronically signed by: Ayush Barbuor   Date:    06/13/2024   Time:    10:38         I have reviewed all pertinent imaging results/findings within the past 24 hours.    Micro/Path/Other:  Microbiology Results (last 7 days)       Procedure Component Value Units Date/Time    Fungal Culture [[id number]]  (Abnormal)  (Susceptibility) Collected: 06/18/24 1223    Order Status:  Completed Specimen: Tissue from Groin Updated: 06/25/24 0624     Fungal Culture Many Candida tropicalis    Blood Culture [[id number]]  (Normal) Collected: 06/22/24 1203    Order Status: Completed Specimen: Blood Updated: 06/24/24 1301     Blood Culture No Growth At 48 Hours    Tissue Culture - Aerobic [[id number]]  (Abnormal)  (Susceptibility) Collected: 06/18/24 1223    Order Status: Completed Specimen: Tissue from Groin Updated: 06/21/24 1102     Tissue - Aerobic Culture Few Methicillin resistant Staphylococcus aureus    Anaerobic Culture [[id number]] Collected: 06/18/24 1223    Order Status: Completed Specimen: Tissue from Groin Updated: 06/21/24 0841     Anaerobe Culture No Anaerobes Isolated    Gram Stain [[id number]] Collected: 06/18/24 1223    Order Status: Completed Specimen: Tissue from Groin Updated: 06/18/24 1431     GRAM STAIN Few WBC observed      Rare Gram positive cocci    Blood culture #1 **CANNOT BE ORDERED STAT** [[id number]]  (Normal) Collected: 06/13/24 0832    Order Status: Completed Specimen: Blood from Hand, Left Updated: 06/18/24 1300     Blood Culture No Growth at 5 days    Blood culture #2 **CANNOT BE ORDERED STAT** [[id number]]  (Normal) Collected: 06/13/24 0832    Order Status: Completed Specimen: Blood from Antecubital, Left Updated: 06/18/24 1300     Blood Culture No Growth at 5 days           Pathology Results  (Last 7 days)      None             Assessment & Plan:   59yo F with DM, HTN, obesity, and asthma, presenting with a R groin soft tissue infection. Initially, there was concern for NSTI and patient was taken to the OR urgently for debridement. She tolerated this well, and there was not evidence of NSTI. She is now s/p R groin incision, drainage, and debridement on 6/14, 6/18 and 6/21.     - WBC continues to trend down  -glucose control remains poor but is improving  -continue current abx - vanc, zosyn, clinda  -diet as tolerated  -encourage PT/OT, ambulation, and IS  -HD per  nephro  -wound care following for dressing changes, appreciate their assistance and recommendations  - Pt likely to need home health vs facility to assist with wound care in acute phase upon discharge  -recommend glucose < 180  - Please call with any further questions or concerns     Pamela Sr MD  [employer] General Surgery, PGY-1

## 2024-06-25 NOTE — PROGRESS NOTES
[facility] Hospital Medicine Progress Note      Chief Complaint: 1 day history of fever and multiple painful lesions to her thighs and buttocks       HPI: (personally reviewed by me and is documented from initial H&P)     58-year-old female whose history includes hypertension, diabetes, asthma and dyslipidemia.     Presented to the ED with complaints of a 1 day history of fever and multiple painful lesions to her thighs and buttocks. First noticed them approximately 3 days ago.      VS on arrival: T 101, P 124, R 18, B/P 189/132, Sats 94% on room air.  Initial labs include WBC 27.06 hemoglobin 11.8, hematocrit 35.8, platelets 264, sodium 131, potassium 3.8, chloride 94, bicarb 23, BUN 9.7, creatinine 0.98, glucose 353 and otherwise unremarkable.  Lactic acid 1.4.  COVID and flu not detected.  UA with significant proteinuria, glucosuria, ketones and few bacteria and yeast.  No white blood cells noted.     T abdomen and pelvis with contrast shows an area of cellulitis involving the perineum on the left side extending to the medial gluteal fold with an area of thickening and inflammatory changes in the labia on the left side with an area of hypoattenuation seen within the labia on the left side concerning for possible developing abscess.  CT chest with contrast shows a right lower lobe atelectasis and small right-sided pleural effusion with no evidence of pulmonary embolism.  Medications given in the ER include vancomycin and Rocephin.      Interval History:        On 06/21/2024 patient underwent repeat wound washout/debridement.  No wound VAC applied.  No new complaints.   Dialysis today.     Objective Assessment:  Physical Exam      Vital signs have been personally reviewed by me   General: Appears comfortable, no acute distress.   castaneda catheter in place-no hematuria noted.   Resting comfortably in bed.   Awake, alert oriented.     Integumentary: Warm, dry, intact.  Musculoskeletal:  Purposeful movement noted.     Respiratory: No accessory muscle use. Breath sounds are equal.  Cardiovascular: Regular rate.       VITAL SIGNS: 24 HRS MIN & MAX LAST   Temp  Min: 97.8 °F (36.6 °C)  Max: 98.8 °F (37.1 °C) 98.6 °F (37 °C)   BP  Min: 144/85  Max: 188/93 (!) 144/85   Pulse  Min: 84  Max: 98  88   Resp  Min: 18  Max: 20 20   SpO2  Min: 95 %  Max: 96 % 96 %     X-Ray Chest 1 View  Narrative: EXAMINATION:  XR CHEST 1 VIEW    CLINICAL HISTORY:  leukocytosis workup;    COMPARISON:  18 June 2024    FINDINGS:  Frontal view of the chest was obtained. Stable right-sided catheter.  The heart is not significantly enlarged.  Similar right hilar prominence and right basilar opacities.  No pneumothorax.  Impression: Similar appearance of the chest.    Electronically signed by: Issa Luna  Date:    06/22/2024  Time:    13:08    Recent Labs   Lab 06/23/24  0451 06/24/24  0343 06/25/24  0601   WBC 28.96  28.96* 22.37  22.37* 17.16*   RBC 2.40* 2.34* 2.33*   HGB 7.4* 7.1* 7.0*   HCT 21.7* 21.0* 21.1*   MCV 90.4 89.7 90.6   MCH 30.8 30.3 30.0   MCHC 34.1 33.8 33.2   RDW 16.0 16.0 16.4   * 423* 401*   MPV 9.5 9.7 9.4       Recent Labs   Lab 06/23/24  0451 06/24/24  0343 06/25/24  0601   * 129* 130*   K 4.3 4.2 4.6   CL 95* 93* 98   CO2 22 22 21*   BUN 24.3* 28.6* 23.8*   CREATININE 4.89* 5.52* 4.05*   CALCIUM 7.4* 8.0* 7.3*   MG  --   --  1.80   ALBUMIN 1.8* 1.7* 1.6*   ALKPHOS 183* 198* 221*   ALT 15 20 23   AST 16 24 30   BILITOT 1.6* 1.5 1.3     Microbiology Results (last 7 days)       Procedure Component Value Units Date/Time    Fungal Culture [[id number]]  (Abnormal)  (Susceptibility) Collected: 06/18/24 1223    Order Status: Completed Specimen: Tissue from Groin Updated: 06/25/24 0624     Fungal Culture Many Candida tropicalis    Blood Culture [[id number]]  (Normal) Collected: 06/22/24 1203    Order Status: Completed Specimen: Blood Updated: 06/24/24 1301     Blood Culture No Growth At 48 Hours     Tissue Culture - Aerobic [[id number]]  (Abnormal)  (Susceptibility) Collected: 06/18/24 1223    Order Status: Completed Specimen: Tissue from Groin Updated: 06/21/24 1102     Tissue - Aerobic Culture Few Methicillin resistant Staphylococcus aureus    Anaerobic Culture [[id number]] Collected: 06/18/24 1223    Order Status: Completed Specimen: Tissue from Groin Updated: 06/21/24 0841     Anaerobe Culture No Anaerobes Isolated    Gram Stain [[id number]] Collected: 06/18/24 1223    Order Status: Completed Specimen: Tissue from Groin Updated: 06/18/24 1431     GRAM STAIN Few WBC observed      Rare Gram positive cocci    Blood culture #1 **CANNOT BE ORDERED STAT** [[id number]]  (Normal) Collected: 06/13/24 0832    Order Status: Completed Specimen: Blood from Hand, Left Updated: 06/18/24 1300     Blood Culture No Growth at 5 days    Blood culture #2 **CANNOT BE ORDERED STAT** [[id number]]  (Normal) Collected: 06/13/24 0832    Order Status: Completed Specimen: Blood from Antecubital, Left Updated: 06/18/24 1300     Blood Culture No Growth at 5 days               Medications for Hospital Course         Scheduled Med:   clindamycin IV (PEDS and ADULTS)  600 mg Intravenous Q8H    enoxaparin  40 mg Subcutaneous Daily    insulin glargine U-100  10 Units Subcutaneous QHS    lactated ringers  1,000 mL Intravenous Once    lactated ringers  1,000 mL Intravenous Once    piperacillin-tazobactam (Zosyn) IV (PEDS and ADULTS) (extended infusion is not appropriate)  4.5 g Intravenous Q12H    sodium chloride 0.9%  10 mL Intravenous Q6H     PRN Meds:    Current Facility-Administered Medications:     0.9%  NaCl infusion (for blood administration), , Intravenous, Q24H PRN    acetaminophen, 650 mg, Oral, Q4H PRN    albuterol-ipratropium, 3 mL, Nebulization, Q4H PRN    aluminum-magnesium hydroxide-simethicone, 30 mL, Oral, QID PRN    bisacodyL, 10 mg, Rectal, Daily PRN    calcium carbonate, 1,000 mg, Oral, TID PRN    dextrose 10%, 12.5 g,  Intravenous, PRN    dextrose 10%, 25 g, Intravenous, PRN    glucagon (human recombinant), 1 mg, Intramuscular, PRN    glucose, 16 g, Oral, PRN    glucose, 24 g, Oral, PRN    heparin (porcine), 2,800 Units, Intravenous, PRN    hydrALAZINE, 10 mg, Intravenous, Q4H PRN    insulin aspart U-100, 0-15 Units, Subcutaneous, QID (AC + HS) PRN    metoclopramide, 10 mg, Intravenous, Q8H PRN    polyethylene glycol, 17 g, Oral, BID PRN    prochlorperazine, 2.5 mg, Intravenous, Q6H PRN    prochlorperazine, 5 mg, Intravenous, Q6H PRN    senna-docusate 8.6-50 mg, 2 tablet, Oral, BID PRN    sodium chloride 0.9%, 10 mL, Intravenous, PRN    Flushing PICC/Midline Protocol, , , Until Discontinued **AND** sodium chloride 0.9%, 10 mL, Intravenous, Q6H **AND** sodium chloride 0.9%, 10 mL, Intravenous, PRN    traMADoL, 50 mg, Oral, Q8H PRN    Pharmacy to dose Vancomycin consult, , , Once **AND** vancomycin - pharmacy to dose, , Intravenous, pharmacy to manage frequency     Assessment and Plan      Perineal cellulitis with necrotizing infection  Uncontrolled type 2 diabetes mellitus with hyperglycemia   Acute Renal failure  DM II uncontrolled  Anemia  Above present on admission         Anticipated discharge and Disposition when medically stable:ltac vs tcu     Therapy: PT/OT/Speech    Nutrition: diabetic      ___________________________________________________________________________________________________________________________________    ----Perineal Cellulitis with necrotizing component, Excisional debridement of left perineum groin wound, wound washoutx2; most recent being 06/21/2024.  Anaerobic culture negative.  Tissue culture with few stah.  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     ---acute renal failure,  dialysis today.     ---uncontrolled hyperglycemia, added 10 units of long-acting insulin therapy at bedtime. maintain fasting blood glucose <180  ---morbidly obese   Continue supportive care  Continue checking vital signs q4hrs.  Reviewed and restarted appropriate home medications.   Continue PT/OT as tolerated patient will likely need LTAC or TCU for continued wound care, IV antibiotic therapy and  rehabilitation; will discuss with CM on Monday discharge planning.     DVT prophylaxis initiated   Nurse notified to page me if any changes occur     Consults: general surgeon   I have personally reviewed the specialist documentation and/or have spoken to the specialist with regard to the care of this patient; recommendations are noted above.     _______________________________________________________________________________________________________________________________      I have spent 40 minutes on the day of the visit; time spent includes face to face time and non-face to face time preparing to see the patient (eg, review of tests), independently reviewing and interpreting medical records, both past and current; documenting clinical information in the electronic or other health record, and communicating results to the patient/family/caregiver and care coordinator and nursing team.      All diagnosis and differential diagnosis have been reviewed,  interpreted and communicated appropriately to care team. assessment and plan has been documented; I have personally reviewed the labs and test results that are presently available and pertinent to this hospital course; I have reviewed medical records based upon their availability.    All of the patient's questions have been  addressed and answered. Patient's is agreeable to the above stated plan.   I will continue to monitor closely and make adjustments to medical management as needed.    Deanne Scott,    Date of Service 6/24/2024    This note was created with the  assistance of Dragon voice recognition software. There may be transcription errors as a result of using this technology however minimal. Effort has been made to assure accuracy of transcription but any obvious errors or omissions should be clarified with the author of the document.

## 2024-06-25 NOTE — PROGRESS NOTES
Nephrology consult follow up note    HPI:      Greer Kahn is a 58 y.o. female admitted on 06/13/2024 with fever 102 reporting possible UTI, boils to right thigh and left buttock.  Lactic acidosis present on admission with WBC 27.0, BUN 9.7, creatinine 0.98.  CT abdomen and pelvis with contrast showed area of cellulitis involving the perineum on the left side extending to the medial gluteal fold and areas of concern within the labia on the left side.  Also noted to have right lower lobe atelectasis via CT chest.  Patient was initiated on vancomycin and Rocephin in ER.     On 06/14 she underwent debridement of left groin and perineum.  Postoperative course complicated by nausea, vomiting and constipation.  She is also developed AUTUMN with rapidly worsening renal indices. Cr 0.98 --> 1.76--> 2.77--> 4.53 at the time of consultation. Urinalysis with low grade proteinuria and no blood.     Patient developed worsening renal function, and anuria.  She was initiated on hemodialysis 06/17/2024.    She has now undergone surgical debridement on 6/14, 6/18, and 6/21.     Interval history:   06/25/2024  Patient dialyzing well this admission. She remains nauseous without vomiting.  Excellent urine output noted.  No shortness of breath.     Review of Systems:       Past medical, family, surgical, and social history reviewed and unchanged from initial consult note.     Objective:       VITAL SIGNS: 24 HR MIN & MAX LAST    Temp  Min: 97.8 °F (36.6 °C)  Max: 98.8 °F (37.1 °C)  98.6 °F (37 °C)        BP  Min: 144/85  Max: 188/93  (!) 144/85     Pulse  Min: 84  Max: 98  88     Resp  Min: 18  Max: 20  20    SpO2  Min: 95 %  Max: 96 %  96 %      GEN:  Moderately developed and nourished.  No acute distress noted.  Awake alert oriented to time person place.    HEENT unremarkable no JVD no lymphadenopathy noted.  CV: RRR without rub or gallop noted.  PULM: CTAB, unlabored  ABD: Soft, NT/ND abdomen with NABS  EXT: No cyanosis or  edema  SKIN: Warm and dry  PSYCH: Awake, alert and appropriately conversant.   Dialysis access:  Right IJ Vas-Cath          Component Value Date/Time     (L) 06/25/2024 0601     (L) 06/24/2024 0343     (L) 06/26/2020 0441     06/25/2020 0615    K 4.6 06/25/2024 0601    K 4.2 06/24/2024 0343    K 3.7 06/26/2020 0441    K 3.5 06/25/2020 0615    CO2 21 (L) 06/25/2024 0601    CO2 22 06/24/2024 0343    CO2 26 06/26/2020 0441    CO2 27 06/25/2020 0615    BUN 23.8 (H) 06/25/2024 0601    BUN 28.6 (H) 06/24/2024 0343    BUN 12 06/26/2020 0441    BUN 9 06/25/2020 0615    CREATININE 4.05 (H) 06/25/2024 0601    CREATININE 5.52 (H) 06/24/2024 0343    CREATININE 0.68 06/26/2020 0441    CREATININE 0.58 06/25/2020 0615    CALCIUM 7.3 (L) 06/25/2024 0601    CALCIUM 8.0 (L) 06/24/2024 0343    CALCIUM 8.2 (L) 06/26/2020 0441    CALCIUM 7.7 (L) 06/25/2020 0615    PHOS 4.9 (H) 06/25/2024 0601            Component Value Date/Time    WBC 17.16 (H) 06/25/2024 0601    WBC 22.37 (H) 06/24/2024 0343    WBC 22.37 06/24/2024 0343    WBC 28.96 06/23/2024 0451    HGB 7.0 (L) 06/25/2024 0601    HGB 7.1 (L) 06/24/2024 0343    HCT 21.1 (L) 06/25/2024 0601    HCT 21.0 (L) 06/24/2024 0343    HCT 42.0 04/16/2019 2328    HCT 42.0 04/16/2019 2305     (H) 06/25/2024 0601     (H) 06/24/2024 0343       Imaging reviewed      Assessment / Plan:     Acute Renal failure now anuric - multifactorial secondary to sepsis, contrast exposure, NSAID use inpatient, vancomycin toxicity  -dialysis initiated 6/17  Perineal Cellulitis with necrotizing component - status post debridement 6/14, 6/18 and 6/21   Poor oral intake   DM II uncontrolled - A1C 11  Worsening anemia     Hold dialysis  Start Epogen today for anemia in kidney failure   Trial one dose Reglan   Add probiotics   Patient encouraged to increase oral fluid intake and will monitor renal functions and electrolytes.

## 2024-06-25 NOTE — PT/OT/SLP PROGRESS
Physical Therapy Treatment    Patient Name:  Greer Kahn   MRN:  [id number]    Recommendations:     Discharge therapy intensity: Moderate Intensity Therapy   Discharge Equipment Recommendations:  (TBD)  Barriers to discharge: Impaired mobility and Ongoing medical needs    Assessment:     Greer Kahn is a 58 y.o. female admitted with a medical diagnosis of groin infection s/p urgent I&D 6/14 and 6/18, severe sepsis, acute metabolic encephalopathy.  She presents with the following impairments/functional limitations: weakness, gait instability, impaired endurance, impaired balance, impaired functional mobility, pain, decreased lower extremity function, decreased ROM.    Rehab Prognosis: Good; patient would benefit from acute skilled PT services to address these deficits and reach maximum level of function.    Recent Surgery: Procedure(s) (LRB):  DEBRIDEMENT, WOUND (Left) 4 Days Post-Op    Plan:     During this hospitalization, patient would benefit from acute PT services 5 x/week to address the identified rehab impairments via gait training, therapeutic activities, therapeutic exercises, neuromuscular re-education and progress toward the following goals:    Plan of Care Expires:  07/19/24    Subjective     Chief Complaint: pain in groin/nausea  Patient/Family Comments/goals:   Pain/Comfort:         Objective:     Communicated with nursing prior to session.  Patient found supine with peripheral IV, castaneda catheter upon PT entry to room.     General Precautions: Standard, contact  Orthopedic Precautions:    Braces:    Respiratory Status: Room air  Blood Pressure: 179/84 - RN notified    Functional Mobility:  Bed Mobility:     Scooting: maximal assistance and of 2 persons toward HOB  Supine to Sit: pt declined sitting EOB at this time    Therapeutic Activities/Exercises:  Ankle pumps 10 reps x2 sets  SLR - AAROM 10 reps x2 sets  Hip abd/add 10 reps x2 sets  Pt only able to perform 3 knee to chest - pt with  increased pain    Education:  Patient provided with verbal education education regarding PT role/goals/POC, fall prevention, and safety awareness.  Understanding was verbalized.     Patient left supine with all lines intact, call button in reach, and nurse notified    GOALS:   Multidisciplinary Problems       Physical Therapy Goals          Problem: Physical Therapy    Goal Priority Disciplines Outcome Goal Variances Interventions   Physical Therapy Goal     PT, PT/OT Progressing     Description: Goals to be met by: 2024     Patient will increase functional independence with mobility by performin. Supine to sit with MInimal Assistance  2. Sit to supine with MInimal Assistance  3. Sitting at edge of bed x10 minutes with Stand-by Assistance  4. Pt with perform sit to stand transfer with RW Mod I.   5. Pt able to amb 150 ft with RW mod I.                          Time Tracking:     PT Received On: 24  PT Start Time: 1144     PT Stop Time: 1204  PT Total Time (min): 20 min     Billable Minutes: Therapeutic Exercise 20    Treatment Type: Treatment  PT/PTA: PTA     Number of PTA visits since last PT visit: 2     2024

## 2024-06-25 NOTE — PT/OT/SLP PROGRESS
Occupational Therapy   Treatment    Name: Greer Kahn  MRN: [id number]  Admitting Diagnosis:  Perineal abscess  4 Days Post-Op    Recommendations:     Recommended therapy intensity at discharge: Moderate Intensity Therapy   Discharge Equipment Recommendations:  to be determined by next level of care  Barriers to discharge:       Assessment:     Greer Kahn is a 58 y.o. female with a medical diagnosis of Perineal abscess.  She presents with good motivation, but limited secondary to pain. Performance deficits affecting function are weakness, impaired balance, pain, impaired endurance, impaired self care skills, impaired functional mobility, gait instability.     Rehab Prognosis:  Good; patient would benefit from acute skilled OT services to address these deficits and reach maximum level of function.       Plan:     Patient to be seen 4 x/week to address the above listed problems via self-care/home management, therapeutic activities, therapeutic exercises  Plan of Care Expires: 07/19/24  Plan of Care Reviewed with: patient    Subjective     Pain/Comfort:  Pain Rating 1: 10/10  Location - Side 1: Left  Location - Orientation 1: upper  Location 1: leg  Pain Addressed 1: Reposition, Distraction    Objective:     Communicated with: nurse prior to session.  Patient found HOB elevated with peripheral IV, SCD, castaneda catheter upon OT entry to room.    General Precautions: Standard, contact    Orthopedic Precautions:N/A  Braces: N/A  Respiratory Status: Room air  Vital Signs: Blood Pressure: 155/85     Occupational Performance:     Bed Mobility:    Patient completed Supine to Sit with maximal assistance unable to complete due to severe pain, nurse notified  Partial sit to supine with Max A x 2      Therapeutic Positioning    OT interventions performed during the course of today's session in an effort to prevent and/or reduce acquired pressure injuries:   Therapeutic positioning was provided at the conclusion of session  to offload all bony prominences for the prevention and/or reduction of pressure injuries    [facility] 6 Click ADL: 14    Patient Education:  Patient provided with verbal education and demonstrations education regarding fall prevention and safety awareness.  Understanding was verbalized, however additional teaching warranted.      Patient left HOB elevated with all lines intact and call button in reach.    GOALS:   Multidisciplinary Problems       Occupational Therapy Goals          Problem: Occupational Therapy    Goal Priority Disciplines Outcome Interventions   Occupational Therapy Goal     OT, PT/OT Progressing    Description: Goals to be met by: 7/20/24     Patient will increase functional independence with ADLs by performing:    UE Dressing with Stand-by Assistance.  LE Dressing with Stand-by Assistance.  Grooming while standing at sink with Stand-by Assistance.  Toileting from toilet with Stand-by Assistance for hygiene and clothing management.   Toilet transfer to toilet with Stand-by Assistance.                         Time Tracking:     OT Date of Treatment: 06/25/24  OT Start Time: 0947  OT Stop Time: 1004  OT Total Time (min): 17 min    Billable Minutes:Therapeutic Activity 17    OT/MAKENZIE: MAKENZIE     Number of MAKENZIE visits since last OT visit: 1    6/25/2024

## 2024-06-25 NOTE — PLAN OF CARE
Problem: Adult Inpatient Plan of Care  Goal: Plan of Care Review  6/25/2024 0741 by Araceli Cormier RN  Outcome: Progressing  6/25/2024 0741 by Araceli Cormier RN  Outcome: Progressing  Goal: Patient-Specific Goal (Individualized)  6/25/2024 0741 by Araceli Cormier RN  Outcome: Progressing  6/25/2024 0741 by Araceli Cormier RN  Outcome: Progressing  Goal: Absence of Hospital-Acquired Illness or Injury  6/25/2024 0741 by Araceli Cormier RN  Outcome: Progressing  6/25/2024 0741 by Araceli Cormier RN  Outcome: Progressing  Goal: Optimal Comfort and Wellbeing  6/25/2024 0741 by Araceli Cormier RN  Outcome: Progressing  6/25/2024 0741 by Araceli Cormier RN  Outcome: Progressing  Goal: Readiness for Transition of Care  6/25/2024 0741 by Araceli Cormier RN  Outcome: Progressing  6/25/2024 0741 by Araceli Cormier RN  Outcome: Progressing     Problem: Wound  Goal: Optimal Coping  6/25/2024 0741 by Araceli Cormier RN  Outcome: Progressing  6/25/2024 0741 by Araceli Cormier RN  Outcome: Progressing  Goal: Optimal Functional Ability  6/25/2024 0741 by Araceli Cormier RN  Outcome: Progressing  6/25/2024 0741 by Araceli Cormier RN  Outcome: Progressing  Goal: Absence of Infection Signs and Symptoms  6/25/2024 0741 by Araceli Cormier RN  Outcome: Progressing  6/25/2024 0741 by Araceli Cormier RN  Outcome: Progressing  Goal: Improved Oral Intake  6/25/2024 0741 by Araceli Cormier RN  Outcome: Progressing  6/25/2024 0741 by Araceli Cormier RN  Outcome: Progressing  Goal: Optimal Pain Control and Function  6/25/2024 0741 by Araceli Cormier RN  Outcome: Progressing  6/25/2024 0741 by Araceli Cormier RN  Outcome: Progressing  Goal: Skin Health and Integrity  6/25/2024 0741 by Araceli Cormier RN  Outcome: Progressing  6/25/2024 0741 by Araceli Cormier RN  Outcome: Progressing  Goal: Optimal Wound Healing  Outcome: Progressing     Problem: Sepsis/Septic Shock  Goal: Optimal Coping  Outcome:  Progressing  Goal: Absence of Bleeding  Outcome: Progressing  Goal: Blood Glucose Level Within Targeted Range  Outcome: Progressing  Goal: Absence of Infection Signs and Symptoms  Outcome: Progressing  Goal: Optimal Nutrition Intake  Outcome: Progressing     Problem: Infection  Goal: Absence of Infection Signs and Symptoms  Outcome: Progressing     Problem: Fall Injury Risk  Goal: Absence of Fall and Fall-Related Injury  Outcome: Progressing     Problem: Skin Injury Risk Increased  Goal: Skin Health and Integrity  Outcome: Progressing     Problem: Hemodialysis  Goal: Safe, Effective Therapy Delivery  Outcome: Progressing  Goal: Effective Tissue Perfusion  Outcome: Progressing  Goal: Absence of Infection Signs and Symptoms  Outcome: Progressing     Problem: Bariatric Environmental Safety  Goal: Safety Maintained with Care  Outcome: Progressing

## 2024-06-25 NOTE — PROGRESS NOTES
[facility] Hospital Medicine Progress Note      Chief Complaint: 1 day history of fever and multiple painful lesions to her thighs and buttocks       HPI: (personally reviewed by me and is documented from initial H&P)     58-year-old female whose history includes hypertension, diabetes, asthma and dyslipidemia.     Presented to the ED with complaints of a 1 day history of fever and multiple painful lesions to her thighs and buttocks. First noticed them approximately 3 days ago.      VS on arrival: T 101, P 124, R 18, B/P 189/132, Sats 94% on room air.  Initial labs include WBC 27.06 hemoglobin 11.8, hematocrit 35.8, platelets 264, sodium 131, potassium 3.8, chloride 94, bicarb 23, BUN 9.7, creatinine 0.98, glucose 353 and otherwise unremarkable.  Lactic acid 1.4.  COVID and flu not detected.  UA with significant proteinuria, glucosuria, ketones and few bacteria and yeast.  No white blood cells noted.     T abdomen and pelvis with contrast shows an area of cellulitis involving the perineum on the left side extending to the medial gluteal fold with an area of thickening and inflammatory changes in the labia on the left side with an area of hypoattenuation seen within the labia on the left side concerning for possible developing abscess.  CT chest with contrast shows a right lower lobe atelectasis and small right-sided pleural effusion with no evidence of pulmonary embolism.  Medications given in the ER include vancomycin and Rocephin.      Interval History:        On 06/21/2024 patient underwent repeat wound washout/debridement.  No wound VAC applied.  No overnight events reported.  No new complaints reported.    All needs are currently met.     Objective Assessment:  Physical Exam      Vital signs have been personally reviewed by me   General: Appears comfortable, no acute distress.   castaneda catheter in place-no hematuria noted.     Awake, alert oriented.     Integumentary: Warm, dry,  intact.  Musculoskeletal: Purposeful movement noted.     Respiratory: No accessory muscle use. Breath sounds are equal.  Cardiovascular: Regular rate.       VITAL SIGNS: 24 HRS MIN & MAX LAST   Temp  Min: 97.8 °F (36.6 °C)  Max: 98.8 °F (37.1 °C) 98.6 °F (37 °C)   BP  Min: 144/85  Max: 188/93 (!) 144/85   Pulse  Min: 84  Max: 98  88   Resp  Min: 18  Max: 20 20   SpO2  Min: 95 %  Max: 96 % 96 %     X-Ray Chest 1 View  Narrative: EXAMINATION:  XR CHEST 1 VIEW    CLINICAL HISTORY:  leukocytosis workup;    COMPARISON:  18 June 2024    FINDINGS:  Frontal view of the chest was obtained. Stable right-sided catheter.  The heart is not significantly enlarged.  Similar right hilar prominence and right basilar opacities.  No pneumothorax.  Impression: Similar appearance of the chest.    Electronically signed by: Issa Luna  Date:    06/22/2024  Time:    13:08    Recent Labs   Lab 06/23/24 0451 06/24/24  0343 06/25/24  0601   WBC 28.96  28.96* 22.37  22.37* 17.16*   RBC 2.40* 2.34* 2.33*   HGB 7.4* 7.1* 7.0*   HCT 21.7* 21.0* 21.1*   MCV 90.4 89.7 90.6   MCH 30.8 30.3 30.0   MCHC 34.1 33.8 33.2   RDW 16.0 16.0 16.4   * 423* 401*   MPV 9.5 9.7 9.4       Recent Labs   Lab 06/23/24  0451 06/24/24  0343 06/25/24  0601   * 129* 130*   K 4.3 4.2 4.6   CL 95* 93* 98   CO2 22 22 21*   BUN 24.3* 28.6* 23.8*   CREATININE 4.89* 5.52* 4.05*   CALCIUM 7.4* 8.0* 7.3*   MG  --   --  1.80   ALBUMIN 1.8* 1.7* 1.6*   ALKPHOS 183* 198* 221*   ALT 15 20 23   AST 16 24 30   BILITOT 1.6* 1.5 1.3     Microbiology Results (last 7 days)       Procedure Component Value Units Date/Time    Fungal Culture [[id number]]  (Abnormal)  (Susceptibility) Collected: 06/18/24 1223    Order Status: Completed Specimen: Tissue from Groin Updated: 06/25/24 0624     Fungal Culture Many Candida tropicalis    Blood Culture [[id number]]  (Normal) Collected: 06/22/24 1203    Order Status: Completed Specimen: Blood Updated: 06/24/24 1301     Blood Culture  No Growth At 48 Hours    Tissue Culture - Aerobic [[id number]]  (Abnormal)  (Susceptibility) Collected: 06/18/24 1223    Order Status: Completed Specimen: Tissue from Groin Updated: 06/21/24 1102     Tissue - Aerobic Culture Few Methicillin resistant Staphylococcus aureus    Anaerobic Culture [[id number]] Collected: 06/18/24 1223    Order Status: Completed Specimen: Tissue from Groin Updated: 06/21/24 0841     Anaerobe Culture No Anaerobes Isolated    Gram Stain [[id number]] Collected: 06/18/24 1223    Order Status: Completed Specimen: Tissue from Groin Updated: 06/18/24 1431     GRAM STAIN Few WBC observed      Rare Gram positive cocci    Blood culture #1 **CANNOT BE ORDERED STAT** [[id number]]  (Normal) Collected: 06/13/24 0832    Order Status: Completed Specimen: Blood from Hand, Left Updated: 06/18/24 1300     Blood Culture No Growth at 5 days    Blood culture #2 **CANNOT BE ORDERED STAT** [[id number]]  (Normal) Collected: 06/13/24 0832    Order Status: Completed Specimen: Blood from Antecubital, Left Updated: 06/18/24 1300     Blood Culture No Growth at 5 days               Medications for Hospital Course         Scheduled Med:   clindamycin IV (PEDS and ADULTS)  600 mg Intravenous Q8H    enoxaparin  40 mg Subcutaneous Daily    insulin glargine U-100  10 Units Subcutaneous QHS    lactated ringers  1,000 mL Intravenous Once    lactated ringers  1,000 mL Intravenous Once    piperacillin-tazobactam (Zosyn) IV (PEDS and ADULTS) (extended infusion is not appropriate)  4.5 g Intravenous Q12H    sodium chloride 0.9%  10 mL Intravenous Q6H     PRN Meds:    Current Facility-Administered Medications:     0.9%  NaCl infusion (for blood administration), , Intravenous, Q24H PRN    acetaminophen, 650 mg, Oral, Q4H PRN    albuterol-ipratropium, 3 mL, Nebulization, Q4H PRN    aluminum-magnesium hydroxide-simethicone, 30 mL, Oral, QID PRN    bisacodyL, 10 mg, Rectal, Daily PRN    calcium carbonate, 1,000 mg, Oral, TID PRN     dextrose 10%, 12.5 g, Intravenous, PRN    dextrose 10%, 25 g, Intravenous, PRN    glucagon (human recombinant), 1 mg, Intramuscular, PRN    glucose, 16 g, Oral, PRN    glucose, 24 g, Oral, PRN    heparin (porcine), 2,800 Units, Intravenous, PRN    hydrALAZINE, 10 mg, Intravenous, Q4H PRN    insulin aspart U-100, 0-15 Units, Subcutaneous, QID (AC + HS) PRN    metoclopramide, 10 mg, Intravenous, Q8H PRN    polyethylene glycol, 17 g, Oral, BID PRN    prochlorperazine, 2.5 mg, Intravenous, Q6H PRN    prochlorperazine, 5 mg, Intravenous, Q6H PRN    senna-docusate 8.6-50 mg, 2 tablet, Oral, BID PRN    sodium chloride 0.9%, 10 mL, Intravenous, PRN    Flushing PICC/Midline Protocol, , , Until Discontinued **AND** sodium chloride 0.9%, 10 mL, Intravenous, Q6H **AND** sodium chloride 0.9%, 10 mL, Intravenous, PRN    traMADoL, 50 mg, Oral, Q8H PRN    Pharmacy to dose Vancomycin consult, , , Once **AND** vancomycin - pharmacy to dose, , Intravenous, pharmacy to manage frequency     Assessment and Plan      Perineal cellulitis with necrotizing infection  Uncontrolled type 2 diabetes mellitus with hyperglycemia   Acute Renal failure  DM II uncontrolled  Anemia  Above present on admission         Anticipated discharge and Disposition when medically stable:ltac vs tcu     Therapy: PT/OT/Speech    Nutrition: diabetic      ___________________________________________________________________________________________________________________________________    ----Perineal Cellulitis with necrotizing component, Excisional debridement of left perineum groin wound, wound washoutx2; most recent being 06/21/2024.  Anaerobic culture negative.  Tissue culture with few stah.  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 Blood cultures negative thus far.---can likely de-escalate antibiotic therapy      ---acute renal failure, new dialysis, tolerating.     ---uncontrolled  hyperglycemia, added 10 units of long-acting insulin therapy at bedtime. maintain fasting blood glucose <180  ---morbidly obese   Continue supportive care  Continue checking vital signs q4hrs.  Reviewed and restarted appropriate home medications.   Continue PT/OT as tolerated patient will likely need LTAC or TCU for continued wound care, IV antibiotic therapy and  rehabilitation; will discuss with CM on Monday discharge planning.     DVT prophylaxis initiated   Nurse notified to page me if any changes occur     Consults: general surgeon   I have personally reviewed the specialist documentation and/or have spoken to the specialist with regard to the care of this patient; recommendations are noted above.     _______________________________________________________________________________________________________________________________      I have spent 40 minutes on the day of the visit; time spent includes face to face time and non-face to face time preparing to see the patient (eg, review of tests), independently reviewing and interpreting medical records, both past and current; documenting clinical information in the electronic or other health record, and communicating results to the patient/family/caregiver and care coordinator and nursing team.      All diagnosis and differential diagnosis have been reviewed,  interpreted and communicated appropriately to care team. assessment and plan has been documented; I have personally reviewed the labs and test results that are presently available and pertinent to this hospital course; I have reviewed medical records based upon their availability.    All of the patient's questions have been  addressed and answered. Patient's is agreeable to the above stated plan.   I will continue to monitor closely and make adjustments to medical management as needed.    Deanne Scott,    06/25/2024     This note was created with the assistance of Dragon voice recognition software.  There may be transcription errors as a result of using this technology however minimal. Effort has been made to assure accuracy of transcription but any obvious errors or omissions should be clarified with the author of the document.

## 2024-06-26 LAB
ALBUMIN SERPL-MCNC: 1.7 G/DL (ref 3.5–5)
ALBUMIN/GLOB SERPL: 0.4 RATIO (ref 1.1–2)
ALP SERPL-CCNC: 216 UNIT/L (ref 40–150)
ALT SERPL-CCNC: 22 UNIT/L (ref 0–55)
ANION GAP SERPL CALC-SCNC: 11 MEQ/L
AST SERPL-CCNC: 35 UNIT/L (ref 5–34)
BASOPHILS # BLD AUTO: 0.07 X10(3)/MCL
BASOPHILS NFR BLD AUTO: 0.5 %
BILIRUB SERPL-MCNC: 1.1 MG/DL
BUN SERPL-MCNC: 28 MG/DL (ref 9.8–20.1)
CALCIUM SERPL-MCNC: 7.3 MG/DL (ref 8.4–10.2)
CHLORIDE SERPL-SCNC: 102 MMOL/L (ref 98–107)
CO2 SERPL-SCNC: 19 MMOL/L (ref 22–29)
CREAT SERPL-MCNC: 4.21 MG/DL (ref 0.55–1.02)
CREAT/UREA NIT SERPL: 7
EOSINOPHIL # BLD AUTO: 0.2 X10(3)/MCL (ref 0–0.9)
EOSINOPHIL NFR BLD AUTO: 1.3 %
ERYTHROCYTE [DISTWIDTH] IN BLOOD BY AUTOMATED COUNT: 17.1 % (ref 11.5–17)
GFR SERPLBLD CREATININE-BSD FMLA CKD-EPI: 12 ML/MIN/1.73/M2
GLOBULIN SER-MCNC: 4.6 GM/DL (ref 2.4–3.5)
GLUCOSE SERPL-MCNC: 167 MG/DL (ref 74–100)
GROUP & RH: NORMAL
HCT VFR BLD AUTO: 20.8 % (ref 37–47)
HGB BLD-MCNC: 6.5 G/DL (ref 12–16)
IMM GRANULOCYTES # BLD AUTO: 0.46 X10(3)/MCL (ref 0–0.04)
IMM GRANULOCYTES NFR BLD AUTO: 3 %
INDIRECT COOMBS: NORMAL
LYMPHOCYTES # BLD AUTO: 1.45 X10(3)/MCL (ref 0.6–4.6)
LYMPHOCYTES NFR BLD AUTO: 9.6 %
MCH RBC QN AUTO: 30 PG (ref 27–31)
MCHC RBC AUTO-ENTMCNC: 31.3 G/DL (ref 33–36)
MCV RBC AUTO: 95.9 FL (ref 80–94)
MONOCYTES # BLD AUTO: 1.23 X10(3)/MCL (ref 0.1–1.3)
MONOCYTES NFR BLD AUTO: 8.1 %
NEUTROPHILS # BLD AUTO: 11.72 X10(3)/MCL (ref 2.1–9.2)
NEUTROPHILS NFR BLD AUTO: 77.5 %
NRBC BLD AUTO-RTO: 0.1 %
PLATELET # BLD AUTO: 397 X10(3)/MCL (ref 130–400)
PMV BLD AUTO: 9.5 FL (ref 7.4–10.4)
POCT GLUCOSE: 148 MG/DL (ref 70–110)
POCT GLUCOSE: 226 MG/DL (ref 70–110)
POCT GLUCOSE: 227 MG/DL (ref 70–110)
POCT GLUCOSE: 235 MG/DL (ref 70–110)
POTASSIUM SERPL-SCNC: 5.2 MMOL/L (ref 3.5–5.1)
PROT SERPL-MCNC: 6.3 GM/DL (ref 6.4–8.3)
RBC # BLD AUTO: 2.17 X10(6)/MCL (ref 4.2–5.4)
SODIUM SERPL-SCNC: 132 MMOL/L (ref 136–145)
SPECIMEN OUTDATE: NORMAL
VANCOMYCIN SERPL-MCNC: 15.3 UG/ML (ref 15–20)
WBC # BLD AUTO: 15.13 X10(3)/MCL (ref 4.5–11.5)

## 2024-06-26 PROCEDURE — 80202 ASSAY OF VANCOMYCIN: CPT | Performed by: INTERNAL MEDICINE

## 2024-06-26 PROCEDURE — P9016 RBC LEUKOCYTES REDUCED: HCPCS

## 2024-06-26 PROCEDURE — 36415 COLL VENOUS BLD VENIPUNCTURE: CPT | Performed by: NURSE PRACTITIONER

## 2024-06-26 PROCEDURE — 25000003 PHARM REV CODE 250: Performed by: INTERNAL MEDICINE

## 2024-06-26 PROCEDURE — 97530 THERAPEUTIC ACTIVITIES: CPT | Mod: CQ

## 2024-06-26 PROCEDURE — 86850 RBC ANTIBODY SCREEN: CPT

## 2024-06-26 PROCEDURE — 63600175 PHARM REV CODE 636 W HCPCS: Mod: JZ,JG | Performed by: INTERNAL MEDICINE

## 2024-06-26 PROCEDURE — 86900 BLOOD TYPING SEROLOGIC ABO: CPT

## 2024-06-26 PROCEDURE — 97535 SELF CARE MNGMENT TRAINING: CPT | Mod: CO

## 2024-06-26 PROCEDURE — 63600175 PHARM REV CODE 636 W HCPCS: Performed by: NURSE PRACTITIONER

## 2024-06-26 PROCEDURE — 86923 COMPATIBILITY TEST ELECTRIC: CPT

## 2024-06-26 PROCEDURE — 25000003 PHARM REV CODE 250: Performed by: NURSE PRACTITIONER

## 2024-06-26 PROCEDURE — 21400001 HC TELEMETRY ROOM

## 2024-06-26 PROCEDURE — 80053 COMPREHEN METABOLIC PANEL: CPT | Performed by: INTERNAL MEDICINE

## 2024-06-26 PROCEDURE — 36430 TRANSFUSION BLD/BLD COMPNT: CPT

## 2024-06-26 PROCEDURE — 25000003 PHARM REV CODE 250: Performed by: STUDENT IN AN ORGANIZED HEALTH CARE EDUCATION/TRAINING PROGRAM

## 2024-06-26 PROCEDURE — 27000207 HC ISOLATION

## 2024-06-26 PROCEDURE — 63600175 PHARM REV CODE 636 W HCPCS: Performed by: STUDENT IN AN ORGANIZED HEALTH CARE EDUCATION/TRAINING PROGRAM

## 2024-06-26 PROCEDURE — A4216 STERILE WATER/SALINE, 10 ML: HCPCS | Performed by: INTERNAL MEDICINE

## 2024-06-26 PROCEDURE — 63600175 PHARM REV CODE 636 W HCPCS: Performed by: INTERNAL MEDICINE

## 2024-06-26 PROCEDURE — 85025 COMPLETE CBC W/AUTO DIFF WBC: CPT | Performed by: INTERNAL MEDICINE

## 2024-06-26 PROCEDURE — 36415 COLL VENOUS BLD VENIPUNCTURE: CPT | Performed by: INTERNAL MEDICINE

## 2024-06-26 RX ORDER — ENOXAPARIN SODIUM 100 MG/ML
30 INJECTION SUBCUTANEOUS EVERY 24 HOURS
Status: DISCONTINUED | OUTPATIENT
Start: 2024-06-26 | End: 2024-06-26

## 2024-06-26 RX ORDER — HYDROCODONE BITARTRATE AND ACETAMINOPHEN 500; 5 MG/1; MG/1
TABLET ORAL
Status: DISCONTINUED | OUTPATIENT
Start: 2024-06-26 | End: 2024-07-09 | Stop reason: HOSPADM

## 2024-06-26 RX ORDER — FUROSEMIDE 10 MG/ML
40 INJECTION INTRAMUSCULAR; INTRAVENOUS ONCE
Status: COMPLETED | OUTPATIENT
Start: 2024-06-26 | End: 2024-06-26

## 2024-06-26 RX ORDER — DIPHENHYDRAMINE HCL 25 MG
25 CAPSULE ORAL EVERY 6 HOURS PRN
Status: DISCONTINUED | OUTPATIENT
Start: 2024-06-26 | End: 2024-07-09 | Stop reason: HOSPADM

## 2024-06-26 RX ORDER — ENOXAPARIN SODIUM 100 MG/ML
30 INJECTION SUBCUTANEOUS EVERY 24 HOURS
Status: DISCONTINUED | OUTPATIENT
Start: 2024-06-26 | End: 2024-07-09 | Stop reason: HOSPADM

## 2024-06-26 RX ADMIN — SODIUM CHLORIDE: 9 INJECTION, SOLUTION INTRAVENOUS at 08:06

## 2024-06-26 RX ADMIN — TRAMADOL HYDROCHLORIDE 50 MG: 50 TABLET, COATED ORAL at 11:06

## 2024-06-26 RX ADMIN — DIPHENHYDRAMINE HYDROCHLORIDE 25 MG: 25 CAPSULE ORAL at 11:06

## 2024-06-26 RX ADMIN — CLINDAMYCIN PHOSPHATE 600 MG: 600 INJECTION, SOLUTION INTRAVENOUS at 12:06

## 2024-06-26 RX ADMIN — HYDRALAZINE HYDROCHLORIDE 10 MG: 20 INJECTION, SOLUTION INTRAMUSCULAR; INTRAVENOUS at 05:06

## 2024-06-26 RX ADMIN — CLINDAMYCIN PHOSPHATE 600 MG: 600 INJECTION, SOLUTION INTRAVENOUS at 05:06

## 2024-06-26 RX ADMIN — HYDRALAZINE HYDROCHLORIDE 10 MG: 20 INJECTION, SOLUTION INTRAMUSCULAR; INTRAVENOUS at 11:06

## 2024-06-26 RX ADMIN — CLINDAMYCIN PHOSPHATE 600 MG: 600 INJECTION, SOLUTION INTRAVENOUS at 08:06

## 2024-06-26 RX ADMIN — SODIUM ZIRCONIUM CYCLOSILICATE 10 G: 10 POWDER, FOR SUSPENSION ORAL at 11:06

## 2024-06-26 RX ADMIN — INSULIN GLARGINE 10 UNITS: 100 INJECTION, SOLUTION SUBCUTANEOUS at 09:06

## 2024-06-26 RX ADMIN — SODIUM CHLORIDE, PRESERVATIVE FREE 10 ML: 5 INJECTION INTRAVENOUS at 05:06

## 2024-06-26 RX ADMIN — PIPERACILLIN SODIUM AND TAZOBACTAM SODIUM 4.5 G: 4; .5 INJECTION, POWDER, LYOPHILIZED, FOR SOLUTION INTRAVENOUS at 09:06

## 2024-06-26 RX ADMIN — ENOXAPARIN SODIUM 30 MG: 30 INJECTION SUBCUTANEOUS at 04:06

## 2024-06-26 RX ADMIN — PIPERACILLIN SODIUM AND TAZOBACTAM SODIUM 4.5 G: 4; .5 INJECTION, POWDER, LYOPHILIZED, FOR SOLUTION INTRAVENOUS at 08:06

## 2024-06-26 RX ADMIN — FUROSEMIDE 40 MG: 10 INJECTION, SOLUTION INTRAVENOUS at 05:06

## 2024-06-26 RX ADMIN — SODIUM CHLORIDE, PRESERVATIVE FREE 10 ML: 5 INJECTION INTRAVENOUS at 04:06

## 2024-06-26 NOTE — PT/OT/SLP PROGRESS
Physical Therapy Treatment    Patient Name:  Greer Kahn   MRN:  [id number]    Recommendations:     Discharge therapy intensity: Moderate Intensity Therapy   Discharge Equipment Recommendations:  (TBD)  Barriers to discharge: Impaired mobility and Ongoing medical needs    Assessment:     Greer Kahn is a 58 y.o. female admitted with a medical diagnosis of groin infection s/p urgent I&D 6/14 and 6/18, severe sepsis, acute metabolic encephalopathy.  She presents with the following impairments/functional limitations: weakness, gait instability, impaired endurance, impaired balance, impaired functional mobility, pain, decreased lower extremity function, decreased ROM.    Rehab Prognosis: Good; patient would benefit from acute skilled PT services to address these deficits and reach maximum level of function.    Recent Surgery: Procedure(s) (LRB):  DEBRIDEMENT, WOUND (Left) 5 Days Post-Op    Plan:     During this hospitalization, patient would benefit from acute PT services 5 x/week to address the identified rehab impairments via gait training, therapeutic activities, therapeutic exercises, neuromuscular re-education and progress toward the following goals:    Plan of Care Expires:  07/19/24    Subjective     Chief Complaint:   Patient/Family Comments/goals:   Pain/Comfort:         Objective:     Communicated with nursing prior to session.  Patient found HOB elevated with peripheral IV, castaneda catheter, PICC line, pulse ox (continuous), telemetry (rectal tube) upon PT entry to room.     General Precautions: Standard, contact, fall  Orthopedic Precautions:    Braces:    Respiratory Status: Room air  Blood Pressure: 198/103 - RN notified    Functional Mobility:  Bed Mobility:     Rolling Left:  minimum assistance  Rolling Right: minimum assistance  For total A pericare & sheet/bed pad change  Supine scooting toward HOB: maximal assistance and of 3 persons  Supine to Sit: maximal assistance and of 3 persons  Sit to  Supine: maximal assistance and of 3 persons  Balance: MaxA    Therapeutic Activities/Exercises:  Pt sat EOB ~10mins. Once seated pt with c/o dizziness. Pt sat for a few moment to see if symptoms resoloved, they did not. BP assessed as above. Pt was laid back down, then rolling was done to clean pt and change bed sheets/pads.    Education:  Patient provided with verbal education education regarding PT role/goals/POC, fall prevention, and safety awareness.  Understanding was verbalized.     Patient left right sidelying with all lines intact, call button in reach, nurse notified, and nurse present    GOALS:   Multidisciplinary Problems       Physical Therapy Goals          Problem: Physical Therapy    Goal Priority Disciplines Outcome Goal Variances Interventions   Physical Therapy Goal     PT, PT/OT Progressing     Description: Goals to be met by: 2024     Patient will increase functional independence with mobility by performin. Supine to sit with MInimal Assistance  2. Sit to supine with MInimal Assistance  3. Sitting at edge of bed x10 minutes with Stand-by Assistance  4. Pt with perform sit to stand transfer with RW Mod I.   5. Pt able to amb 150 ft with RW mod I.                          Time Tracking:     PT Received On: 24  PT Start Time: 1132     PT Stop Time: 1203  PT Total Time (min): 31 min     Billable Minutes: Therapeutic Activity 31    Treatment Type: Treatment  PT/PTA: PTA     Number of PTA visits since last PT visit: 3     2024

## 2024-06-26 NOTE — PROGRESS NOTES
Pharmacist Renal Dose Adjustment Note    Greer Kahn is a 58 y.o. female being treated with the medication enoxaparin    Patient Data:    Vital Signs (Most Recent):  Temp: 98.4 °F (36.9 °C) (06/26/24 0738)  Pulse: 94 (06/26/24 0738)  Resp: 17 (06/25/24 1956)  BP: (!) 161/80 (06/26/24 0738)  SpO2: 96 % (06/26/24 0738) Vital Signs (72h Range):  Temp:  [97.8 °F (36.6 °C)-99.4 °F (37.4 °C)]   Pulse:  [76-98]   Resp:  [16-22]   BP: (144-188)/(78-94)   SpO2:  [94 %-98 %]      Recent Labs   Lab 06/24/24  0343 06/25/24  0601 06/26/24  0429   CREATININE 5.52* 4.05* 4.21*     Serum creatinine: 4.21 mg/dL (H) 06/26/24 0429  Estimated creatinine clearance: 20.7 mL/min (A)    Enoxaparin 40 mg SQ QD will be changed to enoxaparin 30 mg SQ QD based on patient's eCrCl < 30 mL/min per pharmacy protcol    Pharmacist's Name: Matilda Riojas  Pharmacist's Extension: [phone]

## 2024-06-26 NOTE — PROGRESS NOTES
Nephrology consult follow up note    HPI:      Greer Kahn is a 58 y.o. female admitted on 06/13/2024 with fever 102 reporting possible UTI, boils to right thigh and left buttock.  Lactic acidosis present on admission with WBC 27.0, BUN 9.7, creatinine 0.98.  CT abdomen and pelvis with contrast showed area of cellulitis involving the perineum on the left side extending to the medial gluteal fold and areas of concern within the labia on the left side.  Also noted to have right lower lobe atelectasis via CT chest.  Patient was initiated on vancomycin and Rocephin in ER.     On 06/14 she underwent debridement of left groin and perineum.  Postoperative course complicated by nausea, vomiting and constipation.  She is also developed AUTUMN with rapidly worsening renal indices. Cr 0.98 --> 1.76--> 2.77--> 4.53 at the time of consultation. Urinalysis with low grade proteinuria and no blood.     Patient developed worsening renal function, and anuria.  She was initiated on hemodialysis 06/17/2024.    She has now undergone surgical debridement on 6/14, 6/18, and 6/21.     Interval history:   06/25/2024  Patient dialyzing well this admission. She remains nauseous without vomiting.  Excellent urine output noted.  No shortness of breath.    6/26/2024   Noted to have no urine in castaneda bag. CNA reported empty  bag on starting shift at 0700. Flushed urinary catheter with 30 cc of saline and 700 cc urine returned. Hgb 6.5.   Lying down in the bed.  No complaints of shortness of breath.  Eating her lunch now.     Review of Systems:       Past medical, family, surgical, and social history reviewed and unchanged from initial consult note.     Objective:       VITAL SIGNS: 24 HR MIN & MAX LAST    Temp  Min: 98.1 °F (36.7 °C)  Max: 98.9 °F (37.2 °C)  98.4 °F (36.9 °C)        BP  Min: 151/86  Max: 161/80  (!) 161/80     Pulse  Min: 84  Max: 94  94     Resp  Min: 17  Max: 17  17    SpO2  Min: 95 %  Max: 98 %  96 %      Physical  Exam  Constitutional:       General: She is not in acute distress.     Appearance: She is obese.   HENT:      Head: Atraumatic.      Nose: Nose normal.      Mouth/Throat:      Mouth: Mucous membranes are moist.   Eyes:      Extraocular Movements: Extraocular movements intact.      Conjunctiva/sclera: Conjunctivae normal.   Neck:      Comments: R IJ temporary dialysis catheter   Cardiovascular:      Rate and Rhythm: Normal rate and regular rhythm.      Pulses: Normal pulses.      Heart sounds: Normal heart sounds.   Pulmonary:      Effort: Pulmonary effort is normal.      Breath sounds: Normal breath sounds.   Abdominal:      General: Bowel sounds are normal.      Palpations: Abdomen is soft.   Genitourinary:     Comments: Urinary catheter draining dark yellow urine with moderate sediment   Musculoskeletal:         General: No swelling.      Cervical back: Neck supple.   Skin:     General: Skin is warm.   Neurological:      General: No focal deficit present.      Mental Status: She is alert and oriented to person, place, and time.   Psychiatric:         Mood and Affect: Mood normal.         Behavior: Behavior normal.                 Component Value Date/Time     (L) 06/26/2024 0429     (L) 06/25/2024 0601     (L) 06/26/2020 0441     06/25/2020 0615    K 5.2 (H) 06/26/2024 0429    K 4.6 06/25/2024 0601    K 3.7 06/26/2020 0441    K 3.5 06/25/2020 0615    CO2 19 (L) 06/26/2024 0429    CO2 21 (L) 06/25/2024 0601    CO2 26 06/26/2020 0441    CO2 27 06/25/2020 0615    BUN 28.0 (H) 06/26/2024 0429    BUN 23.8 (H) 06/25/2024 0601    BUN 12 06/26/2020 0441    BUN 9 06/25/2020 0615    CREATININE 4.21 (H) 06/26/2024 0429    CREATININE 4.05 (H) 06/25/2024 0601    CREATININE 0.68 06/26/2020 0441    CREATININE 0.58 06/25/2020 0615    CALCIUM 7.3 (L) 06/26/2024 0429    CALCIUM 7.3 (L) 06/25/2024 0601    CALCIUM 8.2 (L) 06/26/2020 0441    CALCIUM 7.7 (L) 06/25/2020 0615    PHOS 4.9 (H) 06/25/2024 0601             Component Value Date/Time    WBC 15.13 (H) 06/26/2024 0429    WBC 17.16 (H) 06/25/2024 0601    WBC 22.37 06/24/2024 0343    WBC 28.96 06/23/2024 0451    HGB 6.5 (L) 06/26/2024 0429    HGB 7.0 (L) 06/25/2024 0601    HCT 20.8 (L) 06/26/2024 0429    HCT 21.1 (L) 06/25/2024 0601    HCT 42.0 04/16/2019 2328    HCT 42.0 04/16/2019 2305     06/26/2024 0429     (H) 06/25/2024 0601       Imaging reviewed      Assessment / Plan:     Acute Renal failure multifactorial secondary to sepsis, contrast exposure, NSAID use inpatient, vancomycin toxicity  -dialysis initiated 6/17.   Last hemodialysis was on 06/24/2024  Perineal Cellulitis with necrotizing component - status post debridement 6/14, 6/18 and 6/21   DM II uncontrolled - A1C 11  Worsening anemia     - Dialysis on hold,  last dialysis on 06/24/2024  - UOP increasing. Monitor castaneda catheter for sediment build up and need for flushing   - Transfuse 2 unit PRBC with Lasix 40 mg IV after first unit  - Stop IVF today.   -Repeat lab tomorrow morning

## 2024-06-26 NOTE — PROGRESS NOTES
Inpatient Nutrition Assessment    Admit Date: 6/13/2024   Total duration of encounter: 13 days   Patient Age: 58 y.o.    Nutrition Recommendation/Prescription     Continue 1800 calorie diabetic low potassium diet as tolerated  [facility] provides 475 kcal, 22gm protein per container    Communication of Recommendations: reviewed with patient and reviewed with family    Nutrition Assessment     Malnutrition Assessment/Nutrition-Focused Physical Exam    Malnutrition Context: acute illness or injury (06/20/24 1324)  Malnutrition Level:  (does not meet criteria) (06/20/24 1324)  Energy Intake (Malnutrition): less than or equal to 50% for greater than or equal to 5 days (06/20/24 1324)  Weight Loss (Malnutrition):  (does not meet criteria) (06/20/24 1324)  Subcutaneous Fat (Malnutrition):  (does not meet criteria) (06/20/24 1324)           Muscle Mass (Malnutrition):  (does not meet criteria) (06/20/24 1324)                                   A minimum of two characteristics is recommended for diagnosis of either severe or non-severe malnutrition.    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  Uncontrolled T2DM with hyperglycemia  Acute Renal failure now anuric - multifactorial secondary to sepsis, contrast exposure, NSAID use inpatient, vancomycin toxicity  -dialysis initiated 6/17    Relevant Medical History: obesity BMI 37, poorly controlled T2DM with last A1c 13 in April 2024     Scheduled Medications:  clindamycin IV (PEDS and ADULTS), 600 mg, Q8H  enoxaparin, 30 mg, Daily  furosemide (LASIX) injection, 40 mg, Once  insulin glargine U-100, 10 Units,  QHS  Lactobacillus acidophilus, 1 capsule, Daily  piperacillin-tazobactam (Zosyn) IV (PEDS and ADULTS) (extended infusion is not appropriate), 4.5 g, Q12H  sodium chloride 0.9%, 10 mL, Q6H    Continuous Infusions:   PRN Medications:  0.9%  NaCl infusion (for blood administration), , Q24H PRN  0.9%  NaCl infusion (for blood administration), , Q24H PRN  acetaminophen, 650 mg, Q4H PRN  albuterol-ipratropium, 3 mL, Q4H PRN  aluminum-magnesium hydroxide-simethicone, 30 mL, QID PRN  bisacodyL, 10 mg, Daily PRN  calcium carbonate, 1,000 mg, TID PRN  dextrose 10%, 12.5 g, PRN  dextrose 10%, 25 g, PRN  diphenhydrAMINE, 25 mg, Q6H PRN  glucagon (human recombinant), 1 mg, PRN  glucose, 16 g, PRN  glucose, 24 g, PRN  heparin (porcine), 2,800 Units, PRN  hydrALAZINE, 10 mg, Q4H PRN  insulin aspart U-100, 0-15 Units, QID (AC + HS) PRN  metoclopramide, 10 mg, Q8H PRN  polyethylene glycol, 17 g, BID PRN  prochlorperazine, 2.5 mg, Q6H PRN  prochlorperazine, 5 mg, Q6H PRN  senna-docusate 8.6-50 mg, 2 tablet, BID PRN  sodium chloride 0.9%, 10 mL, PRN  sodium chloride 0.9%, 10 mL, PRN  traMADoL, 50 mg, Q8H PRN  vancomycin - pharmacy to dose, , pharmacy to manage frequency    Calorie Containing IV Medications: no significant kcals from medications at this time    Recent Labs   Lab 06/21/24  0416 06/21/24  0621 06/21/24  0621 06/22/24  0902 06/23/24  0451 06/24/24  0343 06/25/24  0601 06/26/24  0429   *  --   --  131* 131* 129* 130* 132*   K 4.4  --   --  5.5* 4.3 4.2 4.6 5.2*   CALCIUM 7.6*  --   --  7.6* 7.4* 8.0* 7.3* 7.3*   PHOS 4.4  --   --   --   --   --  4.9*  --    MG  --   --   --   --   --   --  1.80  --    CO2 21*  --   --  22 22 22 21* 19*   BUN 24.1*  --   --  17.5 24.3* 28.6* 23.8* 28.0*   CREATININE 4.79*  --   --  3.86* 4.89* 5.52* 4.05* 4.21*   EGFRNORACEVR 10  --   --  13 10 8 12 12   GLUCOSE 216*  --   --  223* 185* 152* 143* 167*   BILITOT  --   --   --   --  1.6* 1.5 1.3 1.1   ALKPHOS  --   --   --   --  183*  "198* 221* 216*   ALT  --   --   --   --  15 20 23 22   AST  --   --   --   --  16 24 30 35*   ALBUMIN 1.7*  --   --   --  1.8* 1.7* 1.6* 1.7*   WBC  --  30.52*   < > 35.31  35.31* 28.96  28.96* 22.37  22.37* 17.16* 15.13*   HGB  --  8.1*  --  7.7* 7.4* 7.1* 7.0* 6.5*   HCT  --  24.7*  --  23.0* 21.7* 21.0* 21.1* 20.8*    < > = values in this interval not displayed.     Nutrition Orders:  Diet diabetic Low Potassium; 2000 Calorie  Dietary nutrition supplements Novasource Renal - Strawberry; BID    Appetite/Oral Intake: good/% of meals  Factors Affecting Nutritional Intake: none identified  Social Needs Impacting Access to Food: none identified  Food/Jainism/Cultural Preferences: none reported  Food Allergies: no known food allergies  Last Bowel Movement: 24  Wound(s):      Comments    24 pt eating lunch, diet advanced from clear liquid this morning, so far tolerating. Reported some n/v very early this morning but feeling better now. Had been NPO/CL mostly since admit due to n/v. Currently on dialysis x 3 sessions with plans to possibly continue per nephrology. Weight gain noted in EMR, some most likely due to fluid.     24 reports good appetite, tolerating oral diet, drinking Novasource Renal     Anthropometrics    Height: 5' 7.01" (170.2 cm), Height Method: Stated  Last Weight: 130 kg (286 lb 9.6 oz) (24 0626), Weight Method: Bed Scale  BMI (Calculated): 44.9  BMI Classification: obese grade III (BMI >/=40)     Ideal Body Weight (IBW), Female: 135.05 lb     % Ideal Body Weight, Female (lb): 177.84 %                    Usual Body Weight (UBW), k.09 kg  % Usual Body Weight: 119.42     Usual Weight Provided By: patient denies unintentional weight loss    Wt Readings from Last 5 Encounters:   24 130 kg (286 lb 9.6 oz)   24 111.1 kg (245 lb)   23 104.3 kg (230 lb)   19 110 kg (242 lb 8.1 oz)     Weight Change(s) Since Admission:   Wt Readings from Last  " Encounters:   06/24/24 0626 130 kg (286 lb 9.6 oz)   06/20/24 0542 125.9 kg (277 lb 9 oz)   06/17/24 0740 108.9 kg (240 lb 1.3 oz)   06/13/24 0734 108.9 kg (240 lb)   Admit Weight: 108.9 kg (240 lb) (06/13/24 0734), Weight Method: Stated    Estimated Needs    Weight Used For Calorie Calculations: 125.9 kg (277 lb 9 oz)  Energy Calorie Requirements (kcal): 1876 kcal (no stress factor)  Energy Need Method: [place]-St Jeor  Weight Used For Protein Calculations: 125.9 kg (277 lb 9 oz)  Protein Requirements: 100gm (0.8gm/kg)  Fluid Requirements (mL): 1000ml + output (renal on dialysis)  CHO Requirement: 234 gm/day     Enteral Nutrition     Patient not receiving enteral nutrition at this time.    Parenteral Nutrition     Patient not receiving parenteral nutrition support at this time.    Evaluation of Received Nutrient Intake    Calories: meeting estimated needs  Protein: meeting estimated needs    Patient Education     Not applicable.    Nutrition Diagnosis     PES: Inadequate oral intake related to acute illness as evidenced by <50% est energy needs for >/= 5 days. (active)         Nutrition Interventions     Intervention(s): modified composition of meals/snacks and collaboration with other providers    Goal: Meet greater than 80% of nutritional needs by follow-up. (goal progressing)  Goal: Consume % of meals/snacks by follow-up. (goal progressing)    Nutrition Goals & Monitoring     Dietitian will monitor: food and beverage intake, electrolyte/renal panel, glucose/endocrine profile, and gastrointestinal profile  Discharge planning: continue 1800 calorie diabetic diet  Nutrition Risk/Follow-Up: moderate (follow-up in 3-5 days)   Please consult if re-assessment needed sooner.

## 2024-06-26 NOTE — PLAN OF CARE
INTERVENTIONAL NEPHROLOGY PLAN OF CARE UPDATE         Patient Name: Greer CORDOVA Femi CHARLES 1965    Date: 2024      Contacted by nephrology team. They would like to delay insertion of tunneled catheter based on current labs (potential for renal recovery).    Please re-consult PRN.    Rickey Tapia DO  Interventional Nephrology  Cell: [phone]

## 2024-06-26 NOTE — CONSULTS
INTERVENTIONAL NEPHROLOGY INITIAL CONSULTATION NOTE       Patient Name: Greer Kahn  MELVIN 1965    Patient Seen Date: 2024  Patient Seen Time: 8:31 PM     Consult requested by: Deanne Scott DO     Reason for consult: Need for tunneled dialysis catheter insertion       HPI: 58-year-old female with obesity, HTN, and DM2 presented to the hospital with multiple painful lesions to her thigh and buttocks which required surgical debridement. The pt was treated with NSAIDs for pain and vancomycin for infection, however renal indices worsened and AUTUMN developed. Pt required placement of a RIJ vein non-tunneled HD catheter followed by dialysis initiation. Since then, the pt has required the support of dialysis. Nephrology service considers the possibility of requiring further dialysis and has requested tunneled dialysis catheter insertion, hence interventional nephrology service was consulted.    Pt seen and examined at bedside this PM. Daughter present at bedside. Pt denies complaints. Risks and benefits of tunneled dialysis catheter insertion and intravenous conscious sedation was reviewed with the patient. The patient agrees to proceed with the intended procedure. Consents for both intravenous conscious sedation and procedure were signed and placed within the chart.         Review of Systems:  General:  No fatigue  Skin: No rashes  HEENT: No vision changes  CVS: No CP  RS: No SOB  GIT: No abdominal pain  Extremities: No swelling  Neurological:  No focal weakness  Psych: No depression    Past Medical History:   Diagnosis Date    Asthma     Diabetes mellitus     Hypertension       Past Surgical History:   Procedure Laterality Date    INCISION AND DRAINAGE OF ABSCESS N/A 2024    Procedure: Incision and Drainage;  Surgeon: Papa Cesar Jr., MD;  Location: [facility];  Service: General;  Laterality: N/A;  DEBRIDEMENT SOFT TISSUE - PERINEUM    INCISION AND DRAINAGE, LOWER EXTREMITY Left  6/18/2024    Procedure: Incision and Drainage;  Surgeon: Arnaud Vital MD;  Location: [facility] OR;  Service: General;  Laterality: Left;  LEFT GROIN    WOUND DEBRIDEMENT Left 6/21/2024    Procedure: DEBRIDEMENT, WOUND;  Surgeon: Arnaud Vital MD;  Location: [facility] OR;  Service: General;  Laterality: Left;  left groin, lithotomy      Review of patient's allergies indicates:  No Known Allergies   Social History     Tobacco Use    Smoking status: Never    Smokeless tobacco: Never   Substance Use Topics    Alcohol use: Never    Drug use: Never      No family history on file.      Current Facility-Administered Medications:     0.9%  NaCl infusion (for blood administration), , Intravenous, Q24H PRN, Cristopher Butterfield MD    0.9%  NaCl infusion, , Intravenous, Continuous, Deanne Scott DO, Last Rate: 75 mL/hr at 06/25/24 1528, New Bag at 06/25/24 1528    acetaminophen tablet 650 mg, 650 mg, Oral, Q4H PRN, Shante Jackson MD    albuterol-ipratropium 2.5 mg-0.5 mg/3 mL nebulizer solution 3 mL, 3 mL, Nebulization, Q4H PRN, Shante Jackson MD    aluminum-magnesium hydroxide-simethicone 200-200-20 mg/5 mL suspension 30 mL, 30 mL, Oral, QID PRN, Shante Jackson MD    bisacodyL suppository 10 mg, 10 mg, Rectal, Daily PRN, Beverly Zapata MD    calcium carbonate 200 mg calcium (500 mg) chewable tablet 1,000 mg, 1,000 mg, Oral, TID PRN, Shante Jackson MD, 1,000 mg at 06/16/24 0336    clindamycin in D5W 600 mg/50 mL IVPB 600 mg, 600 mg, Intravenous, Q8H, Shante Jackson MD, Stopped at 06/25/24 1828    dextrose 10% bolus 125 mL 125 mL, 12.5 g, Intravenous, PRN, Deanne Soctt,     dextrose 10% bolus 250 mL 250 mL, 25 g, Intravenous, PRN, Deanne Scott,     enoxaparin injection 40 mg, 40 mg, Subcutaneous, Daily, Cristopher Butterfield MD, 40 mg at 06/25/24 1528    epoetin malka injection 10,000 Units, 10,000 Units, Intravenous, Once, Sari Reveles, MALCOLM    glucagon (human recombinant) injection 1 mg, 1 mg, Intramuscular, PRN,  Deanne Scott D, DO    glucose chewable tablet 16 g, 16 g, Oral, PRN, Neda Scotta D, DO    glucose chewable tablet 24 g, 24 g, Oral, PRN, Deanne Scott, DO    heparin (porcine) injection 2,800 Units, 2,800 Units, Intravenous, PRN, Sari Reveles S, AGNP, 2,800 Units at 06/24/24 1230    hydrALAZINE injection 10 mg, 10 mg, Intravenous, Q4H PRN, Sylvia Campos AGACNP-BC, 10 mg at 06/24/24 0059    insulin aspart U-100 injection 0-15 Units, 0-15 Units, Subcutaneous, QID (AC + HS) PRN, Deanne Scott, DO, 6 Units at 06/24/24 2222    insulin glargine U-100 (Lantus) injection 10 Units, 10 Units, Subcutaneous, QHS, Deanne Scott, DO, 10 Units at 06/24/24 2222    Lactobacillus acidophilus capsule 1 capsule, 1 capsule, Oral, Daily, Sari Reveles S, AGNP, 1 capsule at 06/25/24 1139    metoclopramide injection 10 mg, 10 mg, Intravenous, Q8H PRN, Jessica Alejandro MD, 10 mg at 06/24/24 0935    piperacillin-tazobactam (ZOSYN) 4.5 g in dextrose 5 % in water (D5W) 100 mL IVPB (MB+), 4.5 g, Intravenous, Q12H, Cristopher Butterfield MD, Stopped at 06/25/24 1417    polyethylene glycol packet 17 g, 17 g, Oral, BID PRN, Shante Jackson MD    prochlorperazine injection Soln 2.5 mg, 2.5 mg, Intravenous, Q6H PRN, Pamela rS MD, 2.5 mg at 06/25/24 1528    prochlorperazine injection Soln 5 mg, 5 mg, Intravenous, Q6H PRN, ManuelShante orlando MD, 5 mg at 06/16/24 1405    senna-docusate 8.6-50 mg per tablet 2 tablet, 2 tablet, Oral, BID PRN, ManuelShante orlando MD, 2 tablet at 06/16/24 0336    sodium chloride 0.9% flush 10 mL, 10 mL, Intravenous, PRN, Shante Jackson MD    Flushing PICC/Midline Protocol, , , Until Discontinued **AND** sodium chloride 0.9% flush 10 mL, 10 mL, Intravenous, Q6H, 10 mL at 06/25/24 1759 **AND** sodium chloride 0.9% flush 10 mL, 10 mL, Intravenous, PRN, Beverly Zapata MD    traMADoL tablet 50 mg, 50 mg, Oral, Q8H PRN, Deanne Scott DO, 50 mg at 06/25/24 1017    Pharmacy to dose Vancomycin consult, , ,  Once **AND** vancomycin - pharmacy to dose, , Intravenous, pharmacy to manage frequency, ManuelShante MD    Vital Signs (24 h):  Temp:  [98.6 °F (37 °C)-98.9 °F (37.2 °C)] 98.9 °F (37.2 °C)  Pulse:  [84-98] 84  Resp:  [17-20] 17  SpO2:  [95 %-98 %] 98 %  BP: (144-172)/(82-93) 161/82   I/O last 3 completed shifts:  In: 680 [P.O.:680]  Out: 2350 [Urine:2350]  No intake/output data recorded.        Physical Exam:  General: NAD  HEENT: NC/AT, EOMI  CVS: RRR   RS: breathing easily  Abdominal: Obese  Extremities: trace edema b/l LE  Neurological: No focal deficits.  Psych: Normal affect  Dialysis Access: right internal jugular (RIJ) temporary, non-tunneled HD catheter that appears to have a high cannulation in the neck.    Results:    Lab Results   Component Value Date     (L) 06/25/2024     (L) 06/26/2020    K 4.6 06/25/2024    K 3.7 06/26/2020    CL 98 06/25/2024     06/26/2020    CO2 21 (L) 06/25/2024    CO2 26 06/26/2020    BUN 23.8 (H) 06/25/2024    BUN 12 06/26/2020    CREATININE 4.05 (H) 06/25/2024    CREATININE 0.68 06/26/2020     Lab Results   Component Value Date    WBC 17.16 (H) 06/25/2024    WBC 22.37 06/24/2024    HGB 7.0 (L) 06/25/2024     (H) 06/25/2024    MCV 90.6 06/25/2024       Assessment and Plan:      AUTUMN requiring HD.  Pt with AUTUMN requiring HD, who is expected to require HD for > 2 weeks per nephrology service. Interventional nephrology consulted for insertion of tunneled catheter.  - Will plan for insertion of tunneled dialysis catheter tomorrow in cath lab setting, likely around noon.  - Will review AM lab results to determine necessity of continuing dialysis.  - Consent of catheter placement signed and placed in chart.  - NPO after midnight.    Thank you for your consult. Please feel free to reach me with any questions.  Plan for follow-up tomorrow.    Rickey Tapia,   Interventional Nephrology  Cell: [phone]

## 2024-06-26 NOTE — PT/OT/SLP PROGRESS
Occupational Therapy   Treatment    Name: Greer Kahn  MRN: [id number]  Admitting Diagnosis:  Perineal abscess  5 Days Post-Op    Recommendations:     Recommended therapy intensity at discharge: Moderate Intensity Therapy   Discharge Equipment Recommendations:  to be determined by next level of care  Barriers to discharge:       Assessment:     Greer Kahn is a 58 y.o. female with a medical diagnosis of Perineal abscess.  She presents with good participation. Performance deficits affecting function are weakness, impaired balance, decreased safety awareness, impaired endurance, impaired self care skills, impaired functional mobility, gait instability, pain.     Rehab Prognosis:  Good; patient would benefit from acute skilled OT services to address these deficits and reach maximum level of function.       Plan:     Patient to be seen 4 x/week to address the above listed problems via self-care/home management, therapeutic activities, therapeutic exercises  Plan of Care Expires: 07/19/24  Plan of Care Reviewed with: patient    Subjective     Pain/Comfort:  Complains of pain but not rated    Objective:     Communicated with: nurse prior to session.  Patient found HOB elevated with peripheral IV, castaneda catheter, PICC line, telemetry, Other (comments) (digni-shield) upon OT entry to room.    General Precautions: Standard, contact, fall    Orthopedic Precautions:N/A  Braces: N/A  Respiratory Status: Room air  Blood Pressure: seated 198/106 nurse notified, patient returned to supine in bed     Occupational Performance:     Bed Mobility:    Patient completed Rolling/Turning to Left with  maximal assistance and 2 persons  Patient completed Scooting with maximal assistance and 2 persons  Patient completed Supine to Sit with maximal assistance and 2 persons  Patient completed Sit to Supine with maximal assistance and 2 persons  Maintain sitting balance edge of bed with Max A, complains of feeling dizzy     Activities of  Daily Living:  Toileting: total assistance supine and side lying in bed with Max A    Therapeutic Positioning    OT interventions performed during the course of today's session in an effort to prevent and/or reduce acquired pressure injuries:   Therapeutic positioning was provided at the conclusion of session to offload all bony prominences for the prevention and/or reduction of pressure injuries    [facility] 6 Click ADL: 14    Patient Education:  Patient provided with verbal education and demonstrations education regarding safety awareness.  Understanding was verbalized, however additional teaching warranted.      Patient left right sidelying with all lines intact and call button in reach.    GOALS:   Multidisciplinary Problems       Occupational Therapy Goals          Problem: Occupational Therapy    Goal Priority Disciplines Outcome Interventions   Occupational Therapy Goal     OT, PT/OT Progressing    Description: Goals to be met by: 7/20/24     Patient will increase functional independence with ADLs by performing:    UE Dressing with Stand-by Assistance.  LE Dressing with Stand-by Assistance.  Grooming while standing at sink with Stand-by Assistance.  Toileting from toilet with Stand-by Assistance for hygiene and clothing management.   Toilet transfer to toilet with Stand-by Assistance.                         Time Tracking:     OT Date of Treatment: 06/26/24  OT Start Time: 1135  OT Stop Time: 1204  OT Total Time (min): 29 min    Billable Minutes:Self Care/Home Management 29    OT/MAKENZIE: MAKENZIE     Number of MAKEZNIE visits since last OT visit: 2    6/26/2024

## 2024-06-27 LAB
ABO + RH BLD: NORMAL
ANION GAP SERPL CALC-SCNC: 13 MEQ/L
BACTERIA BLD CULT: NORMAL
BASOPHILS # BLD AUTO: 0.12 X10(3)/MCL
BASOPHILS NFR BLD AUTO: 0.7 %
BLD PROD TYP BPU: NORMAL
BLOOD UNIT EXPIRATION DATE: NORMAL
BLOOD UNIT TYPE CODE: 2800
BUN SERPL-MCNC: 35.6 MG/DL (ref 9.8–20.1)
CALCIUM SERPL-MCNC: 8.1 MG/DL (ref 8.4–10.2)
CHLORIDE SERPL-SCNC: 100 MMOL/L (ref 98–107)
CO2 SERPL-SCNC: 20 MMOL/L (ref 22–29)
CREAT SERPL-MCNC: 4.08 MG/DL (ref 0.55–1.02)
CREAT/UREA NIT SERPL: 9
CROSSMATCH INTERPRETATION: NORMAL
DISPENSE STATUS: NORMAL
EOSINOPHIL # BLD AUTO: 0.27 X10(3)/MCL (ref 0–0.9)
EOSINOPHIL NFR BLD AUTO: 1.6 %
ERYTHROCYTE [DISTWIDTH] IN BLOOD BY AUTOMATED COUNT: 17.6 % (ref 11.5–17)
GFR SERPLBLD CREATININE-BSD FMLA CKD-EPI: 12 ML/MIN/1.73/M2
GLUCOSE SERPL-MCNC: 211 MG/DL (ref 74–100)
HCT VFR BLD AUTO: 24.1 % (ref 37–47)
HGB BLD-MCNC: 7.9 G/DL (ref 12–16)
IMM GRANULOCYTES # BLD AUTO: 0.74 X10(3)/MCL (ref 0–0.04)
IMM GRANULOCYTES NFR BLD AUTO: 4.3 %
LYMPHOCYTES # BLD AUTO: 1.73 X10(3)/MCL (ref 0.6–4.6)
LYMPHOCYTES NFR BLD AUTO: 10.1 %
MCH RBC QN AUTO: 29.8 PG (ref 27–31)
MCHC RBC AUTO-ENTMCNC: 32.8 G/DL (ref 33–36)
MCV RBC AUTO: 90.9 FL (ref 80–94)
MONOCYTES # BLD AUTO: 1.39 X10(3)/MCL (ref 0.1–1.3)
MONOCYTES NFR BLD AUTO: 8.1 %
NEUTROPHILS # BLD AUTO: 12.93 X10(3)/MCL (ref 2.1–9.2)
NEUTROPHILS NFR BLD AUTO: 75.2 %
NRBC BLD AUTO-RTO: 0.6 %
PLATELET # BLD AUTO: 441 X10(3)/MCL (ref 130–400)
PMV BLD AUTO: 9.6 FL (ref 7.4–10.4)
POCT GLUCOSE: 223 MG/DL (ref 70–110)
POCT GLUCOSE: 224 MG/DL (ref 70–110)
POCT GLUCOSE: 254 MG/DL (ref 70–110)
POCT GLUCOSE: 262 MG/DL (ref 70–110)
POTASSIUM SERPL-SCNC: 4.4 MMOL/L (ref 3.5–5.1)
RBC # BLD AUTO: 2.65 X10(6)/MCL (ref 4.2–5.4)
SODIUM SERPL-SCNC: 133 MMOL/L (ref 136–145)
UNIT NUMBER: NORMAL
VANCOMYCIN SERPL-MCNC: 14.1 UG/ML (ref 15–20)
WBC # BLD AUTO: 17.18 X10(3)/MCL (ref 4.5–11.5)

## 2024-06-27 PROCEDURE — 63600175 PHARM REV CODE 636 W HCPCS: Mod: JZ,JG | Performed by: INTERNAL MEDICINE

## 2024-06-27 PROCEDURE — P9016 RBC LEUKOCYTES REDUCED: HCPCS

## 2024-06-27 PROCEDURE — 25000003 PHARM REV CODE 250: Performed by: INTERNAL MEDICINE

## 2024-06-27 PROCEDURE — 85025 COMPLETE CBC W/AUTO DIFF WBC: CPT | Performed by: NURSE PRACTITIONER

## 2024-06-27 PROCEDURE — 63600175 PHARM REV CODE 636 W HCPCS: Performed by: STUDENT IN AN ORGANIZED HEALTH CARE EDUCATION/TRAINING PROGRAM

## 2024-06-27 PROCEDURE — 63600175 PHARM REV CODE 636 W HCPCS: Performed by: INTERNAL MEDICINE

## 2024-06-27 PROCEDURE — 27000207 HC ISOLATION

## 2024-06-27 PROCEDURE — 25000003 PHARM REV CODE 250: Performed by: STUDENT IN AN ORGANIZED HEALTH CARE EDUCATION/TRAINING PROGRAM

## 2024-06-27 PROCEDURE — A4216 STERILE WATER/SALINE, 10 ML: HCPCS | Performed by: INTERNAL MEDICINE

## 2024-06-27 PROCEDURE — 36415 COLL VENOUS BLD VENIPUNCTURE: CPT | Performed by: NURSE PRACTITIONER

## 2024-06-27 PROCEDURE — 21400001 HC TELEMETRY ROOM

## 2024-06-27 PROCEDURE — 97535 SELF CARE MNGMENT TRAINING: CPT | Mod: CO

## 2024-06-27 PROCEDURE — 80048 BASIC METABOLIC PNL TOTAL CA: CPT | Performed by: NURSE PRACTITIONER

## 2024-06-27 PROCEDURE — 63600175 PHARM REV CODE 636 W HCPCS: Performed by: NURSE PRACTITIONER

## 2024-06-27 PROCEDURE — 80202 ASSAY OF VANCOMYCIN: CPT | Performed by: STUDENT IN AN ORGANIZED HEALTH CARE EDUCATION/TRAINING PROGRAM

## 2024-06-27 PROCEDURE — 86923 COMPATIBILITY TEST ELECTRIC: CPT

## 2024-06-27 PROCEDURE — 25000003 PHARM REV CODE 250: Performed by: NURSE PRACTITIONER

## 2024-06-27 PROCEDURE — 97530 THERAPEUTIC ACTIVITIES: CPT | Mod: CQ

## 2024-06-27 RX ORDER — AMLODIPINE BESYLATE 5 MG/1
10 TABLET ORAL DAILY
Status: DISCONTINUED | OUTPATIENT
Start: 2024-06-27 | End: 2024-07-09 | Stop reason: HOSPADM

## 2024-06-27 RX ORDER — VANCOMYCIN HCL IN 5 % DEXTROSE 1G/250ML
1000 PLASTIC BAG, INJECTION (ML) INTRAVENOUS ONCE
Status: COMPLETED | OUTPATIENT
Start: 2024-06-27 | End: 2024-06-27

## 2024-06-27 RX ORDER — INSULIN GLARGINE 100 [IU]/ML
20 INJECTION, SOLUTION SUBCUTANEOUS NIGHTLY
Status: DISCONTINUED | OUTPATIENT
Start: 2024-06-27 | End: 2024-06-28

## 2024-06-27 RX ORDER — FUROSEMIDE 10 MG/ML
60 INJECTION INTRAMUSCULAR; INTRAVENOUS ONCE
Status: COMPLETED | OUTPATIENT
Start: 2024-06-27 | End: 2024-06-27

## 2024-06-27 RX ADMIN — Medication 1 CAPSULE: at 08:06

## 2024-06-27 RX ADMIN — ENOXAPARIN SODIUM 30 MG: 30 INJECTION SUBCUTANEOUS at 05:06

## 2024-06-27 RX ADMIN — PIPERACILLIN SODIUM AND TAZOBACTAM SODIUM 4.5 G: 4; .5 INJECTION, POWDER, LYOPHILIZED, FOR SOLUTION INTRAVENOUS at 09:06

## 2024-06-27 RX ADMIN — INSULIN ASPART 9 UNITS: 100 INJECTION, SOLUTION INTRAVENOUS; SUBCUTANEOUS at 12:06

## 2024-06-27 RX ADMIN — SODIUM CHLORIDE, PRESERVATIVE FREE 10 ML: 5 INJECTION INTRAVENOUS at 12:06

## 2024-06-27 RX ADMIN — CLINDAMYCIN PHOSPHATE 600 MG: 600 INJECTION, SOLUTION INTRAVENOUS at 02:06

## 2024-06-27 RX ADMIN — SODIUM CHLORIDE, PRESERVATIVE FREE 10 ML: 5 INJECTION INTRAVENOUS at 05:06

## 2024-06-27 RX ADMIN — INSULIN GLARGINE 10 UNITS: 100 INJECTION, SOLUTION SUBCUTANEOUS at 10:06

## 2024-06-27 RX ADMIN — INSULIN ASPART 6 UNITS: 100 INJECTION, SOLUTION INTRAVENOUS; SUBCUTANEOUS at 05:06

## 2024-06-27 RX ADMIN — INSULIN ASPART 9 UNITS: 100 INJECTION, SOLUTION INTRAVENOUS; SUBCUTANEOUS at 03:06

## 2024-06-27 RX ADMIN — PIPERACILLIN SODIUM AND TAZOBACTAM SODIUM 4.5 G: 4; .5 INJECTION, POWDER, LYOPHILIZED, FOR SOLUTION INTRAVENOUS at 10:06

## 2024-06-27 RX ADMIN — FUROSEMIDE 60 MG: 10 INJECTION, SOLUTION INTRAMUSCULAR; INTRAVENOUS at 02:06

## 2024-06-27 RX ADMIN — VANCOMYCIN HYDROCHLORIDE 1000 MG: 1 INJECTION, POWDER, LYOPHILIZED, FOR SOLUTION INTRAVENOUS at 02:06

## 2024-06-27 RX ADMIN — CLINDAMYCIN PHOSPHATE 600 MG: 600 INJECTION, SOLUTION INTRAVENOUS at 05:06

## 2024-06-27 RX ADMIN — TRAMADOL HYDROCHLORIDE 50 MG: 50 TABLET, COATED ORAL at 08:06

## 2024-06-27 RX ADMIN — CLINDAMYCIN PHOSPHATE 600 MG: 600 INJECTION, SOLUTION INTRAVENOUS at 08:06

## 2024-06-27 NOTE — PROGRESS NOTES
[facility] Hospital Medicine Progress Note      Chief Complaint: 1 day history of fever and multiple painful lesions to her thighs and buttocks       HPI: (personally reviewed by me and is documented from initial H&P)     58-year-old female whose history includes hypertension, diabetes, asthma and dyslipidemia.     Presented to the ED with complaints of a 1 day history of fever and multiple painful lesions to her thighs and buttocks. First noticed them approximately 3 days ago.      VS on arrival: T 101, P 124, R 18, B/P 189/132, Sats 94% on room air.  Initial labs include WBC 27.06 hemoglobin 11.8, hematocrit 35.8, platelets 264, sodium 131, potassium 3.8, chloride 94, bicarb 23, BUN 9.7, creatinine 0.98, glucose 353 and otherwise unremarkable.  Lactic acid 1.4.  COVID and flu not detected.  UA with significant proteinuria, glucosuria, ketones and few bacteria and yeast.  No white blood cells noted.     T abdomen and pelvis with contrast shows an area of cellulitis involving the perineum on the left side extending to the medial gluteal fold with an area of thickening and inflammatory changes in the labia on the left side with an area of hypoattenuation seen within the labia on the left side concerning for possible developing abscess.  CT chest with contrast shows a right lower lobe atelectasis and small right-sided pleural effusion with no evidence of pulmonary embolism.  Medications given in the ER include vancomycin and Rocephin.      Interval History:        On 06/21/2024 patient underwent repeat wound washout/debridement.  No wound VAC applied.  No overnight events reported.  No new complaints reported.    All needs are currently met.     Objective Assessment:  Physical Exam      Vital signs have been personally reviewed by me   General: Appears comfortable, no acute distress.   castaneda catheter in place-no hematuria noted.     Awake, alert oriented.     Integumentary: Warm, dry,  intact.  Musculoskeletal: Purposeful movement noted.     Respiratory: No accessory muscle use. Breath sounds are equal.  Cardiovascular: Regular rate.       VITAL SIGNS: 24 HRS MIN & MAX LAST   Temp  Min: 98.1 °F (36.7 °C)  Max: 99.4 °F (37.4 °C) 99.4 °F (37.4 °C)   BP  Min: 131/80  Max: 198/106 (!) 140/73   Pulse  Min: 88  Max: 104  100   Resp  Min: 16  Max: 20 18   SpO2  Min: 96 %  Max: 98 % 97 %     X-Ray Chest 1 View  Narrative: EXAMINATION:  XR CHEST 1 VIEW    CLINICAL HISTORY:  leukocytosis workup;    COMPARISON:  18 June 2024    FINDINGS:  Frontal view of the chest was obtained. Stable right-sided catheter.  The heart is not significantly enlarged.  Similar right hilar prominence and right basilar opacities.  No pneumothorax.  Impression: Similar appearance of the chest.    Electronically signed by: Issa Luna  Date:    06/22/2024  Time:    13:08    Recent Labs   Lab 06/24/24 0343 06/25/24  0601 06/26/24  0429   WBC 22.37  22.37* 17.16* 15.13*   RBC 2.34* 2.33* 2.17*   HGB 7.1* 7.0* 6.5*   HCT 21.0* 21.1* 20.8*   MCV 89.7 90.6 95.9*   MCH 30.3 30.0 30.0   MCHC 33.8 33.2 31.3*   RDW 16.0 16.4 17.1*   * 401* 397   MPV 9.7 9.4 9.5       Recent Labs   Lab 06/24/24  0343 06/25/24  0601 06/26/24  0429   * 130* 132*   K 4.2 4.6 5.2*   CL 93* 98 102   CO2 22 21* 19*   BUN 28.6* 23.8* 28.0*   CREATININE 5.52* 4.05* 4.21*   CALCIUM 8.0* 7.3* 7.3*   MG  --  1.80  --    ALBUMIN 1.7* 1.6* 1.7*   ALKPHOS 198* 221* 216*   ALT 20 23 22   AST 24 30 35*   BILITOT 1.5 1.3 1.1     Microbiology Results (last 7 days)       Procedure Component Value Units Date/Time    Blood Culture [[id number]]  (Normal) Collected: 06/22/24 1203    Order Status: Completed Specimen: Blood Updated: 06/26/24 1300     Blood Culture No Growth At 96 Hours    Fungal Culture [[id number]]  (Abnormal)  (Susceptibility) Collected: 06/18/24 1223    Order Status: Completed Specimen: Tissue from Groin Updated: 06/25/24 0624     Fungal Culture  Many Candida tropicalis    Tissue Culture - Aerobic [[id number]]  (Abnormal)  (Susceptibility) Collected: 06/18/24 1223    Order Status: Completed Specimen: Tissue from Groin Updated: 06/21/24 1102     Tissue - Aerobic Culture Few Methicillin resistant Staphylococcus aureus    Anaerobic Culture [[id number]] Collected: 06/18/24 1223    Order Status: Completed Specimen: Tissue from Groin Updated: 06/21/24 0841     Anaerobe Culture No Anaerobes Isolated               Medications for Hospital Course         Scheduled Med:   clindamycin IV (PEDS and ADULTS)  600 mg Intravenous Q8H    enoxaparin  30 mg Subcutaneous Daily    insulin glargine U-100  10 Units Subcutaneous QHS    Lactobacillus acidophilus  1 capsule Oral Daily    piperacillin-tazobactam (Zosyn) IV (PEDS and ADULTS) (extended infusion is not appropriate)  4.5 g Intravenous Q12H    sodium chloride 0.9%  10 mL Intravenous Q6H     PRN Meds:    Current Facility-Administered Medications:     0.9%  NaCl infusion (for blood administration), , Intravenous, Q24H PRN    0.9%  NaCl infusion (for blood administration), , Intravenous, Q24H PRN    acetaminophen, 650 mg, Oral, Q4H PRN    albuterol-ipratropium, 3 mL, Nebulization, Q4H PRN    aluminum-magnesium hydroxide-simethicone, 30 mL, Oral, QID PRN    bisacodyL, 10 mg, Rectal, Daily PRN    calcium carbonate, 1,000 mg, Oral, TID PRN    dextrose 10%, 12.5 g, Intravenous, PRN    dextrose 10%, 25 g, Intravenous, PRN    diphenhydrAMINE, 25 mg, Oral, Q6H PRN    glucagon (human recombinant), 1 mg, Intramuscular, PRN    glucose, 16 g, Oral, PRN    glucose, 24 g, Oral, PRN    heparin (porcine), 2,800 Units, Intravenous, PRN    hydrALAZINE, 10 mg, Intravenous, Q4H PRN    insulin aspart U-100, 0-15 Units, Subcutaneous, QID (AC + HS) PRN    metoclopramide, 10 mg, Intravenous, Q8H PRN    polyethylene glycol, 17 g, Oral, BID PRN    prochlorperazine, 2.5 mg, Intravenous, Q6H PRN    prochlorperazine, 5 mg, Intravenous, Q6H PRN     senna-docusate 8.6-50 mg, 2 tablet, Oral, BID PRN    sodium chloride 0.9%, 10 mL, Intravenous, PRN    Flushing PICC/Midline Protocol, , , Until Discontinued **AND** sodium chloride 0.9%, 10 mL, Intravenous, Q6H **AND** sodium chloride 0.9%, 10 mL, Intravenous, PRN    traMADoL, 50 mg, Oral, Q8H PRN    Pharmacy to dose Vancomycin consult, , , Once **AND** vancomycin - pharmacy to dose, , Intravenous, pharmacy to manage frequency     Assessment and Plan      Perineal cellulitis with necrotizing infection  Uncontrolled type 2 diabetes mellitus with hyperglycemia   Acute Renal failure  DM II uncontrolled  Anemia  Above present on admission         Anticipated discharge and Disposition when medically stable:ltac vs tcu     Therapy: PT/OT/Speech    Nutrition: diabetic      ___________________________________________________________________________________________________________________________________    ----Perineal Cellulitis with necrotizing component, Excisional debridement of left perineum groin wound, wound washoutx2; most recent being 06/21/2024.  Anaerobic culture negative.  Tissue culture with few stah.  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 Blood cultures negative thus far.---can likely de-escalate antibiotic therapy followup with general surgeon to see if there are plans for further debridement.    Discussed LTAC with CM.      ---acute renal failure, new dialysis, tolerating.     ---uncontrolled hyperglycemia, added 10 units of long-acting insulin therapy at bedtime. maintain fasting blood glucose <180  ---morbidly obese   Continue supportive care  Continue checking vital signs q4hrs.  Reviewed and restarted appropriate home medications.   Continue PT/OT as tolerated patient will likely need LTAC or TCU for continued wound care, IV antibiotic therapy and  rehabilitation; will discuss with CM on Monday discharge planning.     DVT prophylaxis  initiated   Nurse notified to page me if any changes occur     Consults: general surgeon   I have personally reviewed the specialist documentation and/or have spoken to the specialist with regard to the care of this patient; recommendations are noted above.     _______________________________________________________________________________________________________________________________      I have spent 40 minutes on the day of the visit; time spent includes face to face time and non-face to face time preparing to see the patient (eg, review of tests), independently reviewing and interpreting medical records, both past and current; documenting clinical information in the electronic or other health record, and communicating results to the patient/family/caregiver and care coordinator and nursing team.      All diagnosis and differential diagnosis have been reviewed,  interpreted and communicated appropriately to care team. assessment and plan has been documented; I have personally reviewed the labs and test results that are presently available and pertinent to this hospital course; I have reviewed medical records based upon their availability.    All of the patient's questions have been  addressed and answered. Patient's is agreeable to the above stated plan.   I will continue to monitor closely and make adjustments to medical management as needed.    Deanne Scott,    06/26/2024     This note was created with the assistance of Dragon voice recognition software. There may be transcription errors as a result of using this technology however minimal. Effort has been made to assure accuracy of transcription but any obvious errors or omissions should be clarified with the author of the document.

## 2024-06-27 NOTE — PROGRESS NOTES
Nephrology consult follow up note    HPI:      Greer Kahn is a 58 y.o. female admitted on 06/13/2024 with fever 102 reporting possible UTI, boils to right thigh and left buttock.  Lactic acidosis present on admission with WBC 27.0, BUN 9.7, creatinine 0.98.  CT abdomen and pelvis with contrast showed area of cellulitis involving the perineum on the left side extending to the medial gluteal fold and areas of concern within the labia on the left side.  Also noted to have right lower lobe atelectasis via CT chest.  Patient was initiated on vancomycin and Rocephin in ER.     On 06/14 she underwent debridement of left groin and perineum.  Postoperative course complicated by nausea, vomiting and constipation.  She is also developed AUTUMN with rapidly worsening renal indices. Cr 0.98 --> 1.76--> 2.77--> 4.53 at the time of consultation. Urinalysis with low grade proteinuria and no blood.     Patient developed worsening renal function, and anuria.  She was initiated on hemodialysis 06/17/2024.    She has now undergone surgical debridement on 6/14, 6/18, and 6/21.     Interval history:   06/25/2024  Patient dialyzing well this admission. She remains nauseous without vomiting.  Excellent urine output noted.  No shortness of breath.    6/26/2024   Noted to have no urine in castaneda bag. CNA reported empty  bag on starting shift at 0700. Flushed urinary catheter with 30 cc of saline and 700 cc urine returned. Hgb 6.5.   Lying down in the bed.  No complaints of shortness of breath.  Eating her lunch now.    6/274 - Patient awake, alert and oriented. Eating lunch well. UOP continues to improve.      Review of Systems:       Past medical, family, surgical, and social history reviewed and unchanged from initial consult note.     Objective:       VITAL SIGNS: 24 HR MIN & MAX LAST    Temp  Min: 97.8 °F (36.6 °C)  Max: 99.7 °F (37.6 °C)  97.8 °F (36.6 °C)        BP  Min: 131/80  Max: 174/97  (!) 172/93     Pulse  Min: 87  Max: 105   90     Resp  Min: 16  Max: 20  18    SpO2  Min: 93 %  Max: 98 %  98 %      Physical Exam  Constitutional:       General: She is not in acute distress.     Appearance: She is obese.   HENT:      Head: Atraumatic.      Nose: Nose normal.      Mouth/Throat:      Mouth: Mucous membranes are moist.   Eyes:      Extraocular Movements: Extraocular movements intact.      Conjunctiva/sclera: Conjunctivae normal.   Neck:      Comments: R IJ temporary dialysis catheter   Cardiovascular:      Rate and Rhythm: Normal rate and regular rhythm.      Pulses: Normal pulses.      Heart sounds: Normal heart sounds.   Pulmonary:      Effort: Pulmonary effort is normal.      Breath sounds: Normal breath sounds.   Abdominal:      General: Bowel sounds are normal.      Palpations: Abdomen is soft.   Genitourinary:     Comments: Urinary catheter draining dark yellow urine with moderate sediment   Musculoskeletal:         General: No swelling.      Cervical back: Neck supple.   Skin:     General: Skin is warm.   Neurological:      General: No focal deficit present.      Mental Status: She is alert and oriented to person, place, and time.   Psychiatric:         Mood and Affect: Mood normal.         Behavior: Behavior normal.                 Component Value Date/Time     (L) 06/27/2024 0754     (L) 06/26/2024 0429     (L) 06/26/2020 0441     06/25/2020 0615    K 4.4 06/27/2024 0754    K 5.2 (H) 06/26/2024 0429    K 3.7 06/26/2020 0441    K 3.5 06/25/2020 0615    CO2 20 (L) 06/27/2024 0754    CO2 19 (L) 06/26/2024 0429    CO2 26 06/26/2020 0441    CO2 27 06/25/2020 0615    BUN 35.6 (H) 06/27/2024 0754    BUN 28.0 (H) 06/26/2024 0429    BUN 12 06/26/2020 0441    BUN 9 06/25/2020 0615    CREATININE 4.08 (H) 06/27/2024 0754    CREATININE 4.21 (H) 06/26/2024 0429    CREATININE 0.68 06/26/2020 0441    CREATININE 0.58 06/25/2020 0615    CALCIUM 8.1 (L) 06/27/2024 0754    CALCIUM 7.3 (L) 06/26/2024 0429    CALCIUM 8.2 (L)  06/26/2020 0441    CALCIUM 7.7 (L) 06/25/2020 0615    PHOS 4.9 (H) 06/25/2024 0601            Component Value Date/Time    WBC 17.18 (H) 06/27/2024 0754    WBC 15.13 (H) 06/26/2024 0429    WBC 22.37 06/24/2024 0343    WBC 28.96 06/23/2024 0451    HGB 7.9 (L) 06/27/2024 0754    HGB 6.5 (L) 06/26/2024 0429    HCT 24.1 (L) 06/27/2024 0754    HCT 20.8 (L) 06/26/2024 0429    HCT 42.0 04/16/2019 2328    HCT 42.0 04/16/2019 2305     (H) 06/27/2024 0754     06/26/2024 0429       Imaging reviewed      Assessment / Plan:     Acute Renal failure multifactorial secondary to sepsis, contrast exposure, NSAID use inpatient, vancomycin toxicity  -dialysis initiated 6/17.   Last hemodialysis was on 06/24/2024  Perineal Cellulitis with necrotizing component - status post debridement 6/14, 6/18 and 6/21   DM II uncontrolled - A1C 11  Worsening anemia     - She is edematous. Give trial of Lasix  60 mg IVP.   - Last dialysis 6/24. No indication for dialysis today. Would like to leave HD access in a few more days considering Cr 4

## 2024-06-27 NOTE — PT/OT/SLP PROGRESS
Occupational Therapy   Treatment    Name: Greer Kahn  MRN: [id number]  Admitting Diagnosis:  Perineal abscess  6 Days Post-Op    Recommendations:     Recommended therapy intensity at discharge: Moderate Intensity Therapy   Discharge Equipment Recommendations:  to be determined by next level of care  Barriers to discharge:  Decreased caregiver support    Assessment:     Greer Kahn is a 58 y.o. female with a medical diagnosis of Perineal abscess.  She presents with good participation. Performance deficits affecting function are weakness, impaired balance, decreased safety awareness, impaired endurance, pain, impaired self care skills, impaired functional mobility.     Rehab Prognosis:  Good; patient would benefit from acute skilled OT services to address these deficits and reach maximum level of function.       Plan:     Patient to be seen 4 x/week to address the above listed problems via self-care/home management, therapeutic activities, therapeutic exercises  Plan of Care Expires: 07/19/24  Plan of Care Reviewed with: patient    Subjective     Pain/Comfort:       Objective:     Communicated with: nurse prior to and following session.  Patient found HOB elevated with peripheral IV, castaneda catheter, PICC line, pulse ox (continuous), telemetry upon OT entry to room.    General Precautions: Standard, contact, fall    Orthopedic Precautions:N/A  Braces: N/A  Respiratory Status: Room air  Vital Signs: Blood Pressure: supine 142/84; sitting 201/114, after 3 minutes 194/104, patient returned to side lying in bed; nurse notified     Occupational Performance:     Bed Mobility:    Patient completed Supine to Sit with maximal assistance and 2 persons  Patient completed Sit to Supine with maximal assistance and 2 persons   Maintain sitting balance edge of bed with Max A initially, with short periods of Min A    Therapeutic Positioning    OT interventions performed during the course of today's session in an effort to  prevent and/or reduce acquired pressure injuries:   Therapeutic positioning was provided at the conclusion of session to offload all bony prominences for the prevention and/or reduction of pressure injuries    [facility] 6 Click ADL: 12    Patient Education:  Patient provided with verbal education and demonstrations education regarding safety awareness and pressure ulcer prevention.  Understanding was verbalized, however additional teaching warranted.      Patient left right sidelying with all lines intact and call button in reach.    GOALS:   Multidisciplinary Problems       Occupational Therapy Goals          Problem: Occupational Therapy    Goal Priority Disciplines Outcome Interventions   Occupational Therapy Goal     OT, PT/OT Progressing    Description: Goals to be met by: 7/20/24     Patient will increase functional independence with ADLs by performing:    UE Dressing with Stand-by Assistance.  LE Dressing with Stand-by Assistance.  Grooming while standing at sink with Stand-by Assistance.  Toileting from toilet with Stand-by Assistance for hygiene and clothing management.   Toilet transfer to toilet with Stand-by Assistance.                         Time Tracking:     OT Date of Treatment: 06/27/24  OT Start Time: 1016  OT Stop Time: 1036  OT Total Time (min): 20 min    Billable Minutes:Self Care/Home Management 20    OT/MAKENZIE: MAKENZIE     Number of MAKENZIE visits since last OT visit: 3    6/27/2024

## 2024-06-27 NOTE — PROGRESS NOTES
[facility]  Hospital Medicine Progress Note        Chief Complaint: Inpatient Follow-up     HPI:    58-year-old female medical history notable for obesity BMI 37, poorly controlled T2DM with last A1c 13 in April 2024 presented to [facility] ED on 06/13/2024 morning with chief complaint of fever, chills, nausea, vomiting, perivulvar/perineal painful lesion draining pus.     On arrival noted to be febrile 101, tachycardic, hypertensive but only 1 reading, remaining normal to hypotensive.  Labs notable for lactic acid 1.4 > 1.3, WBC 27.06 with neutrophilic shift,  urinalysis contaminated sample with many squamous cells, glucose 353, CO2 23, anion gap 14, creatinine 0.98, CT abdomen pelvis with IV contrast show area of cellulitis involving the perineum on the left side extending to the medial gluteal fold with area of thickening and inflammatory changes in the labia with hypoattenuation concerning for possible abscess CTA chest show no evidence of PE there is right lower lobe atelectasis and small right pleural effusion.She was resuscitated with IV fluids 2 L of NS and 1 L of LR, given ceftriaxone and vancomycin.Transferred to [facility]  and referred to hospital medicine service.    Gen surgery was consulted, pt was taken to OR emergently for debridement for suspected necrotizing fasciitis.She tolerated the procedure well, and there was not evidence of NSTI. She is  s/p R groin incision, drainage, and debridement on 6/14, 6/18 and 6/21 .cx 6/13 ,6/18 w/ MRSA.        Pt developed AUTUMN, Nephrology consulted, pt required initiation of HD 06/17/2024. , pt had tunneled HD cath placement     Interval Hx:   No acute events reported overnight   Seen at bedside, pt was resting comfortably, denied any n/v, uop improving 1.3 L in last 24 hrs         Objective/physical exam:  General: In no acute distress, afebrile  Chest: unlabored breathing on room air   Heart: normal rate   Abdomen: Soft, nontender  MSK:  Warm  Neurologic: Alert and responding appropriately    VITAL SIGNS: 24 HRS MIN & MAX LAST   Temp  Min: 97.8 °F (36.6 °C)  Max: 99.7 °F (37.6 °C) 97.8 °F (36.6 °C)   BP  Min: 140/73  Max: 174/97 (!) 172/93   Pulse  Min: 87  Max: 105  90   Resp  Min: 18  Max: 20 18   SpO2  Min: 93 %  Max: 98 % 98 %     I have reviewed the following labs:  Recent Labs   Lab 06/25/24  0601 06/26/24  0429 06/27/24  0754   WBC 17.16* 15.13* 17.18*   RBC 2.33* 2.17* 2.65*   HGB 7.0* 6.5* 7.9*   HCT 21.1* 20.8* 24.1*   MCV 90.6 95.9* 90.9   MCH 30.0 30.0 29.8   MCHC 33.2 31.3* 32.8*   RDW 16.4 17.1* 17.6*   * 397 441*   MPV 9.4 9.5 9.6     Recent Labs   Lab 06/24/24  0343 06/25/24  0601 06/26/24  0429 06/27/24  0754   * 130* 132* 133*   K 4.2 4.6 5.2* 4.4   CL 93* 98 102 100   CO2 22 21* 19* 20*   BUN 28.6* 23.8* 28.0* 35.6*   CREATININE 5.52* 4.05* 4.21* 4.08*   CALCIUM 8.0* 7.3* 7.3* 8.1*   MG  --  1.80  --   --    ALBUMIN 1.7* 1.6* 1.7*  --    ALKPHOS 198* 221* 216*  --    ALT 20 23 22  --    AST 24 30 35*  --    BILITOT 1.5 1.3 1.1  --      Microbiology Results (last 7 days)       Procedure Component Value Units Date/Time    Blood Culture [[id number]]  (Normal) Collected: 06/22/24 1203    Order Status: Completed Specimen: Blood Updated: 06/27/24 1300     Blood Culture No Growth at 5 days    Fungal Culture [[id number]]  (Abnormal)  (Susceptibility) Collected: 06/18/24 1223    Order Status: Completed Specimen: Tissue from Groin Updated: 06/25/24 0624     Fungal Culture Many Candida tropicalis    Tissue Culture - Aerobic [[id number]]  (Abnormal)  (Susceptibility) Collected: 06/18/24 1223    Order Status: Completed Specimen: Tissue from Groin Updated: 06/21/24 1102     Tissue - Aerobic Culture Few Methicillin resistant Staphylococcus aureus    Anaerobic Culture [[id number]] Collected: 06/18/24 1223    Order Status: Completed Specimen: Tissue from Groin Updated: 06/21/24 0841     Anaerobe Culture No Anaerobes Isolated              See below for Radiology    Scheduled Med:   clindamycin IV (PEDS and ADULTS)  600 mg Intravenous Q8H    enoxaparin  30 mg Subcutaneous Daily    insulin glargine U-100  10 Units Subcutaneous QHS    Lactobacillus acidophilus  1 capsule Oral Daily    piperacillin-tazobactam (Zosyn) IV (PEDS and ADULTS) (extended infusion is not appropriate)  4.5 g Intravenous Q12H    sodium chloride 0.9%  10 mL Intravenous Q6H      Continuous Infusions:     PRN Meds:    Current Facility-Administered Medications:     0.9%  NaCl infusion (for blood administration), , Intravenous, Q24H PRN    0.9%  NaCl infusion (for blood administration), , Intravenous, Q24H PRN    acetaminophen, 650 mg, Oral, Q4H PRN    albuterol-ipratropium, 3 mL, Nebulization, Q4H PRN    aluminum-magnesium hydroxide-simethicone, 30 mL, Oral, QID PRN    bisacodyL, 10 mg, Rectal, Daily PRN    calcium carbonate, 1,000 mg, Oral, TID PRN    dextrose 10%, 12.5 g, Intravenous, PRN    dextrose 10%, 25 g, Intravenous, PRN    diphenhydrAMINE, 25 mg, Oral, Q6H PRN    glucagon (human recombinant), 1 mg, Intramuscular, PRN    glucose, 16 g, Oral, PRN    glucose, 24 g, Oral, PRN    heparin (porcine), 2,800 Units, Intravenous, PRN    hydrALAZINE, 10 mg, Intravenous, Q4H PRN    insulin aspart U-100, 0-15 Units, Subcutaneous, QID (AC + HS) PRN    metoclopramide, 10 mg, Intravenous, Q8H PRN    polyethylene glycol, 17 g, Oral, BID PRN    prochlorperazine, 2.5 mg, Intravenous, Q6H PRN    prochlorperazine, 5 mg, Intravenous, Q6H PRN    senna-docusate 8.6-50 mg, 2 tablet, Oral, BID PRN    sodium chloride 0.9%, 10 mL, Intravenous, PRN    Flushing PICC/Midline Protocol, , , Until Discontinued **AND** sodium chloride 0.9%, 10 mL, Intravenous, Q6H **AND** sodium chloride 0.9%, 10 mL, Intravenous, PRN    traMADoL, 50 mg, Oral, Q8H PRN    Pharmacy to dose Vancomycin consult, , , Once **AND** vancomycin - pharmacy to dose, , Intravenous, pharmacy to manage frequency      Assessment/Plan:  Perineal cellulitis with necrotizing component , s/p debridement   Acute Renal failure multifactorial -dialysis initiated 6/17   Normocytic Anemia s/p prbc transfusion   Uncontrolled type 2 diabetes mellitus with hyperglycemia  HTN      Labs this AM showed wbc 17K, hb 7.9, plt 441 K, bun 35.6, cr 4.08  Currently on vancomycin , clindamycin  and 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 Blood cultures negative thus far, wound cx with MRSA  can likely de-escalate antibiotic therapy   Follow surgery recommendations  Wound care  HD per nephro  Monitor bp, add amlodipine 10mg , use iv prn antihypertensives   Tight glycemic control,recommend glucose < 180 , increase lantus to 20 U, cont ISS coverage   Cont therapy services   Case was discussed with patient's nurse and  on the floor.CM working on placement options  Labs in AM       VTE prophylaxis: lovenox renally adjusted    Patient condition:  Fair    Anticipated discharge and Disposition:  tbd         _____________________________________________________________________    Nutrition Status:    Radiology:  I have personally reviewed the following imaging and agree with the radiologist.     X-Ray Chest 1 View  Narrative: EXAMINATION:  XR CHEST 1 VIEW    CLINICAL HISTORY:  leukocytosis workup;    COMPARISON:  18 June 2024    FINDINGS:  Frontal view of the chest was obtained. Stable right-sided catheter.  The heart is not significantly enlarged.  Similar right hilar prominence and right basilar opacities.  No pneumothorax.  Impression: Similar appearance of the chest.    Electronically signed by: Issa Luna  Date:    06/22/2024  Time:    13:08      Justine Cortez MD  Department of Hospital Medicine   [facility]   06/27/2024

## 2024-06-27 NOTE — PT/OT/SLP PROGRESS
Physical Therapy Treatment    Patient Name:  Greer Kahn   MRN:  [id number]    Recommendations:     Discharge therapy intensity: Moderate Intensity Therapy   Discharge Equipment Recommendations:  (TBD)  Barriers to discharge: Impaired mobility and Ongoing medical needs    Assessment:     Greer Kahn is a 58 y.o. female admitted with a medical diagnosis of groin infection s/p urgent I&D 6/14 and 6/18, severe sepsis, acute metabolic encephalopathy.  She presents with the following impairments/functional limitations: weakness, gait instability, impaired endurance, impaired balance, impaired functional mobility, pain, decreased lower extremity function, decreased ROM.    Rehab Prognosis: Good; patient would benefit from acute skilled PT services to address these deficits and reach maximum level of function.    Recent Surgery: Procedure(s) (LRB):  DEBRIDEMENT, WOUND (Left) 6 Days Post-Op    Plan:     During this hospitalization, patient would benefit from acute PT services 5 x/week to address the identified rehab impairments via gait training, therapeutic activities, therapeutic exercises, neuromuscular re-education and progress toward the following goals:    Plan of Care Expires:  07/19/24    Subjective     Chief Complaint:   Patient/Family Comments/goals:   Pain/Comfort:         Objective:     Communicated with nursing prior to session.  Patient found HOB elevated with peripheral IV, castaneda catheter, PICC line, pulse ox (continuous), telemetry (rectal tube) upon PT entry to room.     General Precautions: Standard, fall, contact  Orthopedic Precautions:    Braces:    Respiratory Status: Room air  Blood Pressure:    Supine: 142/84   Seated 194/104 - RN notified    Functional Mobility:  Bed Mobility:     Rolling Right: minimum assistance  Supine scooting toward HOB: maximal assistance and of 2 persons  Supine to Sit: maximal assistance and of 2 persons  Sit to Supine: maximal assistance and of 2 persons  Balance:  Initial Pina with BUE posterior support then Max support when BP was assessed    Therapeutic Activities/Exercises:  Pt sat EOB ~10mins. Once seated pt with c/o dizziness. Pt sat for a few moment to see if symptoms resoloved, they did not. BP assessed as above. Pt was laid back down, RN notified  Education:  Patient provided with verbal education education regarding PT role/goals/POC, fall prevention, and safety awareness.  Understanding was verbalized.     Patient left right sidelying with all lines intact, call button in reach, and nurse notified    GOALS:   Multidisciplinary Problems       Physical Therapy Goals          Problem: Physical Therapy    Goal Priority Disciplines Outcome Goal Variances Interventions   Physical Therapy Goal     PT, PT/OT Progressing     Description: Goals to be met by: 2024     Patient will increase functional independence with mobility by performin. Supine to sit with MInimal Assistance  2. Sit to supine with MInimal Assistance  3. Sitting at edge of bed x10 minutes with Stand-by Assistance  4. Pt with perform sit to stand transfer with RW Mod I.   5. Pt able to amb 150 ft with RW mod I.                          Time Tracking:     PT Received On: 24  PT Start Time: 1013     PT Stop Time: 1036  PT Total Time (min): 23 min     Billable Minutes: Therapeutic Activity 23    Treatment Type: Treatment  PT/PTA: PTA     Number of PTA visits since last PT visit: 4     2024

## 2024-06-27 NOTE — PROGRESS NOTES
Pharmacokinetic Assessment Follow Up: IV Vancomycin    Vancomycin serum concentration assessment(s):    The trough level was drawn correctly and can be used to guide therapy at this time. The measurement is below the desired definitive target range of 15 to 20 mcg/mL.    Vancomycin Regimen Plan:    Give 1 gram x 1 today. Trough with am labs. Nephro following- to determine if HD needed.     Scheduled Administration Times        Drug levels (last 3 results):  Recent Labs   Lab Result Units 06/26/24 0429 06/27/24  0754   Vancomycin Random ug/ml 15.3 14.1*       Vancomycin Administrations:  vancomycin given in the last 96 hours                     vancomycin (VANCOCIN) 500 mg in D5W 100 mL IVPB (MB+) (mg) 500 mg New Bag 06/24/24 3723                    Pharmacy will continue to follow and monitor vancomycin.    Please contact pharmacy at extension [phone] for questions regarding this assessment.    Thank you for the consult,   Agnieszka Mckay       Patient brief summary:  Greer Kahn is a 58 y.o. female initiated on antimicrobial therapy with IV Vancomycin for treatment of sepsis    The patient's current regimen is pulse dosing due to poor renal function possible HD     Drug Allergies:   Review of patient's allergies indicates:  No Known Allergies    Actual Body Weight:  Wt Readings from Last 1 Encounters:   06/24/24 130 kg (286 lb 9.6 oz)       Renal Function:   Estimated Creatinine Clearance: 21.4 mL/min (A) (based on SCr of 4.08 mg/dL (H)).,     Dialysis Method (if applicable):  N/A    CBC (last 72 hours):  Recent Labs   Lab Result Units 06/25/24  0601 06/26/24 0429 06/27/24  0754   WBC x10(3)/mcL 17.16* 15.13* 17.18*   Hgb g/dL 7.0* 6.5* 7.9*   Hct % 21.1* 20.8* 24.1*   Platelet x10(3)/mcL 401* 397 441*   Mono % %  --  8.1 8.1   Eos % %  --  1.3 1.6   Basophil % %  --  0.5 0.7       Metabolic Panel (last 72 hours):  Recent Labs   Lab Result Units 06/25/24  0601 06/26/24  0429 06/27/24  0754   Sodium mmol/L 130* 132*  133*   Potassium mmol/L 4.6 5.2* 4.4   Chloride mmol/L 98 102 100   CO2 mmol/L 21* 19* 20*   Glucose mg/dL 143* 167* 211*   Blood Urea Nitrogen mg/dL 23.8* 28.0* 35.6*   Creatinine mg/dL 4.05* 4.21* 4.08*   Albumin g/dL 1.6* 1.7*  --    Bilirubin Total mg/dL 1.3 1.1  --    ALP unit/L 221* 216*  --    AST unit/L 30 35*  --    ALT unit/L 23 22  --    Magnesium Level mg/dL 1.80  --   --    Phosphorus Level mg/dL 4.9*  --   --        Microbiologic Results:  Microbiology Results (last 7 days)       Procedure Component Value Units Date/Time    Blood Culture [[id number]]  (Normal) Collected: 06/22/24 1203    Order Status: Completed Specimen: Blood Updated: 06/26/24 1300     Blood Culture No Growth At 96 Hours    Fungal Culture [[id number]]  (Abnormal)  (Susceptibility) Collected: 06/18/24 1223    Order Status: Completed Specimen: Tissue from Groin Updated: 06/25/24 0624     Fungal Culture Many Candida tropicalis    Tissue Culture - Aerobic [[id number]]  (Abnormal)  (Susceptibility) Collected: 06/18/24 1223    Order Status: Completed Specimen: Tissue from Groin Updated: 06/21/24 1102     Tissue - Aerobic Culture Few Methicillin resistant Staphylococcus aureus    Anaerobic Culture [[id number]] Collected: 06/18/24 1223    Order Status: Completed Specimen: Tissue from Groin Updated: 06/21/24 0841     Anaerobe Culture No Anaerobes Isolated

## 2024-06-27 NOTE — OP NOTE
Surgery Date: 6/21/2024     Indications: 58-year-old female with left-sided labial and perineal necrotizing wound infection requiring multiple debridements plan returned to the operating room to investigate wound          Surgeons and Role:     * Arnaud Vital MD - Primary     Assisting Surgeon: Pamela Sr, PGY1     Pre-op Diagnosis:  Perineal abscess [L02.215]     Post-op Diagnosis:  Post-Op Diagnosis Codes:     * Perineal abscess [L02.215]     Procedure(s) (LRB):  DEBRIDEMENT, WOUND (Left)     Anesthesia: General     Implants:  * No implants in log *     Operative Findings: clean wound bed with granulation tissue      Estimated Blood Loss: minimal      Estimated Blood Loss has been documented.         Specimens:  None     Disposition: Stable satisfactory to PACU     Details of operation: Patient was brought to the operating room laid supine on the operating table, general anesthesia was administered she was intubated endotracheally     She was then placed into the lithotomy position, the perineum and labia was prepped and draped in usual sterile fashion     The wound was investigated, we are looking for any necrotic tissue, we found none     The wound was irrigated with copious saline and then experience antimicrobial solution     Wound was irrigated well, sterile dressings were then applied after finding a satisfactory appearing wound bed with the healthy granulating tissue and no necrotic tissue

## 2024-06-28 LAB
ANION GAP SERPL CALC-SCNC: 12 MEQ/L
BASOPHILS # BLD AUTO: 0.08 X10(3)/MCL
BASOPHILS NFR BLD AUTO: 0.5 %
BUN SERPL-MCNC: 43.6 MG/DL (ref 9.8–20.1)
CALCIUM SERPL-MCNC: 7.9 MG/DL (ref 8.4–10.2)
CHLORIDE SERPL-SCNC: 100 MMOL/L (ref 98–107)
CO2 SERPL-SCNC: 23 MMOL/L (ref 22–29)
CREAT SERPL-MCNC: 4.13 MG/DL (ref 0.55–1.02)
CREAT/UREA NIT SERPL: 11
EOSINOPHIL # BLD AUTO: 0.22 X10(3)/MCL (ref 0–0.9)
EOSINOPHIL NFR BLD AUTO: 1.4 %
ERYTHROCYTE [DISTWIDTH] IN BLOOD BY AUTOMATED COUNT: 17.7 % (ref 11.5–17)
GFR SERPLBLD CREATININE-BSD FMLA CKD-EPI: 12 ML/MIN/1.73/M2
GLUCOSE SERPL-MCNC: 256 MG/DL (ref 74–100)
HCT VFR BLD AUTO: 24.8 % (ref 37–47)
HGB BLD-MCNC: 8.1 G/DL (ref 12–16)
IMM GRANULOCYTES # BLD AUTO: 0.33 X10(3)/MCL (ref 0–0.04)
IMM GRANULOCYTES NFR BLD AUTO: 2.1 %
LYMPHOCYTES # BLD AUTO: 1.52 X10(3)/MCL (ref 0.6–4.6)
LYMPHOCYTES NFR BLD AUTO: 9.6 %
MCH RBC QN AUTO: 29.7 PG (ref 27–31)
MCHC RBC AUTO-ENTMCNC: 32.7 G/DL (ref 33–36)
MCV RBC AUTO: 90.8 FL (ref 80–94)
MONOCYTES # BLD AUTO: 1.12 X10(3)/MCL (ref 0.1–1.3)
MONOCYTES NFR BLD AUTO: 7.1 %
NEUTROPHILS # BLD AUTO: 12.61 X10(3)/MCL (ref 2.1–9.2)
NEUTROPHILS NFR BLD AUTO: 79.3 %
NRBC BLD AUTO-RTO: 0.2 %
PLATELET # BLD AUTO: 405 X10(3)/MCL (ref 130–400)
PMV BLD AUTO: 9.6 FL (ref 7.4–10.4)
POCT GLUCOSE: 237 MG/DL (ref 70–110)
POCT GLUCOSE: 290 MG/DL (ref 70–110)
POTASSIUM SERPL-SCNC: 4.8 MMOL/L (ref 3.5–5.1)
RBC # BLD AUTO: 2.73 X10(6)/MCL (ref 4.2–5.4)
SODIUM SERPL-SCNC: 135 MMOL/L (ref 136–145)
VANCOMYCIN TROUGH SERPL-MCNC: 20.5 UG/ML (ref 15–20)
WBC # BLD AUTO: 15.88 X10(3)/MCL (ref 4.5–11.5)

## 2024-06-28 PROCEDURE — 25000003 PHARM REV CODE 250: Performed by: STUDENT IN AN ORGANIZED HEALTH CARE EDUCATION/TRAINING PROGRAM

## 2024-06-28 PROCEDURE — 63600175 PHARM REV CODE 636 W HCPCS: Performed by: STUDENT IN AN ORGANIZED HEALTH CARE EDUCATION/TRAINING PROGRAM

## 2024-06-28 PROCEDURE — 63600175 PHARM REV CODE 636 W HCPCS: Performed by: INTERNAL MEDICINE

## 2024-06-28 PROCEDURE — 80202 ASSAY OF VANCOMYCIN: CPT | Performed by: STUDENT IN AN ORGANIZED HEALTH CARE EDUCATION/TRAINING PROGRAM

## 2024-06-28 PROCEDURE — A4216 STERILE WATER/SALINE, 10 ML: HCPCS | Performed by: INTERNAL MEDICINE

## 2024-06-28 PROCEDURE — 63600175 PHARM REV CODE 636 W HCPCS: Mod: JZ,JG | Performed by: INTERNAL MEDICINE

## 2024-06-28 PROCEDURE — 80048 BASIC METABOLIC PNL TOTAL CA: CPT | Performed by: NURSE PRACTITIONER

## 2024-06-28 PROCEDURE — 27000207 HC ISOLATION

## 2024-06-28 PROCEDURE — 25000003 PHARM REV CODE 250: Performed by: INTERNAL MEDICINE

## 2024-06-28 PROCEDURE — 63600175 PHARM REV CODE 636 W HCPCS: Performed by: HOSPITALIST

## 2024-06-28 PROCEDURE — 21400001 HC TELEMETRY ROOM

## 2024-06-28 PROCEDURE — 25000003 PHARM REV CODE 250: Performed by: NURSE PRACTITIONER

## 2024-06-28 PROCEDURE — 90935 HEMODIALYSIS ONE EVALUATION: CPT

## 2024-06-28 PROCEDURE — 36415 COLL VENOUS BLD VENIPUNCTURE: CPT | Performed by: NURSE PRACTITIONER

## 2024-06-28 PROCEDURE — 85025 COMPLETE CBC W/AUTO DIFF WBC: CPT | Performed by: INTERNAL MEDICINE

## 2024-06-28 PROCEDURE — 63600175 PHARM REV CODE 636 W HCPCS: Performed by: NURSE PRACTITIONER

## 2024-06-28 RX ORDER — INSULIN GLARGINE 100 [IU]/ML
30 INJECTION, SOLUTION SUBCUTANEOUS NIGHTLY
Status: DISCONTINUED | OUTPATIENT
Start: 2024-06-29 | End: 2024-06-29

## 2024-06-28 RX ORDER — FLUCONAZOLE 100 MG/1
100 TABLET ORAL DAILY
Status: DISCONTINUED | OUTPATIENT
Start: 2024-06-28 | End: 2024-07-09 | Stop reason: HOSPADM

## 2024-06-28 RX ORDER — HYDRALAZINE HYDROCHLORIDE 25 MG/1
25 TABLET, FILM COATED ORAL EVERY 8 HOURS
Status: DISCONTINUED | OUTPATIENT
Start: 2024-06-28 | End: 2024-06-29

## 2024-06-28 RX ADMIN — AMLODIPINE BESYLATE 10 MG: 5 TABLET ORAL at 08:06

## 2024-06-28 RX ADMIN — HYDRALAZINE HYDROCHLORIDE 10 MG: 20 INJECTION, SOLUTION INTRAMUSCULAR; INTRAVENOUS at 12:06

## 2024-06-28 RX ADMIN — INSULIN GLARGINE 20 UNITS: 100 INJECTION, SOLUTION SUBCUTANEOUS at 08:06

## 2024-06-28 RX ADMIN — INSULIN ASPART 9 UNITS: 100 INJECTION, SOLUTION INTRAVENOUS; SUBCUTANEOUS at 05:06

## 2024-06-28 RX ADMIN — CLINDAMYCIN PHOSPHATE 600 MG: 600 INJECTION, SOLUTION INTRAVENOUS at 05:06

## 2024-06-28 RX ADMIN — INSULIN ASPART 6 UNITS: 100 INJECTION, SOLUTION INTRAVENOUS; SUBCUTANEOUS at 11:06

## 2024-06-28 RX ADMIN — SODIUM CHLORIDE, PRESERVATIVE FREE 10 ML: 5 INJECTION INTRAVENOUS at 05:06

## 2024-06-28 RX ADMIN — DIPHENHYDRAMINE HYDROCHLORIDE 25 MG: 25 CAPSULE ORAL at 12:06

## 2024-06-28 RX ADMIN — HYDRALAZINE HYDROCHLORIDE 25 MG: 25 TABLET ORAL at 08:06

## 2024-06-28 RX ADMIN — METOCLOPRAMIDE 10 MG: 5 INJECTION, SOLUTION INTRAMUSCULAR; INTRAVENOUS at 12:06

## 2024-06-28 RX ADMIN — TRAMADOL HYDROCHLORIDE 50 MG: 50 TABLET, COATED ORAL at 12:06

## 2024-06-28 RX ADMIN — CLINDAMYCIN PHOSPHATE 600 MG: 600 INJECTION, SOLUTION INTRAVENOUS at 02:06

## 2024-06-28 RX ADMIN — CLINDAMYCIN PHOSPHATE 600 MG: 600 INJECTION, SOLUTION INTRAVENOUS at 11:06

## 2024-06-28 RX ADMIN — PIPERACILLIN SODIUM AND TAZOBACTAM SODIUM 4.5 G: 4; .5 INJECTION, POWDER, LYOPHILIZED, FOR SOLUTION INTRAVENOUS at 10:06

## 2024-06-28 RX ADMIN — ENOXAPARIN SODIUM 30 MG: 30 INJECTION SUBCUTANEOUS at 05:06

## 2024-06-28 RX ADMIN — SODIUM CHLORIDE, PRESERVATIVE FREE 10 ML: 5 INJECTION INTRAVENOUS at 03:06

## 2024-06-28 RX ADMIN — VANCOMYCIN HYDROCHLORIDE 500 MG: 500 INJECTION, POWDER, LYOPHILIZED, FOR SOLUTION INTRAVENOUS at 08:06

## 2024-06-28 RX ADMIN — SODIUM CHLORIDE, PRESERVATIVE FREE 10 ML: 5 INJECTION INTRAVENOUS at 11:06

## 2024-06-28 RX ADMIN — PIPERACILLIN SODIUM AND TAZOBACTAM SODIUM 4.5 G: 4; .5 INJECTION, POWDER, LYOPHILIZED, FOR SOLUTION INTRAVENOUS at 09:06

## 2024-06-28 RX ADMIN — Medication 1 CAPSULE: at 08:06

## 2024-06-28 RX ADMIN — SODIUM CHLORIDE, PRESERVATIVE FREE 10 ML: 5 INJECTION INTRAVENOUS at 04:06

## 2024-06-28 NOTE — PT/OT/SLP PROGRESS
Physical Therapy      Patient Name:  Greer Kahn   MRN:  [id number]    Patient not seen today secondary to pt off the floor for Dialysis. Will follow-up as schedule allows.

## 2024-06-28 NOTE — PLAN OF CARE
06/28/24 1153   Discharge Reassessment   Assessment Type Discharge Planning Reassessment   Did the patient's condition or plan change since previous assessment? Yes   Discharge Plan discussed with: Patient   Discharge Plan A Long-term acute care facility (LTAC)   Discharge Plan B Home with family;Home Health   Post-Acute Status   Discharge Delays None known at this time

## 2024-06-28 NOTE — PROGRESS NOTES
[facility]  Hospital Medicine Progress Note        Chief Complaint: Inpatient Follow-up     HPI:    58-year-old female medical history notable for obesity BMI 37, poorly controlled T2DM with last A1c 13 in April 2024 presented to [facility] ED on 06/13/2024 morning with chief complaint of fever, chills, nausea, vomiting, perivulvar/perineal painful lesion draining pus.     On arrival noted to be febrile 101, tachycardic, hypertensive but only 1 reading, remaining normal to hypotensive.  Labs notable for lactic acid 1.4 > 1.3, WBC 27.06 with neutrophilic shift,  urinalysis contaminated sample with many squamous cells, glucose 353, CO2 23, anion gap 14, creatinine 0.98, CT abdomen pelvis with IV contrast show area of cellulitis involving the perineum on the left side extending to the medial gluteal fold with area of thickening and inflammatory changes in the labia with hypoattenuation concerning for possible abscess CTA chest show no evidence of PE there is right lower lobe atelectasis and small right pleural effusion.She was resuscitated with IV fluids 2 L of NS and 1 L of LR, given ceftriaxone and vancomycin.Transferred to [facility]  and referred to hospital medicine service.    Gen surgery was consulted, pt was taken to OR emergently for debridement for suspected necrotizing fasciitis.She tolerated the procedure well, and there was not evidence of NSTI. She is  s/p R groin incision, drainage, and debridement on 6/14, 6/18 and 6/21 .cx 6/13 ,6/18 w/ MRSA.        Pt developed AUTUMN, Nephrology consulted, pt required initiation of HD 06/17/2024. , pt had tunneled HD cath placement     Interval Hx:   No acute events reported overnight , seen at bedside this morning, patient voiced no new complaints, discussed the ongoing care .  Patient denied nausea vomiting shortness of breath.          Objective/physical exam:  General: In no acute distress, afebrile  Chest: unlabored breathing on room air    Heart: normal rate   Abdomen: Soft, nontender  MSK: Warm  Neurologic: Alert and responding appropriately    VITAL SIGNS: 24 HRS MIN & MAX LAST   Temp  Min: 97.8 °F (36.6 °C)  Max: 98.6 °F (37 °C) 98.5 °F (36.9 °C)   BP  Min: 123/73  Max: 178/96 (!) 168/83   Pulse  Min: 89  Max: 98  95   Resp  Min: 18  Max: 18 18   SpO2  Min: 95 %  Max: 100 % 96 %     I have reviewed the following labs:  Recent Labs   Lab 06/26/24  0429 06/27/24  0754 06/28/24  0434   WBC 15.13* 17.18* 15.88*   RBC 2.17* 2.65* 2.73*   HGB 6.5* 7.9* 8.1*   HCT 20.8* 24.1* 24.8*   MCV 95.9* 90.9 90.8   MCH 30.0 29.8 29.7   MCHC 31.3* 32.8* 32.7*   RDW 17.1* 17.6* 17.7*    441* 405*   MPV 9.5 9.6 9.6     Recent Labs   Lab 06/24/24  0343 06/25/24  0601 06/26/24  0429 06/27/24  0754 06/28/24  0434   * 130* 132* 133* 135*   K 4.2 4.6 5.2* 4.4 4.8   CL 93* 98 102 100 100   CO2 22 21* 19* 20* 23   BUN 28.6* 23.8* 28.0* 35.6* 43.6*   CREATININE 5.52* 4.05* 4.21* 4.08* 4.13*   CALCIUM 8.0* 7.3* 7.3* 8.1* 7.9*   MG  --  1.80  --   --   --    ALBUMIN 1.7* 1.6* 1.7*  --   --    ALKPHOS 198* 221* 216*  --   --    ALT 20 23 22  --   --    AST 24 30 35*  --   --    BILITOT 1.5 1.3 1.1  --   --      Microbiology Results (last 7 days)       Procedure Component Value Units Date/Time    Blood Culture [[id number]]  (Normal) Collected: 06/22/24 1203    Order Status: Completed Specimen: Blood Updated: 06/27/24 1300     Blood Culture No Growth at 5 days    Fungal Culture [[id number]]  (Abnormal)  (Susceptibility) Collected: 06/18/24 1223    Order Status: Completed Specimen: Tissue from Groin Updated: 06/25/24 0624     Fungal Culture Many Candida tropicalis             See below for Radiology    Scheduled Med:   amLODIPine  10 mg Oral Daily    clindamycin IV (PEDS and ADULTS)  600 mg Intravenous Q8H    enoxaparin  30 mg Subcutaneous Daily    insulin glargine U-100  20 Units Subcutaneous QHS    Lactobacillus acidophilus  1 capsule Oral Daily     piperacillin-tazobactam (Zosyn) IV (PEDS and ADULTS) (extended infusion is not appropriate)  4.5 g Intravenous Q12H    sodium chloride 0.9%  10 mL Intravenous Q6H    vancomycin (VANCOCIN) IV (PEDS and ADULTS)  500 mg Intravenous Once      Continuous Infusions:     PRN Meds:    Current Facility-Administered Medications:     0.9%  NaCl infusion (for blood administration), , Intravenous, Q24H PRN    0.9%  NaCl infusion (for blood administration), , Intravenous, Q24H PRN    acetaminophen, 650 mg, Oral, Q4H PRN    albuterol-ipratropium, 3 mL, Nebulization, Q4H PRN    aluminum-magnesium hydroxide-simethicone, 30 mL, Oral, QID PRN    bisacodyL, 10 mg, Rectal, Daily PRN    calcium carbonate, 1,000 mg, Oral, TID PRN    dextrose 10%, 12.5 g, Intravenous, PRN    dextrose 10%, 25 g, Intravenous, PRN    diphenhydrAMINE, 25 mg, Oral, Q6H PRN    glucagon (human recombinant), 1 mg, Intramuscular, PRN    glucose, 16 g, Oral, PRN    glucose, 24 g, Oral, PRN    heparin (porcine), 2,800 Units, Intravenous, PRN    hydrALAZINE, 10 mg, Intravenous, Q4H PRN    insulin aspart U-100, 0-15 Units, Subcutaneous, QID (AC + HS) PRN    metoclopramide, 10 mg, Intravenous, Q8H PRN    polyethylene glycol, 17 g, Oral, BID PRN    prochlorperazine, 2.5 mg, Intravenous, Q6H PRN    prochlorperazine, 5 mg, Intravenous, Q6H PRN    senna-docusate 8.6-50 mg, 2 tablet, Oral, BID PRN    sodium chloride 0.9%, 10 mL, Intravenous, PRN    Flushing PICC/Midline Protocol, , , Until Discontinued **AND** sodium chloride 0.9%, 10 mL, Intravenous, Q6H **AND** sodium chloride 0.9%, 10 mL, Intravenous, PRN    traMADoL, 50 mg, Oral, Q8H PRN    Pharmacy to dose Vancomycin consult, , , Once **AND** vancomycin - pharmacy to dose, , Intravenous, pharmacy to manage frequency     Assessment/Plan:  Perineal cellulitis with necrotizing component , s/p debridement   Acute Renal failure multifactorial -dialysis initiated 6/17   Normocytic Anemia s/p prbc transfusion   Uncontrolled  type 2 diabetes mellitus with hyperglycemia  HTN      Labs this AM showed WBC 15.8 K improving leukocytosis, hemoglobin 8 .1 BUN 43.6, creatinine 4.3   Case discussed with the Nephrology NP this morning, continue hemodialysis per Nephro team   monitor renal indices and renal recovery  Patient is Currently on vancomycin , clindamycin  and 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 Blood cultures negative thus far, wound cx with MRSA, tissue cultures from grown preliminary Candida   Patient has received 15 days of antibiotic therapy, improving leukocytosis, Likely discontinue vancomycin can, Zosyn, continue clindamycin that can cover MRSA, add fluconazole  We will likely discontinue clindamycin if leukocytosis continues to improve  reconsult surgery team for postop wound care,rectal tube mngt, recommendations on antibiotics  Monitor bp, continue amlodipine 10mg , add hydralazine p.o. use iv prn antihypertensives   Tight glycemic control,recommend glucose < 180 , increase lantus to 30 U, cont ISS coverage   Cont therapy services   Case was discussed with patient's nurse and  on the floor.CM working on placement   Labs in AM       VTE prophylaxis: lovenox renally adjusted    Patient condition:  Fair    Anticipated discharge and Disposition:  tbd         _____________________________________________________________________    Nutrition Status:    Radiology:  I have personally reviewed the following imaging and agree with the radiologist.     X-Ray Chest 1 View  Narrative: EXAMINATION:  XR CHEST 1 VIEW    CLINICAL HISTORY:  leukocytosis workup;    COMPARISON:  18 June 2024    FINDINGS:  Frontal view of the chest was obtained. Stable right-sided catheter.  The heart is not significantly enlarged.  Similar right hilar prominence and right basilar opacities.  No pneumothorax.  Impression: Similar appearance of the chest.    Electronically signed by: Issa Luna  Date:    06/22/2024  Time:    13:08      Justine  MD Dana  Department of Hospital Medicine   [facility]   06/28/2024

## 2024-06-28 NOTE — NURSING
06/28/24 1708   Post-Hemodialysis Assessment   Blood Volume Processed (Liters) 63 L   Dialyzer Clearance Moderately streaked   Duration of Treatment 180 minutes   Total UF (mL) 0 mL   Patient Response to Treatment Tolerated well   Post-Hemodialysis Comments Tx completed. Pt reinfused. CVC deaccessed per P&P, lines flushed and locked with NS and new Clear Guard caps applied. Pt ran for 3hrs, NET removed= 0ml.

## 2024-06-28 NOTE — PROGRESS NOTES
Pharmacokinetic Assessment Follow Up: IV Vancomycin    Vancomycin serum concentration assessment(s):    The random level was drawn correctly and can be used to guide therapy at this time. The measurement is within the desired definitive target range of 15 to 20 mcg/mL.    Vancomycin Regimen Plan:  Level this a.m. 20.5  HD today  Will re-dose with small dose of 500 mg x 1 after HD  Re-dose when the random level is less than 20 mcg/mL, next level to be drawn at 0430 on 6/29    Scheduled Administration Times        Drug levels (last 3 results):  Recent Labs   Lab Result Units 06/26/24  0429 06/27/24  0754 06/28/24  0434   Vancomycin Random ug/ml 15.3 14.1*  --    Vancomycin Trough ug/ml  --   --  20.5*       Vancomycin Administrations:  vancomycin given in the last 96 hours                     vancomycin in dextrose 5 % 1 gram/250 mL IVPB 1,000 mg (mg) 1,000 mg New Bag 06/27/24 1443    vancomycin (VANCOCIN) 500 mg in D5W 100 mL IVPB (MB+) (mg) 500 mg New Bag 06/24/24 2217                    Pharmacy will continue to follow and monitor vancomycin.    Please contact pharmacy at extension [phone] for questions regarding this assessment.    Thank you for the consult,   Kayla Villarreal       Patient brief summary:  Greer Kahn is a 58 y.o. female initiated on antimicrobial therapy with IV Vancomycin for treatment of bacteremia    The patient's current regimen is dosing with PRN HD    Drug Allergies:   Review of patient's allergies indicates:  No Known Allergies    Actual Body Weight:  Wt Readings from Last 1 Encounters:   06/24/24 130 kg (286 lb 9.6 oz)       Renal Function:   Estimated Creatinine Clearance: 21.1 mL/min (A) (based on SCr of 4.13 mg/dL (H)).,     Dialysis Method (if applicable):  intermittent HD    CBC (last 72 hours):  Recent Labs   Lab Result Units 06/26/24  0429 06/27/24  0754 06/28/24  0434   WBC x10(3)/mcL 15.13* 17.18* 15.88*   Hgb g/dL 6.5* 7.9* 8.1*   Hct % 20.8* 24.1* 24.8*   Platelet x10(3)/mcL  397 441* 405*   Mono % % 8.1 8.1 7.1   Eos % % 1.3 1.6 1.4   Basophil % % 0.5 0.7 0.5       Metabolic Panel (last 72 hours):  Recent Labs   Lab Result Units 06/26/24  0429 06/27/24  0754 06/28/24  0434   Sodium mmol/L 132* 133* 135*   Potassium mmol/L 5.2* 4.4 4.8   Chloride mmol/L 102 100 100   CO2 mmol/L 19* 20* 23   Glucose mg/dL 167* 211* 256*   Blood Urea Nitrogen mg/dL 28.0* 35.6* 43.6*   Creatinine mg/dL 4.21* 4.08* 4.13*   Albumin g/dL 1.7*  --   --    Bilirubin Total mg/dL 1.1  --   --    ALP unit/L 216*  --   --    AST unit/L 35*  --   --    ALT unit/L 22  --   --        Microbiologic Results:  Microbiology Results (last 7 days)       Procedure Component Value Units Date/Time    Blood Culture [[id number]]  (Normal) Collected: 06/22/24 1203    Order Status: Completed Specimen: Blood Updated: 06/27/24 1300     Blood Culture No Growth at 5 days    Fungal Culture [[id number]]  (Abnormal)  (Susceptibility) Collected: 06/18/24 1223    Order Status: Completed Specimen: Tissue from Groin Updated: 06/25/24 0624     Fungal Culture Many Candida tropicalis

## 2024-06-28 NOTE — PROGRESS NOTES
Nephrology consult follow up note    HPI:      Greer Kahn is a 58 y.o. female admitted on 06/13/2024 with fever 102 reporting possible UTI, boils to right thigh and left buttock.  Lactic acidosis present on admission with WBC 27.0, BUN 9.7, creatinine 0.98.  CT abdomen and pelvis with contrast showed area of cellulitis involving the perineum on the left side extending to the medial gluteal fold and areas of concern within the labia on the left side.  Also noted to have right lower lobe atelectasis via CT chest.  Patient was initiated on vancomycin and Rocephin in ER.     On 06/14 she underwent debridement of left groin and perineum.  Postoperative course complicated by nausea, vomiting and constipation.  She is also developed AUTUMN with rapidly worsening renal indices. Cr 0.98 --> 1.76--> 2.77--> 4.53 at the time of consultation. Urinalysis with low grade proteinuria and no blood.     Patient developed worsening renal function, and anuria.  She was initiated on hemodialysis 06/17/2024.    She has now undergone surgical debridement on 6/14, 6/18, and 6/21.     Interval history:   06/25/2024  Patient dialyzing well this admission. She remains nauseous without vomiting.  Excellent urine output noted.  No shortness of breath.    6/26/2024   Noted to have no urine in castaneda bag. CNA reported empty  bag on starting shift at 0700. Flushed urinary catheter with 30 cc of saline and 700 cc urine returned. Hgb 6.5.   Lying down in the bed.  No complaints of shortness of breath.  Eating her lunch now.    6/27/24 - Patient awake, alert and oriented. Eating lunch well. UOP continues to improve.     6/28/24 - Cr stable around 4 without much improvement. UOP remains adequate via catsaneda catheter. She appears more alert. Eating better.      Review of Systems:       Past medical, family, surgical, and social history reviewed and unchanged from initial consult note.     Objective:       VITAL SIGNS: 24 HR MIN & MAX LAST    Temp   Min: 97.8 °F (36.6 °C)  Max: 98.6 °F (37 °C)  98.6 °F (37 °C)        BP  Min: 123/73  Max: 178/96  123/73     Pulse  Min: 90  Max: 98  97     Resp  Min: 18  Max: 18  18    SpO2  Min: 93 %  Max: 100 %  100 %      Physical Exam  Constitutional:       General: She is not in acute distress.     Appearance: She is obese.   HENT:      Head: Atraumatic.      Nose: Nose normal.      Mouth/Throat:      Mouth: Mucous membranes are moist.   Eyes:      Extraocular Movements: Extraocular movements intact.      Conjunctiva/sclera: Conjunctivae normal.   Neck:      Comments: R IJ temporary dialysis catheter   Cardiovascular:      Rate and Rhythm: Normal rate and regular rhythm.      Pulses: Normal pulses.      Heart sounds: Normal heart sounds.   Pulmonary:      Effort: Pulmonary effort is normal.      Breath sounds: Normal breath sounds.   Abdominal:      General: Bowel sounds are normal.      Palpations: Abdomen is soft.   Genitourinary:     Comments: Urinary catheter draining dark yellow urine with moderate sediment   Musculoskeletal:         General: No swelling.      Cervical back: Neck supple.   Skin:     General: Skin is warm.   Neurological:      General: No focal deficit present.      Mental Status: She is alert and oriented to person, place, and time.   Psychiatric:         Mood and Affect: Mood normal.         Behavior: Behavior normal.                 Component Value Date/Time     (L) 06/28/2024 0434     (L) 06/27/2024 0754     (L) 06/26/2020 0441     06/25/2020 0615    K 4.8 06/28/2024 0434    K 4.4 06/27/2024 0754    K 3.7 06/26/2020 0441    K 3.5 06/25/2020 0615    CO2 23 06/28/2024 0434    CO2 20 (L) 06/27/2024 0754    CO2 26 06/26/2020 0441    CO2 27 06/25/2020 0615    BUN 43.6 (H) 06/28/2024 0434    BUN 35.6 (H) 06/27/2024 0754    BUN 12 06/26/2020 0441    BUN 9 06/25/2020 0615    CREATININE 4.13 (H) 06/28/2024 0434    CREATININE 4.08 (H) 06/27/2024 0754    CREATININE 0.68 06/26/2020 0441     CREATININE 0.58 06/25/2020 0615    CALCIUM 7.9 (L) 06/28/2024 0434    CALCIUM 8.1 (L) 06/27/2024 0754    CALCIUM 8.2 (L) 06/26/2020 0441    CALCIUM 7.7 (L) 06/25/2020 0615    PHOS 4.9 (H) 06/25/2024 0601            Component Value Date/Time    WBC 15.88 (H) 06/28/2024 0434    WBC 17.18 (H) 06/27/2024 0754    WBC 22.37 06/24/2024 0343    WBC 28.96 06/23/2024 0451    HGB 8.1 (L) 06/28/2024 0434    HGB 7.9 (L) 06/27/2024 0754    HCT 24.8 (L) 06/28/2024 0434    HCT 24.1 (L) 06/27/2024 0754    HCT 42.0 04/16/2019 2328    HCT 42.0 04/16/2019 2305     (H) 06/28/2024 0434     (H) 06/27/2024 0754       Imaging reviewed      Assessment / Plan:     Acute Renal failure multifactorial secondary to sepsis, contrast exposure, NSAID use inpatient, vancomycin toxicity  -dialysis initiated 6/17.   Last hemodialysis was on 06/24/2024  Perineal Cellulitis with necrotizing component - status post debridement 6/14, 6/18 and 6/21   DM II uncontrolled - A1C 11  Worsening anemia     - Will discuss long term plan with Dr Gil regarding need to resume dialysis. Last treatment 4 days ago on 6/24. Patient verbalized understanding.

## 2024-06-29 LAB
ANION GAP SERPL CALC-SCNC: 11 MEQ/L
BUN SERPL-MCNC: 27.1 MG/DL (ref 9.8–20.1)
CALCIUM SERPL-MCNC: 8.1 MG/DL (ref 8.4–10.2)
CHLORIDE SERPL-SCNC: 99 MMOL/L (ref 98–107)
CO2 SERPL-SCNC: 24 MMOL/L (ref 22–29)
CREAT SERPL-MCNC: 2.44 MG/DL (ref 0.55–1.02)
CREAT/UREA NIT SERPL: 11
GFR SERPLBLD CREATININE-BSD FMLA CKD-EPI: 23 ML/MIN/1.73/M2
GLUCOSE SERPL-MCNC: 218 MG/DL (ref 74–100)
POCT GLUCOSE: 173 MG/DL (ref 70–110)
POCT GLUCOSE: 266 MG/DL (ref 70–110)
POCT GLUCOSE: 279 MG/DL (ref 70–110)
POTASSIUM SERPL-SCNC: 3.8 MMOL/L (ref 3.5–5.1)
SODIUM SERPL-SCNC: 134 MMOL/L (ref 136–145)

## 2024-06-29 PROCEDURE — 25000003 PHARM REV CODE 250: Performed by: INTERNAL MEDICINE

## 2024-06-29 PROCEDURE — 63700000 PHARM REV CODE 250 ALT 637 W/O HCPCS: Performed by: INTERNAL MEDICINE

## 2024-06-29 PROCEDURE — 63600175 PHARM REV CODE 636 W HCPCS: Performed by: NURSE PRACTITIONER

## 2024-06-29 PROCEDURE — 25000003 PHARM REV CODE 250: Performed by: NURSE PRACTITIONER

## 2024-06-29 PROCEDURE — 63600175 PHARM REV CODE 636 W HCPCS: Performed by: STUDENT IN AN ORGANIZED HEALTH CARE EDUCATION/TRAINING PROGRAM

## 2024-06-29 PROCEDURE — 63600175 PHARM REV CODE 636 W HCPCS: Mod: JZ,JG | Performed by: INTERNAL MEDICINE

## 2024-06-29 PROCEDURE — 36415 COLL VENOUS BLD VENIPUNCTURE: CPT | Performed by: NURSE PRACTITIONER

## 2024-06-29 PROCEDURE — 27000207 HC ISOLATION

## 2024-06-29 PROCEDURE — 80048 BASIC METABOLIC PNL TOTAL CA: CPT | Performed by: NURSE PRACTITIONER

## 2024-06-29 PROCEDURE — 25000003 PHARM REV CODE 250: Performed by: STUDENT IN AN ORGANIZED HEALTH CARE EDUCATION/TRAINING PROGRAM

## 2024-06-29 PROCEDURE — 63600175 PHARM REV CODE 636 W HCPCS: Performed by: INTERNAL MEDICINE

## 2024-06-29 PROCEDURE — 21400001 HC TELEMETRY ROOM

## 2024-06-29 PROCEDURE — A4216 STERILE WATER/SALINE, 10 ML: HCPCS | Performed by: INTERNAL MEDICINE

## 2024-06-29 RX ORDER — CLONIDINE HYDROCHLORIDE 0.1 MG/1
0.1 TABLET ORAL 3 TIMES DAILY
Status: DISCONTINUED | OUTPATIENT
Start: 2024-06-29 | End: 2024-06-30

## 2024-06-29 RX ORDER — CLONIDINE HYDROCHLORIDE 0.1 MG/1
0.1 TABLET ORAL 2 TIMES DAILY
Status: DISCONTINUED | OUTPATIENT
Start: 2024-06-29 | End: 2024-06-29

## 2024-06-29 RX ORDER — INSULIN GLARGINE 100 [IU]/ML
40 INJECTION, SOLUTION SUBCUTANEOUS NIGHTLY
Status: DISCONTINUED | OUTPATIENT
Start: 2024-06-29 | End: 2024-06-30

## 2024-06-29 RX ADMIN — CLONIDINE HYDROCHLORIDE 0.1 MG: 0.1 TABLET ORAL at 09:06

## 2024-06-29 RX ADMIN — Medication 1 CAPSULE: at 10:06

## 2024-06-29 RX ADMIN — CLINDAMYCIN PHOSPHATE 600 MG: 600 INJECTION, SOLUTION INTRAVENOUS at 10:06

## 2024-06-29 RX ADMIN — HYDRALAZINE HYDROCHLORIDE 25 MG: 25 TABLET ORAL at 06:06

## 2024-06-29 RX ADMIN — SODIUM CHLORIDE, PRESERVATIVE FREE 10 ML: 5 INJECTION INTRAVENOUS at 06:06

## 2024-06-29 RX ADMIN — AMLODIPINE BESYLATE 10 MG: 5 TABLET ORAL at 10:06

## 2024-06-29 RX ADMIN — ENOXAPARIN SODIUM 30 MG: 30 INJECTION SUBCUTANEOUS at 06:06

## 2024-06-29 RX ADMIN — FLUCONAZOLE 100 MG: 100 TABLET ORAL at 10:06

## 2024-06-29 RX ADMIN — FLUCONAZOLE 100 MG: 100 TABLET ORAL at 12:06

## 2024-06-29 RX ADMIN — CLINDAMYCIN PHOSPHATE 600 MG: 600 INJECTION, SOLUTION INTRAVENOUS at 12:06

## 2024-06-29 RX ADMIN — DIPHENHYDRAMINE HYDROCHLORIDE 25 MG: 25 CAPSULE ORAL at 11:06

## 2024-06-29 RX ADMIN — HYDRALAZINE HYDROCHLORIDE 10 MG: 20 INJECTION, SOLUTION INTRAMUSCULAR; INTRAVENOUS at 08:06

## 2024-06-29 RX ADMIN — SODIUM CHLORIDE, PRESERVATIVE FREE 10 ML: 5 INJECTION INTRAVENOUS at 11:06

## 2024-06-29 RX ADMIN — SODIUM CHLORIDE, PRESERVATIVE FREE 10 ML: 5 INJECTION INTRAVENOUS at 12:06

## 2024-06-29 RX ADMIN — CLONIDINE HYDROCHLORIDE 0.1 MG: 0.1 TABLET ORAL at 10:06

## 2024-06-29 RX ADMIN — INSULIN ASPART 9 UNITS: 100 INJECTION, SOLUTION INTRAVENOUS; SUBCUTANEOUS at 01:06

## 2024-06-29 RX ADMIN — INSULIN GLARGINE 40 UNITS: 100 INJECTION, SOLUTION SUBCUTANEOUS at 09:06

## 2024-06-29 RX ADMIN — TRAMADOL HYDROCHLORIDE 50 MG: 50 TABLET, COATED ORAL at 08:06

## 2024-06-29 RX ADMIN — INSULIN ASPART 9 UNITS: 100 INJECTION, SOLUTION INTRAVENOUS; SUBCUTANEOUS at 09:06

## 2024-06-29 NOTE — PROGRESS NOTES
Nephrology consult follow up note    HPI:      Greer Kahn is a 58 y.o. female admitted on 06/13/2024 with fever 102 reporting possible UTI, boils to right thigh and left buttock.  Lactic acidosis present on admission with WBC 27.0, BUN 9.7, creatinine 0.98.  CT abdomen and pelvis with contrast showed area of cellulitis involving the perineum on the left side extending to the medial gluteal fold and areas of concern within the labia on the left side.  Also noted to have right lower lobe atelectasis via CT chest.  Patient was initiated on vancomycin and Rocephin in ER.     On 06/14 she underwent debridement of left groin and perineum.  Postoperative course complicated by nausea, vomiting and constipation.  She is also developed AUTUMN with rapidly worsening renal indices. Cr 0.98 --> 1.76--> 2.77--> 4.53 at the time of consultation. Urinalysis with low grade proteinuria and no blood.     Patient developed worsening renal function, and anuria.  She was initiated on hemodialysis 06/17/2024.    She has now undergone surgical debridement on 6/14, 6/18, and 6/21.     Interval history:   06/25/2024  Patient dialyzing well this admission. She remains nauseous without vomiting.  Excellent urine output noted.  No shortness of breath.    6/26/2024   Noted to have no urine in castaneda bag. CNA reported empty  bag on starting shift at 0700. Flushed urinary catheter with 30 cc of saline and 700 cc urine returned. Hgb 6.5.   Lying down in the bed.  No complaints of shortness of breath.  Eating her lunch now.    6/27/24 - Patient awake, alert and oriented. Eating lunch well. UOP continues to improve.     6/28/24 - Cr stable around 4 without much improvement. UOP remains adequate via castaneda catheter. She appears more alert. Eating better.     6/29 - UOP excellent. No acute events overnight.    Review of Systems:       Past medical, family, surgical, and social history reviewed and unchanged from initial consult note.     Objective:        VITAL SIGNS: 24 HR MIN & MAX LAST    Temp  Min: 97.8 °F (36.6 °C)  Max: 99 °F (37.2 °C)  98.1 °F (36.7 °C)        BP  Min: 154/77  Max: 190/104  (!) 190/104     Pulse  Min: 88  Max: 97  88     Resp  Min: 18  Max: 18  18    SpO2  Min: 94 %  Max: 100 %  97 %      Physical Exam  Constitutional:       General: She is not in acute distress.     Appearance: She is obese.   HENT:      Head: Atraumatic.      Nose: Nose normal.      Mouth/Throat:      Mouth: Mucous membranes are moist.   Eyes:      Extraocular Movements: Extraocular movements intact.      Conjunctiva/sclera: Conjunctivae normal.   Neck:      Comments: R IJ temporary dialysis catheter   Cardiovascular:      Rate and Rhythm: Normal rate and regular rhythm.      Pulses: Normal pulses.      Heart sounds: Normal heart sounds.   Pulmonary:      Effort: Pulmonary effort is normal.      Breath sounds: Normal breath sounds.   Abdominal:      General: Bowel sounds are normal.      Palpations: Abdomen is soft.   Genitourinary:     Comments: Urinary catheter draining dark yellow urine  Musculoskeletal:         General: No swelling.      Cervical back: Neck supple.   Skin:     General: Skin is warm.   Neurological:      General: No focal deficit present.      Mental Status: She is alert and oriented to person, place, and time.   Psychiatric:         Mood and Affect: Mood normal.         Behavior: Behavior normal.                 Component Value Date/Time     (L) 06/29/2024 0618     (L) 06/28/2024 0434     (L) 06/26/2020 0441     06/25/2020 0615    K 3.8 06/29/2024 0618    K 4.8 06/28/2024 0434    K 3.7 06/26/2020 0441    K 3.5 06/25/2020 0615    CO2 24 06/29/2024 0618    CO2 23 06/28/2024 0434    CO2 26 06/26/2020 0441    CO2 27 06/25/2020 0615    BUN 27.1 (H) 06/29/2024 0618    BUN 43.6 (H) 06/28/2024 0434    BUN 12 06/26/2020 0441    BUN 9 06/25/2020 0615    CREATININE 2.44 (H) 06/29/2024 0618    CREATININE 4.13 (H) 06/28/2024 0434     CREATININE 0.68 06/26/2020 0441    CREATININE 0.58 06/25/2020 0615    CALCIUM 8.1 (L) 06/29/2024 0618    CALCIUM 7.9 (L) 06/28/2024 0434    CALCIUM 8.2 (L) 06/26/2020 0441    CALCIUM 7.7 (L) 06/25/2020 0615    PHOS 4.9 (H) 06/25/2024 0601            Component Value Date/Time    WBC 15.88 (H) 06/28/2024 0434    WBC 17.18 (H) 06/27/2024 0754    WBC 22.37 06/24/2024 0343    WBC 28.96 06/23/2024 0451    HGB 8.1 (L) 06/28/2024 0434    HGB 7.9 (L) 06/27/2024 0754    HCT 24.8 (L) 06/28/2024 0434    HCT 24.1 (L) 06/27/2024 0754    HCT 42.0 04/16/2019 2328    HCT 42.0 04/16/2019 2305     (H) 06/28/2024 0434     (H) 06/27/2024 0754       Imaging reviewed      Assessment / Plan:     Acute Renal failure multifactorial secondary to sepsis, contrast exposure, NSAID use inpatient, vancomycin toxicity  -dialysis initiated 6/17.   Last hemodialysis was on 06/24/2024  Perineal Cellulitis with necrotizing component - status post debridement 6/14, 6/18 and 6/21   DM II uncontrolled - A1C 11  Worsening anemia     - Dialysis resumed to continue on MWF schedule   If she remains dialysis dependent Monday morning we will reconsult Dr Tapia for tunneled catheter placement   -If ok with surgery team urinary catheter can be removed from dialysis standpoint

## 2024-06-29 NOTE — PLAN OF CARE
Problem: Adult Inpatient Plan of Care  Goal: Plan of Care Review  Outcome: Progressing  Goal: Patient-Specific Goal (Individualized)  Outcome: Progressing  Goal: Absence of Hospital-Acquired Illness or Injury  Outcome: Progressing  Goal: Optimal Comfort and Wellbeing  Outcome: Progressing  Goal: Readiness for Transition of Care  Outcome: Progressing     Problem: Wound  Goal: Optimal Coping  Outcome: Progressing  Goal: Optimal Functional Ability  Outcome: Progressing  Goal: Absence of Infection Signs and Symptoms  Outcome: Progressing  Goal: Improved Oral Intake  Outcome: Progressing  Goal: Optimal Pain Control and Function  Outcome: Progressing  Goal: Skin Health and Integrity  Outcome: Progressing  Goal: Optimal Wound Healing  Outcome: Progressing     Problem: Sepsis/Septic Shock  Goal: Optimal Coping  Outcome: Progressing  Goal: Absence of Bleeding  Outcome: Progressing  Goal: Blood Glucose Level Within Targeted Range  Outcome: Progressing  Goal: Absence of Infection Signs and Symptoms  Outcome: Progressing  Goal: Optimal Nutrition Intake  Outcome: Progressing     Problem: Infection  Goal: Absence of Infection Signs and Symptoms  Outcome: Progressing     Problem: Fall Injury Risk  Goal: Absence of Fall and Fall-Related Injury  Outcome: Progressing     Problem: Skin Injury Risk Increased  Goal: Skin Health and Integrity  Outcome: Progressing     Problem: Bariatric Environmental Safety  Goal: Safety Maintained with Care  Outcome: Progressing

## 2024-06-29 NOTE — PLAN OF CARE
Problem: Adult Inpatient Plan of Care  Goal: Absence of Hospital-Acquired Illness or Injury  Outcome: Progressing  Goal: Optimal Comfort and Wellbeing  Outcome: Progressing     Problem: Wound  Goal: Optimal Coping  Outcome: Progressing  Goal: Optimal Functional Ability  Outcome: Progressing  Goal: Absence of Infection Signs and Symptoms  Outcome: Progressing  Goal: Optimal Pain Control and Function  Outcome: Progressing  Goal: Skin Health and Integrity  Outcome: Progressing     Problem: Infection  Goal: Absence of Infection Signs and Symptoms  Outcome: Progressing     Problem: Fall Injury Risk  Goal: Absence of Fall and Fall-Related Injury  Outcome: Progressing

## 2024-06-29 NOTE — PROGRESS NOTES
[facility]  Hospital Medicine Progress Note        Chief Complaint: Inpatient Follow-up     HPI:    58-year-old female medical history notable for obesity BMI 37, poorly controlled T2DM with last A1c 13 in April 2024 presented to [facility] ED on 06/13/2024 morning with chief complaint of fever, chills, nausea, vomiting, perivulvar/perineal painful lesion draining pus.     On arrival noted to be febrile 101, tachycardic, hypertensive but only 1 reading, remaining normal to hypotensive.  Labs notable for lactic acid 1.4 > 1.3, WBC 27.06 with neutrophilic shift,  urinalysis contaminated sample with many squamous cells, glucose 353, CO2 23, anion gap 14, creatinine 0.98, CT abdomen pelvis with IV contrast show area of cellulitis involving the perineum on the left side extending to the medial gluteal fold with area of thickening and inflammatory changes in the labia with hypoattenuation concerning for possible abscess CTA chest show no evidence of PE there is right lower lobe atelectasis and small right pleural effusion.She was resuscitated with IV fluids 2 L of NS and 1 L of LR, given ceftriaxone and vancomycin.Transferred to [facility]  and referred to hospital medicine service.    Gen surgery was consulted, pt was taken to OR emergently for debridement for suspected necrotizing fasciitis.She tolerated the procedure well, and there was not evidence of NSTI. She is  s/p R groin incision, drainage, and debridement on 6/14, 6/18 and 6/21 .cx 6/13 ,6/18 w/ MRSA.        Pt developed AUTUMN, Nephrology consulted, pt required initiation of HD 06/17/2024. , pt had tunneled HD cath placement     Interval Hx:   No acute events reported overnight , seen at bedside this morning, no family member at bedside patient reported some nausea early morning improved tolerating p.o.  Patient reported doing okay  Objective/physical exam:  General: In no acute distress, afebrile  Chest: unlabored breathing on room air    Heart: normal rate   Abdomen: Soft, nontender  MSK: Warm  Neurologic: Alert and responding appropriately    VITAL SIGNS: 24 HRS MIN & MAX LAST   Temp  Min: 97.8 °F (36.6 °C)  Max: 99 °F (37.2 °C) 98.1 °F (36.7 °C)   BP  Min: 154/77  Max: 190/104 (!) 190/104   Pulse  Min: 88  Max: 97  88   Resp  Min: 18  Max: 18 18   SpO2  Min: 94 %  Max: 100 % 97 %     I have reviewed the following labs:  Recent Labs   Lab 06/26/24  0429 06/27/24  0754 06/28/24  0434   WBC 15.13* 17.18* 15.88*   RBC 2.17* 2.65* 2.73*   HGB 6.5* 7.9* 8.1*   HCT 20.8* 24.1* 24.8*   MCV 95.9* 90.9 90.8   MCH 30.0 29.8 29.7   MCHC 31.3* 32.8* 32.7*   RDW 17.1* 17.6* 17.7*    441* 405*   MPV 9.5 9.6 9.6     Recent Labs   Lab 06/24/24  0343 06/25/24  0601 06/26/24  0429 06/27/24  0754 06/28/24  0434 06/29/24  0618   * 130* 132* 133* 135* 134*   K 4.2 4.6 5.2* 4.4 4.8 3.8   CL 93* 98 102 100 100 99   CO2 22 21* 19* 20* 23 24   BUN 28.6* 23.8* 28.0* 35.6* 43.6* 27.1*   CREATININE 5.52* 4.05* 4.21* 4.08* 4.13* 2.44*   CALCIUM 8.0* 7.3* 7.3* 8.1* 7.9* 8.1*   MG  --  1.80  --   --   --   --    ALBUMIN 1.7* 1.6* 1.7*  --   --   --    ALKPHOS 198* 221* 216*  --   --   --    ALT 20 23 22  --   --   --    AST 24 30 35*  --   --   --    BILITOT 1.5 1.3 1.1  --   --   --      Microbiology Results (last 7 days)       Procedure Component Value Units Date/Time    Blood Culture [[id number]]  (Normal) Collected: 06/22/24 1203    Order Status: Completed Specimen: Blood Updated: 06/27/24 1300     Blood Culture No Growth at 5 days    Fungal Culture [[id number]]  (Abnormal)  (Susceptibility) Collected: 06/18/24 1223    Order Status: Completed Specimen: Tissue from Groin Updated: 06/25/24 0624     Fungal Culture Many Candida tropicalis             See below for Radiology    Scheduled Med:   amLODIPine  10 mg Oral Daily    clindamycin IV (PEDS and ADULTS)  600 mg Intravenous Q8H    enoxaparin  30 mg Subcutaneous Daily    fluconazole  100 mg Oral Daily     hydrALAZINE  25 mg Oral Q8H    insulin glargine U-100  30 Units Subcutaneous QHS    Lactobacillus acidophilus  1 capsule Oral Daily    sodium chloride 0.9%  10 mL Intravenous Q6H      Continuous Infusions:     PRN Meds:    Current Facility-Administered Medications:     0.9%  NaCl infusion (for blood administration), , Intravenous, Q24H PRN    0.9%  NaCl infusion (for blood administration), , Intravenous, Q24H PRN    acetaminophen, 650 mg, Oral, Q4H PRN    albuterol-ipratropium, 3 mL, Nebulization, Q4H PRN    aluminum-magnesium hydroxide-simethicone, 30 mL, Oral, QID PRN    bisacodyL, 10 mg, Rectal, Daily PRN    calcium carbonate, 1,000 mg, Oral, TID PRN    dextrose 10%, 12.5 g, Intravenous, PRN    dextrose 10%, 25 g, Intravenous, PRN    diphenhydrAMINE, 25 mg, Oral, Q6H PRN    glucagon (human recombinant), 1 mg, Intramuscular, PRN    glucose, 16 g, Oral, PRN    glucose, 24 g, Oral, PRN    heparin (porcine), 2,800 Units, Intravenous, PRN    hydrALAZINE, 10 mg, Intravenous, Q4H PRN    insulin aspart U-100, 0-15 Units, Subcutaneous, QID (AC + HS) PRN    metoclopramide, 10 mg, Intravenous, Q8H PRN    polyethylene glycol, 17 g, Oral, BID PRN    prochlorperazine, 2.5 mg, Intravenous, Q6H PRN    prochlorperazine, 5 mg, Intravenous, Q6H PRN    senna-docusate 8.6-50 mg, 2 tablet, Oral, BID PRN    sodium chloride 0.9%, 10 mL, Intravenous, PRN    Flushing PICC/Midline Protocol, , , Until Discontinued **AND** sodium chloride 0.9%, 10 mL, Intravenous, Q6H **AND** sodium chloride 0.9%, 10 mL, Intravenous, PRN    traMADoL, 50 mg, Oral, Q8H PRN     Assessment/Plan:  Perineal cellulitis with necrotizing component , s/p debridement   Acute Renal failure multifactorial -dialysis initiated 6/17   Normocytic Anemia s/p prbc transfusion   Uncontrolled type 2 diabetes mellitus with hyperglycemia  HTN      Labs this AM showed improved renal indices BUN 27.1, creatinine 2.44, patient had hemodialysis yesterday.     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 Blood cultures negative thus far, wound cx with MRSA, tissue cultures from grown preliminary Candida   Continue  fluconazole .  Probably can DC clindamycin  Continue probiotics  Follow up postop surgery recommendations, wound care  Monitor bp, change clonidine 0.1 to t.i.d., continue amlodipine use iv prn antihypertensives   A.m. blood glucose 218, Tight glycemic control,recommend glucose < 180 , increase lantus to 40 U, cont ISS coverage   Cont therapy services   Case was discussed with patient's nurse    Labs in AM       VTE prophylaxis: lovenox renally adjusted    Patient condition:  Fair    Anticipated discharge and Disposition:  tbd         _____________________________________________________________________    Nutrition Status:    Radiology:  I have personally reviewed the following imaging and agree with the radiologist.     X-Ray Chest 1 View  Narrative: EXAMINATION:  XR CHEST 1 VIEW    CLINICAL HISTORY:  leukocytosis workup;    COMPARISON:  18 June 2024    FINDINGS:  Frontal view of the chest was obtained. Stable right-sided catheter.  The heart is not significantly enlarged.  Similar right hilar prominence and right basilar opacities.  No pneumothorax.  Impression: Similar appearance of the chest.    Electronically signed by: Issa Luna  Date:    06/22/2024  Time:    13:08      Justine Cortez MD  Department of Hospital Medicine   [facility]   06/29/2024

## 2024-06-30 LAB
ANION GAP SERPL CALC-SCNC: 10 MEQ/L
BASOPHILS # BLD AUTO: 0.05 X10(3)/MCL
BASOPHILS NFR BLD AUTO: 0.5 %
BUN SERPL-MCNC: 33.4 MG/DL (ref 9.8–20.1)
CALCIUM SERPL-MCNC: 8.3 MG/DL (ref 8.4–10.2)
CHLORIDE SERPL-SCNC: 101 MMOL/L (ref 98–107)
CO2 SERPL-SCNC: 26 MMOL/L (ref 22–29)
CREAT SERPL-MCNC: 2.7 MG/DL (ref 0.55–1.02)
CREAT/UREA NIT SERPL: 12
EOSINOPHIL # BLD AUTO: 0.27 X10(3)/MCL (ref 0–0.9)
EOSINOPHIL NFR BLD AUTO: 2.9 %
ERYTHROCYTE [DISTWIDTH] IN BLOOD BY AUTOMATED COUNT: 17.1 % (ref 11.5–17)
GFR SERPLBLD CREATININE-BSD FMLA CKD-EPI: 20 ML/MIN/1.73/M2
GLUCOSE SERPL-MCNC: 251 MG/DL (ref 74–100)
GROUP & RH: NORMAL
HCT VFR BLD AUTO: 24.6 % (ref 37–47)
HGB BLD-MCNC: 7.7 G/DL (ref 12–16)
IMM GRANULOCYTES # BLD AUTO: 0.09 X10(3)/MCL (ref 0–0.04)
IMM GRANULOCYTES NFR BLD AUTO: 1 %
INDIRECT COOMBS: NORMAL
LYMPHOCYTES # BLD AUTO: 1.37 X10(3)/MCL (ref 0.6–4.6)
LYMPHOCYTES NFR BLD AUTO: 14.9 %
MCH RBC QN AUTO: 29.4 PG (ref 27–31)
MCHC RBC AUTO-ENTMCNC: 31.3 G/DL (ref 33–36)
MCV RBC AUTO: 93.9 FL (ref 80–94)
MONOCYTES # BLD AUTO: 0.9 X10(3)/MCL (ref 0.1–1.3)
MONOCYTES NFR BLD AUTO: 9.8 %
NEUTROPHILS # BLD AUTO: 6.5 X10(3)/MCL (ref 2.1–9.2)
NEUTROPHILS NFR BLD AUTO: 70.9 %
NRBC BLD AUTO-RTO: 0 %
PLATELET # BLD AUTO: 371 X10(3)/MCL (ref 130–400)
PMV BLD AUTO: 9.7 FL (ref 7.4–10.4)
POCT GLUCOSE: 123 MG/DL (ref 70–110)
POCT GLUCOSE: 253 MG/DL (ref 70–110)
POCT GLUCOSE: 277 MG/DL (ref 70–110)
POCT GLUCOSE: 291 MG/DL (ref 70–110)
POCT GLUCOSE: 302 MG/DL (ref 70–110)
POTASSIUM SERPL-SCNC: 4.2 MMOL/L (ref 3.5–5.1)
RBC # BLD AUTO: 2.62 X10(6)/MCL (ref 4.2–5.4)
SODIUM SERPL-SCNC: 137 MMOL/L (ref 136–145)
SPECIMEN OUTDATE: NORMAL
WBC # BLD AUTO: 9.18 X10(3)/MCL (ref 4.5–11.5)

## 2024-06-30 PROCEDURE — 36415 COLL VENOUS BLD VENIPUNCTURE: CPT | Performed by: NURSE PRACTITIONER

## 2024-06-30 PROCEDURE — 86901 BLOOD TYPING SEROLOGIC RH(D): CPT

## 2024-06-30 PROCEDURE — 99900031 HC PATIENT EDUCATION (STAT)

## 2024-06-30 PROCEDURE — 25000003 PHARM REV CODE 250: Performed by: NURSE PRACTITIONER

## 2024-06-30 PROCEDURE — 94760 N-INVAS EAR/PLS OXIMETRY 1: CPT

## 2024-06-30 PROCEDURE — 36415 COLL VENOUS BLD VENIPUNCTURE: CPT | Performed by: INTERNAL MEDICINE

## 2024-06-30 PROCEDURE — 21400001 HC TELEMETRY ROOM

## 2024-06-30 PROCEDURE — 85025 COMPLETE CBC W/AUTO DIFF WBC: CPT | Performed by: INTERNAL MEDICINE

## 2024-06-30 PROCEDURE — 63600175 PHARM REV CODE 636 W HCPCS: Performed by: STUDENT IN AN ORGANIZED HEALTH CARE EDUCATION/TRAINING PROGRAM

## 2024-06-30 PROCEDURE — 63600175 PHARM REV CODE 636 W HCPCS: Performed by: NURSE PRACTITIONER

## 2024-06-30 PROCEDURE — 25000003 PHARM REV CODE 250: Performed by: STUDENT IN AN ORGANIZED HEALTH CARE EDUCATION/TRAINING PROGRAM

## 2024-06-30 PROCEDURE — 86900 BLOOD TYPING SEROLOGIC ABO: CPT

## 2024-06-30 PROCEDURE — A4216 STERILE WATER/SALINE, 10 ML: HCPCS | Performed by: INTERNAL MEDICINE

## 2024-06-30 PROCEDURE — 80048 BASIC METABOLIC PNL TOTAL CA: CPT | Performed by: INTERNAL MEDICINE

## 2024-06-30 PROCEDURE — 63600175 PHARM REV CODE 636 W HCPCS: Performed by: HOSPITALIST

## 2024-06-30 PROCEDURE — 63700000 PHARM REV CODE 250 ALT 637 W/O HCPCS: Performed by: INTERNAL MEDICINE

## 2024-06-30 PROCEDURE — 99900035 HC TECH TIME PER 15 MIN (STAT)

## 2024-06-30 PROCEDURE — 25000003 PHARM REV CODE 250: Performed by: INTERNAL MEDICINE

## 2024-06-30 PROCEDURE — 86850 RBC ANTIBODY SCREEN: CPT

## 2024-06-30 PROCEDURE — 63600175 PHARM REV CODE 636 W HCPCS: Performed by: INTERNAL MEDICINE

## 2024-06-30 PROCEDURE — 63600175 PHARM REV CODE 636 W HCPCS: Mod: JZ,EC,JG | Performed by: NURSE PRACTITIONER

## 2024-06-30 PROCEDURE — 27000207 HC ISOLATION

## 2024-06-30 RX ORDER — CLONIDINE HYDROCHLORIDE 0.2 MG/1
0.2 TABLET ORAL 3 TIMES DAILY
Status: DISCONTINUED | OUTPATIENT
Start: 2024-06-30 | End: 2024-07-09 | Stop reason: HOSPADM

## 2024-06-30 RX ORDER — INSULIN GLARGINE 100 [IU]/ML
50 INJECTION, SOLUTION SUBCUTANEOUS NIGHTLY
Status: DISCONTINUED | OUTPATIENT
Start: 2024-06-30 | End: 2024-07-06

## 2024-06-30 RX ADMIN — FLUCONAZOLE 100 MG: 100 TABLET ORAL at 08:06

## 2024-06-30 RX ADMIN — CLONIDINE HYDROCHLORIDE 0.1 MG: 0.1 TABLET ORAL at 02:06

## 2024-06-30 RX ADMIN — INSULIN ASPART 9 UNITS: 100 INJECTION, SOLUTION INTRAVENOUS; SUBCUTANEOUS at 06:06

## 2024-06-30 RX ADMIN — ENOXAPARIN SODIUM 30 MG: 30 INJECTION SUBCUTANEOUS at 04:06

## 2024-06-30 RX ADMIN — INSULIN GLARGINE 50 UNITS: 100 INJECTION, SOLUTION SUBCUTANEOUS at 09:06

## 2024-06-30 RX ADMIN — INSULIN ASPART 9 UNITS: 100 INJECTION, SOLUTION INTRAVENOUS; SUBCUTANEOUS at 09:06

## 2024-06-30 RX ADMIN — CLONIDINE HYDROCHLORIDE 0.1 MG: 0.1 TABLET ORAL at 08:06

## 2024-06-30 RX ADMIN — CLONIDINE HYDROCHLORIDE 0.2 MG: 0.2 TABLET ORAL at 09:06

## 2024-06-30 RX ADMIN — AMLODIPINE BESYLATE 10 MG: 5 TABLET ORAL at 08:06

## 2024-06-30 RX ADMIN — DIPHENHYDRAMINE HYDROCHLORIDE 25 MG: 25 CAPSULE ORAL at 09:06

## 2024-06-30 RX ADMIN — SODIUM CHLORIDE, PRESERVATIVE FREE 10 ML: 5 INJECTION INTRAVENOUS at 05:06

## 2024-06-30 RX ADMIN — HYDRALAZINE HYDROCHLORIDE 10 MG: 20 INJECTION, SOLUTION INTRAMUSCULAR; INTRAVENOUS at 11:06

## 2024-06-30 RX ADMIN — METOCLOPRAMIDE 10 MG: 5 INJECTION, SOLUTION INTRAMUSCULAR; INTRAVENOUS at 12:06

## 2024-06-30 RX ADMIN — INSULIN ASPART 12 UNITS: 100 INJECTION, SOLUTION INTRAVENOUS; SUBCUTANEOUS at 08:06

## 2024-06-30 RX ADMIN — ERYTHROPOIETIN 10000 UNITS: 10000 INJECTION, SOLUTION INTRAVENOUS; SUBCUTANEOUS at 04:06

## 2024-06-30 RX ADMIN — SODIUM CHLORIDE, PRESERVATIVE FREE 10 ML: 5 INJECTION INTRAVENOUS at 06:06

## 2024-06-30 RX ADMIN — Medication 1 CAPSULE: at 08:06

## 2024-06-30 RX ADMIN — SODIUM CHLORIDE, PRESERVATIVE FREE 10 ML: 5 INJECTION INTRAVENOUS at 12:06

## 2024-06-30 RX ADMIN — TRAMADOL HYDROCHLORIDE 50 MG: 50 TABLET, COATED ORAL at 09:06

## 2024-06-30 NOTE — PLAN OF CARE
Problem: Adult Inpatient Plan of Care  Goal: Plan of Care Review  6/29/2024 2301 by Cynthia Robins RN  Outcome: Progressing  6/29/2024 2300 by Cynthia Robins RN  Outcome: Progressing  Goal: Patient-Specific Goal (Individualized)  6/29/2024 2301 by Cynthia Robins RN  Outcome: Progressing  6/29/2024 2300 by Cynthia Robins RN  Outcome: Progressing  Goal: Absence of Hospital-Acquired Illness or Injury  6/29/2024 2301 by Cynthia Robins RN  Outcome: Progressing  6/29/2024 2300 by Cynthia Robins RN  Outcome: Progressing  Goal: Optimal Comfort and Wellbeing  6/29/2024 2301 by Cynthia Robins RN  Outcome: Progressing  6/29/2024 2300 by Cynthia Robins RN  Outcome: Progressing  Goal: Readiness for Transition of Care  6/29/2024 2301 by Cynthia Robins RN  Outcome: Progressing  6/29/2024 2300 by Cynthia Robins RN  Outcome: Progressing     Problem: Wound  Goal: Optimal Coping  6/29/2024 2301 by Cynthia Robins RN  Outcome: Progressing  6/29/2024 2300 by Cynthia Robins RN  Outcome: Progressing  Goal: Optimal Functional Ability  6/29/2024 2301 by Cynthia Robins RN  Outcome: Progressing  6/29/2024 2300 by Cynthia Robins RN  Outcome: Progressing  Goal: Absence of Infection Signs and Symptoms  6/29/2024 2301 by Cynthia Robins RN  Outcome: Progressing  6/29/2024 2300 by Cynthia Robins RN  Outcome: Progressing  Goal: Improved Oral Intake  6/29/2024 2301 by Cynthia Robins RN  Outcome: Progressing  6/29/2024 2300 by Cynthia Robins RN  Outcome: Progressing  Goal: Optimal Pain Control and Function  6/29/2024 2301 by Cynthia Robins RN  Outcome: Progressing  6/29/2024 2300 by Cynthia Robins RN  Outcome: Progressing  Goal: Skin Health and Integrity  6/29/2024 2301 by Cynthia Robins RN  Outcome: Progressing  6/29/2024 2300 by Cynthia Robins RN  Outcome: Progressing  Goal: Optimal Wound Healing  6/29/2024 2301 by Cynthia Robins RN  Outcome:  Progressing  6/29/2024 2300 by Cynthia Robins RN  Outcome: Progressing     Problem: Sepsis/Septic Shock  Goal: Optimal Coping  6/29/2024 2301 by Cynthia Robins RN  Outcome: Progressing  6/29/2024 2300 by Cynthia Robins RN  Outcome: Progressing  Goal: Absence of Bleeding  6/29/2024 2301 by Cynthia Robins RN  Outcome: Progressing  6/29/2024 2300 by Cynthia Robins RN  Outcome: Progressing  Goal: Blood Glucose Level Within Targeted Range  6/29/2024 2301 by Cynthia Robins RN  Outcome: Progressing  6/29/2024 2300 by Cynthia Robins RN  Outcome: Progressing  Goal: Absence of Infection Signs and Symptoms  6/29/2024 2301 by Cynthia Robins RN  Outcome: Progressing  6/29/2024 2300 by Cynthia Robins RN  Outcome: Progressing  Goal: Optimal Nutrition Intake  6/29/2024 2301 by Cynthia Robins RN  Outcome: Progressing  6/29/2024 2300 by Cynthia Robins RN  Outcome: Progressing     Problem: Infection  Goal: Absence of Infection Signs and Symptoms  6/29/2024 2301 by Cynthia Robins RN  Outcome: Progressing  6/29/2024 2300 by Cynthia Robins RN  Outcome: Progressing     Problem: Fall Injury Risk  Goal: Absence of Fall and Fall-Related Injury  6/29/2024 2301 by Cynthia Robins RN  Outcome: Progressing  6/29/2024 2300 by Cynthia Robins RN  Outcome: Progressing     Problem: Skin Injury Risk Increased  Goal: Skin Health and Integrity  6/29/2024 2301 by Cynthia Robins RN  Outcome: Progressing  6/29/2024 2300 by Cynthia Robins RN  Outcome: Progressing     Problem: Hemodialysis  Goal: Safe, Effective Therapy Delivery  6/29/2024 2301 by Cynthia Robins RN  Outcome: Progressing  6/29/2024 2300 by Cynthia Robins RN  Outcome: Progressing  Goal: Effective Tissue Perfusion  6/29/2024 2301 by Cynthia Robins RN  Outcome: Progressing  6/29/2024 2300 by Cynthia Robins RN  Outcome: Progressing  Goal: Absence of Infection Signs and Symptoms  6/29/2024 2301 by Cynthia Robins,  RN  Outcome: Progressing  6/29/2024 2300 by Cynthia Robins RN  Outcome: Progressing     Problem: Bariatric Environmental Safety  Goal: Safety Maintained with Care  6/29/2024 2301 by Cynthia Robins RN  Outcome: Progressing  6/29/2024 2300 by Cynthia Robins RN  Outcome: Progressing

## 2024-06-30 NOTE — PROGRESS NOTES
[facility]  Hospital Medicine Progress Note        Chief Complaint: Inpatient Follow-up     HPI:    58-year-old female medical history notable for obesity BMI 37, poorly controlled T2DM with last A1c 13 in April 2024 presented to [facility] ED on 06/13/2024 morning with chief complaint of fever, chills, nausea, vomiting, perivulvar/perineal painful lesion draining pus.     On arrival noted to be febrile 101, tachycardic, hypertensive but only 1 reading, remaining normal to hypotensive.  Labs notable for lactic acid 1.4 > 1.3, WBC 27.06 with neutrophilic shift,  urinalysis contaminated sample with many squamous cells, glucose 353, CO2 23, anion gap 14, creatinine 0.98, CT abdomen pelvis with IV contrast show area of cellulitis involving the perineum on the left side extending to the medial gluteal fold with area of thickening and inflammatory changes in the labia with hypoattenuation concerning for possible abscess CTA chest show no evidence of PE there is right lower lobe atelectasis and small right pleural effusion.She was resuscitated with IV fluids 2 L of NS and 1 L of LR, given ceftriaxone and vancomycin.Transferred to [facility]  and referred to hospital medicine service.    Gen surgery was consulted, pt was taken to OR emergently for debridement for suspected necrotizing fasciitis.She tolerated the procedure well, and there was not evidence of NSTI. She is  s/p R groin incision, drainage, and debridement on 6/14, 6/18 and 6/21 .cx 6/13 ,6/18 w/ MRSA.        Pt developed AUTUMN, Nephrology consulted, pt required initiation of HD 06/17/2024. , pt had tunneled HD cath placement     Interval Hx:   No acute events reported overnight , seen at bedside this morning, family member in the room, she reported doing okay she was no complaints discussed ongoing care.    Labs this AM showed BUN 33.4, creatinine 2.7, hemoglobin 7.7, white count normalized glucose 251      Objective/physical  exam:  General: In no acute distress, afebrile  Chest: unlabored breathing on room air   Heart: normal rate   Abdomen: Soft, nontender  MSK: Warm  Neurologic: Alert and responding appropriately    VITAL SIGNS: 24 HRS MIN & MAX LAST   Temp  Min: 97.8 °F (36.6 °C)  Max: 99.5 °F (37.5 °C) 98.2 °F (36.8 °C)   BP  Min: 146/79  Max: 172/92 (!) 158/77   Pulse  Min: 84  Max: 93  84   Resp  Min: 18  Max: 20 18   SpO2  Min: 96 %  Max: 97 % 97 %     I have reviewed the following labs:  Recent Labs   Lab 06/27/24  0754 06/28/24  0434 06/30/24  0659   WBC 17.18* 15.88* 9.18   RBC 2.65* 2.73* 2.62*   HGB 7.9* 8.1* 7.7*   HCT 24.1* 24.8* 24.6*   MCV 90.9 90.8 93.9   MCH 29.8 29.7 29.4   MCHC 32.8* 32.7* 31.3*   RDW 17.6* 17.7* 17.1*   * 405* 371   MPV 9.6 9.6 9.7     Recent Labs   Lab 06/24/24  0343 06/25/24  0601 06/26/24  0429 06/27/24  0754 06/28/24  0434 06/29/24  0618 06/30/24  0659   * 130* 132*   < > 135* 134* 137   K 4.2 4.6 5.2*   < > 4.8 3.8 4.2   CL 93* 98 102   < > 100 99 101   CO2 22 21* 19*   < > 23 24 26   BUN 28.6* 23.8* 28.0*   < > 43.6* 27.1* 33.4*   CREATININE 5.52* 4.05* 4.21*   < > 4.13* 2.44* 2.70*   CALCIUM 8.0* 7.3* 7.3*   < > 7.9* 8.1* 8.3*   MG  --  1.80  --   --   --   --   --    ALBUMIN 1.7* 1.6* 1.7*  --   --   --   --    ALKPHOS 198* 221* 216*  --   --   --   --    ALT 20 23 22  --   --   --   --    AST 24 30 35*  --   --   --   --    BILITOT 1.5 1.3 1.1  --   --   --   --     < > = values in this interval not displayed.     Microbiology Results (last 7 days)       Procedure Component Value Units Date/Time    Blood Culture [[id number]]  (Normal) Collected: 06/22/24 1203    Order Status: Completed Specimen: Blood Updated: 06/27/24 1300     Blood Culture No Growth at 5 days    Fungal Culture [[id number]]  (Abnormal)  (Susceptibility) Collected: 06/18/24 1223    Order Status: Completed Specimen: Tissue from Groin Updated: 06/25/24 0624     Fungal Culture Many Candida tropicalis              See below for Radiology    Scheduled Med:   amLODIPine  10 mg Oral Daily    cloNIDine  0.1 mg Oral TID    enoxaparin  30 mg Subcutaneous Daily    epoetin malka  10,000 Units Intravenous Once    fluconazole  100 mg Oral Daily    insulin glargine U-100  40 Units Subcutaneous QHS    Lactobacillus acidophilus  1 capsule Oral Daily    sodium chloride 0.9%  10 mL Intravenous Q6H      Continuous Infusions:     PRN Meds:    Current Facility-Administered Medications:     0.9%  NaCl infusion (for blood administration), , Intravenous, Q24H PRN    0.9%  NaCl infusion (for blood administration), , Intravenous, Q24H PRN    acetaminophen, 650 mg, Oral, Q4H PRN    albuterol-ipratropium, 3 mL, Nebulization, Q4H PRN    aluminum-magnesium hydroxide-simethicone, 30 mL, Oral, QID PRN    bisacodyL, 10 mg, Rectal, Daily PRN    calcium carbonate, 1,000 mg, Oral, TID PRN    dextrose 10%, 12.5 g, Intravenous, PRN    dextrose 10%, 25 g, Intravenous, PRN    diphenhydrAMINE, 25 mg, Oral, Q6H PRN    glucagon (human recombinant), 1 mg, Intramuscular, PRN    glucose, 16 g, Oral, PRN    glucose, 24 g, Oral, PRN    heparin (porcine), 2,800 Units, Intravenous, PRN    hydrALAZINE, 10 mg, Intravenous, Q4H PRN    insulin aspart U-100, 0-15 Units, Subcutaneous, QID (AC + HS) PRN    metoclopramide, 10 mg, Intravenous, Q8H PRN    polyethylene glycol, 17 g, Oral, BID PRN    prochlorperazine, 2.5 mg, Intravenous, Q6H PRN    prochlorperazine, 5 mg, Intravenous, Q6H PRN    senna-docusate 8.6-50 mg, 2 tablet, Oral, BID PRN    sodium chloride 0.9%, 10 mL, Intravenous, PRN    Flushing PICC/Midline Protocol, , , Until Discontinued **AND** sodium chloride 0.9%, 10 mL, Intravenous, Q6H **AND** sodium chloride 0.9%, 10 mL, Intravenous, PRN    traMADoL, 50 mg, Oral, Q8H PRN     Assessment/Plan:  Perineal cellulitis with necrotizing component , s/p debridement   Acute Renal failure multifactorial -dialysis initiated 6/17   Normocytic Anemia s/p prbc transfusion    Uncontrolled type 2 diabetes mellitus with hyperglycemia  HTN         Nephrology following, continue hemodialysis per Nephro team   monitor renal indices and renal recovery  Patient was treated with vancomycin , clindamycin  and 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 Blood cultures negative thus far, wound cx with MRSA, tissue cultures from grown preliminary Candida   Continue  fluconazole   Continue probiotics  Follow up postop surgery recommendations, wound care  Monitor bp, change clonidine 0.2 to t.i.d., continue amlodipine use iv prn antihypertensives   A.m. blood glucose 251, Tight glycemic control,recommend glucose < 180 , increase lantus to 50 U, cont ISS coverage   Cont therapy services   Case was discussed with patient's nurse    Labs in AM       VTE prophylaxis: lovenox renally adjusted    Patient condition:  Fair    Anticipated discharge and Disposition:  tbd         _____________________________________________________________________    Nutrition Status:    Radiology:  I have personally reviewed the following imaging and agree with the radiologist.     X-Ray Chest 1 View  Narrative: EXAMINATION:  XR CHEST 1 VIEW    CLINICAL HISTORY:  leukocytosis workup;    COMPARISON:  18 June 2024    FINDINGS:  Frontal view of the chest was obtained. Stable right-sided catheter.  The heart is not significantly enlarged.  Similar right hilar prominence and right basilar opacities.  No pneumothorax.  Impression: Similar appearance of the chest.    Electronically signed by: Issa Luna  Date:    06/22/2024  Time:    13:08      Justine Cortez MD  Department of Hospital Medicine   [facility]   06/30/2024

## 2024-07-01 LAB
ANION GAP SERPL CALC-SCNC: 10 MEQ/L
BUN SERPL-MCNC: 36.4 MG/DL (ref 9.8–20.1)
CALCIUM SERPL-MCNC: 8.1 MG/DL (ref 8.4–10.2)
CHLORIDE SERPL-SCNC: 104 MMOL/L (ref 98–107)
CO2 SERPL-SCNC: 25 MMOL/L (ref 22–29)
CREAT SERPL-MCNC: 2.79 MG/DL (ref 0.55–1.02)
CREAT/UREA NIT SERPL: 13
GFR SERPLBLD CREATININE-BSD FMLA CKD-EPI: 19 ML/MIN/1.73/M2
GLUCOSE SERPL-MCNC: 164 MG/DL (ref 74–100)
POCT GLUCOSE: 161 MG/DL (ref 70–110)
POCT GLUCOSE: 167 MG/DL (ref 70–110)
POCT GLUCOSE: 206 MG/DL (ref 70–110)
POCT GLUCOSE: 224 MG/DL (ref 70–110)
POTASSIUM SERPL-SCNC: 4.4 MMOL/L (ref 3.5–5.1)
SODIUM SERPL-SCNC: 139 MMOL/L (ref 136–145)

## 2024-07-01 PROCEDURE — 80048 BASIC METABOLIC PNL TOTAL CA: CPT | Performed by: NURSE PRACTITIONER

## 2024-07-01 PROCEDURE — 36415 COLL VENOUS BLD VENIPUNCTURE: CPT | Performed by: NURSE PRACTITIONER

## 2024-07-01 PROCEDURE — 63600175 PHARM REV CODE 636 W HCPCS: Performed by: INTERNAL MEDICINE

## 2024-07-01 PROCEDURE — 97535 SELF CARE MNGMENT TRAINING: CPT

## 2024-07-01 PROCEDURE — 25000003 PHARM REV CODE 250: Performed by: INTERNAL MEDICINE

## 2024-07-01 PROCEDURE — 25000003 PHARM REV CODE 250: Performed by: NURSE PRACTITIONER

## 2024-07-01 PROCEDURE — 27000207 HC ISOLATION

## 2024-07-01 PROCEDURE — 80100016 HC MAINTENANCE HEMODIALYSIS

## 2024-07-01 PROCEDURE — 63600175 PHARM REV CODE 636 W HCPCS: Performed by: NURSE PRACTITIONER

## 2024-07-01 PROCEDURE — 63700000 PHARM REV CODE 250 ALT 637 W/O HCPCS: Performed by: INTERNAL MEDICINE

## 2024-07-01 PROCEDURE — 21400001 HC TELEMETRY ROOM

## 2024-07-01 PROCEDURE — 63600175 PHARM REV CODE 636 W HCPCS: Performed by: STUDENT IN AN ORGANIZED HEALTH CARE EDUCATION/TRAINING PROGRAM

## 2024-07-01 PROCEDURE — 25000003 PHARM REV CODE 250: Performed by: STUDENT IN AN ORGANIZED HEALTH CARE EDUCATION/TRAINING PROGRAM

## 2024-07-01 PROCEDURE — A4216 STERILE WATER/SALINE, 10 ML: HCPCS | Performed by: INTERNAL MEDICINE

## 2024-07-01 RX ORDER — SODIUM CHLORIDE 9 MG/ML
INJECTION, SOLUTION INTRAVENOUS
Status: CANCELLED | OUTPATIENT
Start: 2024-07-01

## 2024-07-01 RX ORDER — MUPIROCIN 20 MG/G
OINTMENT TOPICAL 2 TIMES DAILY
Status: CANCELLED | OUTPATIENT
Start: 2024-07-01 | End: 2024-07-06

## 2024-07-01 RX ORDER — SODIUM CHLORIDE 9 MG/ML
INJECTION, SOLUTION INTRAVENOUS ONCE
Status: CANCELLED | OUTPATIENT
Start: 2024-07-01 | End: 2024-07-01

## 2024-07-01 RX ADMIN — CLONIDINE HYDROCHLORIDE 0.2 MG: 0.2 TABLET ORAL at 09:07

## 2024-07-01 RX ADMIN — HEPARIN SODIUM 2800 UNITS: 1000 INJECTION INTRAVENOUS; SUBCUTANEOUS at 05:07

## 2024-07-01 RX ADMIN — ENOXAPARIN SODIUM 30 MG: 30 INJECTION SUBCUTANEOUS at 07:07

## 2024-07-01 RX ADMIN — SODIUM CHLORIDE, PRESERVATIVE FREE 10 ML: 5 INJECTION INTRAVENOUS at 12:07

## 2024-07-01 RX ADMIN — TRAMADOL HYDROCHLORIDE 50 MG: 50 TABLET, COATED ORAL at 10:07

## 2024-07-01 RX ADMIN — INSULIN GLARGINE 50 UNITS: 100 INJECTION, SOLUTION SUBCUTANEOUS at 10:07

## 2024-07-01 RX ADMIN — INSULIN ASPART 6 UNITS: 100 INJECTION, SOLUTION INTRAVENOUS; SUBCUTANEOUS at 11:07

## 2024-07-01 RX ADMIN — AMLODIPINE BESYLATE 10 MG: 5 TABLET ORAL at 09:07

## 2024-07-01 RX ADMIN — Medication 1 CAPSULE: at 09:07

## 2024-07-01 RX ADMIN — CLONIDINE HYDROCHLORIDE 0.2 MG: 0.2 TABLET ORAL at 10:07

## 2024-07-01 RX ADMIN — CLONIDINE HYDROCHLORIDE 0.2 MG: 0.2 TABLET ORAL at 07:07

## 2024-07-01 RX ADMIN — FLUCONAZOLE 100 MG: 100 TABLET ORAL at 09:07

## 2024-07-01 NOTE — PROGRESS NOTES
07/01/24 1730   Post-Hemodialysis Assessment   Blood Volume Processed (Liters) 63 L   Dialyzer Clearance Lightly streaked   Duration of Treatment 180 minutes   Total UF (mL) 0 mL   Patient Response to Treatment Pt tolerated tx well

## 2024-07-01 NOTE — PT/OT/SLP PROGRESS
Occupational Therapy   Treatment    Name: Greer Kahn  MRN: [id number]  Admitting Diagnosis:  Perineal abscess  10 Days Post-Op    Recommendations:     Recommended therapy intensity at discharge: Moderate Intensity Therapy   Discharge Equipment Recommendations:  to be determined by next level of care  Barriers to discharge:  Decreased caregiver support    Assessment:     Greer Kahn is a 58 y.o. female with a medical diagnosis of perineal cellulitis with necrotizing component s/p debridement 6/14, 6/18, and 6/21, acute renal failure with initiation of HD 6/17, HTN.  She presents with the following performance deficits affecting function are weakness, impaired endurance, impaired self care skills, impaired functional mobility, gait instability, impaired balance, decreased safety awareness, pain.     Rehab Prognosis:  Good; patient would benefit from acute skilled OT services to address these deficits and reach maximum level of function.       Plan:     Patient to be seen 4 x/week to address the above listed problems via self-care/home management, therapeutic activities, therapeutic exercises  Plan of Care Expires: 07/19/24  Plan of Care Reviewed with: patient    Subjective     Pain/Comfort:  Pain Rating 1: other (see comments) (no pain at rest, 8/10 pain at perineal region while sitting EOB)  Pain Addressed 1: Reposition, Distraction, Cessation of Activity, Nurse notified    Objective:     Communicated with: RN prior to session.  Patient found HOB elevated with pulse ox (continuous), telemetry, PICC line, castaneda catheter upon OT entry to room.    General Precautions: Standard, contact, fall    Orthopedic Precautions:N/A  Braces: N/A  Respiratory Status: Room air  Vital Signs: HR: 105 in standing  Sp02: 89-95%     Occupational Performance:     Bed Mobility:    Patient completed Supine to Sit with moderate assistance x2  Patient completed Sit to Supine with moderate assistance x2    Functional  Mobility/Transfers:  Patient completed Sit <> Stand Transfer with moderate assistance x2 with rolling walker   Functional Mobility: pt took side steps along EOB ~18 ft using RW with Min A, pt declining to transfer to bedside chair due to pain while sitting up yesterday per pt report    Activities of Daily Living:  Grooming: stand by assistance brushing teeth and washing face while sitting EOB    Therapeutic Positioning    OT interventions performed during the course of today's session in an effort to prevent and/or reduce acquired pressure injuries:   Education was provided on benefits of and recommendations for therapeutic positioning    Skin assessment: all bony prominences were assessed    Findings: no redness or breakdown noted, known altered skin at perineal region- dressing in place    Patient Education:  Patient provided with verbal education education regarding OT role/goals/POC, fall prevention, and safety awareness.  Understanding was verbalized.    Patient left HOB elevated with all lines intact, call button in reach, and RN notified.    GOALS:   Multidisciplinary Problems       Occupational Therapy Goals          Problem: Occupational Therapy    Goal Priority Disciplines Outcome Interventions   Occupational Therapy Goal     OT, PT/OT Progressing    Description: Goals to be met by: 7/20/24     Patient will increase functional independence with ADLs by performing:    UE Dressing with Stand-by Assistance.  LE Dressing with Stand-by Assistance.  Grooming while standing at sink with Stand-by Assistance.  Toileting from toilet with Stand-by Assistance for hygiene and clothing management.   Toilet transfer to toilet with Stand-by Assistance.                         Time Tracking:     OT Date of Treatment: 07/01/24  OT Start Time: 1040  OT Stop Time: 1106  OT Total Time (min): 26 min    Billable Minutes:Self Care/Home Management 26 mins    OT/MAKENZIE: OT     Number of MAKENZIE visits since last OT visit: 4    7/1/2024

## 2024-07-01 NOTE — PROGRESS NOTES
[facility]  Hospital Medicine Progress Note        Chief Complaint: Inpatient Follow-up     HPI:    58-year-old female medical history notable for obesity BMI 37, poorly controlled T2DM with last A1c 13 in April 2024 presented to [facility] ED on 06/13/2024 morning with chief complaint of fever, chills, nausea, vomiting, perivulvar/perineal painful lesion draining pus.     On arrival noted to be febrile 101, tachycardic, hypertensive but only 1 reading, remaining normal to hypotensive.  Labs notable for lactic acid 1.4 > 1.3, WBC 27.06 with neutrophilic shift,  urinalysis contaminated sample with many squamous cells, glucose 353, CO2 23, anion gap 14, creatinine 0.98, CT abdomen pelvis with IV contrast show area of cellulitis involving the perineum on the left side extending to the medial gluteal fold with area of thickening and inflammatory changes in the labia with hypoattenuation concerning for possible abscess CTA chest show no evidence of PE there is right lower lobe atelectasis and small right pleural effusion.She was resuscitated with IV fluids 2 L of NS and 1 L of LR, given ceftriaxone and vancomycin.Transferred to [facility]  and referred to hospital medicine service.    Gen surgery was consulted, pt was taken to OR emergently for debridement for suspected necrotizing fasciitis.She tolerated the procedure well, and there was not evidence of NSTI. She is  s/p R groin incision, drainage, and debridement on 6/14, 6/18 and 6/21 .cx 6/13 ,6/18 w/ MRSA.        Pt developed AUTUMN, Nephrology consulted, pt required initiation of HD 06/17/2024. , pt had tunneled HD cath placement     Interval Hx:   No acute events reported overnight , seen at bedside this morning, no family member in the room, patient was in good spirits, she reported she ambulated in the room, feels energy improving  Reported good wound healing as well, pain tolerable  Discussed ongoing care monitoring renal recovery      Objective/physical exam:  General: In no acute distress, afebrile  Chest: unlabored breathing on room air   Heart: normal rate   Abdomen: Soft, nontender  MSK: Warm  Neurologic: Alert and responding appropriately    VITAL SIGNS: 24 HRS MIN & MAX LAST   Temp  Min: 97.2 °F (36.2 °C)  Max: 98.9 °F (37.2 °C) 97.2 °F (36.2 °C)   BP  Min: 125/73  Max: 181/84 125/73   Pulse  Min: 88  Max: 100  88   Resp  Min: 16  Max: 18 16   SpO2  Min: 93 %  Max: 96 % 95 %     I have reviewed the following labs:  Recent Labs   Lab 06/27/24  0754 06/28/24  0434 06/30/24  0659   WBC 17.18* 15.88* 9.18   RBC 2.65* 2.73* 2.62*   HGB 7.9* 8.1* 7.7*   HCT 24.1* 24.8* 24.6*   MCV 90.9 90.8 93.9   MCH 29.8 29.7 29.4   MCHC 32.8* 32.7* 31.3*   RDW 17.6* 17.7* 17.1*   * 405* 371   MPV 9.6 9.6 9.7     Recent Labs   Lab 06/25/24  0601 06/26/24  0429 06/27/24  0754 06/29/24  0618 06/30/24  0659 07/01/24  0528   * 132*   < > 134* 137 139   K 4.6 5.2*   < > 3.8 4.2 4.4   CL 98 102   < > 99 101 104   CO2 21* 19*   < > 24 26 25   BUN 23.8* 28.0*   < > 27.1* 33.4* 36.4*   CREATININE 4.05* 4.21*   < > 2.44* 2.70* 2.79*   CALCIUM 7.3* 7.3*   < > 8.1* 8.3* 8.1*   MG 1.80  --   --   --   --   --    ALBUMIN 1.6* 1.7*  --   --   --   --    ALKPHOS 221* 216*  --   --   --   --    ALT 23 22  --   --   --   --    AST 30 35*  --   --   --   --    BILITOT 1.3 1.1  --   --   --   --     < > = values in this interval not displayed.     Microbiology Results (last 7 days)       Procedure Component Value Units Date/Time    Blood Culture [[id number]]  (Normal) Collected: 06/22/24 1203    Order Status: Completed Specimen: Blood Updated: 06/27/24 1300     Blood Culture No Growth at 5 days    Fungal Culture [[id number]]  (Abnormal)  (Susceptibility) Collected: 06/18/24 1223    Order Status: Completed Specimen: Tissue from Groin Updated: 06/25/24 0624     Fungal Culture Many Candida tropicalis             See below for Radiology    Scheduled Med:   amLODIPine  10  mg Oral Daily    cloNIDine  0.2 mg Oral TID    enoxaparin  30 mg Subcutaneous Daily    fluconazole  100 mg Oral Daily    insulin glargine U-100  50 Units Subcutaneous QHS    Lactobacillus acidophilus  1 capsule Oral Daily    sodium chloride 0.9%  10 mL Intravenous Q6H      Continuous Infusions:     PRN Meds:    Current Facility-Administered Medications:     0.9%  NaCl infusion (for blood administration), , Intravenous, Q24H PRN    0.9%  NaCl infusion (for blood administration), , Intravenous, Q24H PRN    acetaminophen, 650 mg, Oral, Q4H PRN    albuterol-ipratropium, 3 mL, Nebulization, Q4H PRN    aluminum-magnesium hydroxide-simethicone, 30 mL, Oral, QID PRN    bisacodyL, 10 mg, Rectal, Daily PRN    calcium carbonate, 1,000 mg, Oral, TID PRN    dextrose 10%, 12.5 g, Intravenous, PRN    dextrose 10%, 25 g, Intravenous, PRN    diphenhydrAMINE, 25 mg, Oral, Q6H PRN    glucagon (human recombinant), 1 mg, Intramuscular, PRN    glucose, 16 g, Oral, PRN    glucose, 24 g, Oral, PRN    heparin (porcine), 2,800 Units, Intravenous, PRN    hydrALAZINE, 10 mg, Intravenous, Q4H PRN    insulin aspart U-100, 0-15 Units, Subcutaneous, QID (AC + HS) PRN    metoclopramide, 10 mg, Intravenous, Q8H PRN    polyethylene glycol, 17 g, Oral, BID PRN    prochlorperazine, 2.5 mg, Intravenous, Q6H PRN    prochlorperazine, 5 mg, Intravenous, Q6H PRN    senna-docusate 8.6-50 mg, 2 tablet, Oral, BID PRN    sodium chloride 0.9%, 10 mL, Intravenous, PRN    Flushing PICC/Midline Protocol, , , Until Discontinued **AND** sodium chloride 0.9%, 10 mL, Intravenous, Q6H **AND** sodium chloride 0.9%, 10 mL, Intravenous, PRN    traMADoL, 50 mg, Oral, Q8H PRN     Assessment/Plan:  Perineal cellulitis with necrotizing component , s/p debridement   Acute Renal failure multifactorial -dialysis initiated 6/17   Normocytic Anemia s/p prbc transfusion   Uncontrolled type 2 diabetes mellitus with hyperglycemia  HTN        Nephrology following, continue hemodialysis  per Nephro team   monitor renal indices and renal recovery labs this morning showed BUN 36.4, creatinine 2.79, glucose 164  Patient was treated with vancomycin , clindamycin  and 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 Blood cultures negative thus far, wound cx with MRSA, tissue cultures from grown preliminary Candida   Continue  fluconazole day 4  Continue probiotics  Follow up postop surgery recommendations, wound care  Monitor bp, better controlled  this morning, continue clonidine 0.2  t.i.d., continue amlodipine use iv prn antihypertensives   Tight glycemic control,recommend glucose < 180 ,  lantus to 50 U, cont ISS coverage   Cont therapy services   Case was discussed with patient's nurse  , case management  Labs in AM       VTE prophylaxis: lovenox renally adjusted    Patient condition:  Fair    Anticipated discharge and Disposition:  tbd         _____________________________________________________________________    Nutrition Status:    Radiology:  I have personally reviewed the following imaging and agree with the radiologist.     X-Ray Chest 1 View  Narrative: EXAMINATION:  XR CHEST 1 VIEW    CLINICAL HISTORY:  leukocytosis workup;    COMPARISON:  18 June 2024    FINDINGS:  Frontal view of the chest was obtained. Stable right-sided catheter.  The heart is not significantly enlarged.  Similar right hilar prominence and right basilar opacities.  No pneumothorax.  Impression: Similar appearance of the chest.    Electronically signed by: Issa Luna  Date:    06/22/2024  Time:    13:08      Justine Cortez MD  Department of Hospital Medicine   [facility]   07/01/2024

## 2024-07-01 NOTE — PT/OT/SLP PROGRESS
Physical Therapy      Patient Name:  Greer Kahn   MRN:  [id number]    Patient not seen today secondary to Dialysis. Will follow-up tomorrow as appropriate.

## 2024-07-01 NOTE — PROGRESS NOTES
Inpatient Nutrition Assessment    Admit Date: 6/13/2024   Total duration of encounter: 18 days   Patient Age: 58 y.o.    Nutrition Recommendation/Prescription     Continue 1800 calorie diabetic low potassium diet as tolerated  [facility] provides 475 kcal, 22gm protein per container    Communication of Recommendations: reviewed with patient and reviewed with family    Nutrition Assessment     Malnutrition Assessment/Nutrition-Focused Physical Exam    Malnutrition Context: acute illness or injury (06/20/24 1324)  Malnutrition Level:  (does not meet criteria) (06/20/24 1324)  Energy Intake (Malnutrition):  (does not meet criteria) (07/01/24 1311)  Weight Loss (Malnutrition):  (does not meet criteria) (06/20/24 1324)  Subcutaneous Fat (Malnutrition):  (does not meet criteria) (06/20/24 1324)           Muscle Mass (Malnutrition):  (does not meet criteria) (06/20/24 1324)                                   A minimum of two characteristics is recommended for diagnosis of either severe or non-severe malnutrition.    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  Uncontrolled T2DM with hyperglycemia  Acute Renal failure now anuric - multifactorial secondary to sepsis, contrast exposure, NSAID use inpatient, vancomycin toxicity  -dialysis initiated 6/17    Relevant Medical History: obesity BMI 37, poorly controlled T2DM with last A1c 13 in April 2024     Scheduled Medications:  amLODIPine, 10 mg, Daily  cloNIDine, 0.2 mg, TID  enoxaparin, 30 mg, Daily  fluconazole, 100 mg, Daily  insulin glargine U-100, 50 Units, QHS  Lactobacillus acidophilus, 1 capsule, Daily  sodium  chloride 0.9%, 10 mL, Q6H    Continuous Infusions:   PRN Medications:  0.9%  NaCl infusion (for blood administration), , Q24H PRN  0.9%  NaCl infusion (for blood administration), , Q24H PRN  acetaminophen, 650 mg, Q4H PRN  albuterol-ipratropium, 3 mL, Q4H PRN  aluminum-magnesium hydroxide-simethicone, 30 mL, QID PRN  bisacodyL, 10 mg, Daily PRN  calcium carbonate, 1,000 mg, TID PRN  dextrose 10%, 12.5 g, PRN  dextrose 10%, 25 g, PRN  diphenhydrAMINE, 25 mg, Q6H PRN  glucagon (human recombinant), 1 mg, PRN  glucose, 16 g, PRN  glucose, 24 g, PRN  heparin (porcine), 2,800 Units, PRN  hydrALAZINE, 10 mg, Q4H PRN  insulin aspart U-100, 0-15 Units, QID (AC + HS) PRN  metoclopramide, 10 mg, Q8H PRN  polyethylene glycol, 17 g, BID PRN  prochlorperazine, 2.5 mg, Q6H PRN  prochlorperazine, 5 mg, Q6H PRN  senna-docusate 8.6-50 mg, 2 tablet, BID PRN  sodium chloride 0.9%, 10 mL, PRN  sodium chloride 0.9%, 10 mL, PRN  traMADoL, 50 mg, Q8H PRN    Calorie Containing IV Medications: no significant kcals from medications at this time    Recent Labs   Lab 06/25/24  0601 06/26/24  0429 06/27/24  0754 06/28/24  0434 06/29/24  0618 06/30/24  0659 07/01/24  0528   * 132* 133* 135* 134* 137 139   K 4.6 5.2* 4.4 4.8 3.8 4.2 4.4   CALCIUM 7.3* 7.3* 8.1* 7.9* 8.1* 8.3* 8.1*   PHOS 4.9*  --   --   --   --   --   --    MG 1.80  --   --   --   --   --   --    CO2 21* 19* 20* 23 24 26 25   BUN 23.8* 28.0* 35.6* 43.6* 27.1* 33.4* 36.4*   CREATININE 4.05* 4.21* 4.08* 4.13* 2.44* 2.70* 2.79*   EGFRNORACEVR 12 12 12 12 23 20 19   GLUCOSE 143* 167* 211* 256* 218* 251* 164*   BILITOT 1.3 1.1  --   --   --   --   --    ALKPHOS 221* 216*  --   --   --   --   --    ALT 23 22  --   --   --   --   --    AST 30 35*  --   --   --   --   --    ALBUMIN 1.6* 1.7*  --   --   --   --   --    WBC 17.16* 15.13* 17.18* 15.88*  --  9.18  --    HGB 7.0* 6.5* 7.9* 8.1*  --  7.7*  --    HCT 21.1* 20.8* 24.1* 24.8*  --  24.6*  --      Nutrition Orders:  Diet  "diabetic Low Potassium; 2000 Calorie  Dietary nutrition supplements [facility], [facility] Cafe Mocha, [facility] Vanilla; TID (alternate flavors)    Appetite/Oral Intake: good/% of meals  Factors Affecting Nutritional Intake: none identified  Social Needs Impacting Access to Food: none identified  Food/Baptism/Cultural Preferences: none reported  Food Allergies: no known food allergies  Last Bowel Movement: 24  Wound(s):      Comments    24 pt eating lunch, diet advanced from clear liquid this morning, so far tolerating. Reported some n/v very early this morning but feeling better now. Had been NPO/CL mostly since admit due to n/v. Currently on dialysis x 3 sessions with plans to possibly continue per nephrology. Weight gain noted in EMR, some most likely due to fluid.     24 reports good appetite, tolerating oral diet, drinking [facility] Renal     24 pt tolerating oral diet, good appetite and intake    Anthropometrics    Height: 5' 7.01" (170.2 cm), Height Method: Stated  Last Weight: 130 kg (286 lb 9.6 oz) (24 0626), Weight Method: Bed Scale  BMI (Calculated): 44.9  BMI Classification: obese grade III (BMI >/=40)     Ideal Body Weight (IBW), Female: 135.05 lb     % Ideal Body Weight, Female (lb): 177.84 %                    Usual Body Weight (UBW), k.09 kg  % Usual Body Weight: 119.42     Usual Weight Provided By: patient denies unintentional weight loss    Wt Readings from Last 5 Encounters:   24 130 kg (286 lb 9.6 oz)   24 111.1 kg (245 lb)   23 104.3 kg (230 lb)   19 110 kg (242 lb 8.1 oz)     Weight Change(s) Since Admission:   Wt Readings from Last 1 Encounters:   24 0626 130 kg (286 lb 9.6 oz)   24 0542 125.9 kg (277 lb 9 oz)   24 0740 108.9 kg (240 lb 1.3 oz)   24 0734 108.9 kg (240 lb)   Admit Weight: 108.9 kg (240 lb) (24 0734), Weight Method: Stated    Estimated " Needs    Weight Used For Calorie Calculations: 125.9 kg (277 lb 9 oz)  Energy Calorie Requirements (kcal): 1876 kcal (no stress factor)  Energy Need Method: [place]-St Jeor  Weight Used For Protein Calculations: 125.9 kg (277 lb 9 oz)  Protein Requirements: 100gm (0.8gm/kg)  Fluid Requirements (mL): 1000ml + output (renal on dialysis)  CHO Requirement: 234 gm/day     Enteral Nutrition     Patient not receiving enteral nutrition at this time.    Parenteral Nutrition     Patient not receiving parenteral nutrition support at this time.    Evaluation of Received Nutrient Intake    Calories: meeting estimated needs  Protein: meeting estimated needs    Patient Education     Not applicable.    Nutrition Diagnosis     PES: Inadequate oral intake related to acute illness as evidenced by <50% est energy needs for >/= 5 days. (resolved)         Nutrition Interventions     Intervention(s): modified composition of meals/snacks and collaboration with other providers    Goal: Meet greater than 80% of nutritional needs by follow-up. (goal met)  Goal: Consume % of meals/snacks by follow-up. (goal met)    Nutrition Goals & Monitoring     Dietitian will monitor: food and beverage intake, electrolyte/renal panel, glucose/endocrine profile, and gastrointestinal profile  Discharge planning: continue 1800 calorie diabetic diet  Nutrition Risk/Follow-Up: low (follow-up in 5-7 days)   Please consult if re-assessment needed sooner.

## 2024-07-01 NOTE — PROGRESS NOTES
Nephrology consult follow up note    HPI:      Greer Kahn is a 58 y.o. female admitted on 06/13/2024 with fever 102 reporting possible UTI, boils to right thigh and left buttock.  Lactic acidosis present on admission with WBC 27.0, BUN 9.7, creatinine 0.98.  CT abdomen and pelvis with contrast showed area of cellulitis involving the perineum on the left side extending to the medial gluteal fold and areas of concern within the labia on the left side.  Also noted to have right lower lobe atelectasis via CT chest.  Patient was initiated on vancomycin and Rocephin in ER.     On 06/14 she underwent debridement of left groin and perineum.  Postoperative course complicated by nausea, vomiting and constipation.  She is also developed AUTUMN with rapidly worsening renal indices. Cr 0.98 --> 1.76--> 2.77--> 4.53 at the time of consultation. Urinalysis with low grade proteinuria and no blood.     Patient developed worsening renal function, and anuria.  She was initiated on hemodialysis 06/17/2024.    She has now undergone surgical debridement on 6/14, 6/18, and 6/21.    Interval history:     She is being closely monitored for renal recovery. There has been marked increase in urine production but azotemia continues to rise in between dialysis sessions.     Review of Systems:       Past medical, family, surgical, and social history reviewed and unchanged from initial consult note.     Objective:       VITAL SIGNS: 24 HR MIN & MAX LAST    Temp  Min: 97.2 °F (36.2 °C)  Max: 98.9 °F (37.2 °C)  97.2 °F (36.2 °C)        BP  Min: 125/73  Max: 175/91  125/73     Pulse  Min: 88  Max: 100  88     Resp  Min: 16  Max: 18  16    SpO2  Min: 93 %  Max: 95 %  95 %      Physical Exam  Constitutional:       General: She is not in acute distress.     Appearance: She is obese.   HENT:      Head: Atraumatic.      Nose: Nose normal.      Mouth/Throat:      Mouth: Mucous membranes are moist.   Eyes:      Extraocular Movements: Extraocular  movements intact.      Conjunctiva/sclera: Conjunctivae normal.   Neck:      Comments: R IJ temporary dialysis catheter   Cardiovascular:      Rate and Rhythm: Normal rate and regular rhythm.      Pulses: Normal pulses.      Heart sounds: Normal heart sounds.   Pulmonary:      Effort: Pulmonary effort is normal.      Breath sounds: Normal breath sounds.   Abdominal:      General: Bowel sounds are normal.      Palpations: Abdomen is soft.   Genitourinary:     Comments: Urinary catheter draining dark yellow urine  Musculoskeletal:         General: No swelling.      Cervical back: Neck supple.   Skin:     General: Skin is warm.   Neurological:      General: No focal deficit present.      Mental Status: She is alert and oriented to person, place, and time.   Psychiatric:         Mood and Affect: Mood normal.         Behavior: Behavior normal.                 Component Value Date/Time     07/01/2024 0528     06/30/2024 0659     (L) 06/26/2020 0441     06/25/2020 0615    K 4.4 07/01/2024 0528    K 4.2 06/30/2024 0659    K 3.7 06/26/2020 0441    K 3.5 06/25/2020 0615    CO2 25 07/01/2024 0528    CO2 26 06/30/2024 0659    CO2 26 06/26/2020 0441    CO2 27 06/25/2020 0615    BUN 36.4 (H) 07/01/2024 0528    BUN 33.4 (H) 06/30/2024 0659    BUN 12 06/26/2020 0441    BUN 9 06/25/2020 0615    CREATININE 2.79 (H) 07/01/2024 0528    CREATININE 2.70 (H) 06/30/2024 0659    CREATININE 0.68 06/26/2020 0441    CREATININE 0.58 06/25/2020 0615    CALCIUM 8.1 (L) 07/01/2024 0528    CALCIUM 8.3 (L) 06/30/2024 0659    CALCIUM 8.2 (L) 06/26/2020 0441    CALCIUM 7.7 (L) 06/25/2020 0615    PHOS 4.9 (H) 06/25/2024 0601            Component Value Date/Time    WBC 9.18 06/30/2024 0659    WBC 15.88 (H) 06/28/2024 0434    WBC 22.37 06/24/2024 0343    WBC 28.96 06/23/2024 0451    HGB 7.7 (L) 06/30/2024 0659    HGB 8.1 (L) 06/28/2024 0434    HCT 24.6 (L) 06/30/2024 0659    HCT 24.8 (L) 06/28/2024 0434    HCT 42.0 04/16/2019 3283     HCT 42.0 04/16/2019 2305     06/30/2024 0659     (H) 06/28/2024 0434       Imaging reviewed      Assessment / Plan:     Acute Renal failure multifactorial secondary to sepsis, contrast exposure, NSAID use inpatient, vancomycin toxicity  -dialysis initiated 6/17.     Perineal Cellulitis with necrotizing component - status post debridement 6/14, 6/18 and 6/21   DM II uncontrolled - A1C 11  Worsening anemia     - She will dialyze today. Hope to not need much more dialysis.   We will continue to monitor closely.

## 2024-07-02 LAB
ANION GAP SERPL CALC-SCNC: 7 MEQ/L
BUN SERPL-MCNC: 20.2 MG/DL (ref 9.8–20.1)
CALCIUM SERPL-MCNC: 8.1 MG/DL (ref 8.4–10.2)
CHLORIDE SERPL-SCNC: 103 MMOL/L (ref 98–107)
CO2 SERPL-SCNC: 28 MMOL/L (ref 22–29)
CREAT SERPL-MCNC: 1.92 MG/DL (ref 0.55–1.02)
CREAT/UREA NIT SERPL: 11
GFR SERPLBLD CREATININE-BSD FMLA CKD-EPI: 30 ML/MIN/1.73/M2
GLUCOSE SERPL-MCNC: 127 MG/DL (ref 74–100)
POCT GLUCOSE: 206 MG/DL (ref 70–110)
POCT GLUCOSE: 207 MG/DL (ref 70–110)
POCT GLUCOSE: 78 MG/DL (ref 70–110)
POTASSIUM SERPL-SCNC: 4.1 MMOL/L (ref 3.5–5.1)
SODIUM SERPL-SCNC: 138 MMOL/L (ref 136–145)

## 2024-07-02 PROCEDURE — A4216 STERILE WATER/SALINE, 10 ML: HCPCS | Performed by: INTERNAL MEDICINE

## 2024-07-02 PROCEDURE — 21400001 HC TELEMETRY ROOM

## 2024-07-02 PROCEDURE — 25000003 PHARM REV CODE 250: Performed by: INTERNAL MEDICINE

## 2024-07-02 PROCEDURE — 25000003 PHARM REV CODE 250: Performed by: NURSE PRACTITIONER

## 2024-07-02 PROCEDURE — 97116 GAIT TRAINING THERAPY: CPT

## 2024-07-02 PROCEDURE — 63600175 PHARM REV CODE 636 W HCPCS: Performed by: INTERNAL MEDICINE

## 2024-07-02 PROCEDURE — 63700000 PHARM REV CODE 250 ALT 637 W/O HCPCS: Performed by: INTERNAL MEDICINE

## 2024-07-02 PROCEDURE — 36415 COLL VENOUS BLD VENIPUNCTURE: CPT | Performed by: INTERNAL MEDICINE

## 2024-07-02 PROCEDURE — 97535 SELF CARE MNGMENT TRAINING: CPT

## 2024-07-02 PROCEDURE — 25000003 PHARM REV CODE 250: Performed by: STUDENT IN AN ORGANIZED HEALTH CARE EDUCATION/TRAINING PROGRAM

## 2024-07-02 PROCEDURE — 63600175 PHARM REV CODE 636 W HCPCS: Performed by: STUDENT IN AN ORGANIZED HEALTH CARE EDUCATION/TRAINING PROGRAM

## 2024-07-02 PROCEDURE — 27000207 HC ISOLATION

## 2024-07-02 PROCEDURE — 80048 BASIC METABOLIC PNL TOTAL CA: CPT | Performed by: INTERNAL MEDICINE

## 2024-07-02 RX ORDER — SODIUM CHLORIDE 9 MG/ML
INJECTION, SOLUTION INTRAVENOUS CONTINUOUS
Status: DISCONTINUED | OUTPATIENT
Start: 2024-07-02 | End: 2024-07-03

## 2024-07-02 RX ORDER — CARVEDILOL 3.12 MG/1
3.12 TABLET ORAL 2 TIMES DAILY
Status: DISCONTINUED | OUTPATIENT
Start: 2024-07-02 | End: 2024-07-03

## 2024-07-02 RX ADMIN — FLUCONAZOLE 100 MG: 100 TABLET ORAL at 09:07

## 2024-07-02 RX ADMIN — PROCHLORPERAZINE EDISYLATE 5 MG: 5 INJECTION INTRAMUSCULAR; INTRAVENOUS at 11:07

## 2024-07-02 RX ADMIN — CLONIDINE HYDROCHLORIDE 0.2 MG: 0.2 TABLET ORAL at 09:07

## 2024-07-02 RX ADMIN — SODIUM CHLORIDE, PRESERVATIVE FREE 10 ML: 5 INJECTION INTRAVENOUS at 05:07

## 2024-07-02 RX ADMIN — INSULIN ASPART 6 UNITS: 100 INJECTION, SOLUTION INTRAVENOUS; SUBCUTANEOUS at 05:07

## 2024-07-02 RX ADMIN — CLONIDINE HYDROCHLORIDE 0.2 MG: 0.2 TABLET ORAL at 03:07

## 2024-07-02 RX ADMIN — SODIUM CHLORIDE: 9 INJECTION, SOLUTION INTRAVENOUS at 11:07

## 2024-07-02 RX ADMIN — SODIUM CHLORIDE, PRESERVATIVE FREE 10 ML: 5 INJECTION INTRAVENOUS at 11:07

## 2024-07-02 RX ADMIN — Medication 1 CAPSULE: at 09:07

## 2024-07-02 RX ADMIN — AMLODIPINE BESYLATE 10 MG: 5 TABLET ORAL at 09:07

## 2024-07-02 RX ADMIN — TRAMADOL HYDROCHLORIDE 50 MG: 50 TABLET, COATED ORAL at 04:07

## 2024-07-02 RX ADMIN — CARVEDILOL 3.12 MG: 3.12 TABLET, FILM COATED ORAL at 04:07

## 2024-07-02 RX ADMIN — ENOXAPARIN SODIUM 30 MG: 30 INJECTION SUBCUTANEOUS at 04:07

## 2024-07-02 RX ADMIN — INSULIN GLARGINE 50 UNITS: 100 INJECTION, SOLUTION SUBCUTANEOUS at 08:07

## 2024-07-02 RX ADMIN — DIPHENHYDRAMINE HYDROCHLORIDE 25 MG: 25 CAPSULE ORAL at 11:07

## 2024-07-02 RX ADMIN — ACETAMINOPHEN 650 MG: 325 TABLET, FILM COATED ORAL at 02:07

## 2024-07-02 RX ADMIN — CLONIDINE HYDROCHLORIDE 0.2 MG: 0.2 TABLET ORAL at 08:07

## 2024-07-02 NOTE — PT/OT/SLP PROGRESS
Physical Therapy Treatment    Patient Name:  Greer Kahn   MRN:  [id number]    Recommendations:     Discharge therapy intensity: Moderate Intensity Therapy   Discharge Equipment Recommendations: to be determined by next level of care  Barriers to discharge: Impaired mobility and Ongoing medical needs    Assessment:     Greer Kahn is a 58 y.o. female admitted with a medical diagnosis of groin infection s/p urgent I&D 6/14 and 6/18, severe sepsis, acute metabolic encephalopathy.  She presents with the following impairments/functional limitations: weakness, gait instability, impaired balance, pain, impaired functional mobility. Pt with improved mobility today. Pt req Min A for transfers and amb 110ft CGA with RW. Still recommending moderate intensity therapy to help pt regain independence.     Rehab Prognosis: Good; patient would benefit from acute skilled PT services to address these deficits and reach maximum level of function.    Recent Surgery: Procedure(s) (LRB):  DEBRIDEMENT, WOUND (Left) 11 Days Post-Op    Plan:     During this hospitalization, patient would benefit from acute PT services 5 x/week to address the identified rehab impairments via gait training, therapeutic activities, therapeutic exercises, neuromuscular re-education and progress toward the following goals:    Plan of Care Expires:  07/19/24    Subjective     Chief Complaint: nasuea  Patient/Family Comments/goals: PLOF  Pain/Comfort:         Objective:     Communicated with RN prior to session.  Patient found supine with peripheral IV, pulse ox (continuous), telemetry, castaneda catheter, PICC line upon PT entry to room.     General Precautions: Standard, contact, fall  Orthopedic Precautions:    Braces:    Respiratory Status: Room air  Blood Pressure: NT  Skin Integrity: Visible skin intact      Functional Mobility:  Bed Mobility:     Supine to Sit: minimum assistance  Transfers:     Sit to Stand:  minimum assistance with rolling  walker  Gait: Pt amb 55ft + 55ft CGA with RW demo steady step to gait pattern, decreased sixto, and no LOB noted. 1 standing break in between trials.      Therapeutic Activities/Exercises:      Education:  Patient provided with verbal education education regarding PT role/goals/POC.  Understanding was verbalized.     Patient left up in chair with all lines intact, call button in reach, geomat cushion, and RN notified    GOALS:   Multidisciplinary Problems       Physical Therapy Goals          Problem: Physical Therapy    Goal Priority Disciplines Outcome Goal Variances Interventions   Physical Therapy Goal     PT, PT/OT Progressing     Description: Goals to be met by: 2024     Patient will increase functional independence with mobility by performin. Supine to sit with MInimal Assistance  2. Sit to supine with MInimal Assistance  3. Sitting at edge of bed x10 minutes with Stand-by Assistance  4. Pt with perform sit to stand transfer with RW Mod I.   5. Pt able to amb 150 ft with RW mod I.                          Time Tracking:     PT Received On: 24  PT Start Time: 1355     PT Stop Time: 1420  PT Total Time (min): 25 min     Billable Minutes: Gait Training 25    Treatment Type: Treatment  PT/PTA: PT     Number of PTA visits since last PT visit: 2024

## 2024-07-02 NOTE — PT/OT/SLP PROGRESS
Occupational Therapy   Treatment    Name: Greer Kahn  MRN: [id number]  Admitting Diagnosis:  Perineal abscess  11 Days Post-Op    Recommendations:     Recommended therapy intensity at discharge: Moderate Intensity Therapy   Discharge Equipment Recommendations:  to be determined by next level of care  Barriers to discharge:  Decreased caregiver support    Assessment:     Greer Kahn is a 58 y.o. female with a medical diagnosis of perineal cellulitis with necrotizing component s/p debridement 6/14, 6/18, and 6/21, acute renal failure with initiation of HD 6/17, HTN.  She presents with the following performance deficits affecting function are weakness, impaired self care skills, impaired functional mobility, gait instability, impaired balance, pain.     Rehab Prognosis:  Good; patient would benefit from acute skilled OT services to address these deficits and reach maximum level of function.       Plan:     Patient to be seen 4 x/week to address the above listed problems via self-care/home management, therapeutic activities, therapeutic exercises  Plan of Care Expires: 07/19/24  Plan of Care Reviewed with: patient    Subjective     Pain/Comfort:  Pain Rating 1: 0/10    Objective:     Communicated with: RN prior to session.  Patient found up in chair with peripheral IV, telemetry, castaneda catheter, PICC line, pulse ox (continuous) upon OT entry to room.    General Precautions: Standard, contact, fall    Orthopedic Precautions:N/A  Braces: N/A  Respiratory Status: Room air     Occupational Performance:     Functional Mobility/Transfers:  Patient completed Sit <> Stand Transfer with moderate assistance  with  rolling walker   Functional Mobility: min-CGA for mobility to/from bathroom sink using RW    Activities of Daily Living:  Grooming: contact guard assistance standing at sink for oral care  Lower Body Dressing: minimum assistance for doffing/donning socks using sock aid and reacher following demo and  instruction    Therapeutic Positioning    OT interventions performed during the course of today's session in an effort to prevent and/or reduce acquired pressure injuries:   Education was provided on benefits of and recommendations for therapeutic positioning  Therapeutic positioning was provided at the conclusion of session to offload all bony prominences for the prevention and/or reduction of pressure injuries    Skin assessment: known altered skin at perineum    Patient Education:  Patient provided with verbal education education regarding OT role/goals/POC, fall prevention, and safety awareness.  Understanding was verbalized.    Patient left up in chair with all lines intact, call button in reach, and geomat cushion.    GOALS:   Multidisciplinary Problems       Occupational Therapy Goals          Problem: Occupational Therapy    Goal Priority Disciplines Outcome Interventions   Occupational Therapy Goal     OT, PT/OT Progressing    Description: Goals to be met by: 7/20/24     Patient will increase functional independence with ADLs by performing:    UE Dressing with Stand-by Assistance.  LE Dressing with Stand-by Assistance.  Grooming while standing at sink with Stand-by Assistance.  Toileting from toilet with Stand-by Assistance for hygiene and clothing management.   Toilet transfer to toilet with Stand-by Assistance.                         Time Tracking:     OT Date of Treatment: 07/02/24  OT Start Time: 1515  OT Stop Time: 1532  OT Total Time (min): 17 min    Billable Minutes:Self Care/Home Management 17 mins    OT/MAKENZIE: OT     Number of MAKENZIE visits since last OT visit: 5 7/2/2024

## 2024-07-02 NOTE — PROGRESS NOTES
[facility]  Hospital Medicine Progress Note        Chief Complaint: Inpatient Follow-up     HPI:    58-year-old female medical history notable for obesity BMI 37, poorly controlled T2DM with last A1c 13 in April 2024 presented to [facility] ED on 06/13/2024 morning with chief complaint of fever, chills, nausea, vomiting, perivulvar/perineal painful lesion draining pus.     On arrival noted to be febrile 101, tachycardic, hypertensive but only 1 reading, remaining normal to hypotensive.  Labs notable for lactic acid 1.4 > 1.3, WBC 27.06 with neutrophilic shift,  urinalysis contaminated sample with many squamous cells, glucose 353, CO2 23, anion gap 14, creatinine 0.98, CT abdomen pelvis with IV contrast show area of cellulitis involving the perineum on the left side extending to the medial gluteal fold with area of thickening and inflammatory changes in the labia with hypoattenuation concerning for possible abscess CTA chest show no evidence of PE there is right lower lobe atelectasis and small right pleural effusion.She was resuscitated with IV fluids 2 L of NS and 1 L of LR, given ceftriaxone and vancomycin.Transferred to [facility]  and referred to hospital medicine service.    Gen surgery was consulted, pt was taken to OR emergently for debridement for suspected necrotizing fasciitis.She tolerated the procedure well, and there was not evidence of NSTI. She is  s/p R groin incision, drainage, and debridement on 6/14, 6/18 and 6/21 .cx 6/13 ,6/18 w/ MRSA.        Pt developed AUTUMN, Nephrology consulted, pt required initiation of HD 06/17/2024. , pt had tunneled HD cath placement     Interval Hx:   No acute events reported overnight , seen at bedside this morning, patient reported feeling some nausea and had episode of vomiting, denied any abdominal pain  Of note patient had dialysis session yesterday.         Objective/physical exam:  General: In no acute distress, afebrile  Chest: unlabored  breathing on room air   Heart: normal rate   Abdomen: Soft, nontender  : Huff in place  MSK: Warm  Neurologic: Alert and responding appropriately    VITAL SIGNS: 24 HRS MIN & MAX LAST   Temp  Min: 97.2 °F (36.2 °C)  Max: 98.8 °F (37.1 °C) 98.4 °F (36.9 °C)   BP  Min: 137/86  Max: 156/86 (!) 149/87   Pulse  Min: 74  Max: 92  75   Resp  Min: 18  Max: 20 20   SpO2  Min: 95 %  Max: 96 % 96 %     I have reviewed the following labs:  Recent Labs   Lab 06/27/24  0754 06/28/24  0434 06/30/24  0659   WBC 17.18* 15.88* 9.18   RBC 2.65* 2.73* 2.62*   HGB 7.9* 8.1* 7.7*   HCT 24.1* 24.8* 24.6*   MCV 90.9 90.8 93.9   MCH 29.8 29.7 29.4   MCHC 32.8* 32.7* 31.3*   RDW 17.6* 17.7* 17.1*   * 405* 371   MPV 9.6 9.6 9.7     Recent Labs   Lab 06/26/24  0429 06/27/24  0754 06/30/24  0659 07/01/24  0528 07/02/24  0352   *   < > 137 139 138   K 5.2*   < > 4.2 4.4 4.1      < > 101 104 103   CO2 19*   < > 26 25 28   BUN 28.0*   < > 33.4* 36.4* 20.2*   CREATININE 4.21*   < > 2.70* 2.79* 1.92*   CALCIUM 7.3*   < > 8.3* 8.1* 8.1*   ALBUMIN 1.7*  --   --   --   --    ALKPHOS 216*  --   --   --   --    ALT 22  --   --   --   --    AST 35*  --   --   --   --    BILITOT 1.1  --   --   --   --     < > = values in this interval not displayed.     Microbiology Results (last 7 days)       Procedure Component Value Units Date/Time    Blood Culture [[id number]]  (Normal) Collected: 06/22/24 1203    Order Status: Completed Specimen: Blood Updated: 06/27/24 1300     Blood Culture No Growth at 5 days             See below for Radiology    Scheduled Med:   amLODIPine  10 mg Oral Daily    cloNIDine  0.2 mg Oral TID    enoxaparin  30 mg Subcutaneous Daily    fluconazole  100 mg Oral Daily    insulin glargine U-100  50 Units Subcutaneous QHS    Lactobacillus acidophilus  1 capsule Oral Daily    sodium chloride 0.9%  10 mL Intravenous Q6H      Continuous Infusions:     PRN Meds:    Current Facility-Administered Medications:     0.9%  NaCl  infusion (for blood administration), , Intravenous, Q24H PRN    0.9%  NaCl infusion (for blood administration), , Intravenous, Q24H PRN    acetaminophen, 650 mg, Oral, Q4H PRN    albuterol-ipratropium, 3 mL, Nebulization, Q4H PRN    aluminum-magnesium hydroxide-simethicone, 30 mL, Oral, QID PRN    bisacodyL, 10 mg, Rectal, Daily PRN    calcium carbonate, 1,000 mg, Oral, TID PRN    dextrose 10%, 12.5 g, Intravenous, PRN    dextrose 10%, 25 g, Intravenous, PRN    diphenhydrAMINE, 25 mg, Oral, Q6H PRN    glucagon (human recombinant), 1 mg, Intramuscular, PRN    glucose, 16 g, Oral, PRN    glucose, 24 g, Oral, PRN    heparin (porcine), 2,800 Units, Intravenous, PRN    hydrALAZINE, 10 mg, Intravenous, Q4H PRN    insulin aspart U-100, 0-15 Units, Subcutaneous, QID (AC + HS) PRN    metoclopramide, 10 mg, Intravenous, Q8H PRN    polyethylene glycol, 17 g, Oral, BID PRN    prochlorperazine, 2.5 mg, Intravenous, Q6H PRN    prochlorperazine, 5 mg, Intravenous, Q6H PRN    senna-docusate 8.6-50 mg, 2 tablet, Oral, BID PRN    sodium chloride 0.9%, 10 mL, Intravenous, PRN    Flushing PICC/Midline Protocol, , , Until Discontinued **AND** sodium chloride 0.9%, 10 mL, Intravenous, Q6H **AND** sodium chloride 0.9%, 10 mL, Intravenous, PRN    traMADoL, 50 mg, Oral, Q8H PRN     Assessment/Plan:  Perineal cellulitis with necrotizing component , s/p debridement   Acute Renal failure multifactorial -dialysis initiated 6/17   Normocytic Anemia s/p prbc transfusion   Uncontrolled type 2 diabetes mellitus with hyperglycemia  HTN      Labs from this morning showed BUN 20.2, creatinine 1.92, K 4.1 bicarb 28  Case discussed with the Nephrology NP this morning ,recommended IV fluids due to nausea/vomiting, Nephrology following, hemodialysis per Nephro   Patient's renal recovery is being monitored  Patient was treated with vancomycin , clindamycin  and 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 Blood cultures negative thus far, wound cx with MRSA,  tissue cultures from grown preliminary Candida   Continue  fluconazole day 5  Continue probiotics  Follow up postop surgery recommendations, wound care  Monitor bp,  continue clonidine 0.2  t.i.d., continue amlodipine, add low-dose Coreg use iv prn antihypertensives   Blood sugars improved, 127 a.m. glucose,recommend glucose < 180 ,  lantus  50 U, cont ISS coverage   Cont therapy services   Supportive care with antiemetics, analgesics  Case was discussed with patient's nurse  , case management  Labs in AM       VTE prophylaxis: lovenox renally adjusted    Patient condition:  Fair    Anticipated discharge and Disposition:  tbd         _____________________________________________________________________    Nutrition Status:    Radiology:  I have personally reviewed the following imaging and agree with the radiologist.     X-Ray Chest 1 View  Narrative: EXAMINATION:  XR CHEST 1 VIEW    CLINICAL HISTORY:  leukocytosis workup;    COMPARISON:  18 June 2024    FINDINGS:  Frontal view of the chest was obtained. Stable right-sided catheter.  The heart is not significantly enlarged.  Similar right hilar prominence and right basilar opacities.  No pneumothorax.  Impression: Similar appearance of the chest.    Electronically signed by: Issa Luna  Date:    06/22/2024  Time:    13:08      Justine Cortez MD  Department of Hospital Medicine   [facility]   07/02/2024

## 2024-07-02 NOTE — PROGRESS NOTES
[facility] - 9th Floor Med Surg  Wound Care    Patient Name:  Greer Kahn   MRN:  [id number]  Date: 7/2/2024  Diagnosis: Perineal abscess    History:     Past Medical History:   Diagnosis Date    Asthma     Diabetes mellitus     Hypertension        Social History     Socioeconomic History    Marital status: Single   Tobacco Use    Smoking status: Never    Smokeless tobacco: Never   Substance and Sexual Activity    Alcohol use: Never    Drug use: Never    Sexual activity: Not Currently     Social Determinants of Health     Financial Resource Strain: High Risk (6/14/2024)    Overall Financial Resource Strain (CARDIA)     Difficulty of Paying Living Expenses: Hard   Food Insecurity: Food Insecurity Present (6/14/2024)    Hunger Vital Sign     Worried About Running Out of Food in the Last Year: Often true     Ran Out of Food in the Last Year: Never true   Transportation Needs: No Transportation Needs (6/14/2024)    TRANSPORTATION NEEDS     Transportation : No   Physical Activity: Unknown (6/14/2024)    Exercise Vital Sign     Days of Exercise per Week: 0 days   Stress: Stress Concern Present (6/14/2024)    [employer] - Occupational Stress Questionnaire     Feeling of Stress : To some extent   Housing Stability: High Risk (6/14/2024)    Housing Stability Vital Sign     Unable to Pay for Housing in the Last Year: Yes     Homeless in the Last Year: No       Precautions:     Allergies as of 06/13/2024    (No Known Allergies)       WOC Assessment Details/Treatment        07/02/24 1118   WOCN Assessment   Visit Date 07/02/24   Visit Time 1118   Consult Type Follow Up   WOCN Speciality Wound   Wound surgical   Intervention assessed;chart review   Skin Interventions   Device Skin Pressure Protection absorbent pad utilized/changed        Wound 06/14/24 Incision Left Perineum vertical   Date First Assessed: 06/14/24   Primary Wound Type: Incision  Side: Left  Location: Perineum   Incision Type: vertical  Closure Method: Other (see comments)  Additional Comments: packed with kelix soaked betadine   Wound Image     Dressing Appearance Moist drainage   Drainage Amount Moderate   Drainage Characteristics/Odor Serosanguineous   Appearance Red;Yellow   Red (%), Wound Tissue Color 75 %   Yellow (%), Wound Tissue Color 25 %   Periwound Area Moist   Wound Edges Defined   Wound Length (cm) 18 cm   Wound Width (cm) 5 cm   Wound Depth (cm) 6 cm   Wound Volume (cm^3) 540 cm^3   Wound Surface Area (cm^2) 90 cm^2   Care Cleansed with:;Sterile normal saline   Dressing Applied;Gauze, wet to moist       Wound care consulted for left groin wound.  Continue with current treatment recommendations put into place. Left groin: Cleanse with vashe. Pack with vashe moistened 4x4, Abd pad, secure with medipore tape. Change BID and PRN soilage. No adult briefs while in bed. Nursing to continue with preventative measures. Will follow up.      07/02/2024

## 2024-07-02 NOTE — PROGRESS NOTES
Nephrology consult follow up note    HPI:      rGeer Kahn is a 58 y.o. female admitted on 06/13/2024 with fever 102 reporting possible UTI, boils to right thigh and left buttock.  Lactic acidosis present on admission with WBC 27.0, BUN 9.7, creatinine 0.98.  CT abdomen and pelvis with contrast showed area of cellulitis involving the perineum on the left side extending to the medial gluteal fold and areas of concern within the labia on the left side.  Also noted to have right lower lobe atelectasis via CT chest.  Patient was initiated on vancomycin and Rocephin in ER.     On 06/14 she underwent debridement of left groin and perineum.  Postoperative course complicated by nausea, vomiting and constipation.  She is also developed AUTUMN with rapidly worsening renal indices. Cr 0.98 --> 1.76--> 2.77--> 4.53 at the time of consultation. Urinalysis with low grade proteinuria and no blood.     Patient developed worsening renal function, and anuria.  She was initiated on hemodialysis 06/17/2024.    She has now undergone surgical debridement on 6/14, 6/18, and 6/21.    Interval history:     She is being closely monitored for renal recovery. There has been marked increase in urine production but azotemia continued to rise in between dialysis sessions prompting further dialysis yesterday. She is nauseous today and has vomited once.     Review of Systems:       Past medical, family, surgical, and social history reviewed and unchanged from initial consult note.     Objective:       VITAL SIGNS: 24 HR MIN & MAX LAST    Temp  Min: 97.2 °F (36.2 °C)  Max: 98.8 °F (37.1 °C)  98.4 °F (36.9 °C)        BP  Min: 137/86  Max: 156/86  (!) 149/87     Pulse  Min: 74  Max: 92  75     Resp  Min: 18  Max: 20  20    SpO2  Min: 95 %  Max: 96 %  96 %      Physical Exam  Constitutional:       General: She is not in acute distress.     Appearance: She is obese.   HENT:      Head: Atraumatic.      Nose: Nose normal.      Mouth/Throat:      Mouth:  Mucous membranes are moist.   Eyes:      Extraocular Movements: Extraocular movements intact.      Conjunctiva/sclera: Conjunctivae normal.   Neck:      Comments: R IJ temporary dialysis catheter   Cardiovascular:      Rate and Rhythm: Normal rate and regular rhythm.      Pulses: Normal pulses.      Heart sounds: Normal heart sounds.   Pulmonary:      Effort: Pulmonary effort is normal.      Breath sounds: Normal breath sounds.   Abdominal:      General: Bowel sounds are normal.      Palpations: Abdomen is soft.   Genitourinary:     Comments: Urinary catheter draining dark yellow urine  Musculoskeletal:         General: No swelling.      Cervical back: Neck supple.   Skin:     General: Skin is warm.   Neurological:      General: No focal deficit present.      Mental Status: She is alert and oriented to person, place, and time.   Psychiatric:         Mood and Affect: Mood normal.         Behavior: Behavior normal.                 Component Value Date/Time     07/02/2024 0352     07/01/2024 0528     (L) 06/26/2020 0441     06/25/2020 0615    K 4.1 07/02/2024 0352    K 4.4 07/01/2024 0528    K 3.7 06/26/2020 0441    K 3.5 06/25/2020 0615    CO2 28 07/02/2024 0352    CO2 25 07/01/2024 0528    CO2 26 06/26/2020 0441    CO2 27 06/25/2020 0615    BUN 20.2 (H) 07/02/2024 0352    BUN 36.4 (H) 07/01/2024 0528    BUN 12 06/26/2020 0441    BUN 9 06/25/2020 0615    CREATININE 1.92 (H) 07/02/2024 0352    CREATININE 2.79 (H) 07/01/2024 0528    CREATININE 0.68 06/26/2020 0441    CREATININE 0.58 06/25/2020 0615    CALCIUM 8.1 (L) 07/02/2024 0352    CALCIUM 8.1 (L) 07/01/2024 0528    CALCIUM 8.2 (L) 06/26/2020 0441    CALCIUM 7.7 (L) 06/25/2020 0615    PHOS 4.9 (H) 06/25/2024 0601            Component Value Date/Time    WBC 9.18 06/30/2024 0659    WBC 15.88 (H) 06/28/2024 0434    WBC 22.37 06/24/2024 0343    WBC 28.96 06/23/2024 0451    HGB 7.7 (L) 06/30/2024 0659    HGB 8.1 (L) 06/28/2024 0434    HCT 24.6 (L)  06/30/2024 0659    HCT 24.8 (L) 06/28/2024 0434    HCT 42.0 04/16/2019 2328    HCT 42.0 04/16/2019 2305     06/30/2024 0659     (H) 06/28/2024 0434       Imaging reviewed      Assessment / Plan:     Acute Renal failure multifactorial secondary to sepsis, contrast exposure, NSAID use inpatient, vancomycin toxicity  -dialysis initiated 6/17.     Perineal Cellulitis with necrotizing component - status post debridement 6/14, 6/18 and 6/21   DM II uncontrolled - A1C 11  Worsening anemia     Hold dialysis tomorrow until labs and urine output reviewed.   Due to emesis and no fluid intake yet today I will start NS at 65 mL.hr  Discussed with patient. Verbalized understanding.

## 2024-07-03 LAB
ANION GAP SERPL CALC-SCNC: 8 MEQ/L
BUN SERPL-MCNC: 27.9 MG/DL (ref 9.8–20.1)
CALCIUM SERPL-MCNC: 8.6 MG/DL (ref 8.4–10.2)
CHLORIDE SERPL-SCNC: 102 MMOL/L (ref 98–107)
CO2 SERPL-SCNC: 28 MMOL/L (ref 22–29)
CREAT SERPL-MCNC: 2.34 MG/DL (ref 0.55–1.02)
CREAT/UREA NIT SERPL: 12
GFR SERPLBLD CREATININE-BSD FMLA CKD-EPI: 24 ML/MIN/1.73/M2
GLUCOSE SERPL-MCNC: 164 MG/DL (ref 74–100)
POCT GLUCOSE: 103 MG/DL (ref 70–110)
POCT GLUCOSE: 140 MG/DL (ref 70–110)
POCT GLUCOSE: 155 MG/DL (ref 70–110)
POCT GLUCOSE: 157 MG/DL (ref 70–110)
POTASSIUM SERPL-SCNC: 4.3 MMOL/L (ref 3.5–5.1)
SODIUM SERPL-SCNC: 138 MMOL/L (ref 136–145)

## 2024-07-03 PROCEDURE — A4216 STERILE WATER/SALINE, 10 ML: HCPCS | Performed by: INTERNAL MEDICINE

## 2024-07-03 PROCEDURE — 97168 OT RE-EVAL EST PLAN CARE: CPT

## 2024-07-03 PROCEDURE — 25000003 PHARM REV CODE 250: Performed by: NURSE PRACTITIONER

## 2024-07-03 PROCEDURE — 97535 SELF CARE MNGMENT TRAINING: CPT

## 2024-07-03 PROCEDURE — 27000207 HC ISOLATION

## 2024-07-03 PROCEDURE — 63700000 PHARM REV CODE 250 ALT 637 W/O HCPCS: Performed by: INTERNAL MEDICINE

## 2024-07-03 PROCEDURE — 21400001 HC TELEMETRY ROOM

## 2024-07-03 PROCEDURE — 80048 BASIC METABOLIC PNL TOTAL CA: CPT | Performed by: NURSE PRACTITIONER

## 2024-07-03 PROCEDURE — 25000003 PHARM REV CODE 250: Performed by: INTERNAL MEDICINE

## 2024-07-03 PROCEDURE — 97164 PT RE-EVAL EST PLAN CARE: CPT

## 2024-07-03 PROCEDURE — 63600175 PHARM REV CODE 636 W HCPCS: Performed by: STUDENT IN AN ORGANIZED HEALTH CARE EDUCATION/TRAINING PROGRAM

## 2024-07-03 PROCEDURE — 36415 COLL VENOUS BLD VENIPUNCTURE: CPT | Performed by: NURSE PRACTITIONER

## 2024-07-03 RX ORDER — CARVEDILOL 3.12 MG/1
6.25 TABLET ORAL 2 TIMES DAILY
Status: DISCONTINUED | OUTPATIENT
Start: 2024-07-03 | End: 2024-07-09 | Stop reason: HOSPADM

## 2024-07-03 RX ORDER — HYDRALAZINE HYDROCHLORIDE 50 MG/1
50 TABLET, FILM COATED ORAL EVERY 8 HOURS
Status: DISCONTINUED | OUTPATIENT
Start: 2024-07-03 | End: 2024-07-06

## 2024-07-03 RX ORDER — PROCHLORPERAZINE EDISYLATE 5 MG/ML
2.5 INJECTION INTRAMUSCULAR; INTRAVENOUS EVERY 6 HOURS PRN
Status: DISCONTINUED | OUTPATIENT
Start: 2024-06-14 | End: 2024-07-09 | Stop reason: HOSPADM

## 2024-07-03 RX ADMIN — CLONIDINE HYDROCHLORIDE 0.2 MG: 0.2 TABLET ORAL at 06:07

## 2024-07-03 RX ADMIN — CLONIDINE HYDROCHLORIDE 0.2 MG: 0.2 TABLET ORAL at 09:07

## 2024-07-03 RX ADMIN — AMLODIPINE BESYLATE 10 MG: 5 TABLET ORAL at 09:07

## 2024-07-03 RX ADMIN — SODIUM CHLORIDE, PRESERVATIVE FREE 10 ML: 5 INJECTION INTRAVENOUS at 08:07

## 2024-07-03 RX ADMIN — ENOXAPARIN SODIUM 30 MG: 30 INJECTION SUBCUTANEOUS at 06:07

## 2024-07-03 RX ADMIN — CARVEDILOL 6.25 MG: 3.12 TABLET, FILM COATED ORAL at 08:07

## 2024-07-03 RX ADMIN — SODIUM CHLORIDE, PRESERVATIVE FREE 10 ML: 5 INJECTION INTRAVENOUS at 12:07

## 2024-07-03 RX ADMIN — HYDRALAZINE HYDROCHLORIDE 50 MG: 50 TABLET ORAL at 06:07

## 2024-07-03 RX ADMIN — CARVEDILOL 3.12 MG: 3.12 TABLET, FILM COATED ORAL at 09:07

## 2024-07-03 RX ADMIN — FLUCONAZOLE 100 MG: 100 TABLET ORAL at 09:07

## 2024-07-03 RX ADMIN — SODIUM CHLORIDE, PRESERVATIVE FREE 10 ML: 5 INJECTION INTRAVENOUS at 06:07

## 2024-07-03 RX ADMIN — Medication 1 CAPSULE: at 09:07

## 2024-07-03 RX ADMIN — DIPHENHYDRAMINE HYDROCHLORIDE 25 MG: 25 CAPSULE ORAL at 11:07

## 2024-07-03 NOTE — PROGRESS NOTES
Nephrology consult follow up note    HPI:      Greer Kahn is a 58 y.o. female admitted on 06/13/2024 with fever 102 reporting possible UTI, boils to right thigh and left buttock.  Lactic acidosis present on admission with WBC 27.0, BUN 9.7, creatinine 0.98.  CT abdomen and pelvis with contrast showed area of cellulitis involving the perineum on the left side extending to the medial gluteal fold and areas of concern within the labia on the left side.  Also noted to have right lower lobe atelectasis via CT chest.  Patient was initiated on vancomycin and Rocephin in ER.     On 06/14 she underwent debridement of left groin and perineum.  Postoperative course complicated by nausea, vomiting and constipation.  She is also developed AUTUMN with rapidly worsening renal indices. Cr 0.98 --> 1.76--> 2.77--> 4.53 at the time of consultation. Urinalysis with low grade proteinuria and no blood.     Patient developed worsening renal function, and anuria.  She was initiated on hemodialysis 06/17/2024.    She has now undergone surgical debridement on 6/14, 6/18, and 6/21.    Interval history:     She is being closely monitored for renal recovery. Dialysis currently on hold with most recent treatment 7/1.     Review of Systems:       Past medical, family, surgical, and social history reviewed and unchanged from initial consult note.     Objective:       VITAL SIGNS: 24 HR MIN & MAX LAST    Temp  Min: 98 °F (36.7 °C)  Max: 98.6 °F (37 °C)  98.4 °F (36.9 °C)        BP  Min: 156/89  Max: 174/95  (!) 162/93     Pulse  Min: 74  Max: 86  84     Resp  Min: 20  Max: 20  20    SpO2  Min: 94 %  Max: 96 %  95 %      Physical Exam  Constitutional:       General: She is not in acute distress.     Appearance: She is obese.   HENT:      Head: Atraumatic.      Nose: Nose normal.      Mouth/Throat:      Mouth: Mucous membranes are moist.   Eyes:      Extraocular Movements: Extraocular movements intact.      Conjunctiva/sclera: Conjunctivae  normal.   Neck:      Comments: R IJ temporary dialysis catheter   Cardiovascular:      Rate and Rhythm: Normal rate and regular rhythm.      Pulses: Normal pulses.      Heart sounds: Normal heart sounds.   Pulmonary:      Effort: Pulmonary effort is normal.      Breath sounds: Normal breath sounds.   Abdominal:      General: Bowel sounds are normal.      Palpations: Abdomen is soft.   Genitourinary:     Comments: Urinary catheter draining dark yellow urine  Musculoskeletal:         General: No swelling.      Cervical back: Neck supple.   Skin:     General: Skin is warm.   Neurological:      General: No focal deficit present.      Mental Status: She is alert and oriented to person, place, and time.   Psychiatric:         Mood and Affect: Mood normal.         Behavior: Behavior normal.                 Component Value Date/Time     07/03/2024 0405     07/02/2024 0352     (L) 06/26/2020 0441     06/25/2020 0615    K 4.3 07/03/2024 0405    K 4.1 07/02/2024 0352    K 3.7 06/26/2020 0441    K 3.5 06/25/2020 0615    CO2 28 07/03/2024 0405    CO2 28 07/02/2024 0352    CO2 26 06/26/2020 0441    CO2 27 06/25/2020 0615    BUN 27.9 (H) 07/03/2024 0405    BUN 20.2 (H) 07/02/2024 0352    BUN 12 06/26/2020 0441    BUN 9 06/25/2020 0615    CREATININE 2.34 (H) 07/03/2024 0405    CREATININE 1.92 (H) 07/02/2024 0352    CREATININE 0.68 06/26/2020 0441    CREATININE 0.58 06/25/2020 0615    CALCIUM 8.6 07/03/2024 0405    CALCIUM 8.1 (L) 07/02/2024 0352    CALCIUM 8.2 (L) 06/26/2020 0441    CALCIUM 7.7 (L) 06/25/2020 0615    PHOS 4.9 (H) 06/25/2024 0601            Component Value Date/Time    WBC 9.18 06/30/2024 0659    WBC 15.88 (H) 06/28/2024 0434    WBC 22.37 06/24/2024 0343    WBC 28.96 06/23/2024 0451    HGB 7.7 (L) 06/30/2024 0659    HGB 8.1 (L) 06/28/2024 0434    HCT 24.6 (L) 06/30/2024 0659    HCT 24.8 (L) 06/28/2024 0434    HCT 42.0 04/16/2019 2328    HCT 42.0 04/16/2019 2305     06/30/2024 0659      (H) 06/28/2024 0434       Imaging reviewed      Assessment / Plan:     Acute Renal failure multifactorial secondary to sepsis, contrast exposure, NSAID use inpatient, vancomycin toxicity  -dialysis initiated 6/17.     Perineal Cellulitis with necrotizing component - status post debridement 6/14, 6/18 and 6/21   DM II uncontrolled - A1C 11  Worsening anemia     Hold dialysis for the next couple of days and monitor UOP/renal function.   We will resume dialysis if needed.   Stop IVF. No further emesis reported

## 2024-07-03 NOTE — PT/OT/SLP RE-EVAL
Occupational Therapy   Re-evaluation    Name: Greer Kahn  MRN: [id number]  Admitting Diagnosis: cellut  Recent Surgery: Procedure(s) (LRB):  DEBRIDEMENT, WOUND (Left) 12 Days Post-Op    Recommendations:     Discharge therapy intensity: Moderate Intensity Therapy   Discharge Equipment Recommendations:  to be determined by next level of care  Barriers to discharge:  Decreased caregiver support    Assessment:     Greer Kahn is a 58 y.o. female with a medical diagnosis of perineal cellulitis with necrotizing component s/p debridement 6/14, 6/18, and 6/21, acute renal failure with initiation of HD 6/17, HTN.  She presents with the following performance deficits affecting function: weakness, impaired endurance, impaired self care skills, impaired functional mobility, gait instability, impaired balance, pain, decreased safety awareness.      Pt with good tolerance to OT re-eval. Pt with improvements in functional transfers, ambulation, and ADLs since initial evaluation. Pt requires Min-Mod A x1 for sit>stand transfers from bedside chair and bathroom commode with use of grab bar. Pt ambulates with CGA using RW and completes grooming while standing at sink with CGA. Pt requires Mod A for lower body ADLs at this time. Pt remains appropriate for moderate intensity therapy upon discharge.     Rehab Prognosis: Good; patient would benefit from acute skilled OT services to address these deficits and reach maximum level of function.       Plan:     Patient to be seen 4 x/week to address the above listed problems via self-care/home management, therapeutic activities, therapeutic exercises  Plan of Care Expires: 08/02/24  Plan of Care Reviewed with: patient    Subjective     Chief Complaint: pain at wound site  Patient/Family Comments/goals: to get stronger    Pain/Comfort:  Pain Rating 1: 8/10  Pain Addressed 1: Reposition, Distraction, Nurse notified, Cessation of Activity    Patients cultural, spiritual, U[place]din  conflicts given the current situation: no    Objective:     OT communicated with RN prior to session.      Patient was found up in chair with telemetry, castaneda catheter upon OT entry to room.    General Precautions: Standard, contact, fall  Orthopedic Precautions: N/A  Braces: N/A    Bed Mobility:    Patient completed Sit to Supine with moderate assistance    Functional Mobility/Transfers:  Patient completed Sit <> Stand Transfer with minimum assistance  with  rolling walker   Patient completed Toilet Transfer Step Transfer technique with moderate assistance with  rolling walker  Functional Mobility: CGA for mobility <> bathroom commode using RW    Activities of Daily Living:  Toileting: moderate assistance for posterior hygiene following BM    Functional Cognition:  Safety Awareness: WFL  Insight into Deficits: WFL    Visual Perceptual Skills:  Intact    Upper Extremity Function:  Right Upper Extremity:   Strength: WFL    Left Upper Extremity:  Strength: WFL    Balance:   Static sitting balance: WFL  Dynamic sitting balance:WFL  Static standing balance:CGA at RW  Dynamic standing balance:min A at RW    Therapeutic Positioning  Risk for acquired pressure injuries is decreased due to ability to get to BSC/toilet with assist.    OT interventions performed during the course of today's session in an effort to prevent and/or reduce acquired pressure injuries:   Education was provided on benefits of and recommendations for therapeutic positioning    Skin assessment: all bony prominences were assessed    Findings: known area of altered skin integrity at perineum; dressing saturated with BM, RN present to complete wound care    OT recommendations for therapeutic positioning throughout hospitalization:   Follow [facility] Pressure Injury Prevention Protocol  Geomat recommended for sacral protection while UIC    Additional Treatment:  ADL Trainin mins for toileting    Patient Education:  Patient provided with verbal education  education regarding OT role/goals/POC, fall prevention, safety awareness, and pressure ulcer prevention.  Understanding was verbalized.     Patient left HOB elevated with all lines intact and call button in reach    GOALS:   Multidisciplinary Problems       Occupational Therapy Goals          Problem: Occupational Therapy    Goal Priority Disciplines Outcome Interventions   Occupational Therapy Goal     OT, PT/OT Progressing    Description: Goals to be met by: 8/3/24     Patient will increase functional independence with ADLs by performing:    UE Dressing with Mod I.  LE Dressing with Mod I.  Grooming while standing at sink with Mod I.  Toileting from toilet with Mod I for hygiene and clothing management.   Toilet transfer to toilet with Mod I.    All goals revised 7/3/24                         History:     Past Medical History:   Diagnosis Date    Asthma     Diabetes mellitus     Hypertension          Past Surgical History:   Procedure Laterality Date    INCISION AND DRAINAGE OF ABSCESS N/A 6/14/2024    Procedure: Incision and Drainage;  Surgeon: Papa Cesar Jr., MD;  Location: [facility];  Service: General;  Laterality: N/A;  DEBRIDEMENT SOFT TISSUE - PERINEUM    INCISION AND DRAINAGE, LOWER EXTREMITY Left 6/18/2024    Procedure: Incision and Drainage;  Surgeon: Arnaud Vital MD;  Location: [facility] OR;  Service: General;  Laterality: Left;  LEFT GROIN    WOUND DEBRIDEMENT Left 6/21/2024    Procedure: DEBRIDEMENT, WOUND;  Surgeon: Arnaud Vital MD;  Location: [facility];  Service: General;  Laterality: Left;  left groin, lithotomy       Time Tracking:     OT Date of Treatment: 07/03/24  OT Start Time: 1157  OT Stop Time: 1225  OT Total Time (min): 28 min    Billable Minutes:Re-eval 15 mins  Self Care/Home Management 13 mins    7/3/2024

## 2024-07-03 NOTE — PROGRESS NOTES
[facility]  Hospital Medicine Progress Note        Chief Complaint: Inpatient Follow-up     HPI:    58-year-old female medical history notable for obesity BMI 37, poorly controlled T2DM with last A1c 13 in April 2024 presented to [facility] ED on 06/13/2024 morning with chief complaint of fever, chills, nausea, vomiting, perivulvar/perineal painful lesion draining pus.     On arrival noted to be febrile 101, tachycardic, hypertensive but only 1 reading, remaining normal to hypotensive.  Labs notable for lactic acid 1.4 > 1.3, WBC 27.06 with neutrophilic shift,  urinalysis contaminated sample with many squamous cells, glucose 353, CO2 23, anion gap 14, creatinine 0.98, CT abdomen pelvis with IV contrast show area of cellulitis involving the perineum on the left side extending to the medial gluteal fold with area of thickening and inflammatory changes in the labia with hypoattenuation concerning for possible abscess CTA chest show no evidence of PE there is right lower lobe atelectasis and small right pleural effusion.She was resuscitated with IV fluids 2 L of NS and 1 L of LR, given ceftriaxone and vancomycin.Transferred to [facility]  and referred to hospital medicine service.    Gen surgery was consulted, pt was taken to OR emergently for debridement for suspected necrotizing fasciitis.She tolerated the procedure well, and there was not evidence of NSTI. She is  s/p R groin incision, drainage, and debridement on 6/14, 6/18 and 6/21 .cx 6/13 ,6/18 w/ MRSA.  Patient has completed at least 2 weeks course antibiotics with vancomycin, Zosyn, clindamycin.      Pt developed AUTUMN, Nephrology consulted, pt required initiation of HD 06/17/2024. , pt had tunneled HD cath placement .    Physical therapy recommended moderate intensity at discharge.    Interval Hx:   No acute events reported overnight , seen at bedside this morning, no further reports of nausea reported doing okay she was working with the  physical therapy encourage mobility           Objective/physical exam:  General: In no acute distress, afebrile  Chest: unlabored breathing on room air   Heart: normal rate   Abdomen: Soft, nontender  : Huff in place  MSK: Warm  Neurologic: Alert and responding appropriately    VITAL SIGNS: 24 HRS MIN & MAX LAST   Temp  Min: 98 °F (36.7 °C)  Max: 98.6 °F (37 °C) 98.4 °F (36.9 °C)   BP  Min: 156/89  Max: 174/95 (!) 162/93   Pulse  Min: 74  Max: 86  84   Resp  Min: 20  Max: 20 20   SpO2  Min: 94 %  Max: 96 % 95 %     I have reviewed the following labs:  Recent Labs   Lab 06/27/24  0754 06/28/24  0434 06/30/24  0659   WBC 17.18* 15.88* 9.18   RBC 2.65* 2.73* 2.62*   HGB 7.9* 8.1* 7.7*   HCT 24.1* 24.8* 24.6*   MCV 90.9 90.8 93.9   MCH 29.8 29.7 29.4   MCHC 32.8* 32.7* 31.3*   RDW 17.6* 17.7* 17.1*   * 405* 371   MPV 9.6 9.6 9.7     Recent Labs   Lab 07/01/24  0528 07/02/24  0352 07/03/24  0405    138 138   K 4.4 4.1 4.3    103 102   CO2 25 28 28   BUN 36.4* 20.2* 27.9*   CREATININE 2.79* 1.92* 2.34*   CALCIUM 8.1* 8.1* 8.6     Microbiology Results (last 7 days)       Procedure Component Value Units Date/Time    Blood Culture [[id number]]  (Normal) Collected: 06/22/24 1203    Order Status: Completed Specimen: Blood Updated: 06/27/24 1300     Blood Culture No Growth at 5 days             See below for Radiology    Scheduled Med:   amLODIPine  10 mg Oral Daily    carvediloL  3.125 mg Oral BID    cloNIDine  0.2 mg Oral TID    enoxaparin  30 mg Subcutaneous Daily    fluconazole  100 mg Oral Daily    insulin glargine U-100  50 Units Subcutaneous QHS    Lactobacillus acidophilus  1 capsule Oral Daily    sodium chloride 0.9%  10 mL Intravenous Q6H      Continuous Infusions:     PRN Meds:    Current Facility-Administered Medications:     0.9%  NaCl infusion (for blood administration), , Intravenous, Q24H PRN    0.9%  NaCl infusion (for blood administration), , Intravenous, Q24H PRN    acetaminophen, 650  mg, Oral, Q4H PRN    albuterol-ipratropium, 3 mL, Nebulization, Q4H PRN    aluminum-magnesium hydroxide-simethicone, 30 mL, Oral, QID PRN    bisacodyL, 10 mg, Rectal, Daily PRN    calcium carbonate, 1,000 mg, Oral, TID PRN    dextrose 10%, 12.5 g, Intravenous, PRN    dextrose 10%, 25 g, Intravenous, PRN    diphenhydrAMINE, 25 mg, Oral, Q6H PRN    glucagon (human recombinant), 1 mg, Intramuscular, PRN    glucose, 16 g, Oral, PRN    glucose, 24 g, Oral, PRN    heparin (porcine), 2,800 Units, Intravenous, PRN    hydrALAZINE, 10 mg, Intravenous, Q4H PRN    insulin aspart U-100, 0-15 Units, Subcutaneous, QID (AC + HS) PRN    metoclopramide, 10 mg, Intravenous, Q8H PRN    polyethylene glycol, 17 g, Oral, BID PRN    prochlorperazine, 2.5 mg, Intravenous, Q6H PRN    prochlorperazine, 5 mg, Intravenous, Q6H PRN    senna-docusate 8.6-50 mg, 2 tablet, Oral, BID PRN    sodium chloride 0.9%, 10 mL, Intravenous, PRN    Flushing PICC/Midline Protocol, , , Until Discontinued **AND** sodium chloride 0.9%, 10 mL, Intravenous, Q6H **AND** sodium chloride 0.9%, 10 mL, Intravenous, PRN    traMADoL, 50 mg, Oral, Q8H PRN     Assessment/Plan:  Perineal cellulitis with necrotizing component , s/p debridement   Acute Renal failure multifactorial -dialysis initiated 6/17   Normocytic Anemia s/p prbc transfusion   Uncontrolled type 2 diabetes mellitus with hyperglycemia  HTN      Labs from this morning showed BUN 27.9, creatinine 2.34, bicarb 28, K 4.3, glucose 164   Nephrology following, hemodialysis per Nephro   Patient's renal recovery, urine output is being monitored, to decide on further dialysis  Patient was treated with vancomycin , clindamycin  and 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 Blood cultures negative thus far, wound cx with MRSA, tissue cultures from grown preliminary Candida   Continue  fluconazole day 6  Continue probiotics  Follow up postop surgery recommendations, wound care  Monitor bp,  continue clonidine 0.2  t.i.d.,  continue amlodipine, increase Coreg , add p.o. hydralazine use iv prn antihypertensives   Blood sugars improved,recommend glucose < 180 ,  lantus  50 U, cont ISS coverage   Cont therapy services   Supportive care with antiemetics, analgesics  Case was discussed with patient's nurse  , case management      VTE prophylaxis: lovenox renally adjusted    Patient condition:  Fair    Anticipated discharge and Disposition:  tbd         _____________________________________________________________________    Nutrition Status:    Radiology:  I have personally reviewed the following imaging and agree with the radiologist.     X-Ray Chest 1 View  Narrative: EXAMINATION:  XR CHEST 1 VIEW    CLINICAL HISTORY:  leukocytosis workup;    COMPARISON:  18 June 2024    FINDINGS:  Frontal view of the chest was obtained. Stable right-sided catheter.  The heart is not significantly enlarged.  Similar right hilar prominence and right basilar opacities.  No pneumothorax.  Impression: Similar appearance of the chest.    Electronically signed by: Issa Luna  Date:    06/22/2024  Time:    13:08      Justine Cortez MD  Department of Hospital Medicine   [facility]   07/03/2024

## 2024-07-03 NOTE — PLAN OF CARE
Problem: Occupational Therapy  Goal: Occupational Therapy Goal  Description: Goals to be met by: 8/3/24     Patient will increase functional independence with ADLs by performing:    UE Dressing with Mod I.  LE Dressing with Mod I.  Grooming while standing at sink with Mod I.  Toileting from toilet with Mod I for hygiene and clothing management.   Toilet transfer to toilet with Mod I.    All goals revised 7/3/24    Outcome: Progressing

## 2024-07-03 NOTE — PT/OT/SLP RE-EVAL
Physical Therapy Re-Evaluation    Patient Name:  Greer Kahn   MRN:  [id number]    Recommendations:     Discharge therapy intensity: Moderate Intensity Therapy   Discharge Equipment Recommendations: to be determined by next level of care   Barriers to discharge: Ongoing medical needs    Assessment:     Greer Kahn is a 58 y.o. female admitted with a medical diagnosis of groin infection s/p urgent I&D 6/14 and 6/18, severe sepsis, acute metabolic encephalopathy.  She presents with the following impairments/functional limitations: weakness, gait instability, impaired endurance, impaired balance, decreased lower extremity function, impaired functional mobility. Ms. Butterfield's strength and mobility has improved since initial evaluation. Pt req min A for semi-supine to sit, Min A x2 for sit to stand at EOB, and amb for 135ft CGA with RW today. Pt would benefit moderate intensity therapy in order to regain PLOF.      Rehab Prognosis: Good; patient would benefit from acute skilled PT services to address these deficits and reach maximum level of function.    Recent Surgery: Procedure(s) (LRB):  DEBRIDEMENT, WOUND (Left) 12 Days Post-Op    Plan:     During this hospitalization, patient would benefit from acute PT services 5 x/week to address the identified rehab impairments via gait training, therapeutic activities, therapeutic exercises, neuromuscular re-education and progress toward the following goals:    Plan of Care Expires:  08/02/24    PT/PTA conference to discuss PT POC and patient's progression towards goals held with Joselin Rizzo PTA.     Subjective     Chief Complaint: none  Patient/Family Comments/goals: get better  Pain/Comfort:       Patients cultural, spiritual, Anglican conflicts given the current situation: no    Objective:     Communicated with RN prior to session.  Patient found supine with peripheral IV, pulse ox (continuous), telemetry, castaneda catheter  upon PT entry to room.    General  Precautions: Standard, contact, fall  Orthopedic Precautions:    Braces:    Respiratory Status: Room air  Blood Pressure: NT      Exams:  RLE ROM: WFL  RLE Strength: 4/5  LLE ROM: WFL  LLE Strength: 4/5      Functional Mobility:  Bed Mobility:     Supine to Sit: minimum assistance  Transfers:     Sit to Stand:  minimum assistance and of 2 persons with rolling walker  Gait: Pt amb 135ft CGA with RW demo step to and step through pattern. One VC provided for walker proximity; good carryover. Pt's gait speed increased with distance.   Balance: Sitting EOB SBA      AM-PAC 6 CLICK MOBILITY  Total Score:        Treatment & Education:  Pt with improved ambulation distance today.     Patient and daughter/s were provided with verbal education education regarding PT role/goals/POC and fall prevention.  Understanding was verbalized.     Patient left up in chair with all lines intact, call button in reach, geomat cushion, and daughter present.    GOALS:   Multidisciplinary Problems       Physical Therapy Goals          Problem: Physical Therapy    Goal Priority Disciplines Outcome Goal Variances Interventions   Physical Therapy Goal     PT, PT/OT Progressing     Description: Goals to be met by: 8/3/2024     Patient will increase functional independence with mobility by performin. Supine to sit with MInimal Assistance  2. Sit to supine with MInimal Assistance  3. Sitting at edge of bed x10 minutes with Stand-by Assistance  4. Pt with perform sit to stand transfer with RW Mod I.   5. Pt able to amb 150 ft with RW mod I.                          History:     Past Medical History:   Diagnosis Date    Asthma     Diabetes mellitus     Hypertension        Past Surgical History:   Procedure Laterality Date    INCISION AND DRAINAGE OF ABSCESS N/A 2024    Procedure: Incision and Drainage;  Surgeon: Papa Cesar Jr., MD;  Location: [facility];  Service: General;  Laterality: N/A;  DEBRIDEMENT SOFT TISSUE - PERINEUM     INCISION AND DRAINAGE, LOWER EXTREMITY Left 6/18/2024    Procedure: Incision and Drainage;  Surgeon: Arnaud Vital MD;  Location: [facility] OR;  Service: General;  Laterality: Left;  LEFT GROIN    WOUND DEBRIDEMENT Left 6/21/2024    Procedure: DEBRIDEMENT, WOUND;  Surgeon: Arnaud Vital MD;  Location: [facility] OR;  Service: General;  Laterality: Left;  left groin, lithotomy       Time Tracking:     PT Received On: 07/03/24  PT Start Time: 1007     PT Stop Time: 1033  PT Total Time (min): 26 min     Billable Minutes: Re-eval 26      07/03/2024

## 2024-07-03 NOTE — PLAN OF CARE
Problem: Physical Therapy  Goal: Physical Therapy Goal  Description: Goals to be met by: 8/3/2024     Patient will increase functional independence with mobility by performin. Supine to sit with MInimal Assistance  2. Sit to supine with MInimal Assistance  3. Sitting at edge of bed x10 minutes with Stand-by Assistance  4. Pt with perform sit to stand transfer with RW Mod I.   5. Pt able to amb 150 ft with RW mod I.     Outcome: Progressing

## 2024-07-04 LAB
ALBUMIN SERPL-MCNC: 2 G/DL (ref 3.5–5)
BUN SERPL-MCNC: 33 MG/DL (ref 9.8–20.1)
CALCIUM SERPL-MCNC: 8.4 MG/DL (ref 8.4–10.2)
CHLORIDE SERPL-SCNC: 106 MMOL/L (ref 98–107)
CO2 SERPL-SCNC: 25 MMOL/L (ref 22–29)
CREAT SERPL-MCNC: 2.16 MG/DL (ref 0.55–1.02)
GFR SERPLBLD CREATININE-BSD FMLA CKD-EPI: 26 ML/MIN/1.73/M2
GLUCOSE SERPL-MCNC: 141 MG/DL (ref 74–100)
PHOSPHATE SERPL-MCNC: 5.1 MG/DL (ref 2.3–4.7)
POCT GLUCOSE: 133 MG/DL (ref 70–110)
POCT GLUCOSE: 169 MG/DL (ref 70–110)
POCT GLUCOSE: 234 MG/DL (ref 70–110)
POCT GLUCOSE: 256 MG/DL (ref 70–110)
POTASSIUM SERPL-SCNC: 4.5 MMOL/L (ref 3.5–5.1)
SODIUM SERPL-SCNC: 140 MMOL/L (ref 136–145)

## 2024-07-04 PROCEDURE — 21400001 HC TELEMETRY ROOM

## 2024-07-04 PROCEDURE — 25000003 PHARM REV CODE 250: Performed by: INTERNAL MEDICINE

## 2024-07-04 PROCEDURE — 27000207 HC ISOLATION

## 2024-07-04 PROCEDURE — 36415 COLL VENOUS BLD VENIPUNCTURE: CPT | Performed by: STUDENT IN AN ORGANIZED HEALTH CARE EDUCATION/TRAINING PROGRAM

## 2024-07-04 PROCEDURE — 63700000 PHARM REV CODE 250 ALT 637 W/O HCPCS: Performed by: INTERNAL MEDICINE

## 2024-07-04 PROCEDURE — 80069 RENAL FUNCTION PANEL: CPT | Performed by: STUDENT IN AN ORGANIZED HEALTH CARE EDUCATION/TRAINING PROGRAM

## 2024-07-04 PROCEDURE — 99900035 HC TECH TIME PER 15 MIN (STAT)

## 2024-07-04 PROCEDURE — 63600175 PHARM REV CODE 636 W HCPCS: Performed by: STUDENT IN AN ORGANIZED HEALTH CARE EDUCATION/TRAINING PROGRAM

## 2024-07-04 PROCEDURE — 63600175 PHARM REV CODE 636 W HCPCS: Performed by: INTERNAL MEDICINE

## 2024-07-04 PROCEDURE — A4216 STERILE WATER/SALINE, 10 ML: HCPCS | Performed by: INTERNAL MEDICINE

## 2024-07-04 PROCEDURE — 25000003 PHARM REV CODE 250: Performed by: NURSE PRACTITIONER

## 2024-07-04 RX ORDER — LOPERAMIDE HYDROCHLORIDE 2 MG/1
2 CAPSULE ORAL 4 TIMES DAILY PRN
Status: DISCONTINUED | OUTPATIENT
Start: 2024-07-04 | End: 2024-07-09 | Stop reason: HOSPADM

## 2024-07-04 RX ADMIN — AMLODIPINE BESYLATE 10 MG: 5 TABLET ORAL at 09:07

## 2024-07-04 RX ADMIN — CLONIDINE HYDROCHLORIDE 0.2 MG: 0.2 TABLET ORAL at 08:07

## 2024-07-04 RX ADMIN — HYDRALAZINE HYDROCHLORIDE 50 MG: 50 TABLET ORAL at 04:07

## 2024-07-04 RX ADMIN — CARVEDILOL 6.25 MG: 3.12 TABLET, FILM COATED ORAL at 09:07

## 2024-07-04 RX ADMIN — LOPERAMIDE HYDROCHLORIDE 2 MG: 2 CAPSULE ORAL at 12:07

## 2024-07-04 RX ADMIN — SODIUM CHLORIDE, PRESERVATIVE FREE 10 ML: 5 INJECTION INTRAVENOUS at 12:07

## 2024-07-04 RX ADMIN — CLONIDINE HYDROCHLORIDE 0.2 MG: 0.2 TABLET ORAL at 04:07

## 2024-07-04 RX ADMIN — ENOXAPARIN SODIUM 30 MG: 30 INJECTION SUBCUTANEOUS at 04:07

## 2024-07-04 RX ADMIN — SODIUM CHLORIDE, PRESERVATIVE FREE 10 ML: 5 INJECTION INTRAVENOUS at 06:07

## 2024-07-04 RX ADMIN — CARVEDILOL 6.25 MG: 3.12 TABLET, FILM COATED ORAL at 08:07

## 2024-07-04 RX ADMIN — LOPERAMIDE HYDROCHLORIDE 2 MG: 2 CAPSULE ORAL at 08:07

## 2024-07-04 RX ADMIN — Medication 1 CAPSULE: at 09:07

## 2024-07-04 RX ADMIN — FLUCONAZOLE 100 MG: 100 TABLET ORAL at 09:07

## 2024-07-04 RX ADMIN — INSULIN GLARGINE 50 UNITS: 100 INJECTION, SOLUTION SUBCUTANEOUS at 08:07

## 2024-07-04 RX ADMIN — HYDRALAZINE HYDROCHLORIDE 50 MG: 50 TABLET ORAL at 06:07

## 2024-07-04 RX ADMIN — INSULIN ASPART 9 UNITS: 100 INJECTION, SOLUTION INTRAVENOUS; SUBCUTANEOUS at 04:07

## 2024-07-04 RX ADMIN — INSULIN ASPART 6 UNITS: 100 INJECTION, SOLUTION INTRAVENOUS; SUBCUTANEOUS at 08:07

## 2024-07-04 RX ADMIN — CLONIDINE HYDROCHLORIDE 0.2 MG: 0.2 TABLET ORAL at 09:07

## 2024-07-04 RX ADMIN — HYDRALAZINE HYDROCHLORIDE 50 MG: 50 TABLET ORAL at 10:07

## 2024-07-04 NOTE — PLAN OF CARE
Problem: Wound  Goal: Optimal Coping  Outcome: Progressing  Goal: Optimal Functional Ability  Outcome: Progressing  Goal: Absence of Infection Signs and Symptoms  Outcome: Progressing  Goal: Improved Oral Intake  Outcome: Progressing  Goal: Optimal Pain Control and Function  Outcome: Progressing  Goal: Skin Health and Integrity  Outcome: Progressing  Goal: Optimal Wound Healing  Outcome: Progressing     Problem: Adult Inpatient Plan of Care  Goal: Plan of Care Review  Outcome: Progressing  Goal: Patient-Specific Goal (Individualized)  Outcome: Progressing  Goal: Absence of Hospital-Acquired Illness or Injury  Outcome: Progressing  Goal: Optimal Comfort and Wellbeing  Outcome: Progressing  Goal: Readiness for Transition of Care  Outcome: Progressing     Problem: Fall Injury Risk  Goal: Absence of Fall and Fall-Related Injury  Outcome: Progressing

## 2024-07-04 NOTE — PLAN OF CARE
Problem: Adult Inpatient Plan of Care  Goal: Plan of Care Review  Outcome: Met  Goal: Patient-Specific Goal (Individualized)  Outcome: Met  Goal: Absence of Hospital-Acquired Illness or Injury  Outcome: Met  Goal: Optimal Comfort and Wellbeing  Outcome: Met  Goal: Readiness for Transition of Care  Outcome: Met     Problem: Wound  Goal: Optimal Coping  Outcome: Met  Goal: Optimal Functional Ability  Outcome: Met  Goal: Absence of Infection Signs and Symptoms  Outcome: Met  Goal: Improved Oral Intake  Outcome: Met  Goal: Optimal Pain Control and Function  Outcome: Met  Goal: Skin Health and Integrity  Outcome: Met  Goal: Optimal Wound Healing  Outcome: Met     Problem: Sepsis/Septic Shock  Goal: Optimal Coping  Outcome: Met  Goal: Absence of Bleeding  Outcome: Met  Goal: Blood Glucose Level Within Targeted Range  Outcome: Met  Goal: Absence of Infection Signs and Symptoms  Outcome: Met  Goal: Optimal Nutrition Intake  Outcome: Met     Problem: Infection  Goal: Absence of Infection Signs and Symptoms  Outcome: Met     Problem: Fall Injury Risk  Goal: Absence of Fall and Fall-Related Injury  Outcome: Met     Problem: Skin Injury Risk Increased  Goal: Skin Health and Integrity  Outcome: Met     Problem: Hemodialysis  Goal: Safe, Effective Therapy Delivery  Outcome: Met  Goal: Effective Tissue Perfusion  Outcome: Met  Goal: Absence of Infection Signs and Symptoms  Outcome: Met     Problem: Bariatric Environmental Safety  Goal: Safety Maintained with Care  Outcome: Met

## 2024-07-04 NOTE — PROGRESS NOTES
Nephrology consult follow up note    HPI:      Greer Kahn is a 58 y.o. female admitted on 06/13/2024 with fever 102 reporting possible UTI, boils to right thigh and left buttock.  Lactic acidosis present on admission with WBC 27.0, BUN 9.7, creatinine 0.98.  CT abdomen and pelvis with contrast showed area of cellulitis involving the perineum on the left side extending to the medial gluteal fold and areas of concern within the labia on the left side.  Also noted to have right lower lobe atelectasis via CT chest.  Patient was initiated on vancomycin and Rocephin in ER.     On 06/14 she underwent debridement of left groin and perineum.  Postoperative course complicated by nausea, vomiting and constipation.  She is also developed AUTUMN with rapidly worsening renal indices. Cr 0.98 --> 1.76--> 2.77--> 4.53 at the time of consultation. Urinalysis with low grade proteinuria and no blood.     Patient developed worsening renal function, and anuria.  She was initiated on hemodialysis 06/17/2024.    She has now undergone surgical debridement on 6/14, 6/18, and 6/21.    Interval history:     She is being closely monitored for renal recovery. Dialysis currently on hold with most recent treatment 7/1. Today her Cr is improved from yesterday. She has no complaints.     Review of Systems:       Past medical, family, surgical, and social history reviewed and unchanged from initial consult note.     Objective:       VITAL SIGNS: 24 HR MIN & MAX LAST    Temp  Min: 98.3 °F (36.8 °C)  Max: 98.8 °F (37.1 °C)  98.5 °F (36.9 °C)        BP  Min: 134/75  Max: 168/90  (!) 165/101     Pulse  Min: 81  Max: 88  81     Resp  Min: 18  Max: 18  18    SpO2  Min: 93 %  Max: 96 %  96 %      Physical Exam  Constitutional:       General: She is not in acute distress.     Appearance: She is obese.   HENT:      Head: Atraumatic.      Nose: Nose normal.      Mouth/Throat:      Mouth: Mucous membranes are moist.   Eyes:      Extraocular Movements:  Extraocular movements intact.      Conjunctiva/sclera: Conjunctivae normal.   Neck:      Comments: R IJ temporary dialysis catheter   Cardiovascular:      Rate and Rhythm: Normal rate and regular rhythm.      Pulses: Normal pulses.      Heart sounds: Normal heart sounds.   Pulmonary:      Effort: Pulmonary effort is normal.      Breath sounds: Normal breath sounds.   Abdominal:      General: Bowel sounds are normal.      Palpations: Abdomen is soft.   Genitourinary:     Comments: Urinary catheter   Musculoskeletal:         General: No swelling.      Cervical back: Neck supple.   Skin:     General: Skin is warm.   Neurological:      General: No focal deficit present.      Mental Status: She is alert and oriented to person, place, and time.   Psychiatric:         Mood and Affect: Mood normal.         Behavior: Behavior normal.                 Component Value Date/Time     07/04/2024 0522     07/03/2024 0405     (L) 06/26/2020 0441     06/25/2020 0615    K 4.5 07/04/2024 0522    K 4.3 07/03/2024 0405    K 3.7 06/26/2020 0441    K 3.5 06/25/2020 0615    CO2 25 07/04/2024 0522    CO2 28 07/03/2024 0405    CO2 26 06/26/2020 0441    CO2 27 06/25/2020 0615    BUN 33.0 (H) 07/04/2024 0522    BUN 27.9 (H) 07/03/2024 0405    BUN 12 06/26/2020 0441    BUN 9 06/25/2020 0615    CREATININE 2.16 (H) 07/04/2024 0522    CREATININE 2.34 (H) 07/03/2024 0405    CREATININE 0.68 06/26/2020 0441    CREATININE 0.58 06/25/2020 0615    CALCIUM 8.4 07/04/2024 0522    CALCIUM 8.6 07/03/2024 0405    CALCIUM 8.2 (L) 06/26/2020 0441    CALCIUM 7.7 (L) 06/25/2020 0615    PHOS 5.1 (H) 07/04/2024 0522            Component Value Date/Time    WBC 9.18 06/30/2024 0659    WBC 15.88 (H) 06/28/2024 0434    WBC 22.37 06/24/2024 0343    WBC 28.96 06/23/2024 0451    HGB 7.7 (L) 06/30/2024 0659    HGB 8.1 (L) 06/28/2024 0434    HCT 24.6 (L) 06/30/2024 0659    HCT 24.8 (L) 06/28/2024 0434    HCT 42.0 04/16/2019 2328    HCT 42.0  04/16/2019 2305     06/30/2024 0659     (H) 06/28/2024 0434       Imaging reviewed      Assessment / Plan:     Acute Renal failure multifactorial secondary to sepsis, contrast exposure, NSAID use inpatient, vancomycin toxicity  -dialysis initiated 6/17.     Perineal Cellulitis with necrotizing component - status post debridement 6/14, 6/18 and 6/21   DM II uncontrolled - A1C 11  Worsening anemia     Continue to hold dialysis. If no further dialysis needed tomorrow we can consider removing the dialysis catheter.   Repeat lab tomorrow

## 2024-07-04 NOTE — PROGRESS NOTES
Hospital Medicine  Progress Note    Patient Name: Greer Kahn  MRN: [id number]  Status: IP- Inpatient   Admission Date: 6/13/2024  Length of Stay: 21  Date of Service: 07/04/2024       CC: hospital follow-up for ARF       SUBJECTIVE    58-year-old female wit a history notable for poorly controlled DM (A1c 13), presented to [facility] ED on 06/13 with a perivulvar/perineal painful lesion draining pus; associated with fever, chills, nausea and vomiting.  Evaluation in ED noted patient febrile up 101° F, tachycardic.  Labs notable for lactic acid 1.4 and a [place] of 79[address] with neutrophilic shift; CRP was 367.  CT abdomen pelvis with IV contrast showed an area of cellulitis involving the perineum on the left side extending to the medial gluteal fold with area of thickening and inflammatory changes in the labia with hypoattenuation concerning for possible abscess.  Blood pressures improved IV fluid resuscitation.  She was started on broad-spectrum IV antibiotics and transferred to [facility], where she was admitted to hospital medicine service.  Gen surgery was consulted, and patient taken to OR emergently for debridement where she was found to have necrotizing infection of the left groin.  She underwent several more repeat debridements last on 6/21.  Wound and Surgical cultures noting MSRA, as well as Candida tropicalis.  She subsequently completed a 2 weeks course of IV antibiotics, and also being treated with oral fluconazole.  Unfortunately hospital course was complicated by development of AUTUMN, for which Nephrology was consulted.  Patient required initiation of hemodialysis on 06/17, which she continued to require intermittently.  UOP however was improving.   Patient also deconditioned, working with therapy.     Today: Patient seen and examined at bedside, and chart reviewed.       MEDICATIONS   Scheduled   amLODIPine  10 mg Oral Daily    carvediloL  6.25 mg Oral BID    cloNIDine  0.2 mg Oral TID    enoxaparin  30 mg  "Subcutaneous Daily    fluconazole  100 mg Oral Daily    hydrALAZINE  50 mg Oral Q8H    insulin glargine U-100  50 Units Subcutaneous QHS    Lactobacillus acidophilus  1 capsule Oral Daily    sodium chloride 0.9%  10 mL Intravenous Q6H     Continuous Infusions  None      PHYSICAL EXAM   VITALS: T 98.5 °F (36.9 °C)   BP (!) 165/101   P 81   RR 18   O2 96 %    GENERAL: Awake and in NAD  LUNGS: CTA anteriorly  CVS: Normal rate  GI/: Soft, NT, bowel sounds positive.  EXTREMITIES:  Radial pulse 2 +  NEURO: AAOx3  PSYCH: Cooperative      LABS   CBC  No results for input(s): "WBC", "RBC", "HGB", "HCT", "MCV", "MCH", "MCHC", "RDW", "PLT", "RETIC", "ESR" in the last 72 hours.  CHEM  Recent Labs     07/03/24  0405 07/04/24  0522    140   K 4.3 4.5   CO2 28 25   BUN 27.9* 33.0*   CREATININE 2.34* 2.16*   GLUCOSE 164* 141*   CALCIUM 8.6 8.4   PHOS  --  5.1*   ALBUMIN  --  2.0*       ASSESSMENT   Acute renal failure requiring hemodialysis   Necrotizing soft tissue infection of the left groin/perineal region, resolved  Poorly controlled type II diabetes mellitus with hyperglycemia  Essential hypertension   Worsening Normocytic anemia, now stable    PLAN   HD on hold per Nephrology, renal indices seem to be improving, UOP is good, will continue to monitor closely  Continue for clinical to complete 2 week course  Continue wound care as outlined  Continue with probiotic, will add antidiarrheal  She was under fair control, continue current insulin regimen   Otherwise continue current management and monitoring in the interim      Prophylaxis: STORM Styles MD  [facility]      "

## 2024-07-05 LAB
ALBUMIN SERPL-MCNC: 1.9 G/DL (ref 3.5–5)
BUN SERPL-MCNC: 35.4 MG/DL (ref 9.8–20.1)
CALCIUM SERPL-MCNC: 8.4 MG/DL (ref 8.4–10.2)
CHLORIDE SERPL-SCNC: 102 MMOL/L (ref 98–107)
CO2 SERPL-SCNC: 26 MMOL/L (ref 22–29)
CREAT SERPL-MCNC: 2.45 MG/DL (ref 0.55–1.02)
ERYTHROCYTE [DISTWIDTH] IN BLOOD BY AUTOMATED COUNT: 16.8 % (ref 11.5–17)
GFR SERPLBLD CREATININE-BSD FMLA CKD-EPI: 22 ML/MIN/1.73/M2
GLUCOSE SERPL-MCNC: 183 MG/DL (ref 74–100)
HCT VFR BLD AUTO: 24.8 % (ref 37–47)
HGB BLD-MCNC: 8 G/DL (ref 12–16)
MCH RBC QN AUTO: 30.5 PG (ref 27–31)
MCHC RBC AUTO-ENTMCNC: 32.3 G/DL (ref 33–36)
MCV RBC AUTO: 94.7 FL (ref 80–94)
NRBC BLD AUTO-RTO: 0 %
PHOSPHATE SERPL-MCNC: 4.4 MG/DL (ref 2.3–4.7)
PLATELET # BLD AUTO: 308 X10(3)/MCL (ref 130–400)
PMV BLD AUTO: 9.8 FL (ref 7.4–10.4)
POCT GLUCOSE: 215 MG/DL (ref 70–110)
POCT GLUCOSE: 270 MG/DL (ref 70–110)
POCT GLUCOSE: 47 MG/DL (ref 70–110)
POTASSIUM SERPL-SCNC: 4.5 MMOL/L (ref 3.5–5.1)
RBC # BLD AUTO: 2.62 X10(6)/MCL (ref 4.2–5.4)
SODIUM SERPL-SCNC: 138 MMOL/L (ref 136–145)
WBC # BLD AUTO: 6.96 X10(3)/MCL (ref 4.5–11.5)

## 2024-07-05 PROCEDURE — 97116 GAIT TRAINING THERAPY: CPT | Mod: CQ

## 2024-07-05 PROCEDURE — 97530 THERAPEUTIC ACTIVITIES: CPT | Mod: CQ

## 2024-07-05 PROCEDURE — 21400001 HC TELEMETRY ROOM

## 2024-07-05 PROCEDURE — 63600175 PHARM REV CODE 636 W HCPCS: Performed by: STUDENT IN AN ORGANIZED HEALTH CARE EDUCATION/TRAINING PROGRAM

## 2024-07-05 PROCEDURE — 25000003 PHARM REV CODE 250: Performed by: STUDENT IN AN ORGANIZED HEALTH CARE EDUCATION/TRAINING PROGRAM

## 2024-07-05 PROCEDURE — 99900035 HC TECH TIME PER 15 MIN (STAT)

## 2024-07-05 PROCEDURE — 25000003 PHARM REV CODE 250: Performed by: NURSE PRACTITIONER

## 2024-07-05 PROCEDURE — 94760 N-INVAS EAR/PLS OXIMETRY 1: CPT

## 2024-07-05 PROCEDURE — 25000003 PHARM REV CODE 250: Performed by: INTERNAL MEDICINE

## 2024-07-05 PROCEDURE — 85027 COMPLETE CBC AUTOMATED: CPT | Performed by: INTERNAL MEDICINE

## 2024-07-05 PROCEDURE — 27000207 HC ISOLATION

## 2024-07-05 PROCEDURE — 97535 SELF CARE MNGMENT TRAINING: CPT

## 2024-07-05 PROCEDURE — 63600175 PHARM REV CODE 636 W HCPCS: Performed by: INTERNAL MEDICINE

## 2024-07-05 PROCEDURE — 36415 COLL VENOUS BLD VENIPUNCTURE: CPT | Performed by: STUDENT IN AN ORGANIZED HEALTH CARE EDUCATION/TRAINING PROGRAM

## 2024-07-05 PROCEDURE — 80069 RENAL FUNCTION PANEL: CPT | Performed by: STUDENT IN AN ORGANIZED HEALTH CARE EDUCATION/TRAINING PROGRAM

## 2024-07-05 PROCEDURE — 27000221 HC OXYGEN, UP TO 24 HOURS

## 2024-07-05 PROCEDURE — 63700000 PHARM REV CODE 250 ALT 637 W/O HCPCS: Performed by: INTERNAL MEDICINE

## 2024-07-05 RX ADMIN — CARVEDILOL 6.25 MG: 3.12 TABLET, FILM COATED ORAL at 09:07

## 2024-07-05 RX ADMIN — LOPERAMIDE HYDROCHLORIDE 2 MG: 2 CAPSULE ORAL at 12:07

## 2024-07-05 RX ADMIN — PROCHLORPERAZINE EDISYLATE 5 MG: 5 INJECTION INTRAMUSCULAR; INTRAVENOUS at 01:07

## 2024-07-05 RX ADMIN — Medication 1 CAPSULE: at 09:07

## 2024-07-05 RX ADMIN — CLONIDINE HYDROCHLORIDE 0.2 MG: 0.2 TABLET ORAL at 09:07

## 2024-07-05 RX ADMIN — ENOXAPARIN SODIUM 30 MG: 30 INJECTION SUBCUTANEOUS at 04:07

## 2024-07-05 RX ADMIN — HYDRALAZINE HYDROCHLORIDE 50 MG: 50 TABLET ORAL at 04:07

## 2024-07-05 RX ADMIN — TRAMADOL HYDROCHLORIDE 50 MG: 50 TABLET, COATED ORAL at 12:07

## 2024-07-05 RX ADMIN — DIPHENHYDRAMINE HYDROCHLORIDE 25 MG: 25 CAPSULE ORAL at 12:07

## 2024-07-05 RX ADMIN — AMLODIPINE BESYLATE 10 MG: 5 TABLET ORAL at 09:07

## 2024-07-05 RX ADMIN — FLUCONAZOLE 100 MG: 100 TABLET ORAL at 09:07

## 2024-07-05 RX ADMIN — TRAMADOL HYDROCHLORIDE 50 MG: 50 TABLET, COATED ORAL at 06:07

## 2024-07-05 RX ADMIN — INSULIN ASPART 9 UNITS: 100 INJECTION, SOLUTION INTRAVENOUS; SUBCUTANEOUS at 09:07

## 2024-07-05 RX ADMIN — HYDRALAZINE HYDROCHLORIDE 50 MG: 50 TABLET ORAL at 09:07

## 2024-07-05 RX ADMIN — INSULIN GLARGINE 50 UNITS: 100 INJECTION, SOLUTION SUBCUTANEOUS at 09:07

## 2024-07-05 NOTE — PT/OT/SLP PROGRESS
Occupational Therapy   Treatment    Name: Greer Kahn  MRN: [id number]  Admitting Diagnosis:  Perineal abscess  14 Days Post-Op    Recommendations:     Recommended therapy intensity at discharge: Moderate Intensity Therapy   Discharge Equipment Recommendations:  to be determined by next level of care  Barriers to discharge:  Decreased caregiver support    Assessment:     Greer Kahn is a 58 y.o. female with a medical diagnosis of groin infection s/p urgent I&D 6/14 and 6/18, severe sepsis, acute metabolic encephalopathy.  She presents with the following performance deficits affecting function are weakness, impaired endurance, impaired self care skills, impaired functional mobility, gait instability, impaired balance, decreased safety awareness.     Rehab Prognosis:  Good; patient would benefit from acute skilled OT services to address these deficits and reach maximum level of function.       Plan:     Patient to be seen 4 x/week to address the above listed problems via self-care/home management, therapeutic activities, therapeutic exercises  Plan of Care Expires: 08/02/24  Plan of Care Reviewed with: patient    Subjective     Pain/Comfort:  Pain Rating 1: 0/10    Objective:     Communicated with: RN prior to session.  Patient found HOB elevated with pulse ox (continuous), telemetry, peripheral IV, castaneda catheter upon OT entry to room.    General Precautions: Standard, contact, fall    Orthopedic Precautions:N/A  Braces: N/A  Respiratory Status: Room air  Vital Signs: Blood Pressure: /91, in chair 157/89     Occupational Performance:     Bed Mobility:    Patient completed Supine to Sit with minimum assistance     Functional Mobility/Transfers:  Patient completed Sit <> Stand Transfer with moderate assistance x2 with rolling walker   Patient completed Toilet Transfer Step Transfer technique with moderate assistance with rolling walker  Functional Mobility: CGA for mobility to/from bathroom commode and  within room using RW    Activities of Daily Living:  Grooming: contact guard assistance standing at sink brushing teeth    Therapeutic Positioning    OT interventions performed during the course of today's session in an effort to prevent and/or reduce acquired pressure injuries:   Education was provided on benefits of and recommendations for therapeutic positioning  Therapeutic positioning was provided at the conclusion of session to offload all bony prominences for the prevention and/or reduction of pressure injuries    Skin assessment: known altered skin at perineal region    Patient Education:  Patient provided with verbal education education regarding OT role/goals/POC, fall prevention, safety awareness, and Discharge/DME recommendations.  Understanding was verbalized.    Patient left up in chair with all lines intact, call button in reach, geomat cushion, and RN notified.    GOALS:   Multidisciplinary Problems       Occupational Therapy Goals          Problem: Occupational Therapy    Goal Priority Disciplines Outcome Interventions   Occupational Therapy Goal     OT, PT/OT Progressing    Description: Goals to be met by: 8/3/24     Patient will increase functional independence with ADLs by performing:    UE Dressing with Mod I.  LE Dressing with Mod I.  Grooming while standing at sink with Mod I.  Toileting from toilet with Mod I for hygiene and clothing management.   Toilet transfer to toilet with Mod I.    All goals revised 7/3/24                         Time Tracking:     OT Date of Treatment: 07/05/24  OT Start Time: 1147  OT Stop Time: 1210  OT Total Time (min): 23 min    Billable Minutes:Self Care/Home Management 23 mins    OT/MAKENZIE: OT     Number of MAKENZIE visits since last OT visit: 1 7/5/2024

## 2024-07-05 NOTE — PT/OT/SLP PROGRESS
Physical Therapy Treatment    Patient Name:  Greer Kahn   MRN:  [id number]    Recommendations:     Discharge therapy intensity: Moderate Intensity Therapy   Discharge Equipment Recommendations: to be determined by next level of care  Barriers to discharge: Impaired mobility and Ongoing medical needs    Assessment:     Greer Kahn is a 58 y.o. female admitted with a medical diagnosis of groin infection s/p urgent I&D 6/14 and 6/18, severe sepsis, acute metabolic encephalopathy .  She presents with the following impairments/functional limitations: weakness, impaired endurance, impaired self care skills, impaired functional mobility, decreased lower extremity function, gait instability, impaired balance, impaired cardiopulmonary response to activity, pain .    Rehab Prognosis: Good; patient would benefit from acute skilled PT services to address these deficits and reach maximum level of function.    Recent Surgery: Procedure(s) (LRB):  DEBRIDEMENT, WOUND (Left) 14 Days Post-Op    Plan:     During this hospitalization, patient would benefit from acute PT services 5 x/week to address the identified rehab impairments via gait training, therapeutic activities and progress toward the following goals:    Plan of Care Expires:  08/02/24    Subjective     Chief Complaint:   Patient/Family Comments/goals:   Pain/Comfort:         Objective:     Communicated with nurse prior to session.  Patient found HOB elevated with pulse ox (continuous), telemetry, castaneda catheter, peripheral IV upon PT entry to room.     General Precautions: Standard, contact  Orthopedic Precautions:    Braces:    Respiratory Status: Room air  Blood Pressure: 160/91, 157/89 after mobility  Skin Integrity:  Compromised skin      Functional Mobility:  Bed Mobility:     Supine to Sit: minimum assistance  Transfers:     Sit to Stand:  moderate assistance and of 2 persons with rolling walker from EOB; modA for sit to stand from toilet   Gait: pt amb x20ft  and x40ft with RW and Mason with cues for upright posture and staying within RW JHONY.     Pt reports some dizziness and light headedness as well as nausea during mobility but BP assessed and remained WNL.     Education:  Patient provided with verbal education education regarding fall prevention and safety awareness.  Understanding was verbalized, however additional teaching warranted.     Patient left up in chair with all lines intact, call button in reach, and nurse notified    GOALS:   Multidisciplinary Problems       Physical Therapy Goals          Problem: Physical Therapy    Goal Priority Disciplines Outcome Goal Variances Interventions   Physical Therapy Goal     PT, PT/OT Progressing     Description: Goals to be met by: 8/3/2024     Patient will increase functional independence with mobility by performin. Supine to sit with MInimal Assistance  2. Sit to supine with MInimal Assistance  3. Sitting at edge of bed x10 minutes with Stand-by Assistance  4. Pt with perform sit to stand transfer with RW Mod I.   5. Pt able to amb 150 ft with RW mod I.                          Time Tracking:     PT Received On: 24  PT Start Time: 1146     PT Stop Time: 1209  PT Total Time (min): 23 min     Billable Minutes: Gait Training 10 and Therapeutic Activity 13    Treatment Type: Treatment  PT/PTA: PTA     Number of PTA visits since last PT visit: 2024

## 2024-07-05 NOTE — PROGRESS NOTES
Nephrology consult follow up note    HPI:      Greer Kahn is a 58 y.o. female admitted on 06/13/2024 with fever 102 reporting possible UTI, boils to right thigh and left buttock.  Lactic acidosis present on admission with WBC 27.0, BUN 9.7, creatinine 0.98.  CT abdomen and pelvis with contrast showed area of cellulitis involving the perineum on the left side extending to the medial gluteal fold and areas of concern within the labia on the left side.  Also noted to have right lower lobe atelectasis via CT chest.  Patient was initiated on vancomycin and Rocephin in ER.     On 06/14 she underwent debridement of left groin and perineum.  Postoperative course complicated by nausea, vomiting and constipation.  She is also developed AUTUMN with rapidly worsening renal indices. Cr 0.98 --> 1.76--> 2.77--> 4.53 at the time of consultation. Urinalysis with low grade proteinuria and no blood.     Patient developed worsening renal function, and anuria.  She was initiated on hemodialysis 06/17/2024.    She has now undergone surgical debridement on 6/14, 6/18, and 6/21.    Interval history:     She is being closely monitored for renal recovery. Dialysis currently on hold with most recent treatment 7/1. Voiding well with 3600 mL in the last 24 hours. No symptoms of uremia.     Review of Systems:     Past medical, family, surgical, and social history reviewed and unchanged from initial consult note.     Objective:       VITAL SIGNS: 24 HR MIN & MAX LAST    Temp  Min: 98.2 °F (36.8 °C)  Max: 99.6 °F (37.6 °C)  98.3 °F (36.8 °C)        BP  Min: 149/90  Max: 165/101  (!) 162/90     Pulse  Min: 70  Max: 83  70     Resp  Min: 16  Max: 16  16    SpO2  Min: 93 %  Max: 96 %  95 %      Physical Exam  Constitutional:       General: She is not in acute distress.     Appearance: She is obese.   HENT:      Head: Atraumatic.      Nose: Nose normal.      Mouth/Throat:      Mouth: Mucous membranes are moist.   Eyes:      Extraocular Movements:  Extraocular movements intact.      Conjunctiva/sclera: Conjunctivae normal.   Neck:      Comments: R IJ temporary dialysis catheter   Cardiovascular:      Rate and Rhythm: Normal rate and regular rhythm.      Pulses: Normal pulses.      Heart sounds: Normal heart sounds.   Pulmonary:      Effort: Pulmonary effort is normal.      Breath sounds: Normal breath sounds.   Abdominal:      General: Bowel sounds are normal.      Palpations: Abdomen is soft.   Genitourinary:     Comments: Urinary catheter   Musculoskeletal:         General: No swelling.      Cervical back: Neck supple.   Skin:     General: Skin is warm.   Neurological:      General: No focal deficit present.      Mental Status: She is alert and oriented to person, place, and time.   Psychiatric:         Mood and Affect: Mood normal.         Behavior: Behavior normal.                 Component Value Date/Time     07/05/2024 0428     07/04/2024 0522     (L) 06/26/2020 0441     06/25/2020 0615    K 4.5 07/05/2024 0428    K 4.5 07/04/2024 0522    K 3.7 06/26/2020 0441    K 3.5 06/25/2020 0615    CO2 26 07/05/2024 0428    CO2 25 07/04/2024 0522    CO2 26 06/26/2020 0441    CO2 27 06/25/2020 0615    BUN 35.4 (H) 07/05/2024 0428    BUN 33.0 (H) 07/04/2024 0522    BUN 12 06/26/2020 0441    BUN 9 06/25/2020 0615    CREATININE 2.45 (H) 07/05/2024 0428    CREATININE 2.16 (H) 07/04/2024 0522    CREATININE 0.68 06/26/2020 0441    CREATININE 0.58 06/25/2020 0615    CALCIUM 8.4 07/05/2024 0428    CALCIUM 8.4 07/04/2024 0522    CALCIUM 8.2 (L) 06/26/2020 0441    CALCIUM 7.7 (L) 06/25/2020 0615    PHOS 4.4 07/05/2024 0428            Component Value Date/Time    WBC 6.96 07/05/2024 0428    WBC 9.18 06/30/2024 0659    WBC 22.37 06/24/2024 0343    WBC 28.96 06/23/2024 0451    HGB 8.0 (L) 07/05/2024 0428    HGB 7.7 (L) 06/30/2024 0659    HCT 24.8 (L) 07/05/2024 0428    HCT 24.6 (L) 06/30/2024 0659    HCT 42.0 04/16/2019 2328    HCT 42.0 04/16/2019 2305      07/05/2024 0428     06/30/2024 0659       Imaging reviewed      Assessment / Plan:     Acute Renal failure multifactorial secondary to sepsis, contrast exposure, NSAID use inpatient, vancomycin toxicity  -dialysis initiated 6/17.     Perineal Cellulitis with necrotizing component - status post debridement 6/14, 6/18 and 6/21   DM II uncontrolled - A1C 11  Worsening anemia     No further dialysis needed. Remove temporary dialysis catheter   Continue to avoid NSAIDs and nephrotoxic agents   Recommend encouraging oral intake of fluids.

## 2024-07-05 NOTE — PROGRESS NOTES
Hospital Medicine  Progress Note    Patient Name: Greer Kahn  MRN: [id number]  Status: IP- Inpatient   Admission Date: 6/13/2024  Length of Stay: 22  Date of Service: 07/05/2024       CC: hospital follow-up for ARF       SUBJECTIVE    58-year-old female wit a history notable for poorly controlled DM (A1c 13), presented to [facility] ED on 06/13 with a perivulvar/perineal painful lesion draining pus; associated with fever, chills, nausea and vomiting.  Evaluation in ED noted patient febrile up 101° F, tachycardic.  Labs notable for lactic acid 1.4 and a [place] count of 61472 with neutrophilic shift; CRP was 367.  CT abdomen pelvis with IV contrast showed an area of cellulitis involving the perineum on the left side extending to the medial gluteal fold with area of thickening and inflammatory changes in the labia with hypoattenuation concerning for possible abscess.  Blood pressures improved IV fluid resuscitation.  She was started on broad-spectrum IV antibiotics and transferred to [facility], where she was admitted to hospital medicine service.  Gen surgery was consulted, and patient taken to OR emergently for debridement where she was found to have necrotizing infection of the left groin.  She underwent several more repeat debridements last on 6/21.  Wound and Surgical cultures noting MSRA, as well as Candida tropicalis.  She subsequently completed a 2 weeks course of IV antibiotics, and also being treated with oral fluconazole.  Unfortunately hospital course was complicated by development of AUTUMN, for which Nephrology was consulted.  Patient required initiation of hemodialysis on 06/17, which she continued to require intermittently.  UOP however was improving.   Patient also deconditioned, working with therapy.     Today: Patient seen and examined at bedside, and chart reviewed.  Dialysis remains on hold, UOP continues to be encouraging in regards to renal recovery.  Patient did have an episode nausea and vomiting today,  though has been fine ever since.  No other new issues or complaints.      MEDICATIONS   Scheduled   amLODIPine  10 mg Oral Daily    carvediloL  6.25 mg Oral BID    cloNIDine  0.2 mg Oral TID    enoxaparin  30 mg Subcutaneous Daily    fluconazole  100 mg Oral Daily    hydrALAZINE  50 mg Oral Q8H    insulin glargine U-100  50 Units Subcutaneous QHS    Lactobacillus acidophilus  1 capsule Oral Daily    sodium chloride 0.9%  10 mL Intravenous Q6H     Continuous Infusions  None      PHYSICAL EXAM   VITALS: T 97.5 °F (36.4 °C)   /86   P 67   RR 16   O2 96 %    GENERAL: Awake and in NAD  LUNGS: CTA anteriorly  CVS: Normal rate  GI/: Soft, NT, bowel sounds positive.  EXTREMITIES:  Radial pulse 2 +  NEURO: AAOx3  PSYCH: Cooperative      LABS   CBC  Recent Labs     07/05/24  0428   WBC 6.96   RBC 2.62*   HGB 8.0*   HCT 24.8*   MCV 94.7*   MCH 30.5   MCHC 32.3*   RDW 16.8        CHEM  Recent Labs     07/04/24  0522 07/05/24  0428    138   K 4.5 4.5   CO2 25 26   BUN 33.0* 35.4*   CREATININE 2.16* 2.45*   GLUCOSE 141* 183*   CALCIUM 8.4 8.4   PHOS 5.1* 4.4   ALBUMIN 2.0* 1.9*       ASSESSMENT   Acute renal failure requiring hemodialysis   Necrotizing soft tissue infection of the left groin/perineal region, resolved  Poorly controlled type II diabetes mellitus with hyperglycemia  Essential hypertension   Worsening Normocytic anemia, now stable    PLAN   HD on hold, monitoring renal indices seem to be improving, UOP has been good  Continue fluconazole to complete 2 week course  Continue wound care as outlined  Continue with probiotic and antidiarrheals  Sugars under fair control, continue current insulin regimen   Otherwise continue current management and monitoring in the interim      Prophylaxis: SC Lovejim Styles MD  [facility]

## 2024-07-06 LAB
ALBUMIN SERPL-MCNC: 2 G/DL (ref 3.5–5)
BUN SERPL-MCNC: 34.6 MG/DL (ref 9.8–20.1)
CALCIUM SERPL-MCNC: 8.4 MG/DL (ref 8.4–10.2)
CHLORIDE SERPL-SCNC: 102 MMOL/L (ref 98–107)
CO2 SERPL-SCNC: 28 MMOL/L (ref 22–29)
CREAT SERPL-MCNC: 2.08 MG/DL (ref 0.55–1.02)
GFR SERPLBLD CREATININE-BSD FMLA CKD-EPI: 27 ML/MIN/1.73/M2
GLUCOSE SERPL-MCNC: 189 MG/DL (ref 74–100)
PHOSPHATE SERPL-MCNC: 4.6 MG/DL (ref 2.3–4.7)
POCT GLUCOSE: 163 MG/DL (ref 70–110)
POCT GLUCOSE: 200 MG/DL (ref 70–110)
POCT GLUCOSE: 215 MG/DL (ref 70–110)
POTASSIUM SERPL-SCNC: 4.5 MMOL/L (ref 3.5–5.1)
SODIUM SERPL-SCNC: 138 MMOL/L (ref 136–145)

## 2024-07-06 PROCEDURE — 80069 RENAL FUNCTION PANEL: CPT | Performed by: STUDENT IN AN ORGANIZED HEALTH CARE EDUCATION/TRAINING PROGRAM

## 2024-07-06 PROCEDURE — 63600175 PHARM REV CODE 636 W HCPCS: Performed by: STUDENT IN AN ORGANIZED HEALTH CARE EDUCATION/TRAINING PROGRAM

## 2024-07-06 PROCEDURE — 25000003 PHARM REV CODE 250: Performed by: INTERNAL MEDICINE

## 2024-07-06 PROCEDURE — 36415 COLL VENOUS BLD VENIPUNCTURE: CPT | Performed by: STUDENT IN AN ORGANIZED HEALTH CARE EDUCATION/TRAINING PROGRAM

## 2024-07-06 PROCEDURE — 63700000 PHARM REV CODE 250 ALT 637 W/O HCPCS: Performed by: INTERNAL MEDICINE

## 2024-07-06 PROCEDURE — A4216 STERILE WATER/SALINE, 10 ML: HCPCS | Performed by: INTERNAL MEDICINE

## 2024-07-06 PROCEDURE — 25000003 PHARM REV CODE 250: Performed by: NURSE PRACTITIONER

## 2024-07-06 PROCEDURE — 63600175 PHARM REV CODE 636 W HCPCS: Performed by: INTERNAL MEDICINE

## 2024-07-06 PROCEDURE — 27000221 HC OXYGEN, UP TO 24 HOURS

## 2024-07-06 PROCEDURE — 27000207 HC ISOLATION

## 2024-07-06 PROCEDURE — 21400001 HC TELEMETRY ROOM

## 2024-07-06 RX ORDER — INSULIN GLARGINE 100 [IU]/ML
55 INJECTION, SOLUTION SUBCUTANEOUS NIGHTLY
Status: DISCONTINUED | OUTPATIENT
Start: 2024-07-06 | End: 2024-07-07

## 2024-07-06 RX ADMIN — Medication 1 CAPSULE: at 09:07

## 2024-07-06 RX ADMIN — HYDRALAZINE HYDROCHLORIDE 75 MG: 50 TABLET ORAL at 01:07

## 2024-07-06 RX ADMIN — CARVEDILOL 6.25 MG: 3.12 TABLET, FILM COATED ORAL at 09:07

## 2024-07-06 RX ADMIN — ENOXAPARIN SODIUM 30 MG: 30 INJECTION SUBCUTANEOUS at 05:07

## 2024-07-06 RX ADMIN — DIPHENHYDRAMINE HYDROCHLORIDE 25 MG: 25 CAPSULE ORAL at 01:07

## 2024-07-06 RX ADMIN — CLONIDINE HYDROCHLORIDE 0.2 MG: 0.2 TABLET ORAL at 09:07

## 2024-07-06 RX ADMIN — HYDRALAZINE HYDROCHLORIDE 50 MG: 50 TABLET ORAL at 05:07

## 2024-07-06 RX ADMIN — CARVEDILOL 6.25 MG: 3.12 TABLET, FILM COATED ORAL at 08:07

## 2024-07-06 RX ADMIN — AMLODIPINE BESYLATE 10 MG: 5 TABLET ORAL at 09:07

## 2024-07-06 RX ADMIN — SODIUM CHLORIDE, PRESERVATIVE FREE 10 ML: 5 INJECTION INTRAVENOUS at 01:07

## 2024-07-06 RX ADMIN — CLONIDINE HYDROCHLORIDE 0.2 MG: 0.2 TABLET ORAL at 05:07

## 2024-07-06 RX ADMIN — FLUCONAZOLE 100 MG: 100 TABLET ORAL at 09:07

## 2024-07-06 RX ADMIN — HYDRALAZINE HYDROCHLORIDE 75 MG: 50 TABLET ORAL at 10:07

## 2024-07-06 RX ADMIN — INSULIN ASPART 3 UNITS: 100 INJECTION, SOLUTION INTRAVENOUS; SUBCUTANEOUS at 05:07

## 2024-07-06 RX ADMIN — INSULIN GLARGINE 55 UNITS: 100 INJECTION, SOLUTION SUBCUTANEOUS at 10:07

## 2024-07-06 RX ADMIN — SODIUM CHLORIDE, PRESERVATIVE FREE 10 ML: 5 INJECTION INTRAVENOUS at 05:07

## 2024-07-06 RX ADMIN — CLONIDINE HYDROCHLORIDE 0.2 MG: 0.2 TABLET ORAL at 08:07

## 2024-07-06 NOTE — PLAN OF CARE
Problem: Adult Inpatient Plan of Care  Goal: Plan of Care Review  Outcome: Progressing  Goal: Patient-Specific Goal (Individualized)  Outcome: Progressing  Goal: Absence of Hospital-Acquired Illness or Injury  Outcome: Progressing  Goal: Optimal Comfort and Wellbeing  Outcome: Progressing  Goal: Readiness for Transition of Care  Outcome: Progressing     Problem: Wound  Goal: Optimal Coping  Outcome: Progressing  Goal: Optimal Functional Ability  Outcome: Progressing  Goal: Absence of Infection Signs and Symptoms  Outcome: Progressing  Goal: Improved Oral Intake  Outcome: Progressing  Goal: Optimal Pain Control and Function  Outcome: Progressing  Goal: Skin Health and Integrity  Outcome: Progressing  Intervention: Optimize Skin Protection  Flowsheets (Taken 7/6/2024 1612)  Pressure Reduction Techniques:   frequent weight shift encouraged   weight shift assistance provided  Pressure Reduction Devices: specialty bed utilized  Activity Management: Up to bedside commode - L3  Head of Bed (HOB) Positioning: HOB at 30 degrees  Goal: Optimal Wound Healing  Outcome: Progressing     Problem: Fall Injury Risk  Goal: Absence of Fall and Fall-Related Injury  Outcome: Progressing

## 2024-07-06 NOTE — PROGRESS NOTES
"[facility]    Nephrology Progress Note    Patient Name: Greer Kahn  Age: 58 y.o.  : 1965  MRN: [id number]  Admission Date: 2024    Chief complaint: Fever (Pt concerned at fever 102 yesterday reporting possible UTI and boils to right thigh and left buttock)      Hospital course  Greer Kahn is a 58 y.o. Black or  female who was admitted on 2024 with complaints of boils to right thigh and left buttock and fever of 102.CT abdomen and pelvis with contrast showed area of cellulitis involving the perineum on the left side extending to the medial gluteal fold and areas of concern within the labia on the left side. Also noted to have right lower lobe atelectasis via CT chest. Patient was initiated on vancomycin and Rocephin in ER.   On  she underwent debridement of left groin and perineum. Postoperative course complicated by nausea, vomiting and constipation. She is also developed AUTUMN and was started on hemodialysis on 2024.  She is status post surgical debridement on 2024, 2024, and 2024.  Patient has recovered kidney function and has not been dialyzed since 2024.  We are continuing to follow for nephrology care.      Subjective  Patient denies complaints.      Review of Systems  Cardiovascular:  Negative for chest pain   Respiratory: Negative for shortness of breath     Objective  BP (!) 166/93   Pulse 75   Temp 98.8 °F (37.1 °C) (Oral)   Resp 17   Ht 5' 7.01" (1.702 m)   Wt 130 kg (286 lb 9.6 oz)   SpO2 95%   Breastfeeding No   BMI 44.88 kg/m²     Intake/Output Summary (Last 24 hours) at 2024 1017  Last data filed at 2024 0000  Gross per 24 hour   Intake --   Output 3750 ml   Net -3750 ml       Physical Exam  General appearance: Patient is in no acute distress.  HEENT: PERRLA, EOMI, no scleral icterus, no JVD. Neck is supple.  Chest: Respirations are unlabored. Lungs sounds are clear.   Heart: S1, S2. "   Abdomen: Benign.  :  Huff catheter to gravity.  Extremities: No edema, peripheral pulses are palpable.   Neuro: No focal deficits.     Medications  Scheduled Meds:   amLODIPine  10 mg Oral Daily    carvediloL  6.25 mg Oral BID    cloNIDine  0.2 mg Oral TID    enoxaparin  30 mg Subcutaneous Daily    fluconazole  100 mg Oral Daily    hydrALAZINE  75 mg Oral Q8H    insulin glargine U-100  50 Units Subcutaneous QHS    Lactobacillus acidophilus  1 capsule Oral Daily    sodium chloride 0.9%  10 mL Intravenous Q6H     Continuous Infusions:  PRN Meds:.  Current Facility-Administered Medications:     0.9%  NaCl infusion (for blood administration), , Intravenous, Q24H PRN    0.9%  NaCl infusion (for blood administration), , Intravenous, Q24H PRN    acetaminophen, 650 mg, Oral, Q4H PRN    albuterol-ipratropium, 3 mL, Nebulization, Q4H PRN    aluminum-magnesium hydroxide-simethicone, 30 mL, Oral, QID PRN    bisacodyL, 10 mg, Rectal, Daily PRN    calcium carbonate, 1,000 mg, Oral, TID PRN    dextrose 10%, 12.5 g, Intravenous, PRN    dextrose 10%, 25 g, Intravenous, PRN    diphenhydrAMINE, 25 mg, Oral, Q6H PRN    glucagon (human recombinant), 1 mg, Intramuscular, PRN    glucose, 16 g, Oral, PRN    glucose, 24 g, Oral, PRN    heparin (porcine), 2,800 Units, Intravenous, PRN    hydrALAZINE, 10 mg, Intravenous, Q4H PRN    insulin aspart U-100, 0-15 Units, Subcutaneous, QID (AC + HS) PRN    loperamide, 2 mg, Oral, QID PRN    metoclopramide, 10 mg, Intravenous, Q8H PRN    polyethylene glycol, 17 g, Oral, BID PRN    prochlorperazine, 2.5 mg, Intravenous, Q6H PRN    prochlorperazine, 5 mg, Intravenous, Q6H PRN    senna-docusate 8.6-50 mg, 2 tablet, Oral, BID PRN    sodium chloride 0.9%, 10 mL, Intravenous, PRN    Flushing PICC/Midline Protocol, , , Until Discontinued **AND** sodium chloride 0.9%, 10 mL, Intravenous, Q6H **AND** sodium chloride 0.9%, 10 mL, Intravenous, PRN    traMADoL, 50 mg, Oral, Q8H PRN     Imaging:     Reviewed    Laboratory Data:    Chemistry  Recent Labs     07/04/24  0522 07/05/24  0428 07/06/24  0507    138 138   K 4.5 4.5 4.5   CO2 25 26 28   BUN 33.0* 35.4* 34.6*   CREATININE 2.16* 2.45* 2.08*   EGFRNORACEVR 26 22 27   GLUCOSE 141* 183* 189*   CALCIUM 8.4 8.4 8.4   PHOS 5.1* 4.4 4.6      LFT  Lab Results   Component Value Date    ALKPHOS 216 (H) 06/26/2024    AST 35 (H) 06/26/2024    ALT 22 06/26/2024    BILIDIR 2.9 (H) 06/17/2024    IBILI 0.20 06/21/2020    BILITOT 1.1 06/26/2024    ALBUMIN 2.0 (L) 07/06/2024      CKD-MBD  Lab Results   Component Value Date    GHQMDVGE57PB 10.66 (L) 04/11/2019      Hematology  Recent Labs     07/05/24  0428   WBC 6.96   HGB 8.0*   HCT 24.8*         Lab Results   Component Value Date    FERRITIN 625.1 (H) 06/23/2020    [id number] 768 04/11/2019     06/22/2020      ID  Lab Results   Component Value Date    HEPBSAB Reactive (A) 06/17/2024      Additional serology  Lab Results   Component Value Date    HGBA1C 11.0 (H) 06/14/2024       Urine studies  Lab Results   Component Value Date    APPEARANCEUA Cloudy (A) 06/13/2024    SGUA 1.015 06/13/2024    PROTEINUA 30 (A) 06/13/2024    KETONESUA 15 (A) 06/13/2024    LEUKOCYTESUR Negative 06/13/2024    RBCUA 3-5 06/13/2024    WBCUA None Seen 06/13/2024    BACTERIA Few (A) 06/13/2024    CREATRANDUR 40.6 05/30/2018        Impression  Nonoliguric acute kidney injury, resolving  Perineal cellulitis with necrotizing component, status post multiple debridement procedures   Poorly-controlled diabetes mellitus   Anemia  Hypertension    Plan  Renal indices continue to improve gradually, urinary output is adequate, there are no electrolyte or acid-base abnormalities.  Continue supportive care and current antihypertensive regimen.        Zuleika Horvath NP  [facility] Nephrology  7/6/2024

## 2024-07-06 NOTE — PROGRESS NOTES
Hospital Medicine  Progress Note    Patient Name: Greer Kahn  MRN: [id number]  Status: IP- Inpatient   Admission Date: 6/13/2024  Length of Stay: 23  Date of Service: 07/06/2024       CC: hospital follow-up for ARF       SUBJECTIVE    58-year-old female wit a history notable for poorly controlled DM (A1c 13), presented to [facility] ED on 06/13 with a perivulvar/perineal painful lesion draining pus; associated with fever, chills, nausea and vomiting.  Evaluation in ED noted patient febrile up 101° F, tachycardic.  Labs notable for lactic acid 1.4 and [place] of [address] with neutrophilic shift; CRP was 367.  CT abdomen pelvis with IV contrast showed an area of cellulitis involving the perineum on the left side extending to the medial gluteal fold with area of thickening and inflammatory changes in the labia with hypoattenuation concerning for possible abscess.  Blood pressures improved IV fluid resuscitation.  She was started on broad-spectrum IV antibiotics and transferred to [facility], where she was admitted to hospital medicine service.  Gen surgery was consulted, and patient taken to OR emergently for debridement where she was found to have necrotizing infection of the left groin.  She underwent several more repeat debridements last on 6/21.  Wound and Surgical cultures noting MSRA, as well as Candida tropicalis.  She subsequently completed a 2 weeks course of IV antibiotics, and also being treated with oral fluconazole.  Unfortunately hospital course was complicated by development of AUTUMN, for which Nephrology was consulted.  Patient required initiation of hemodialysis on 06/17, which she continued to require intermittently.  UOP however was improving.   Patient also deconditioned, working with therapy.     Today: Patient seen and examined at bedside, and chart reviewed.  No new issues or complaints, urine output remains good, and creatinine continues to trend down.      MEDICATIONS   Scheduled   amLODIPine  10 mg  Oral Daily    carvediloL  6.25 mg Oral BID    cloNIDine  0.2 mg Oral TID    enoxaparin  30 mg Subcutaneous Daily    fluconazole  100 mg Oral Daily    hydrALAZINE  75 mg Oral Q8H    insulin glargine U-100  50 Units Subcutaneous QHS    Lactobacillus acidophilus  1 capsule Oral Daily    sodium chloride 0.9%  10 mL Intravenous Q6H     Continuous Infusions  None      PHYSICAL EXAM   VITALS: T 98.8 °F (37.1 °C)   BP (!) 166/93   P 75   RR 17   O2 95 %    GENERAL: Awake and in NAD  LUNGS: CTA anteriorly  CVS: Normal rate  GI/: Soft, NT, bowel sounds positive.  EXTREMITIES:  Radial pulse 2 +  NEURO: AAOx3  PSYCH: Cooperative      LABS   CBC  Recent Labs     07/05/24  0428   WBC 6.96   RBC 2.62*   HGB 8.0*   HCT 24.8*   MCV 94.7*   MCH 30.5   MCHC 32.3*   RDW 16.8        CHEM  Recent Labs     07/05/24  0428 07/06/24  0507    138   K 4.5 4.5   CO2 26 28   BUN 35.4* 34.6*   CREATININE 2.45* 2.08*   GLUCOSE 183* 189*   CALCIUM 8.4 8.4   PHOS 4.4 4.6   ALBUMIN 1.9* 2.0*       ASSESSMENT   Acute renal failure requiring hemodialysis   Necrotizing soft tissue infection of the left groin/perineal region, resolved  Poorly controlled type II diabetes mellitus with hyperglycemia  Essential hypertension   Worsening Normocytic anemia, now stable    PLAN   HD on hold, monitoring renal indices  Continue fluconazole to complete 2 week course  Continue wound care as outlined  Continue with probiotic and antidiarrheals  Sugars have been labile, will up titrate Lantus slightly to 55 units nightly  Continue CBG monitoring and further insulin sliding scale coverage as indicated  Otherwise continue current management and monitoring in the interim      Prophylaxis: SC Lovenox        Nicolas Styles MD  [facility]

## 2024-07-07 LAB
ALBUMIN SERPL-MCNC: 2 G/DL (ref 3.5–5)
ALBUMIN/GLOB SERPL: 0.4 RATIO (ref 1.1–2)
ALP SERPL-CCNC: 515 UNIT/L (ref 40–150)
ALT SERPL-CCNC: 72 UNIT/L (ref 0–55)
ANION GAP SERPL CALC-SCNC: 10 MEQ/L
AST SERPL-CCNC: 54 UNIT/L (ref 5–34)
BASOPHILS # BLD AUTO: 0.03 X10(3)/MCL
BASOPHILS NFR BLD AUTO: 0.4 %
BILIRUB SERPL-MCNC: 0.7 MG/DL
BUN SERPL-MCNC: 31.4 MG/DL (ref 9.8–20.1)
CALCIUM SERPL-MCNC: 8.4 MG/DL (ref 8.4–10.2)
CHLORIDE SERPL-SCNC: 105 MMOL/L (ref 98–107)
CO2 SERPL-SCNC: 26 MMOL/L (ref 22–29)
CREAT SERPL-MCNC: 1.88 MG/DL (ref 0.55–1.02)
CREAT/UREA NIT SERPL: 17
EOSINOPHIL # BLD AUTO: 0.42 X10(3)/MCL (ref 0–0.9)
EOSINOPHIL NFR BLD AUTO: 5.9 %
ERYTHROCYTE [DISTWIDTH] IN BLOOD BY AUTOMATED COUNT: 16 % (ref 11.5–17)
GFR SERPLBLD CREATININE-BSD FMLA CKD-EPI: 31 ML/MIN/1.73/M2
GLOBULIN SER-MCNC: 5.2 GM/DL (ref 2.4–3.5)
GLUCOSE SERPL-MCNC: 77 MG/DL (ref 74–100)
HCT VFR BLD AUTO: 25.4 % (ref 37–47)
HGB BLD-MCNC: 8.2 G/DL (ref 12–16)
IMM GRANULOCYTES # BLD AUTO: 0.04 X10(3)/MCL (ref 0–0.04)
IMM GRANULOCYTES NFR BLD AUTO: 0.6 %
LYMPHOCYTES # BLD AUTO: 1.84 X10(3)/MCL (ref 0.6–4.6)
LYMPHOCYTES NFR BLD AUTO: 26 %
MCH RBC QN AUTO: 30.4 PG (ref 27–31)
MCHC RBC AUTO-ENTMCNC: 32.3 G/DL (ref 33–36)
MCV RBC AUTO: 94.1 FL (ref 80–94)
MONOCYTES # BLD AUTO: 0.57 X10(3)/MCL (ref 0.1–1.3)
MONOCYTES NFR BLD AUTO: 8.1 %
NEUTROPHILS # BLD AUTO: 4.17 X10(3)/MCL (ref 2.1–9.2)
NEUTROPHILS NFR BLD AUTO: 59 %
NRBC BLD AUTO-RTO: 0 %
PHOSPHATE SERPL-MCNC: 4.5 MG/DL (ref 2.3–4.7)
PLATELET # BLD AUTO: 307 X10(3)/MCL (ref 130–400)
PMV BLD AUTO: 9.8 FL (ref 7.4–10.4)
POCT GLUCOSE: 164 MG/DL (ref 70–110)
POCT GLUCOSE: 189 MG/DL (ref 70–110)
POCT GLUCOSE: 59 MG/DL (ref 70–110)
POCT GLUCOSE: 93 MG/DL (ref 70–110)
POTASSIUM SERPL-SCNC: 4.6 MMOL/L (ref 3.5–5.1)
PROT SERPL-MCNC: 7.2 GM/DL (ref 6.4–8.3)
RBC # BLD AUTO: 2.7 X10(6)/MCL (ref 4.2–5.4)
SODIUM SERPL-SCNC: 141 MMOL/L (ref 136–145)
WBC # BLD AUTO: 7.07 X10(3)/MCL (ref 4.5–11.5)

## 2024-07-07 PROCEDURE — 63700000 PHARM REV CODE 250 ALT 637 W/O HCPCS: Performed by: INTERNAL MEDICINE

## 2024-07-07 PROCEDURE — 63600175 PHARM REV CODE 636 W HCPCS: Performed by: INTERNAL MEDICINE

## 2024-07-07 PROCEDURE — 63600175 PHARM REV CODE 636 W HCPCS: Performed by: HOSPITALIST

## 2024-07-07 PROCEDURE — 25000003 PHARM REV CODE 250: Performed by: NURSE PRACTITIONER

## 2024-07-07 PROCEDURE — 25000003 PHARM REV CODE 250: Performed by: INTERNAL MEDICINE

## 2024-07-07 PROCEDURE — 85025 COMPLETE CBC W/AUTO DIFF WBC: CPT | Performed by: NURSE PRACTITIONER

## 2024-07-07 PROCEDURE — 27000207 HC ISOLATION

## 2024-07-07 PROCEDURE — 36415 COLL VENOUS BLD VENIPUNCTURE: CPT | Performed by: NURSE PRACTITIONER

## 2024-07-07 PROCEDURE — A4216 STERILE WATER/SALINE, 10 ML: HCPCS | Performed by: INTERNAL MEDICINE

## 2024-07-07 PROCEDURE — 21400001 HC TELEMETRY ROOM

## 2024-07-07 PROCEDURE — 25000003 PHARM REV CODE 250: Performed by: STUDENT IN AN ORGANIZED HEALTH CARE EDUCATION/TRAINING PROGRAM

## 2024-07-07 PROCEDURE — 84100 ASSAY OF PHOSPHORUS: CPT | Performed by: NURSE PRACTITIONER

## 2024-07-07 PROCEDURE — 80053 COMPREHEN METABOLIC PANEL: CPT | Performed by: NURSE PRACTITIONER

## 2024-07-07 PROCEDURE — 63600175 PHARM REV CODE 636 W HCPCS: Performed by: STUDENT IN AN ORGANIZED HEALTH CARE EDUCATION/TRAINING PROGRAM

## 2024-07-07 PROCEDURE — 97535 SELF CARE MNGMENT TRAINING: CPT | Mod: CO

## 2024-07-07 RX ORDER — INSULIN GLARGINE 100 [IU]/ML
45 INJECTION, SOLUTION SUBCUTANEOUS NIGHTLY
Status: DISCONTINUED | OUTPATIENT
Start: 2024-07-07 | End: 2024-07-09 | Stop reason: HOSPADM

## 2024-07-07 RX ORDER — GLIMEPIRIDE 4 MG/1
4 TABLET ORAL
Status: DISCONTINUED | OUTPATIENT
Start: 2024-07-08 | End: 2024-07-09 | Stop reason: HOSPADM

## 2024-07-07 RX ADMIN — CLONIDINE HYDROCHLORIDE 0.2 MG: 0.2 TABLET ORAL at 09:07

## 2024-07-07 RX ADMIN — CARVEDILOL 6.25 MG: 3.12 TABLET, FILM COATED ORAL at 09:07

## 2024-07-07 RX ADMIN — TRAMADOL HYDROCHLORIDE 50 MG: 50 TABLET, COATED ORAL at 08:07

## 2024-07-07 RX ADMIN — HYDRALAZINE HYDROCHLORIDE 75 MG: 50 TABLET ORAL at 08:07

## 2024-07-07 RX ADMIN — HYDRALAZINE HYDROCHLORIDE 75 MG: 50 TABLET ORAL at 05:07

## 2024-07-07 RX ADMIN — SODIUM CHLORIDE, PRESERVATIVE FREE 10 ML: 5 INJECTION INTRAVENOUS at 12:07

## 2024-07-07 RX ADMIN — METOCLOPRAMIDE 10 MG: 5 INJECTION, SOLUTION INTRAMUSCULAR; INTRAVENOUS at 01:07

## 2024-07-07 RX ADMIN — TRAMADOL HYDROCHLORIDE 50 MG: 50 TABLET, COATED ORAL at 02:07

## 2024-07-07 RX ADMIN — DIPHENHYDRAMINE HYDROCHLORIDE 25 MG: 25 CAPSULE ORAL at 01:07

## 2024-07-07 RX ADMIN — SODIUM CHLORIDE, PRESERVATIVE FREE 10 ML: 5 INJECTION INTRAVENOUS at 06:07

## 2024-07-07 RX ADMIN — INSULIN GLARGINE 45 UNITS: 100 INJECTION, SOLUTION SUBCUTANEOUS at 08:07

## 2024-07-07 RX ADMIN — AMLODIPINE BESYLATE 10 MG: 5 TABLET ORAL at 09:07

## 2024-07-07 RX ADMIN — Medication 1 CAPSULE: at 09:07

## 2024-07-07 RX ADMIN — CARVEDILOL 6.25 MG: 3.12 TABLET, FILM COATED ORAL at 08:07

## 2024-07-07 RX ADMIN — FLUCONAZOLE 100 MG: 100 TABLET ORAL at 09:07

## 2024-07-07 RX ADMIN — HYDRALAZINE HYDROCHLORIDE 75 MG: 50 TABLET ORAL at 02:07

## 2024-07-07 RX ADMIN — SODIUM CHLORIDE, PRESERVATIVE FREE 10 ML: 5 INJECTION INTRAVENOUS at 05:07

## 2024-07-07 RX ADMIN — CLONIDINE HYDROCHLORIDE 0.2 MG: 0.2 TABLET ORAL at 08:07

## 2024-07-07 RX ADMIN — ENOXAPARIN SODIUM 30 MG: 30 INJECTION SUBCUTANEOUS at 05:07

## 2024-07-07 RX ADMIN — CLONIDINE HYDROCHLORIDE 0.2 MG: 0.2 TABLET ORAL at 02:07

## 2024-07-07 NOTE — PROGRESS NOTES
"[facility]    Nephrology Progress Note    Patient Name: Greer Kahn  Age: 58 y.o.  : 1965  MRN: [id number]  Admission Date: 2024    Chief complaint: Fever (Pt concerned at fever 102 yesterday reporting possible UTI and boils to right thigh and left buttock)      Hospital course  Greer Kahn is a 58 y.o. Black or  female who was admitted on 2024 with complaints of boils to right thigh and left buttock and fever of 102.CT abdomen and pelvis with contrast showed area of cellulitis involving the perineum on the left side extending to the medial gluteal fold and areas of concern within the labia on the left side. Also noted to have right lower lobe atelectasis via CT chest. Patient was initiated on vancomycin and Rocephin in ER.   On  she underwent debridement of left groin and perineum. Postoperative course complicated by nausea, vomiting and constipation. She is also developed AUTUMN and was started on hemodialysis on 2024.  She is status post surgical debridement on 2024, 2024, and 2024.  Patient has recovered kidney function and has not been dialyzed since 2024.  We are continuing to follow for nephrology care.      Subjective  Patient denies complaints.      Review of Systems  Cardiovascular:  Negative for chest pain   Respiratory: Negative for shortness of breath     Objective  BP (!) 147/80   Pulse 74   Temp 98.7 °F (37.1 °C) (Oral)   Resp 18   Ht 5' 7.01" (1.702 m)   Wt 130 kg (286 lb 9.6 oz)   SpO2 96%   Breastfeeding No   BMI 44.88 kg/m²     Intake/Output Summary (Last 24 hours) at 2024 0845  Last data filed at 2024 1907  Gross per 24 hour   Intake 360 ml   Output 1850 ml   Net -1490 ml       Physical Exam  General appearance: Patient is in no acute distress.  HEENT: PERRLA, EOMI, no scleral icterus, no JVD. Neck is supple.  Chest: Respirations are unlabored. Lungs sounds are clear.   Heart: S1, " S2.   Abdomen: Benign.  :  Huff catheter to gravity.  Extremities: No edema, peripheral pulses are palpable.   Neuro: No focal deficits.     Medications  Scheduled Meds:   amLODIPine  10 mg Oral Daily    carvediloL  6.25 mg Oral BID    cloNIDine  0.2 mg Oral TID    enoxaparin  30 mg Subcutaneous Daily    fluconazole  100 mg Oral Daily    hydrALAZINE  75 mg Oral Q8H    insulin glargine U-100  55 Units Subcutaneous QHS    Lactobacillus acidophilus  1 capsule Oral Daily    sodium chloride 0.9%  10 mL Intravenous Q6H     Continuous Infusions:  PRN Meds:.  Current Facility-Administered Medications:     0.9%  NaCl infusion (for blood administration), , Intravenous, Q24H PRN    0.9%  NaCl infusion (for blood administration), , Intravenous, Q24H PRN    acetaminophen, 650 mg, Oral, Q4H PRN    albuterol-ipratropium, 3 mL, Nebulization, Q4H PRN    aluminum-magnesium hydroxide-simethicone, 30 mL, Oral, QID PRN    bisacodyL, 10 mg, Rectal, Daily PRN    calcium carbonate, 1,000 mg, Oral, TID PRN    dextrose 10%, 12.5 g, Intravenous, PRN    dextrose 10%, 25 g, Intravenous, PRN    diphenhydrAMINE, 25 mg, Oral, Q6H PRN    glucagon (human recombinant), 1 mg, Intramuscular, PRN    glucose, 16 g, Oral, PRN    glucose, 24 g, Oral, PRN    heparin (porcine), 2,800 Units, Intravenous, PRN    hydrALAZINE, 10 mg, Intravenous, Q4H PRN    insulin aspart U-100, 0-15 Units, Subcutaneous, QID (AC + HS) PRN    loperamide, 2 mg, Oral, QID PRN    metoclopramide, 10 mg, Intravenous, Q8H PRN    polyethylene glycol, 17 g, Oral, BID PRN    prochlorperazine, 2.5 mg, Intravenous, Q6H PRN    prochlorperazine, 5 mg, Intravenous, Q6H PRN    senna-docusate 8.6-50 mg, 2 tablet, Oral, BID PRN    sodium chloride 0.9%, 10 mL, Intravenous, PRN    Flushing PICC/Midline Protocol, , , Until Discontinued **AND** sodium chloride 0.9%, 10 mL, Intravenous, Q6H **AND** sodium chloride 0.9%, 10 mL, Intravenous, PRN    traMADoL, 50 mg, Oral, Q8H PRN     Imaging:     Reviewed    Laboratory Data:    Chemistry  Recent Labs     07/05/24  0428 07/06/24  0507 07/07/24  0446    138 141   K 4.5 4.5 4.6   CO2 26 28 26   BUN 35.4* 34.6* 31.4*   CREATININE 2.45* 2.08* 1.88*   EGFRNORACEVR 22 27 31   GLUCOSE 183* 189* 77   CALCIUM 8.4 8.4 8.4   PHOS 4.4 4.6 4.5      LFT  Lab Results   Component Value Date    ALKPHOS 515 (H) 07/07/2024    AST 54 (H) 07/07/2024    ALT 72 (H) 07/07/2024    BILIDIR 2.9 (H) 06/17/2024    IBILI 0.20 06/21/2020    BILITOT 0.7 07/07/2024    ALBUMIN 2.0 (L) 07/07/2024      CKD-MBD  Lab Results   Component Value Date    ZMIOZQXH48CJ 10.66 (L) 04/11/2019      Hematology  Recent Labs     07/05/24  0428 07/07/24  0446   WBC 6.96 7.07   HGB 8.0* 8.2*   HCT 24.8* 25.4*    307      Lab Results   Component Value Date    FERRITIN 625.1 (H) 06/23/2020    [id number] 768 04/11/2019     06/22/2020      ID  Lab Results   Component Value Date    HEPBSAB Reactive (A) 06/17/2024      Additional serology  Lab Results   Component Value Date    HGBA1C 11.0 (H) 06/14/2024       Urine studies  Lab Results   Component Value Date    APPEARANCEUA Cloudy (A) 06/13/2024    SGUA 1.015 06/13/2024    PROTEINUA 30 (A) 06/13/2024    KETONESUA 15 (A) 06/13/2024    LEUKOCYTESUR Negative 06/13/2024    RBCUA 3-5 06/13/2024    WBCUA None Seen 06/13/2024    BACTERIA Few (A) 06/13/2024    CREATRANDUR 40.6 05/30/2018        Impression  Nonoliguric acute kidney injury, resolving  Perineal cellulitis with necrotizing component, status post multiple debridement procedures   Poorly-controlled diabetes mellitus   Anemia  Hypertension    Plan  Renal indices continue to improve gradually, urinary output is adequate, there are no electrolyte or acid-base abnormalities.  Continue supportive care and current antihypertensive regimen.        Zuleika Horvath NP  [facility] Nephrology  7/7/2024

## 2024-07-07 NOTE — PLAN OF CARE
.  Problem: Wound  Goal: Optimal Coping  7/7/2024 0424 by Andria Borrero RN  Outcome: Progressing  7/7/2024 0424 by Andria Borrero RN  Outcome: Progressing     Problem: Wound  Goal: Absence of Infection Signs and Symptoms  7/7/2024 0424 by Andria Borrero RN  Outcome: Progressing  7/7/2024 0424 by Andria Borrero RN  Outcome: Progressing     Problem: Wound  Goal: Optimal Wound Healing  7/7/2024 0424 by Andria Borrero RN  Outcome: Progressing  7/7/2024 0424 by Andria Borrero RN  Outcome: Progressing     Problem: Fall Injury Risk  Goal: Absence of Fall and Fall-Related Injury  7/7/2024 0424 by Andria Borrero RN  Outcome: Progressing  7/7/2024 0424 by Andria Borrero RN  Outcome: Progressing

## 2024-07-07 NOTE — PROGRESS NOTES
Hospital Medicine  Progress Note    Patient Name: Greer Kahn  MRN: [id number]  Status: IP- Inpatient   Admission Date: 6/13/2024  Length of Stay: 24  Date of Service: 07/07/2024       CC: hospital follow-up for ARF       SUBJECTIVE    58-year-old female wit a history notable for poorly controlled DM (A1c 13), presented to [facility] ED on 06/13 with a perivulvar/perineal painful lesion draining pus; associated with fever, chills, nausea and vomiting.  Evaluation in ED noted patient febrile up 101° F, tachycardic.  Labs notable for lactic acid 1.4 and a [place] of 31236 with neutrophilic shift; CRP was 367.  CT abdomen pelvis with IV contrast showed an area of cellulitis involving the perineum on the left side extending to the medial gluteal fold with area of thickening and inflammatory changes in the labia with hypoattenuation concerning for possible abscess.  Blood pressures improved IV fluid resuscitation.  She was started on broad-spectrum IV antibiotics and transferred to [facility], where she was admitted to hospital medicine service.  Gen surgery was consulted, and patient taken to OR emergently for debridement where she was found to have necrotizing infection of the left groin.  She underwent several more repeat debridements last on 6/21.  Wound and Surgical cultures noting MSRA, as well as Candida tropicalis.  She subsequently completed a 2 weeks course of IV antibiotics, and also being treated with oral fluconazole.  Unfortunately hospital course was complicated by development of AUTUMN, for which Nephrology was consulted.  Patient required initiation of hemodialysis on 06/17, which she continued to require intermittently.  UOP however was improving.   Patient also deconditioned, working with therapy.     Today: Patient seen and examined at bedside, and chart reviewed.  No new issues or complaints, creatinine continues to trend down. Overall making good recovery.  She was hypoglycemic this morning down to 50,  after increasing Lantus dose last night.  Sugars have been quite labile though as high as 270 in the last couple of days.      MEDICATIONS   Scheduled   amLODIPine  10 mg Oral Daily    carvediloL  6.25 mg Oral BID    cloNIDine  0.2 mg Oral TID    enoxaparin  30 mg Subcutaneous Daily    fluconazole  100 mg Oral Daily    hydrALAZINE  75 mg Oral Q8H    insulin glargine U-100  55 Units Subcutaneous QHS    Lactobacillus acidophilus  1 capsule Oral Daily    sodium chloride 0.9%  10 mL Intravenous Q6H     Continuous Infusions  None      PHYSICAL EXAM   VITALS: T 98.6 °F (37 °C)   BP (!) 167/92   P 76   RR 18   O2 (!) 93 %    GENERAL: Awake and in NAD  LUNGS: CTA anteriorly  CVS: Normal rate  GI/: Soft, NT, bowel sounds positive.  EXTREMITIES:  Radial pulse 2 +  NEURO: AAOx3  PSYCH: Cooperative      LABS   CBC  Recent Labs     07/05/24  0428 07/07/24  0446   WBC 6.96 7.07   RBC 2.62* 2.70*   HGB 8.0* 8.2*   HCT 24.8* 25.4*   MCV 94.7* 94.1*   MCH 30.5 30.4   MCHC 32.3* 32.3*   RDW 16.8 16.0    307     CHEM  Recent Labs     07/06/24  0507 07/07/24  0446    141   K 4.5 4.6   CO2 28 26   BUN 34.6* 31.4*   CREATININE 2.08* 1.88*   GLUCOSE 189* 77   CALCIUM 8.4 8.4   PHOS 4.6 4.5   ALBUMIN 2.0* 2.0*   GLOBULIN  --  5.2*   ALKPHOS  --  515*   ALT  --  72*   AST  --  54*   BILITOT  --  0.7       ASSESSMENT   Acute renal failure requiring hemodialysis   Necrotizing soft tissue infection of the left groin/perineal region, resolved  Poorly controlled type II diabetes mellitus with hyperglycemia  Essential hypertension   Worsening Normocytic anemia, now stable    PLAN   Renal function recovering  Continue fluconazole to complete 2 week course  Continue wound care as outlined  Will have to titrate Lantus back given hypoglycemia this morning  Will scale Lantus back to 45 units nightly, though given improved renal function will add back home glimepiride for tomorrow morning   Continue CBG monitoring and insulin sliding  scale coverage as indicated  Otherwise continue current management and monitoring in the interim      Prophylaxis: SC Lovenox        Nicolas Styles MD  [facility] Medicine

## 2024-07-07 NOTE — PT/OT/SLP PROGRESS
Occupational Therapy   Treatment    Name: Greer Kahn  MRN: [id number]  Admitting Diagnosis:  Perineal abscess  16 Days Post-Op    Recommendations:     Recommended therapy intensity at discharge: Moderate Intensity Therapy   Discharge Equipment Recommendations:  to be determined by next level of care  Barriers to discharge:       Assessment:     Greer Kahn is a 58 y.o. female with a medical diagnosis of Perineal abscess.  Performance deficits affecting function are weakness, impaired endurance, impaired self care skills, impaired functional mobility, gait instability, impaired balance, decreased safety awareness.     Rehab Prognosis:  Fair; patient would benefit from acute skilled OT services to address these deficits and reach maximum level of function.       Plan:     Patient to be seen 4 x/week to address the above listed problems via self-care/home management, therapeutic activities, therapeutic exercises  Plan of Care Expires: 08/02/24  Plan of Care Reviewed with: patient    Subjective     Pain/Comfort:       Objective:     Communicated with: RN prior to session.  Patient found  sitting on BSC  with CNA in room upon OT entry to room.    General Precautions: Standard, contact, fall    Orthopedic Precautions:N/A  Braces: N/A     Occupational Performance:     Bed Mobility:    Patient completed Sit to Supine with minimum assistance     Functional Mobility/Transfers:  Patient completed Sit <> Stand Transfer with stand by assistance  with  no assistive device   Functional Mobility: patient performing BSC to EOB transfer requiring SBA without DME with no LOB noted    Activities of Daily Living:  Lower Body Dressing: total assistance donning/doffing B socks  Toileting: minimum assistance patient performing pericare in standing requiring assistance to ensure cleanliness.    Patient Education:  Patient provided with verbal education education regarding OT role/goals/POC.  Understanding was  verbalized.      Patient left HOB elevated with all lines intact and call button in reach.    GOALS:   Multidisciplinary Problems       Occupational Therapy Goals          Problem: Occupational Therapy    Goal Priority Disciplines Outcome Interventions   Occupational Therapy Goal     OT, PT/OT Progressing    Description: Goals to be met by: 8/3/24     Patient will increase functional independence with ADLs by performing:    UE Dressing with Mod I.  LE Dressing with Mod I.  Grooming while standing at sink with Mod I.  Toileting from toilet with Mod I for hygiene and clothing management.   Toilet transfer to toilet with Mod I.    All goals revised 7/3/24                         Time Tracking:     OT Date of Treatment: 07/07/24  OT Start Time: 1329  OT Stop Time: 1341  OT Total Time (min): 12 min    Billable Minutes:Self Care/Home Management 1    OT/MAKENZIE: MAKENZIE     Number of MAKENZIE visits since last OT visit: 2    7/7/2024

## 2024-07-08 PROBLEM — N12 PYELONEPHRITIS OF RIGHT KIDNEY: Status: RESOLVED | Noted: 2024-04-08 | Resolved: 2024-07-08

## 2024-07-08 LAB
ANION GAP SERPL CALC-SCNC: 10 MEQ/L
BUN SERPL-MCNC: 31.1 MG/DL (ref 9.8–20.1)
CALCIUM SERPL-MCNC: 8.4 MG/DL (ref 8.4–10.2)
CHLORIDE SERPL-SCNC: 102 MMOL/L (ref 98–107)
CO2 SERPL-SCNC: 25 MMOL/L (ref 22–29)
CREAT SERPL-MCNC: 1.85 MG/DL (ref 0.55–1.02)
CREAT/UREA NIT SERPL: 17
GFR SERPLBLD CREATININE-BSD FMLA CKD-EPI: 31 ML/MIN/1.73/M2
GLUCOSE SERPL-MCNC: 234 MG/DL (ref 74–100)
POCT GLUCOSE: 131 MG/DL (ref 70–110)
POCT GLUCOSE: 187 MG/DL (ref 70–110)
POCT GLUCOSE: 202 MG/DL (ref 70–110)
POCT GLUCOSE: 224 MG/DL (ref 70–110)
POTASSIUM SERPL-SCNC: 4.5 MMOL/L (ref 3.5–5.1)
SODIUM SERPL-SCNC: 137 MMOL/L (ref 136–145)

## 2024-07-08 PROCEDURE — 27000207 HC ISOLATION

## 2024-07-08 PROCEDURE — 21400001 HC TELEMETRY ROOM

## 2024-07-08 PROCEDURE — 63600175 PHARM REV CODE 636 W HCPCS: Performed by: INTERNAL MEDICINE

## 2024-07-08 PROCEDURE — 25000003 PHARM REV CODE 250: Performed by: NURSE PRACTITIONER

## 2024-07-08 PROCEDURE — 80048 BASIC METABOLIC PNL TOTAL CA: CPT | Performed by: NURSE PRACTITIONER

## 2024-07-08 PROCEDURE — 25000003 PHARM REV CODE 250: Performed by: INTERNAL MEDICINE

## 2024-07-08 PROCEDURE — 63600175 PHARM REV CODE 636 W HCPCS: Performed by: STUDENT IN AN ORGANIZED HEALTH CARE EDUCATION/TRAINING PROGRAM

## 2024-07-08 PROCEDURE — 97530 THERAPEUTIC ACTIVITIES: CPT | Mod: CO

## 2024-07-08 PROCEDURE — 36415 COLL VENOUS BLD VENIPUNCTURE: CPT | Performed by: NURSE PRACTITIONER

## 2024-07-08 PROCEDURE — 63700000 PHARM REV CODE 250 ALT 637 W/O HCPCS: Performed by: INTERNAL MEDICINE

## 2024-07-08 PROCEDURE — A4216 STERILE WATER/SALINE, 10 ML: HCPCS | Performed by: INTERNAL MEDICINE

## 2024-07-08 PROCEDURE — 97535 SELF CARE MNGMENT TRAINING: CPT | Mod: CO

## 2024-07-08 RX ADMIN — INSULIN GLARGINE 45 UNITS: 100 INJECTION, SOLUTION SUBCUTANEOUS at 09:07

## 2024-07-08 RX ADMIN — SODIUM CHLORIDE, PRESERVATIVE FREE 10 ML: 5 INJECTION INTRAVENOUS at 12:07

## 2024-07-08 RX ADMIN — HYDRALAZINE HYDROCHLORIDE 75 MG: 50 TABLET ORAL at 09:07

## 2024-07-08 RX ADMIN — HYDRALAZINE HYDROCHLORIDE 75 MG: 50 TABLET ORAL at 01:07

## 2024-07-08 RX ADMIN — CARVEDILOL 6.25 MG: 3.12 TABLET, FILM COATED ORAL at 09:07

## 2024-07-08 RX ADMIN — CARVEDILOL 6.25 MG: 3.12 TABLET, FILM COATED ORAL at 08:07

## 2024-07-08 RX ADMIN — Medication 1 CAPSULE: at 08:07

## 2024-07-08 RX ADMIN — INSULIN ASPART 6 UNITS: 100 INJECTION, SOLUTION INTRAVENOUS; SUBCUTANEOUS at 06:07

## 2024-07-08 RX ADMIN — ENOXAPARIN SODIUM 30 MG: 30 INJECTION SUBCUTANEOUS at 05:07

## 2024-07-08 RX ADMIN — HYDRALAZINE HYDROCHLORIDE 75 MG: 50 TABLET ORAL at 05:07

## 2024-07-08 RX ADMIN — DIPHENHYDRAMINE HYDROCHLORIDE 25 MG: 25 CAPSULE ORAL at 01:07

## 2024-07-08 RX ADMIN — GLIMEPIRIDE 4 MG: 4 TABLET ORAL at 08:07

## 2024-07-08 RX ADMIN — FLUCONAZOLE 100 MG: 100 TABLET ORAL at 08:07

## 2024-07-08 RX ADMIN — CLONIDINE HYDROCHLORIDE 0.2 MG: 0.2 TABLET ORAL at 05:07

## 2024-07-08 RX ADMIN — ACETAMINOPHEN 650 MG: 325 TABLET, FILM COATED ORAL at 10:07

## 2024-07-08 RX ADMIN — SODIUM CHLORIDE, PRESERVATIVE FREE 10 ML: 5 INJECTION INTRAVENOUS at 05:07

## 2024-07-08 RX ADMIN — AMLODIPINE BESYLATE 10 MG: 5 TABLET ORAL at 08:07

## 2024-07-08 RX ADMIN — CLONIDINE HYDROCHLORIDE 0.2 MG: 0.2 TABLET ORAL at 09:07

## 2024-07-08 RX ADMIN — CLONIDINE HYDROCHLORIDE 0.2 MG: 0.2 TABLET ORAL at 08:07

## 2024-07-08 NOTE — PROGRESS NOTES
Inpatient Nutrition Assessment    Admit Date: 6/13/2024   Total duration of encounter: 25 days   Patient Age: 58 y.o.    Nutrition Recommendation/Prescription     Continue 16650 calorie diabetic low potassium diet as tolerated      Communication of Recommendations: reviewed with patient and reviewed with family    Nutrition Assessment     Malnutrition Assessment/Nutrition-Focused Physical Exam    Malnutrition Context: acute illness or injury (06/20/24 1324)  Malnutrition Level:  (does not meet criteria) (06/20/24 1324)  Energy Intake (Malnutrition):  (does not meet criteria) (07/01/24 1311)  Weight Loss (Malnutrition):  (does not meet criteria) (06/20/24 1324)  Subcutaneous Fat (Malnutrition):  (does not meet criteria) (06/20/24 1324)           Muscle Mass (Malnutrition):  (does not meet criteria) (06/20/24 1324)                                   A minimum of two characteristics is recommended for diagnosis of either severe or non-severe malnutrition.    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  Uncontrolled T2DM with hyperglycemia  Acute Renal failure now anuric - multifactorial secondary to sepsis, contrast exposure, NSAID use inpatient, vancomycin toxicity  -dialysis initiated 6/17    Relevant Medical History: obesity BMI 37, poorly controlled T2DM with last A1c 13 in April 2024     Scheduled Medications:  amLODIPine, 10 mg, Daily  carvediloL, 6.25 mg, BID  cloNIDine, 0.2 mg, TID  enoxaparin, 30 mg, Daily  fluconazole, 100 mg, Daily  glimepiride, 4 mg, Daily with breakfast  hydrALAZINE, 75 mg, Q8H  insulin glargine U-100, 45 Units, QHS  Lactobacillus acidophilus,  1 capsule, Daily  sodium chloride 0.9%, 10 mL, Q6H    Continuous Infusions:   PRN Medications:  0.9%  NaCl infusion (for blood administration), , Q24H PRN  0.9%  NaCl infusion (for blood administration), , Q24H PRN  acetaminophen, 650 mg, Q4H PRN  albuterol-ipratropium, 3 mL, Q4H PRN  aluminum-magnesium hydroxide-simethicone, 30 mL, QID PRN  bisacodyL, 10 mg, Daily PRN  calcium carbonate, 1,000 mg, TID PRN  dextrose 10%, 12.5 g, PRN  dextrose 10%, 25 g, PRN  diphenhydrAMINE, 25 mg, Q6H PRN  glucagon (human recombinant), 1 mg, PRN  glucose, 16 g, PRN  glucose, 24 g, PRN  heparin (porcine), 2,800 Units, PRN  hydrALAZINE, 10 mg, Q4H PRN  insulin aspart U-100, 0-15 Units, QID (AC + HS) PRN  loperamide, 2 mg, QID PRN  metoclopramide, 10 mg, Q8H PRN  polyethylene glycol, 17 g, BID PRN  prochlorperazine, 2.5 mg, Q6H PRN  prochlorperazine, 5 mg, Q6H PRN  senna-docusate 8.6-50 mg, 2 tablet, BID PRN  sodium chloride 0.9%, 10 mL, PRN  sodium chloride 0.9%, 10 mL, PRN  traMADoL, 50 mg, Q8H PRN    Calorie Containing IV Medications: no significant kcals from medications at this time    Recent Labs   Lab 07/02/24  0352 07/03/24  0405 07/04/24  0522 07/05/24  0428 07/06/24  0507 07/07/24  0446 07/08/24  0418    138 140 138 138 141 137   K 4.1 4.3 4.5 4.5 4.5 4.6 4.5   CALCIUM 8.1* 8.6 8.4 8.4 8.4 8.4 8.4   PHOS  --   --  5.1* 4.4 4.6 4.5  --    CO2 28 28 25 26 28 26 25   BUN 20.2* 27.9* 33.0* 35.4* 34.6* 31.4* 31.1*   CREATININE 1.92* 2.34* 2.16* 2.45* 2.08* 1.88* 1.85*   EGFRNORACEVR 30 24 26 22 27 31 31   GLUCOSE 127* 164* 141* 183* 189* 77 234*   BILITOT  --   --   --   --   --  0.7  --    ALKPHOS  --   --   --   --   --  515*  --    ALT  --   --   --   --   --  72*  --    AST  --   --   --   --   --  54*  --    ALBUMIN  --   --  2.0* 1.9* 2.0* 2.0*  --    WBC  --   --   --  6.96  --  7.07  --    HGB  --   --   --  8.0*  --  8.2*  --    HCT  --   --   --  24.8*  --  25.4*  --      Nutrition Orders:  Diet diabetic Low  "Potassium; 2000 Calorie      Appetite/Oral Intake: good/% of meals  Factors Affecting Nutritional Intake: none identified  Social Needs Impacting Access to Food: none identified  Food/Orthodoxy/Cultural Preferences: none reported  Food Allergies: no known food allergies  Last Bowel Movement: 24  Wound(s):      Comments    24 pt eating lunch, diet advanced from clear liquid this morning, so far tolerating. Reported some n/v very early this morning but feeling better now. Had been NPO/CL mostly since admit due to n/v. Currently on dialysis x 3 sessions with plans to possibly continue per nephrology. Weight gain noted in EMR, some most likely due to fluid.     24 reports good appetite, tolerating oral diet, drinking Novasource Renal     24 pt tolerating oral diet, good appetite and intake    24 pt tolerating oral diet, 100% intake of meals, Novasource oral supplement d/c'd    Anthropometrics    Height: 5' 7.01" (170.2 cm), Height Method: Stated  Last Weight: 130 kg (286 lb 9.6 oz) (24 0626), Weight Method: Bed Scale  BMI (Calculated): 44.9  BMI Classification: obese grade III (BMI >/=40)     Ideal Body Weight (IBW), Female: 135.05 lb     % Ideal Body Weight, Female (lb): 177.84 %                    Usual Body Weight (UBW), k.09 kg  % Usual Body Weight: 119.42     Usual Weight Provided By: patient denies unintentional weight loss    Wt Readings from Last 5 Encounters:   24 130 kg (286 lb 9.6 oz)   24 111.1 kg (245 lb)   23 104.3 kg (230 lb)   19 110 kg (242 lb 8.1 oz)     Weight Change(s) Since Admission:   Wt Readings from Last 1 Encounters:   24 0626 130 kg (286 lb 9.6 oz)   24 0542 125.9 kg (277 lb 9 oz)   24 0740 108.9 kg (240 lb 1.3 oz)   24 0734 108.9 kg (240 lb)   Admit Weight: 108.9 kg (240 lb) (24 0734), Weight Method: Stated    Estimated Needs    Weight Used For Calorie Calculations: 125.9 kg (277 lb 9 oz)  Energy " Calorie Requirements (kcal): 1876 kcal (no stress factor)  Energy Need Method: [place]  Weight Used For Protein Calculations: 125.9 kg (277 lb 9 oz)  Protein Requirements: 100gm (0.8gm/kg)  Fluid Requirements (mL): 1000ml + output (renal on dialysis)  CHO Requirement: 234 gm/day     Enteral Nutrition     Patient not receiving enteral nutrition at this time.    Parenteral Nutrition     Patient not receiving parenteral nutrition support at this time.    Evaluation of Received Nutrient Intake    Calories: meeting estimated needs  Protein: meeting estimated needs    Patient Education     Not applicable.    Nutrition Diagnosis     PES: Inadequate oral intake related to acute illness as evidenced by <50% est energy needs for >/= 5 days. (resolved)         Nutrition Interventions     Intervention(s): modified composition of meals/snacks and collaboration with other providers    Goal: Meet greater than 80% of nutritional needs by follow-up. (goal met)  Goal: Consume % of meals/snacks by follow-up. (goal met)    Nutrition Goals & Monitoring     Dietitian will monitor: food and beverage intake, electrolyte/renal panel, glucose/endocrine profile, and gastrointestinal profile  Discharge planning: continue 2000 calorie diabetic low potassium diet  Nutrition Risk/Follow-Up: low (follow-up in 5-7 days)   Please consult if re-assessment needed sooner.

## 2024-07-08 NOTE — PT/OT/SLP PROGRESS
Occupational Therapy   Treatment    Name: Greer Kahn  MRN: [id number]  Admitting Diagnosis:  Perineal abscess  17 Days Post-Op    Recommendations:     Recommended therapy intensity at discharge: Moderate Intensity Therapy   Discharge Equipment Recommendations:  to be determined by next level of care  Barriers to discharge:  Decreased caregiver support    Assessment:     Greer Kahn is a 58 y.o. female with a medical diagnosis of Perineal abscess.  She presents with good participation. Performance deficits affecting function are weakness, impaired balance, impaired endurance, impaired self care skills, impaired functional mobility, impaired cardiopulmonary response to activity.     Rehab Prognosis:  Good; patient would benefit from acute skilled OT services to address these deficits and reach maximum level of function.       Plan:     Patient to be seen 4 x/week to address the above listed problems via self-care/home management, therapeutic activities, therapeutic exercises  Plan of Care Expires: 08/02/24  Plan of Care Reviewed with: patient    Subjective     Pain/Comfort:  No complaints of pain    Objective:     Communicated with: nurse prior to session.  Patient found HOB elevated with pulse ox (continuous), telemetry, castaneda catheter, peripheral IV upon OT entry to room.    General Precautions: Standard, contact    Orthopedic Precautions:N/A  Braces: N/A  Respiratory Status: Room air     Occupational Performance:     Bed Mobility:    Patient completed Scooting with supervision  Patient completed Supine to Sit with supervision     Functional Mobility/Transfers:  Patient completed Sit <> Stand Transfer with supervision  with  rolling walker   Patient completed Toilet Transfer Step Transfer technique with stand by assistance with  rolling walker  Transfer to and from shower stall with SBA with RW  Functional Mobility: ambulate to and from bathroom and in room with RW with SBA    Activities of Daily  Living:  Grooming: supervision standing at sink with RW    [facility] 6 Click ADL: 20    Patient Education:  Patient provided with verbal education and demonstrations education regarding fall prevention and safety awareness.  Understanding was verbalized, however additional teaching warranted.      Patient left up in chair with all lines intact and call button in reach.    GOALS:   Multidisciplinary Problems       Occupational Therapy Goals          Problem: Occupational Therapy    Goal Priority Disciplines Outcome Interventions   Occupational Therapy Goal     OT, PT/OT Progressing    Description: Goals to be met by: 8/3/24     Patient will increase functional independence with ADLs by performing:    UE Dressing with Mod I.  LE Dressing with Mod I.  Grooming while standing at sink with Mod I.  Toileting from toilet with Mod I for hygiene and clothing management.   Toilet transfer to toilet with Mod I.    All goals revised 7/3/24                         Time Tracking:     OT Date of Treatment: 07/08/24  OT Start Time: 0930  OT Stop Time: 0953  OT Total Time (min): 23 min    Billable Minutes:Self Care/Home Management 13  Therapeutic Activity 10    OT/MAKENZIE: MAKENZIE     Number of MAKENZIE visits since last OT visit: 3    7/8/2024

## 2024-07-08 NOTE — PROGRESS NOTES
"[facility]    Nephrology Progress Note      Hospital course  Greer Kahn is a 58 y.o. Black or  female who was admitted on 06/13/2024 with complaints of boils to right thigh and left buttock and fever of 102.CT abdomen and pelvis with contrast showed area of cellulitis involving the perineum on the left side extending to the medial gluteal fold and areas of concern within the labia on the left side. Also noted to have right lower lobe atelectasis via CT chest. Patient was initiated on vancomycin and Rocephin in ER.   On 06/14 she underwent debridement of left groin and perineum. Postoperative course complicated by nausea, vomiting and constipation. She is also developed AUTUMN and was started on hemodialysis on 06/17/2024.  She is status post surgical debridement on 06/14/2024, 06/18/2024, and 06/21/2024.  Patient has recovered kidney function and has not been dialyzed since 07/01/2024.          Objective  BP (!) 146/82   Pulse 70   Temp 99 °F (37.2 °C) (Oral)   Resp 19   Ht 5' 7.01" (1.702 m)   Wt 130 kg (286 lb 9.6 oz)   SpO2 (!) 94%   Breastfeeding No   BMI 44.88 kg/m²     Intake/Output Summary (Last 24 hours) at 7/8/2024 1322  Last data filed at 7/8/2024 1158  Gross per 24 hour   Intake 600 ml   Output 4350 ml   Net -3750 ml       Physical Exam  Physical Exam  Constitutional:       General: She is not in acute distress.     Appearance: She is obese.   HENT:      Head: Atraumatic.      Mouth/Throat:      Mouth: Mucous membranes are moist.   Eyes:      Extraocular Movements: Extraocular movements intact.      Conjunctiva/sclera: Conjunctivae normal.   Cardiovascular:      Rate and Rhythm: Normal rate and regular rhythm.      Pulses: Normal pulses.      Heart sounds: Normal heart sounds.   Pulmonary:      Effort: Pulmonary effort is normal.      Breath sounds: Normal breath sounds.   Abdominal:      General: There is no distension.      Palpations: Abdomen is soft. "   Genitourinary:     Comments: Urinary catheter   Musculoskeletal:         General: No swelling.      Cervical back: Neck supple.   Neurological:      General: No focal deficit present.      Mental Status: She is alert and oriented to person, place, and time.   Psychiatric:         Mood and Affect: Mood normal.         Behavior: Behavior normal.           Medications  Scheduled Meds:   amLODIPine  10 mg Oral Daily    carvediloL  6.25 mg Oral BID    cloNIDine  0.2 mg Oral TID    enoxaparin  30 mg Subcutaneous Daily    fluconazole  100 mg Oral Daily    glimepiride  4 mg Oral Daily with breakfast    hydrALAZINE  75 mg Oral Q8H    insulin glargine U-100  45 Units Subcutaneous QHS    Lactobacillus acidophilus  1 capsule Oral Daily    sodium chloride 0.9%  10 mL Intravenous Q6H     Continuous Infusions:  PRN Meds:.  Current Facility-Administered Medications:     0.9%  NaCl infusion (for blood administration), , Intravenous, Q24H PRN    0.9%  NaCl infusion (for blood administration), , Intravenous, Q24H PRN    acetaminophen, 650 mg, Oral, Q4H PRN    albuterol-ipratropium, 3 mL, Nebulization, Q4H PRN    aluminum-magnesium hydroxide-simethicone, 30 mL, Oral, QID PRN    bisacodyL, 10 mg, Rectal, Daily PRN    calcium carbonate, 1,000 mg, Oral, TID PRN    dextrose 10%, 12.5 g, Intravenous, PRN    dextrose 10%, 25 g, Intravenous, PRN    diphenhydrAMINE, 25 mg, Oral, Q6H PRN    glucagon (human recombinant), 1 mg, Intramuscular, PRN    glucose, 16 g, Oral, PRN    glucose, 24 g, Oral, PRN    heparin (porcine), 2,800 Units, Intravenous, PRN    hydrALAZINE, 10 mg, Intravenous, Q4H PRN    insulin aspart U-100, 0-15 Units, Subcutaneous, QID (AC + HS) PRN    loperamide, 2 mg, Oral, QID PRN    metoclopramide, 10 mg, Intravenous, Q8H PRN    polyethylene glycol, 17 g, Oral, BID PRN    prochlorperazine, 2.5 mg, Intravenous, Q6H PRN    prochlorperazine, 5 mg, Intravenous, Q6H PRN    senna-docusate 8.6-50 mg, 2 tablet, Oral, BID PRN    sodium  chloride 0.9%, 10 mL, Intravenous, PRN    Flushing PICC/Midline Protocol, , , Until Discontinued **AND** sodium chloride 0.9%, 10 mL, Intravenous, Q6H **AND** sodium chloride 0.9%, 10 mL, Intravenous, PRN    traMADoL, 50 mg, Oral, Q8H PRN     Imaging:    Reviewed    Laboratory Data:    Chemistry  Recent Labs     07/06/24  0507 07/07/24  0446 07/08/24  0418    141 137   K 4.5 4.6 4.5   CO2 28 26 25   BUN 34.6* 31.4* 31.1*   CREATININE 2.08* 1.88* 1.85*   EGFRNORACEVR 27 31 31   GLUCOSE 189* 77 234*   CALCIUM 8.4 8.4 8.4   PHOS 4.6 4.5  --       LFT  Lab Results   Component Value Date    ALKPHOS 515 (H) 07/07/2024    AST 54 (H) 07/07/2024    ALT 72 (H) 07/07/2024    BILIDIR 2.9 (H) 06/17/2024    IBILI 0.20 06/21/2020    BILITOT 0.7 07/07/2024    ALBUMIN 2.0 (L) 07/07/2024      CKD-MBD  Lab Results   Component Value Date    HMEWVPUT08LD 10.66 (L) 04/11/2019      Hematology  Recent Labs     07/07/24  0446   WBC 7.07   HGB 8.2*   HCT 25.4*         Lab Results   Component Value Date    FERRITIN 625.1 (H) 06/23/2020    EKHNPFSQ01 768 04/11/2019     06/22/2020      ID  Lab Results   Component Value Date    HEPBSAB Reactive (A) 06/17/2024      Additional serology  Lab Results   Component Value Date    HGBA1C 11.0 (H) 06/14/2024       Urine studies  Lab Results   Component Value Date    APPEARANCEUA Cloudy (A) 06/13/2024    SGUA 1.015 06/13/2024    PROTEINUA 30 (A) 06/13/2024    KETONESUA 15 (A) 06/13/2024    LEUKOCYTESUR Negative 06/13/2024    RBCUA 3-5 06/13/2024    WBCUA None Seen 06/13/2024    BACTERIA Few (A) 06/13/2024    CREATRANDUR 40.6 05/30/2018        Impression  Nonoliguric acute kidney injury, resolving  Perineal cellulitis with necrotizing component, status post multiple debridement procedures   Poorly-controlled diabetes mellitus   Anemia  Hypertension    Plan  Patient's renal function has recovered nicely with excellent urine output. Likely to dc within the next 24 hours. Discussed  importance of oral fluid intake and avoidance of nephrotoxic agents with patient. Verbalized understanding.   We will sign off. Thank you for letting us care for this patient with you.

## 2024-07-08 NOTE — PROGRESS NOTES
Hospital Medicine  Progress Note    Patient Name: Greer Kahn  MRN: [id number]  Status: IP- Inpatient   Admission Date: 6/13/2024  Length of Stay: 25  Date of Service: 07/08/2024       CC: hospital follow-up for ARF       SUBJECTIVE    58-year-old female wit a history notable for poorly controlled DM (A1c 13), presented to [facility] ED on 06/13 with a perivulvar/perineal painful lesion draining pus; associated with fever, chills, nausea and vomiting.  Evaluation in ED noted patient febrile up 101° F, tachycardic.  Labs notable for lactic acid 1.4 and [place] [address] with neutrophilic shift; CRP was 367.  CT abdomen pelvis with IV contrast showed an area of cellulitis involving the perineum on the left side extending to the medial gluteal fold with area of thickening and inflammatory changes in the labia with hypoattenuation concerning for possible abscess.  Blood pressures improved IV fluid resuscitation.  She was started on broad-spectrum IV antibiotics and transferred to [facility], where she was admitted to hospital medicine service.  Gen surgery was consulted, and patient taken to OR emergently for debridement where she was found to have necrotizing infection of the left groin.  She underwent several more repeat debridements last on 6/21.  Wound and Surgical cultures noting MSRA, as well as Candida tropicalis.  She subsequently completed a 2 weeks course of IV antibiotics, and also being treated with oral fluconazole.  Unfortunately hospital course was complicated by development of AUTUMN, for which Nephrology was consulted.  Patient required initiation of hemodialysis on 06/17, which she continued to require intermittently.  UOP however was improving.   Patient also deconditioned, working with therapy.     Today: Patient seen and examined at bedside, and chart reviewed.  No new issues or complaints.  Renal function has made a great recovery.  Sugars are a little high today, though we are adjusting regimen with  resumption of glimepiride this morning.  Continues to work with therapy, no note patient is moving much better, able to ambulate independently with the aid of rolling walker.      MEDICATIONS   Scheduled   amLODIPine  10 mg Oral Daily    carvediloL  6.25 mg Oral BID    cloNIDine  0.2 mg Oral TID    enoxaparin  30 mg Subcutaneous Daily    fluconazole  100 mg Oral Daily    glimepiride  4 mg Oral Daily with breakfast    hydrALAZINE  75 mg Oral Q8H    insulin glargine U-100  45 Units Subcutaneous QHS    Lactobacillus acidophilus  1 capsule Oral Daily    sodium chloride 0.9%  10 mL Intravenous Q6H     Continuous Infusions  None      PHYSICAL EXAM   VITALS: T 98 °F (36.7 °C)   BP (!) 162/89   P 73   RR 19   O2 97 %    GENERAL: Awake and in NAD  LUNGS: CTA anteriorly  CVS: Normal rate  GI/: Soft, NT, bowel sounds positive.  EXTREMITIES:  Radial pulse 2 +  NEURO: AAOx3  PSYCH: Cooperative      LABS   CBC  Recent Labs     07/07/24  0446   WBC 7.07   RBC 2.70*   HGB 8.2*   HCT 25.4*   MCV 94.1*   MCH 30.4   MCHC 32.3*   RDW 16.0        CHEM  Recent Labs     07/06/24  0507 07/07/24  0446 07/08/24  0418    141 137   K 4.5 4.6 4.5   CO2 28 26 25   BUN 34.6* 31.4* 31.1*   CREATININE 2.08* 1.88* 1.85*   GLUCOSE 189* 77 234*   CALCIUM 8.4 8.4 8.4   PHOS 4.6 4.5  --    ALBUMIN 2.0* 2.0*  --    GLOBULIN  --  5.2*  --    ALKPHOS  --  515*  --    ALT  --  72*  --    AST  --  54*  --    BILITOT  --  0.7  --        ASSESSMENT   Acute renal failure requiring hemodialysis   Necrotizing soft tissue infection of the left groin/perineal region, resolved  Poorly controlled type II diabetes mellitus with hyperglycemia  Essential hypertension   Worsening Normocytic anemia, now stable    PLAN   Renal function recovered  Continue fluconazole to complete 2 week course  Continue wound care as outlined  Continue with current Lantus, and resumption of glimepiride  Continue CBG monitoring and insulin sliding scale coverage as  indicated  Case management consulted for possible discharge home with home health, once appropriate DME arranged  Otherwise continue current management and monitoring in the interim      Prophylaxis: STORM Styles MD  [facility]

## 2024-07-09 VITALS
BODY MASS INDEX: 44.99 KG/M2 | RESPIRATION RATE: 18 BRPM | OXYGEN SATURATION: 96 % | SYSTOLIC BLOOD PRESSURE: 169 MMHG | HEART RATE: 71 BPM | WEIGHT: 286.63 LBS | HEIGHT: 67 IN | TEMPERATURE: 98 F | DIASTOLIC BLOOD PRESSURE: 95 MMHG

## 2024-07-09 PROBLEM — L02.215 PERINEAL ABSCESS: Status: RESOLVED | Noted: 2024-06-14 | Resolved: 2024-07-09

## 2024-07-09 PROBLEM — A41.9 SEPSIS: Status: RESOLVED | Noted: 2024-06-14 | Resolved: 2024-07-09

## 2024-07-09 LAB
POCT GLUCOSE: 196 MG/DL (ref 70–110)
POCT GLUCOSE: 212 MG/DL (ref 70–110)

## 2024-07-09 PROCEDURE — 97530 THERAPEUTIC ACTIVITIES: CPT | Mod: CO

## 2024-07-09 PROCEDURE — A4216 STERILE WATER/SALINE, 10 ML: HCPCS | Performed by: INTERNAL MEDICINE

## 2024-07-09 PROCEDURE — 25000003 PHARM REV CODE 250: Performed by: INTERNAL MEDICINE

## 2024-07-09 PROCEDURE — 63700000 PHARM REV CODE 250 ALT 637 W/O HCPCS: Performed by: INTERNAL MEDICINE

## 2024-07-09 PROCEDURE — 25000003 PHARM REV CODE 250: Performed by: NURSE PRACTITIONER

## 2024-07-09 PROCEDURE — 97535 SELF CARE MNGMENT TRAINING: CPT | Mod: CO

## 2024-07-09 PROCEDURE — 63600175 PHARM REV CODE 636 W HCPCS: Performed by: STUDENT IN AN ORGANIZED HEALTH CARE EDUCATION/TRAINING PROGRAM

## 2024-07-09 RX ORDER — CARVEDILOL 6.25 MG/1
12.5 TABLET ORAL 2 TIMES DAILY
Qty: 360 TABLET | Refills: 3 | Status: SHIPPED | OUTPATIENT
Start: 2024-07-09 | End: 2024-07-09

## 2024-07-09 RX ORDER — CLONIDINE HYDROCHLORIDE 0.2 MG/1
0.2 TABLET ORAL 3 TIMES DAILY
Qty: 90 TABLET | Refills: 0 | Status: SHIPPED | OUTPATIENT
Start: 2024-07-09 | End: 2025-07-09

## 2024-07-09 RX ORDER — CIPROFLOXACIN AND DEXAMETHASONE 3; 1 MG/ML; MG/ML
4 SUSPENSION/ DROPS AURICULAR (OTIC) 2 TIMES DAILY
Qty: 7.5 ML | Refills: 0 | Status: SHIPPED | OUTPATIENT
Start: 2024-07-09 | End: 2024-07-09

## 2024-07-09 RX ORDER — TRAMADOL HYDROCHLORIDE 50 MG/1
50 TABLET ORAL EVERY 8 HOURS PRN
Qty: 30 TABLET | Refills: 0 | Status: SHIPPED | OUTPATIENT
Start: 2024-07-09

## 2024-07-09 RX ORDER — CARVEDILOL 6.25 MG/1
12.5 TABLET ORAL 2 TIMES DAILY
Qty: 360 TABLET | Refills: 1 | Status: SHIPPED | OUTPATIENT
Start: 2024-07-09 | End: 2025-07-09

## 2024-07-09 RX ORDER — TRAMADOL HYDROCHLORIDE 50 MG/1
50 TABLET ORAL EVERY 8 HOURS PRN
Qty: 30 TABLET | Refills: 0 | Status: SHIPPED | OUTPATIENT
Start: 2024-07-09 | End: 2024-07-09

## 2024-07-09 RX ORDER — FLUCONAZOLE 100 MG/1
100 TABLET ORAL DAILY
Qty: 7 TABLET | Refills: 0 | Status: SHIPPED | OUTPATIENT
Start: 2024-07-09 | End: 2024-07-09

## 2024-07-09 RX ORDER — CLONIDINE HYDROCHLORIDE 0.2 MG/1
0.2 TABLET ORAL 3 TIMES DAILY
Qty: 90 TABLET | Refills: 0 | Status: SHIPPED | OUTPATIENT
Start: 2024-07-09 | End: 2024-07-09

## 2024-07-09 RX ORDER — CIPROFLOXACIN AND DEXAMETHASONE 3; 1 MG/ML; MG/ML
4 SUSPENSION/ DROPS AURICULAR (OTIC) 2 TIMES DAILY
Status: DISCONTINUED | OUTPATIENT
Start: 2024-07-09 | End: 2024-07-09 | Stop reason: HOSPADM

## 2024-07-09 RX ORDER — HYDRALAZINE HYDROCHLORIDE 25 MG/1
100 TABLET, FILM COATED ORAL EVERY 8 HOURS
Qty: 360 TABLET | Refills: 0 | Status: SHIPPED | OUTPATIENT
Start: 2024-07-09 | End: 2024-07-09

## 2024-07-09 RX ORDER — CIPROFLOXACIN AND DEXAMETHASONE 3; 1 MG/ML; MG/ML
4 SUSPENSION/ DROPS AURICULAR (OTIC) 2 TIMES DAILY
Qty: 7.5 ML | Refills: 0 | Status: SHIPPED | OUTPATIENT
Start: 2024-07-09 | End: 2024-07-16

## 2024-07-09 RX ORDER — HYDRALAZINE HYDROCHLORIDE 25 MG/1
100 TABLET, FILM COATED ORAL EVERY 8 HOURS
Qty: 360 TABLET | Refills: 0 | Status: SHIPPED | OUTPATIENT
Start: 2024-07-09 | End: 2025-07-09

## 2024-07-09 RX ORDER — FLUCONAZOLE 100 MG/1
100 TABLET ORAL DAILY
Qty: 7 TABLET | Refills: 0 | Status: SHIPPED | OUTPATIENT
Start: 2024-07-09 | End: 2024-07-16

## 2024-07-09 RX ADMIN — SODIUM CHLORIDE, PRESERVATIVE FREE 10 ML: 5 INJECTION INTRAVENOUS at 12:07

## 2024-07-09 RX ADMIN — AMLODIPINE BESYLATE 10 MG: 5 TABLET ORAL at 09:07

## 2024-07-09 RX ADMIN — CIPROFLOXACIN AND DEXAMETHASONE 4 DROP: 3; 1 SUSPENSION/ DROPS AURICULAR (OTIC) at 11:07

## 2024-07-09 RX ADMIN — CARVEDILOL 6.25 MG: 3.12 TABLET, FILM COATED ORAL at 09:07

## 2024-07-09 RX ADMIN — ENOXAPARIN SODIUM 30 MG: 30 INJECTION SUBCUTANEOUS at 04:07

## 2024-07-09 RX ADMIN — HYDRALAZINE HYDROCHLORIDE 75 MG: 50 TABLET ORAL at 07:07

## 2024-07-09 RX ADMIN — CLONIDINE HYDROCHLORIDE 0.2 MG: 0.2 TABLET ORAL at 09:07

## 2024-07-09 RX ADMIN — CLONIDINE HYDROCHLORIDE 0.2 MG: 0.2 TABLET ORAL at 04:07

## 2024-07-09 RX ADMIN — INSULIN ASPART 6 UNITS: 100 INJECTION, SOLUTION INTRAVENOUS; SUBCUTANEOUS at 06:07

## 2024-07-09 RX ADMIN — DIPHENHYDRAMINE HYDROCHLORIDE 25 MG: 25 CAPSULE ORAL at 01:07

## 2024-07-09 RX ADMIN — FLUCONAZOLE 100 MG: 100 TABLET ORAL at 09:07

## 2024-07-09 RX ADMIN — HYDRALAZINE HYDROCHLORIDE 75 MG: 50 TABLET ORAL at 04:07

## 2024-07-09 RX ADMIN — GLIMEPIRIDE 4 MG: 4 TABLET ORAL at 09:07

## 2024-07-09 NOTE — PT/OT/SLP PROGRESS
Occupational Therapy   Treatment    Name: Greer Kahn  MRN: [id number]  Admitting Diagnosis:  Perineal abscess  18 Days Post-Op    Recommendations:     Recommended therapy intensity at discharge: Moderate Intensity Therapy   Discharge Equipment Recommendations:  to be determined by next level of care  Barriers to discharge:  Decreased caregiver support    Assessment:     Greer Kahn is a 58 y.o. female with a medical diagnosis of Perineal abscess.  She presents with good participation. Performance deficits affecting function are weakness, impaired endurance, impaired self care skills.     Rehab Prognosis:  Good; patient would benefit from acute skilled OT services to address these deficits and reach maximum level of function.       Plan:     Patient to be seen 4 x/week to address the above listed problems via self-care/home management, therapeutic activities, therapeutic exercises  Plan of Care Expires: 08/02/24  Plan of Care Reviewed with: patient, daughter    Subjective     Pain/Comfort:  Pain Rating 1: 0/10    Objective:     Communicated with: nurse prior to session.  Patient found sitting edge of bed with pulse ox (continuous), telemetry, castaneda catheter, peripheral IV upon OT entry to room.    General Precautions: Standard, contact    Orthopedic Precautions:N/A  Braces: N/A  Respiratory Status: Room air     Occupational Performance:     Bed Mobility:    Patient completed Scooting with stand by assistance  Patient completed Supine to Sit with stand by assistance     Functional Mobility/Transfers:  Patient completed Sit <> Stand Transfer with stand by assistance  with  rolling walker   Patient completed Toilet Transfer Step Transfer technique with stand by assistance with  rolling walker  Functional Mobility: ambulate to and from bathroom with RW    Activities of Daily Living:  Lower Body Dressing: stand by assistance initially progressing to supervision with instruction, with reacher, dressing stick long  shoehorn and sock aide  Grooming: standing at sink to wash hands  Toileting: clothing manipulation and hygiene in sitting and standing with supervision with RW      [facility] 6 Click ADL: 22    Patient Education:  Patient provided with verbal education and demonstrations education regarding fall prevention and safety awareness.  Understanding was verbalized, however additional teaching warranted.      Patient left sitting edge of bed with all lines intact, call button in reach, and daughter present.    GOALS:   Multidisciplinary Problems       Occupational Therapy Goals          Problem: Occupational Therapy    Goal Priority Disciplines Outcome Interventions   Occupational Therapy Goal     OT, PT/OT Progressing    Description: Goals to be met by: 8/3/24     Patient will increase functional independence with ADLs by performing:    UE Dressing with Mod I.  LE Dressing with Mod I.  Grooming while standing at sink with Mod I.  Toileting from toilet with Mod I for hygiene and clothing management.   Toilet transfer to toilet with Mod I.    All goals revised 7/3/24                         Time Tracking:     OT Date of Treatment: 07/09/24  OT Start Time: 0855  OT Stop Time: 0920  OT Total Time (min): 25 min    Billable Minutes:Self Care/Home Management 15  Therapeutic Activity 10    OT/MAKENZIE: MAKENZIE     Number of MAKENZIE visits since last OT visit: 4    7/9/2024

## 2024-07-09 NOTE — PLAN OF CARE
[facility] sent Referral/discharge orders/AVS, wound care order/note and clinicals via [employer].

## 2024-07-09 NOTE — CARE UPDATE
Patient c/o bilateral ear pain. Patient reports this was reported on days and some drops were to be ordered. Afebrile and no leukocytosis. Nurse reports no drainage. Will treat with PRN analgesics overnight. Consider ENT consult in AM if persists.

## 2024-07-10 NOTE — DISCHARGE SUMMARY
Hospital Medicine  Discharge Summary    Patient Name: Greer Kahn  MRN: [id number]  Admit Date: 6/13/2024  Discharge Date: 7/9/2024   Status: IP- Inpatient   Length of Stay: 26      PHYSICIANS   Admitting Physician: Portillo North MD  Discharging Physician: Nicolas Styles MD.  Primary Care Physician: Ricardo Carr NP  Consults: General Surgery and Nephrology      DISCHARGE DIAGNOSES   Acute renal failure requiring hemodialysis   Necrotizing soft tissue infection of the left groin/perineal region  Poorly controlled type II diabetes mellitus with hyperglycemia  Essential hypertension   Normocytic anemia      PROCEDURES   6/14/24 - Debridement of skin and soft tissue of left groin and perineum   6/17/24 - Excisional debridement of left perineum groin wound, wound washout   6/21/24 - Debridement left groin wound        HOSPITAL COURSE    58-year-old female wit a history notable for poorly controlled DM (A1c 13), presented to [facility] ED on 06/13 with a perivulvar/perineal painful lesion draining pus; associated with fever, chills, nausea and vomiting. Evaluation in ED noted patient febrile up 101° F, tachycardic. Labs notable for lactic acid 1.4 [place] [address] with neutrophilic shift; CRP was 367. CT abdomen pelvis with IV contrast showed an area of cellulitis involving the perineum on the left side extending to the medial gluteal fold with area of thickening and inflammatory changes in the labia with hypoattenuation concerning for possible abscess. Blood pressures improved IV fluid resuscitation. She was started on broad-spectrum IV antibiotics and transferred to [facility], where she was admitted to hospital medicine service. Gen surgery was consulted, and patient taken to OR emergently for debridement where she was found to have necrotizing infection of the left groin. She underwent several more repeat debridements last on 6/21. Wound and Surgical cultures noting MSRA, as well as Candida tropicalis. She  subsequently completed a 2 weeks course of IV antibiotics, and also being treated with oral fluconazole. Unfortunately hospital course was complicated by development of AUTUMN, for which Nephrology was consulted. Patient required initiation of hemodialysis on 06/17, which she continued to require intermittently. UOP however was improving. Patient also deconditioned, working with therapy, making good clinical progress.  In the interim renal function made good clinical recovery off HD, catheter removed.  Creatinine continued to improved down to 1.85 on discharge.  She was ambulating independently with rolling walker.  She is appropriate for discharge home with continue therapy services and wound care via .      STATUS  Improved    DISPOSITION  Discharge to home with home health    DIET  Cardiac/diabetic    ACTIVITY  As tolerated      FOLLOW-UP      Mehul Lambert, DO Follow up on 7/29/2024.    Specialty: Nephrology  Why: @10:20  Contact information:  [address] [facility] [address]  [phone]               Ricardo Canchola RN Follow up on 7/15/2024.    Why: @1pm  Contact information:  [address] [facility] [address] [phone]               [employer] [facility] Follow up.    Specialties: Home Health Services, Home Therapy Services, Home Living Aide Services  Why: they will call you to set up date and time  Contact information:  [address] [facility] [address] [phone]               DISCHARGE MEDICATION RECONCILIATION     PRESCRIBED     carvediloL 6.25 MG tablet  Commonly known as: COREG  Take 2 tablets (12.5 mg total) by mouth 2 (two) times daily.     ciprofloxacin-dexAMETHasone 0.3-0.1% 0.3-0.1 % Drps  Commonly known as: CIPRODEX  Place 4 drops into the right ear 2 (two) times daily. for 7 days     cloNIDine 0.2 MG tablet  Commonly known as: CATAPRES  Take 1 tablet (0.2 mg total) by mouth 3 (three) times daily.     fluconazole 100 MG tablet  Commonly known as:  DIFLUCAN  Take 1 tablet (100 mg total) by mouth once daily. for 7 days     hydrALAZINE 25 MG tablet  Commonly known as: APRESOLINE  Take 4 tablets (100 mg total) by mouth every 8 (eight) hours.     traMADoL 50 mg tablet  Commonly known as: ULTRAM  Take 1 tablet (50 mg total) by mouth every 8 (eight) hours as needed for Pain.       CONTINUE     albuterol 90 mcg/actuation inhaler  Commonly known as: PROVENTIL/VENTOLIN HFA     amLODIPine 10 MG tablet  Commonly known as: NORVASC  Take 1 tablet (10 mg total) by mouth once daily.     famotidine 20 MG tablet  Commonly known as: PEPCID  Take 1 tablet (20 mg total) by mouth 2 (two) times daily.     glimepiride 4 MG tablet  Commonly known as: AMARYL  Take 1 tablet (4 mg total) by mouth daily with breakfast.     insulin glargine U-100 (Lantus) 100 unit/mL injection  Inject 50 Units into the skin every evening.     meclizine 25 mg tablet  Commonly known as: ANTIVERT  Take 1 tablet (25 mg total) by mouth 3 (three) times daily. for 10 days     pioglitazone 30 MG tablet  Commonly known as: ACTOS  Take 1 tablet (30 mg total) by mouth once daily.       DISCONTINUE     atorvastatin 20 MG tablet  Commonly known as: LIPITOR     hydroCHLOROthiazide 25 MG tablet  Commonly known as: HYDRODIURIL     metFORMIN 1000 MG tablet  Commonly known as: GLUCOPHAGE     metoprolol tartrate 25 MG tablet  Commonly known as: LOPRESSOR            These medications were sent to   [facility]  [address] [place] [address] [address]  [place] [address]      Phone: [phone]   carvediloL 6.25 MG tablet  ciprofloxacin-dexAMETHasone 0.3-0.1% 0.3-0.1 % Drps  cloNIDine 0.2 MG tablet  fluconazole 100 MG tablet  hydrALAZINE 25 MG tablet  traMADoL 50 mg tablet      PHYSICAL EXAM   VITALS: T 97.9 °F (36.6 °C)   BP (!) 169/95   P 71   RR 18   O2 96 %    GENERAL: Awake and in NAD  LUNGS: CTA anteriorly  CVS: Normal rate  GI/: Soft, NT, bowel sounds positive.  EXTREMITIES:  Radial pulse 2  +  NEURO: AAOx3  PSYCH: Cooperative      Discharge time: 33 minutes     Nicolas Styles MD  [facility]       DIAGNOSITCS   CBC:   Recent Labs   Lab 07/05/24  0428 07/07/24  0446   WBC 6.96 7.07   HGB 8.0* 8.2*   HCT 24.8* 25.4*    307       CMP:   Recent Labs   Lab 07/05/24  0428 07/06/24  0507 07/07/24  0446 07/08/24  0418   CALCIUM 8.4 8.4 8.4 8.4   ALBUMIN 1.9* 2.0* 2.0*  --     138 141 137   K 4.5 4.5 4.6 4.5   CO2 26 28 26 25    102 105 102   BUN 35.4* 34.6* 31.4* 31.1*   CREATININE 2.45* 2.08* 1.88* 1.85*   ALKPHOS  --   --  515*  --    ALT  --   --  72*  --    AST  --   --  54*  --    BILITOT  --   --  0.7  --    PHOS 4.4 4.6 4.5  --      Estimated Creatinine Clearance: 47.1 mL/min (A) (based on SCr of 1.85 mg/dL (H)).      Recent Labs     07/08/24  0533 07/08/24  1157 07/08/24  1708 07/08/24  2125 07/09/24  1147 07/09/24  1737   POCTGLUCOSE 224* 187* 202* 131* 196* 212*         Lab Results   Component Value Date    HGBA1C 11.0 (H) 06/14/2024        X-Ray Chest 1 View  Result Date: 6/22/2024  EXAMINATION: XR CHEST 1 VIEW CLINICAL HISTORY: leukocytosis workup; COMPARISON: 18 June 2024 FINDINGS: Frontal view of the chest was obtained. Stable right-sided catheter.  The heart is not significantly enlarged.  Similar right hilar prominence and right basilar opacities.  No pneumothorax.   Impression:  Similar appearance of the chest.   Electronically signed by: Issa Luna Date:    06/22/2024 Time:    13:08    CT Chest Abdomen Pelvis With IV Contrast (XPD) NO Oral Contrast  Result Date: 6/18/2024  EXAMINATION: CT CHEST ABDOMEN PELVIS WITH IV CONTRAST (XPD) CLINICAL HISTORY: Sepsis;Sepsis, unspecified organism TECHNIQUE: Axial images of the chest, abdomen, and pelvis were obtained With Contrast. Sagittal and coronal reconstructed images were available for review. Automatic exposure control was utilized to reduce the patient's radiation dose. DLP = 1724 COMPARISON: 06/13/2024 FINDINGS: Small  bilateral pleural effusions right greater than left.  No acute osseous abnormality.  The bladder is decompressed around a Huff catheter.  There is no mediastinal adenopathy.  The by lateral axillar clear.  The heart demonstrates a normal CT appearance.  Status post cholecystectomy.  Bilateral adrenal glands, kidneys, spleen, are unremarkable.  There is mild haziness about the duodenum which may be related to motion artifact.  Mild duodenitis is not excluded.  There is no free air.  There is gas noted within the soft tissues of the left perineum.  Necrotizing fasciitis is not excluded.   Impression:  Interval development of gas within the left perineum.  Necrotizing fasciitis is not excluded.  There is mild haziness about the duodenum.  Mild duodenitis is not excluded.  This possibly relates to mild stranding seen in the retroperitoneum which may be inflammatory.  Small bilateral effusions. Possible necrotizing fasciitis reported to Prashanth prior to interpretation.   Electronically signed by: Ez Nevarez Date:    06/18/2024 Time:    07:51    CT Head Without Contrast  Result Date: 6/18/2024  EXAMINATION: CT HEAD WITHOUT CONTRAST CLINICAL HISTORY: Mental status change, unknown cause; TECHNIQUE: Axial scans were obtained from skull base to the vertex. Coronal and sagittal reconstructions obtained from the axial data. Automatic exposure control was utilized to limit radiation dose. Contrast: None Radiation Dose: Total DLP: 1011 mGy*cm COMPARISON: MRI brain dated 04/30/2023 FINDINGS: There is no acute intracranial hemorrhage or edema. The gray-white matter differentiation is preserved.  Scattered hypodensities in the subcortical white matter likely represent chronic microvascular ischemic changes. There is no mass effect or midline shift. The ventricles and sulci are normal in size. The basal cisterns are patent. There is no abnormal extra-axial fluid collection.  There is no definite displaced fracture.    Impression:  1. No acute intracranial abnormality.   2. Chronic microvascular ischemic changes.   Electronically signed by: Marlin Gonzalez Date:    06/18/2024 Time:    07:43    X-Ray Chest 1 View  Result Date: 6/18/2024  EXAMINATION: XR CHEST 1 VIEW CLINICAL HISTORY: Unresponsive;  Cutaneous abscess of perineum TECHNIQUE: Single view of the chest COMPARISON: 06/17/2024 FINDINGS: Right-sided central line projects over the right atrium.  Prominent interstitial markings throughout the right similar to prior.  The left hemithorax is clear.   Impression:  As above.   Electronically signed by: Ez Nevarez Date:    06/18/2024 Time:    07:17    X-Ray Chest 1 View  Result Date: 6/17/2024  EXAMINATION: XR CHEST 1 VIEW CLINICAL HISTORY: HD line placement; TECHNIQUE: One COMPARISON: June 13, 2020. FINDINGS: Right transjugular approach central venous catheter terminates within the distal superior vena cava.  No pneumothorax. Cardiopericardial silhouette is within normal limits.  Lungs hypoventilatory changes without overt edema or dense consolidation.  No significant fluid within the pleural spaces.   Impression:  Optimal placement of hemodialysis catheter.   Electronically signed by: Garland Bowie Date:    06/17/2024 Time:    18:14    US Retroperitoneal Complete  Result Date: 6/17/2024  EXAMINATION: US RETROPERITONEAL COMPLETE CLINICAL HISTORY: AUTUMN; COMPARISON: 13 June 2024 FINDINGS: Grayscale and color Doppler sonographic evaluation of the kidneys and urinary bladder. The right kidney measures 10 cm. The left kidney measures 9 cm.   No hydronephrosis.  Grossly normal renal parenchymal echogenicity. Huff in the urinary bladder.   Impression:  No significant sonographic abnormality of the kidneys.   Electronically signed by: Issa Luna Date:    06/17/2024 Time:    11:11    US Abdomen Limited  Result Date: 6/17/2024  EXAMINATION: US ABDOMEN LIMITED CLINICAL HISTORY: Ruq pain.; COMPARISON: CT 13 June 2024. FINDINGS:  Grayscale, color and spectral doppler evaluation of the right upper quadrant. No focal abnormality of limited visualized pancreas. Imaged portions of aorta and IVC normal in caliber. Liver is mildly enlarged with the right lobe measuring at least 20 cm craniocaudad normal hepatopetal flow is noted in the portal vein. The gallbladder is surgically absent.  The common bile duct is normal in caliber  and measures 3 mm. Right kidney measures 14 cm in length. No hydronephrosis.   Impression:  1. Mild hepatomegaly.   2. Status post cholecystectomy.  No biliary ductal dilatation.   Electronically signed by: Issa Luna Date:    06/17/2024 Time:    09:58    Echo  Result Date: 6/15/2024    Left Ventricle: There is normal systolic function with a visually estimated ejection fraction of 55 - 60%.     CT Abdomen Pelvis With IV Contrast NO Oral Contrast  Result Date: 6/13/2024  EXAMINATION: CT ABDOMEN PELVIS WITH IV CONTRAST CLINICAL HISTORY: sepsis, abscess buttock, s/p I&D; TECHNIQUE: Low dose axial images, sagittal and coronal reformations were obtained from the lung bases to the pubic symphysis following the IV administration of contrast. Automatic exposure control (AEC) is utilized to reduce patient radiation exposure. COMPARISON: 04/08/2024 FINDINGS: There is right lower lobe atelectasis.  There is a right-sided pleural effusion..  There is a calcified pleural plaque in the right mid hemithorax. The liver appears normal.  No liver mass or lesion is seen.  Portal and hepatic veins appear normal. The patient is status post cholecystectomy.. The pancreas appears normal.  No pancreatic mass or lesion is seen. The spleen shows no acute abnormality. The adrenal glands appear normal.  No adrenal nodule is seen. The kidneys appear normal.  No hydronephrosis is seen.  No hydroureter is seen.  There is a punctate nonobstructing medullary calculus in the midpole of the right kidney.  There is a cyst in the lower pole of the right  kidney.  It appears to be a simple cyst.  It is stable since the prior examination..  No obvious ureteral stones are seen. Urinary bladder appears grossly unremarkable. No colitis is seen.  No diverticulitis is seen.  No obvious colonic mass or lesion is seen.  The appendix appears normal. Uterus and ovaries appear normal. There are inflammatory changes seen involving the perineum on the left side extending into the medial gluteal fold on the left side.  The inflammatory changes are limited to the left side with some labial fold thickening seen on the left side as well.  There is a area of hypoattenuation along the labial fold on the left side which may represent developing abscess.  It measures 4 cm x 1.7 cm.  Follow-up of this area is recommended. No free air is seen.  No free fluid is seen.   Impression:  Area of cellulitis involving the perineum on the left side extending to the medial gluteal fold with a area of thickening and inflammatory changes in the labia on the left side with a area of hypoattenuation seen within the labia on the left side concerning for possible developing abscess.  Follow-up is recommended. Right lower lobe atelectasis and right-sided pleural effusion   Electronically signed by: Ayush Barbour Date:    06/13/2024 Time:    10:38    CTA Chest Non-Coronary (PE Studies)  Result Date: 6/13/2024  EXAMINATION: CTA CHEST NON CORONARY (PE STUDIES) CLINICAL HISTORY: Pulmonary embolism (PE) suspected, unknown D-dimer; TECHNIQUE: Low dose axial images, sagittal and coronal reformations were obtained from the thoracic inlet to the lung bases following the IV administration of contrast..  Contrast timing was optimized to evaluate the pulmonary arteries.  MIP images were performed.  Automated exposure control was utilized COMPARISON: None FINDINGS: There are chronic interstitial lung changes seen bilaterally.  There is right lower lobe atelectasis.  No infiltrate is seen.  No mass is seen.  There is a  right-sided pleural effusion.  It is small..  No pleural thickening is seen.  No pneumothorax is seen. No pulmonary embolism is seen. The thoracic aorta appears normal.  No mediastinal lymphadenopathy is seen.  The heart appears normal. Upper abdomen shows no acute abnormality.  There is some air seen within the esophagus which is nonspecific.   Impression:  No pulmonary embolism seen Right lower lobe atelectasis and small right-sided pleural effusion   Electronically signed by: Ayush Barbour Date:    06/13/2024 Time:    09:58

## 2024-07-16 ENCOUNTER — OFFICE VISIT (OUTPATIENT)
Dept: SURGERY | Facility: CLINIC | Age: 59
End: 2024-07-16

## 2024-07-16 ENCOUNTER — HOSPITAL ENCOUNTER (INPATIENT)
Facility: HOSPITAL | Age: 59
LOS: 3 days | Discharge: HOME HEALTH | DRG: 603 | End: 2024-07-20
Attending: EMERGENCY MEDICINE | Admitting: INTERNAL MEDICINE

## 2024-07-16 DIAGNOSIS — R73.9 HYPERGLYCEMIA: ICD-10-CM

## 2024-07-16 DIAGNOSIS — N39.0 URINARY TRACT INFECTION WITH HEMATURIA, SITE UNSPECIFIED: ICD-10-CM

## 2024-07-16 DIAGNOSIS — I10 UNCONTROLLED HYPERTENSION: ICD-10-CM

## 2024-07-16 DIAGNOSIS — R31.9 URINARY TRACT INFECTION WITH HEMATURIA, SITE UNSPECIFIED: ICD-10-CM

## 2024-07-16 DIAGNOSIS — Z09 POSTOP CHECK: Primary | ICD-10-CM

## 2024-07-16 DIAGNOSIS — Z98.890 STATUS POST INCISION AND DRAINAGE: ICD-10-CM

## 2024-07-16 DIAGNOSIS — R07.9 CHEST PAIN: ICD-10-CM

## 2024-07-16 DIAGNOSIS — R11.2 NAUSEA AND VOMITING, UNSPECIFIED VOMITING TYPE: Primary | ICD-10-CM

## 2024-07-16 DIAGNOSIS — R74.01 TRANSAMINITIS: ICD-10-CM

## 2024-07-16 PROCEDURE — 99024 POSTOP FOLLOW-UP VISIT: CPT | Mod: ,,, | Performed by: SURGERY

## 2024-07-16 PROCEDURE — 99285 EMERGENCY DEPT VISIT HI MDM: CPT | Mod: 25

## 2024-07-16 RX ORDER — HYDRALAZINE HYDROCHLORIDE 20 MG/ML
10 INJECTION INTRAMUSCULAR; INTRAVENOUS
Status: COMPLETED | OUTPATIENT
Start: 2024-07-16 | End: 2024-07-17

## 2024-07-16 RX ORDER — MORPHINE SULFATE 4 MG/ML
4 INJECTION, SOLUTION INTRAMUSCULAR; INTRAVENOUS
Status: COMPLETED | OUTPATIENT
Start: 2024-07-16 | End: 2024-07-17

## 2024-07-16 RX ORDER — ONDANSETRON HYDROCHLORIDE 2 MG/ML
4 INJECTION, SOLUTION INTRAVENOUS
Status: COMPLETED | OUTPATIENT
Start: 2024-07-16 | End: 2024-07-17

## 2024-07-16 NOTE — Clinical Note
Diagnosis: Nausea and vomiting, unspecified vomiting type [[id number]]   Future Attending Provider: DORA CHO [[id number]]   Admit to which facility:: [facility] [[id number]]   Reason for IP Medical Treatment  (Clinical interventions that can only be accomplished in the IP setting? ) :: Persistent vomiting   I certify that Inpatient services for greater than or equal to 2 midnights are medically necessary:: Yes   Plans for Post-Acute care--if anticipated (pick the single best option):: A. No post acute care anticipated at this time

## 2024-07-16 NOTE — PROGRESS NOTES
Greer Kahn is presenting to clinic for a wound check following recent I&D..    Wound is along her left perivulvar region s/p multiple I&Ds. Patient has home health and currently packing the wound with dry gauze.    Wound is healing as expected. Edges are not indurated or erythematous, no signs of infection. Superior portion of the wound noticeably healing well.      PLAN  - continue home health wound care  - f/u GABRIEL Cervantes M.D.  PGY1 [employer]

## 2024-07-17 PROBLEM — I10 HTN (HYPERTENSION): Status: ACTIVE | Noted: 2024-07-17

## 2024-07-17 PROBLEM — E11.9 TYPE 2 DIABETES MELLITUS, WITH LONG-TERM CURRENT USE OF INSULIN: Chronic | Status: ACTIVE | Noted: 2024-07-17

## 2024-07-17 PROBLEM — Z79.4 TYPE 2 DIABETES MELLITUS, WITH LONG-TERM CURRENT USE OF INSULIN: Chronic | Status: ACTIVE | Noted: 2024-07-17

## 2024-07-17 PROBLEM — G43.109 MIGRAINE WITH AURA: Status: RESOLVED | Noted: 2023-05-01 | Resolved: 2024-07-17

## 2024-07-17 PROBLEM — R51.9 INTRACTABLE HEADACHE: Status: RESOLVED | Noted: 2023-05-01 | Resolved: 2024-07-17

## 2024-07-17 LAB
ALBUMIN SERPL-MCNC: 2.9 G/DL (ref 3.5–5)
ALBUMIN/GLOB SERPL: 0.4 RATIO (ref 1.1–2)
ALLENS TEST: ABNORMAL
ALP SERPL-CCNC: 551 UNIT/L (ref 40–150)
ALT SERPL-CCNC: 63 UNIT/L (ref 0–55)
ANION GAP SERPL CALC-SCNC: 14 MEQ/L
AST SERPL-CCNC: 40 UNIT/L (ref 5–34)
B-OH-BUTYR SERPL-MCNC: 0.2 MMOL/L
BACTERIA #/AREA URNS AUTO: ABNORMAL /HPF
BASE EXCESS VENOUS: 10 MMOL/L (ref -2–2)
BASOPHILS # BLD AUTO: 0.07 X10(3)/MCL
BASOPHILS NFR BLD AUTO: 0.6 %
BILIRUB SERPL-MCNC: 0.6 MG/DL
BILIRUB UR QL STRIP.AUTO: NEGATIVE
BUN SERPL-MCNC: 18.8 MG/DL (ref 9.8–20.1)
CALCIUM SERPL-MCNC: 9.6 MG/DL (ref 8.4–10.2)
CHLORIDE SERPL-SCNC: 95 MMOL/L (ref 98–107)
CLARITY UR: ABNORMAL
CO2 SERPL-SCNC: 27 MMOL/L (ref 22–29)
CO2 TOTAL: 36
COLOR UR AUTO: YELLOW
CREAT SERPL-MCNC: 1.46 MG/DL (ref 0.55–1.02)
CREAT/UREA NIT SERPL: 13
DELSYS: ABNORMAL
EOSINOPHIL # BLD AUTO: 0.27 X10(3)/MCL (ref 0–0.9)
EOSINOPHIL NFR BLD AUTO: 2.3 %
ERYTHROCYTE [DISTWIDTH] IN BLOOD BY AUTOMATED COUNT: 13.6 % (ref 11.5–17)
FIO2: 21
FLUAV AG UPPER RESP QL IA.RAPID: NOT DETECTED
FLUBV AG UPPER RESP QL IA.RAPID: NOT DETECTED
GFR SERPLBLD CREATININE-BSD FMLA CKD-EPI: 42 ML/MIN/1.73/M2
GLOBULIN SER-MCNC: 6.6 GM/DL (ref 2.4–3.5)
GLUCOSE SERPL-MCNC: 426 MG/DL (ref 74–100)
GLUCOSE UR QL STRIP: >=1000
HCO3 UR-SCNC: 34.7 MMOL/L (ref 24–28)
HCT VFR BLD AUTO: 33.3 % (ref 37–47)
HGB BLD-MCNC: 10.9 G/DL (ref 12–16)
HGB UR QL STRIP: ABNORMAL
IMM GRANULOCYTES # BLD AUTO: 0.05 X10(3)/MCL (ref 0–0.04)
IMM GRANULOCYTES NFR BLD AUTO: 0.4 %
KETONES UR QL STRIP: NEGATIVE
LACTATE SERPL-SCNC: 1.6 MMOL/L (ref 0.5–2.2)
LEUKOCYTE ESTERASE UR QL STRIP: ABNORMAL
LIPASE SERPL-CCNC: 29 U/L
LYMPHOCYTES # BLD AUTO: 2.8 X10(3)/MCL (ref 0.6–4.6)
LYMPHOCYTES NFR BLD AUTO: 23.8 %
MAGNESIUM SERPL-MCNC: 1.5 MG/DL (ref 1.6–2.6)
MCH RBC QN AUTO: 29.9 PG (ref 27–31)
MCHC RBC AUTO-ENTMCNC: 32.7 G/DL (ref 33–36)
MCV RBC AUTO: 91.2 FL (ref 80–94)
MONOCYTES # BLD AUTO: 0.88 X10(3)/MCL (ref 0.1–1.3)
MONOCYTES NFR BLD AUTO: 7.5 %
NEUTROPHILS # BLD AUTO: 7.69 X10(3)/MCL (ref 2.1–9.2)
NEUTROPHILS NFR BLD AUTO: 65.4 %
NITRITE UR QL STRIP: NEGATIVE
NRBC BLD AUTO-RTO: 0 %
PCO2 BLDA: 57 MMHG (ref 35–45)
PH SMN: 7.39 [PH] (ref 7.35–7.45)
PH UR STRIP: 6 [PH]
PLATELET # BLD AUTO: 501 X10(3)/MCL (ref 130–400)
PMV BLD AUTO: 10.5 FL (ref 7.4–10.4)
PO2 BLDA: 33 MMHG (ref 40–60)
POC BE: 10 MMOL/L
POC COHB: ABNORMAL
POC HCO3 VENOUS: 34.7
POC METHB: ABNORMAL
POC O2HB MIXED VENOUS: ABNORMAL
POC O2HB VENOUS: ABNORMAL
POC PCO2 VENOUS: 57
POC PH VENOUS: 7.39
POC PO2 MIXED VENOUS: ABNORMAL
POC PO2 VENOUS: 33
POC SATURATED O2 VENOUS: 62
POC SATURATED O2: 62 % (ref 95–100)
POC TCO2: 36 MMOL/L (ref 24–29)
POC THB: ABNORMAL
POC VBG SOURCE: ABNORMAL
POCT GLUCOSE: 327 MG/DL (ref 70–110)
POCT GLUCOSE: 342 MG/DL (ref 70–110)
POCT GLUCOSE: 403 MG/DL (ref 70–110)
POTASSIUM SERPL-SCNC: 3.7 MMOL/L (ref 3.5–5.1)
PROT SERPL-MCNC: 9.5 GM/DL (ref 6.4–8.3)
PROT UR QL STRIP: 100
RBC # BLD AUTO: 3.65 X10(6)/MCL (ref 4.2–5.4)
RBC #/AREA URNS AUTO: ABNORMAL /HPF
SAMPLE: ABNORMAL
SARS-COV-2 RNA RESP QL NAA+PROBE: NOT DETECTED
SITE: ABNORMAL
SODIUM SERPL-SCNC: 136 MMOL/L (ref 136–145)
SP GR UR STRIP.AUTO: 1.01 (ref 1–1.03)
SQUAMOUS #/AREA URNS AUTO: ABNORMAL /HPF
UROBILINOGEN UR STRIP-ACNC: 0.2
WBC # BLD AUTO: 11.76 X10(3)/MCL (ref 4.5–11.5)
WBC #/AREA URNS AUTO: ABNORMAL /HPF

## 2024-07-17 PROCEDURE — 25000003 PHARM REV CODE 250: Performed by: INTERNAL MEDICINE

## 2024-07-17 PROCEDURE — 25500020 PHARM REV CODE 255: Performed by: EMERGENCY MEDICINE

## 2024-07-17 PROCEDURE — 11000001 HC ACUTE MED/SURG PRIVATE ROOM

## 2024-07-17 PROCEDURE — 96367 TX/PROPH/DG ADDL SEQ IV INF: CPT

## 2024-07-17 PROCEDURE — 82010 KETONE BODYS QUAN: CPT | Performed by: EMERGENCY MEDICINE

## 2024-07-17 PROCEDURE — 83690 ASSAY OF LIPASE: CPT | Performed by: EMERGENCY MEDICINE

## 2024-07-17 PROCEDURE — 81001 URINALYSIS AUTO W/SCOPE: CPT | Performed by: EMERGENCY MEDICINE

## 2024-07-17 PROCEDURE — 96375 TX/PRO/DX INJ NEW DRUG ADDON: CPT

## 2024-07-17 PROCEDURE — 25000003 PHARM REV CODE 250: Performed by: EMERGENCY MEDICINE

## 2024-07-17 PROCEDURE — 83605 ASSAY OF LACTIC ACID: CPT | Performed by: EMERGENCY MEDICINE

## 2024-07-17 PROCEDURE — 87077 CULTURE AEROBIC IDENTIFY: CPT | Performed by: EMERGENCY MEDICINE

## 2024-07-17 PROCEDURE — 81003 URINALYSIS AUTO W/O SCOPE: CPT | Performed by: EMERGENCY MEDICINE

## 2024-07-17 PROCEDURE — 87086 URINE CULTURE/COLONY COUNT: CPT | Performed by: EMERGENCY MEDICINE

## 2024-07-17 PROCEDURE — 83735 ASSAY OF MAGNESIUM: CPT | Performed by: EMERGENCY MEDICINE

## 2024-07-17 PROCEDURE — 80053 COMPREHEN METABOLIC PANEL: CPT | Performed by: EMERGENCY MEDICINE

## 2024-07-17 PROCEDURE — 96361 HYDRATE IV INFUSION ADD-ON: CPT

## 2024-07-17 PROCEDURE — 82803 BLOOD GASES ANY COMBINATION: CPT

## 2024-07-17 PROCEDURE — 63600175 PHARM REV CODE 636 W HCPCS: Performed by: EMERGENCY MEDICINE

## 2024-07-17 PROCEDURE — 85025 COMPLETE CBC W/AUTO DIFF WBC: CPT | Performed by: EMERGENCY MEDICINE

## 2024-07-17 PROCEDURE — 87040 BLOOD CULTURE FOR BACTERIA: CPT | Performed by: EMERGENCY MEDICINE

## 2024-07-17 PROCEDURE — 0240U COVID/FLU A&B PCR: CPT | Performed by: EMERGENCY MEDICINE

## 2024-07-17 PROCEDURE — 99900035 HC TECH TIME PER 15 MIN (STAT)

## 2024-07-17 PROCEDURE — 63600175 PHARM REV CODE 636 W HCPCS: Performed by: INTERNAL MEDICINE

## 2024-07-17 PROCEDURE — 96365 THER/PROPH/DIAG IV INF INIT: CPT

## 2024-07-17 RX ORDER — INSULIN ASPART 100 [IU]/ML
0-10 INJECTION, SOLUTION INTRAVENOUS; SUBCUTANEOUS
Status: DISCONTINUED | OUTPATIENT
Start: 2024-07-17 | End: 2024-07-21 | Stop reason: HOSPADM

## 2024-07-17 RX ORDER — SODIUM CHLORIDE 0.9 % (FLUSH) 0.9 %
10 SYRINGE (ML) INJECTION EVERY 12 HOURS PRN
Status: DISCONTINUED | OUTPATIENT
Start: 2024-07-17 | End: 2024-07-21 | Stop reason: HOSPADM

## 2024-07-17 RX ORDER — LANOLIN ALCOHOL/MO/W.PET/CERES
800 CREAM (GRAM) TOPICAL
Status: DISCONTINUED | OUTPATIENT
Start: 2024-07-17 | End: 2024-07-21 | Stop reason: HOSPADM

## 2024-07-17 RX ORDER — PROCHLORPERAZINE EDISYLATE 5 MG/ML
5 INJECTION INTRAMUSCULAR; INTRAVENOUS EVERY 6 HOURS PRN
Status: DISCONTINUED | OUTPATIENT
Start: 2024-07-17 | End: 2024-07-21 | Stop reason: HOSPADM

## 2024-07-17 RX ORDER — LABETALOL HYDROCHLORIDE 5 MG/ML
10 INJECTION, SOLUTION INTRAVENOUS EVERY 4 HOURS PRN
Status: DISCONTINUED | OUTPATIENT
Start: 2024-07-17 | End: 2024-07-21 | Stop reason: HOSPADM

## 2024-07-17 RX ORDER — METOCLOPRAMIDE HYDROCHLORIDE 5 MG/ML
5 INJECTION INTRAMUSCULAR; INTRAVENOUS EVERY 6 HOURS
Status: COMPLETED | OUTPATIENT
Start: 2024-07-17 | End: 2024-07-19

## 2024-07-17 RX ORDER — MAGNESIUM SULFATE 1 G/100ML
1 INJECTION INTRAVENOUS
Status: COMPLETED | OUTPATIENT
Start: 2024-07-17 | End: 2024-07-17

## 2024-07-17 RX ORDER — AMLODIPINE BESYLATE 5 MG/1
10 TABLET ORAL DAILY
Status: DISCONTINUED | OUTPATIENT
Start: 2024-07-17 | End: 2024-07-21 | Stop reason: HOSPADM

## 2024-07-17 RX ORDER — PROCHLORPERAZINE EDISYLATE 5 MG/ML
5 INJECTION INTRAMUSCULAR; INTRAVENOUS
Status: COMPLETED | OUTPATIENT
Start: 2024-07-17 | End: 2024-07-17

## 2024-07-17 RX ORDER — HYDRALAZINE HYDROCHLORIDE 20 MG/ML
10 INJECTION INTRAMUSCULAR; INTRAVENOUS
Status: ACTIVE | OUTPATIENT
Start: 2024-07-17 | End: 2024-07-17

## 2024-07-17 RX ORDER — IBUPROFEN 200 MG
16 TABLET ORAL
Status: DISCONTINUED | OUTPATIENT
Start: 2024-07-17 | End: 2024-07-18

## 2024-07-17 RX ORDER — CLONIDINE 0.2 MG/24H
1 PATCH, EXTENDED RELEASE TRANSDERMAL
Status: DISCONTINUED | OUTPATIENT
Start: 2024-07-18 | End: 2024-07-21 | Stop reason: HOSPADM

## 2024-07-17 RX ORDER — SIMETHICONE 80 MG
1 TABLET,CHEWABLE ORAL 4 TIMES DAILY PRN
Status: DISCONTINUED | OUTPATIENT
Start: 2024-07-17 | End: 2024-07-21 | Stop reason: HOSPADM

## 2024-07-17 RX ORDER — PANTOPRAZOLE SODIUM 40 MG/10ML
40 INJECTION, POWDER, LYOPHILIZED, FOR SOLUTION INTRAVENOUS DAILY
Status: DISCONTINUED | OUTPATIENT
Start: 2024-07-17 | End: 2024-07-21 | Stop reason: HOSPADM

## 2024-07-17 RX ORDER — MAGNESIUM SULFATE HEPTAHYDRATE 40 MG/ML
2 INJECTION, SOLUTION INTRAVENOUS ONCE
Status: COMPLETED | OUTPATIENT
Start: 2024-07-17 | End: 2024-07-17

## 2024-07-17 RX ORDER — MUPIROCIN 20 MG/G
OINTMENT TOPICAL 2 TIMES DAILY
Status: DISCONTINUED | OUTPATIENT
Start: 2024-07-17 | End: 2024-07-21 | Stop reason: HOSPADM

## 2024-07-17 RX ORDER — ALBUTEROL SULFATE 90 UG/1
2 AEROSOL, METERED RESPIRATORY (INHALATION) EVERY 4 HOURS PRN
Status: DISCONTINUED | OUTPATIENT
Start: 2024-07-17 | End: 2024-07-21 | Stop reason: HOSPADM

## 2024-07-17 RX ORDER — ENOXAPARIN SODIUM 100 MG/ML
40 INJECTION SUBCUTANEOUS EVERY 24 HOURS
Status: DISCONTINUED | OUTPATIENT
Start: 2024-07-17 | End: 2024-07-21 | Stop reason: HOSPADM

## 2024-07-17 RX ORDER — GLUCAGON 1 MG
1 KIT INJECTION
Status: DISCONTINUED | OUTPATIENT
Start: 2024-07-17 | End: 2024-07-18

## 2024-07-17 RX ORDER — SODIUM CHLORIDE 9 MG/ML
INJECTION, SOLUTION INTRAVENOUS CONTINUOUS
Status: DISCONTINUED | OUTPATIENT
Start: 2024-07-17 | End: 2024-07-19

## 2024-07-17 RX ORDER — ONDANSETRON HYDROCHLORIDE 2 MG/ML
4 INJECTION, SOLUTION INTRAVENOUS EVERY 8 HOURS PRN
Status: DISCONTINUED | OUTPATIENT
Start: 2024-07-17 | End: 2024-07-18

## 2024-07-17 RX ORDER — IBUPROFEN 200 MG
24 TABLET ORAL
Status: DISCONTINUED | OUTPATIENT
Start: 2024-07-17 | End: 2024-07-18

## 2024-07-17 RX ORDER — CARVEDILOL 12.5 MG/1
12.5 TABLET ORAL 2 TIMES DAILY
Status: DISCONTINUED | OUTPATIENT
Start: 2024-07-17 | End: 2024-07-21 | Stop reason: HOSPADM

## 2024-07-17 RX ORDER — NALOXONE HCL 0.4 MG/ML
0.02 VIAL (ML) INJECTION
Status: DISCONTINUED | OUTPATIENT
Start: 2024-07-17 | End: 2024-07-21 | Stop reason: HOSPADM

## 2024-07-17 RX ORDER — ALUMINUM HYDROXIDE, MAGNESIUM HYDROXIDE, AND SIMETHICONE 1200; 120; 1200 MG/30ML; MG/30ML; MG/30ML
30 SUSPENSION ORAL
Status: DISCONTINUED | OUTPATIENT
Start: 2024-07-17 | End: 2024-07-21 | Stop reason: HOSPADM

## 2024-07-17 RX ADMIN — CARVEDILOL 12.5 MG: 12.5 TABLET, FILM COATED ORAL at 07:07

## 2024-07-17 RX ADMIN — PANTOPRAZOLE SODIUM 40 MG: 40 INJECTION, POWDER, LYOPHILIZED, FOR SOLUTION INTRAVENOUS at 02:07

## 2024-07-17 RX ADMIN — ENOXAPARIN SODIUM 40 MG: 40 INJECTION SUBCUTANEOUS at 04:07

## 2024-07-17 RX ADMIN — ALUMINA, MAGNESIA, AND SIMETHICONE ORAL SUSPENSION REGULAR STRENGTH 30 ML: 1200; 1200; 120 SUSPENSION ORAL at 04:07

## 2024-07-17 RX ADMIN — IOHEXOL 99 ML: 350 INJECTION, SOLUTION INTRAVENOUS at 01:07

## 2024-07-17 RX ADMIN — METOCLOPRAMIDE HYDROCHLORIDE 5 MG: 5 INJECTION INTRAMUSCULAR; INTRAVENOUS at 11:07

## 2024-07-17 RX ADMIN — HYDRALAZINE HYDROCHLORIDE 10 MG: 20 INJECTION INTRAMUSCULAR; INTRAVENOUS at 12:07

## 2024-07-17 RX ADMIN — PROCHLORPERAZINE EDISYLATE 5 MG: 5 INJECTION INTRAMUSCULAR; INTRAVENOUS at 04:07

## 2024-07-17 RX ADMIN — VANCOMYCIN HYDROCHLORIDE 1250 MG: 1.25 INJECTION, POWDER, LYOPHILIZED, FOR SOLUTION INTRAVENOUS at 06:07

## 2024-07-17 RX ADMIN — INSULIN HUMAN 10 UNITS: 100 INJECTION, SOLUTION PARENTERAL at 02:07

## 2024-07-17 RX ADMIN — MORPHINE SULFATE 4 MG: 4 INJECTION INTRAVENOUS at 12:07

## 2024-07-17 RX ADMIN — PIPERACILLIN SODIUM AND TAZOBACTAM SODIUM 4.5 G: 4; .5 INJECTION, POWDER, LYOPHILIZED, FOR SOLUTION INTRAVENOUS at 09:07

## 2024-07-17 RX ADMIN — PIPERACILLIN SODIUM AND TAZOBACTAM SODIUM 4.5 G: 4; .5 INJECTION, POWDER, LYOPHILIZED, FOR SOLUTION INTRAVENOUS at 01:07

## 2024-07-17 RX ADMIN — SODIUM CHLORIDE: 9 INJECTION, SOLUTION INTRAVENOUS at 11:07

## 2024-07-17 RX ADMIN — PIPERACILLIN SODIUM AND TAZOBACTAM SODIUM 4.5 G: 4; .5 INJECTION, POWDER, LYOPHILIZED, FOR SOLUTION INTRAVENOUS at 05:07

## 2024-07-17 RX ADMIN — MAGNESIUM SULFATE IN DEXTROSE 1 G: 10 INJECTION, SOLUTION INTRAVENOUS at 02:07

## 2024-07-17 RX ADMIN — METOCLOPRAMIDE HYDROCHLORIDE 5 MG: 5 INJECTION INTRAMUSCULAR; INTRAVENOUS at 04:07

## 2024-07-17 RX ADMIN — INSULIN ASPART 10 UNITS: 100 INJECTION, SOLUTION INTRAVENOUS; SUBCUTANEOUS at 02:07

## 2024-07-17 RX ADMIN — ALUMINA, MAGNESIA, AND SIMETHICONE ORAL SUSPENSION REGULAR STRENGTH 30 ML: 1200; 1200; 120 SUSPENSION ORAL at 07:07

## 2024-07-17 RX ADMIN — SODIUM CHLORIDE 1000 ML: 9 INJECTION, SOLUTION INTRAVENOUS at 12:07

## 2024-07-17 RX ADMIN — SODIUM CHLORIDE, POTASSIUM CHLORIDE, SODIUM LACTATE AND CALCIUM CHLORIDE 1848 ML: 600; 310; 30; 20 INJECTION, SOLUTION INTRAVENOUS at 05:07

## 2024-07-17 RX ADMIN — AMLODIPINE BESYLATE 10 MG: 5 TABLET ORAL at 03:07

## 2024-07-17 RX ADMIN — MAGNESIUM SULFATE HEPTAHYDRATE 2 G: 40 INJECTION, SOLUTION INTRAVENOUS at 03:07

## 2024-07-17 RX ADMIN — ONDANSETRON 4 MG: 2 INJECTION INTRAMUSCULAR; INTRAVENOUS at 12:07

## 2024-07-17 RX ADMIN — VANCOMYCIN HYDROCHLORIDE 1250 MG: 1.25 INJECTION, POWDER, LYOPHILIZED, FOR SOLUTION INTRAVENOUS at 07:07

## 2024-07-17 RX ADMIN — MUPIROCIN: 20 OINTMENT TOPICAL at 09:07

## 2024-07-17 NOTE — H&P
[facility] EMERGENCY DEPARTMENT    Hospital Medicine History & Physical Examination       Patient Name: Greer Kahn  MRN: [id number]  Patient Class: IP- Inpatient   Admission Date: 7/16/2024   Admitting Physician: RADHA Service   Length of Stay: 0  Attending Physician: Ernesto Kennedy MD  Primary Care Provider: Ricardo Canchola NP  Face-to-Face encounter date: 07/17/2024    Code Status: Full Code    Chief Complaint: Nausea and Vomiting          HISTORY OF PRESENT ILLNESS:   Greer Kahn is a 58 y.o. female who  has a past medical history of Asthma, Diabetes mellitus, and Hypertension.. The patient presented to [facility] on 7/16/2024 with a primary complaint of vomiting. She was hospitalized from 6/13 -7/9/24 for AUTUMN requiring hemodialysis, Necrotizing soft tissue infection of the left groin/perineal region and Poorly controlled type II diabetes mellitus.  She reports recurrent nausea and vomiting the last 3-4 days. She had bouts of nausea and vomiting during her hospitalization. She denies Fever, chills, melena or hematemesis. She has diffuse abd discomfort but more tenderness on the right. She denies diarrhea. She has had trouble holding down her meds. She also says she hasn't been on insulin since leaving the hospital. She denies increased pain at her wound site, she did f/u with her surgeon in clinic since her discharge.    PAST MEDICAL HISTORY:     Past Medical History:   Diagnosis Date    Asthma     Diabetes mellitus     Hypertension        PAST SURGICAL HISTORY:     Past Surgical History:   Procedure Laterality Date    GALLBLADDER SURGERY      INCISION AND DRAINAGE OF ABSCESS N/A 06/14/2024    Procedure: Incision and Drainage;  Surgeon: Papa Cesar Jr., MD;  Location: [facility];  Service: General;  Laterality: N/A;  DEBRIDEMENT SOFT TISSUE - PERINEUM    INCISION AND DRAINAGE, LOWER EXTREMITY Left 06/18/2024    Procedure: Incision and Drainage;  Surgeon: Arnaud Vital  MD;  Location: [facility];  Service: General;  Laterality: Left;  LEFT GROIN    WOUND DEBRIDEMENT Left 06/21/2024    Procedure: DEBRIDEMENT, WOUND;  Surgeon: Arnaud Vital MD;  Location: [facility];  Service: General;  Laterality: Left;  left groin, lithotomy       ALLERGIES:   Patient has no known allergies.    FAMILY HISTORY:   family history includes Diabetes in her mother.    SOCIAL HISTORY:     Social History     Tobacco Use    Smoking status: Never    Smokeless tobacco: Never   Substance Use Topics    Alcohol use: Never        HOME MEDICATIONS:     Prior to Admission medications    Medication Sig Start Date End Date Taking? Authorizing Provider   albuterol (PROVENTIL/VENTOLIN HFA) 90 mcg/actuation inhaler Inhale 2 puffs into the lungs every 4 to 6 hours as needed for Shortness of Breath. 1/17/24   Provider, Historical   amLODIPine (NORVASC) 10 MG tablet Take 1 tablet (10 mg total) by mouth once daily. 4/12/24 5/12/24  Ernesto Kennedy MD   carvediloL (COREG) 6.25 MG tablet Take 2 tablets (12.5 mg total) by mouth 2 (two) times daily. 7/9/24 7/9/25  Nicolas Styels MD   ciprofloxacin-dexAMETHasone 0.3-0.1% (CIPRODEX) 0.3-0.1 % DrpS Place 4 drops into the right ear 2 (two) times daily. for 7 days 7/9/24 7/16/24  Nicolas Styles MD   cloNIDine (CATAPRES) 0.2 MG tablet Take 1 tablet (0.2 mg total) by mouth 3 (three) times daily. 7/9/24 7/9/25  Nicolas Styles MD   famotidine (PEPCID) 20 MG tablet Take 1 tablet (20 mg total) by mouth 2 (two) times daily. 4/12/24 5/12/24  Ernesto Kennedy MD   fluconazole (DIFLUCAN) 100 MG tablet Take 1 tablet (100 mg total) by mouth once daily. for 7 days 7/9/24 7/16/24  Nicolas Styles MD   glimepiride (AMARYL) 4 MG tablet Take 1 tablet (4 mg total) by mouth daily with breakfast. 4/13/24 5/13/24  Ernesto Kennedy MD   hydrALAZINE (APRESOLINE) 25 MG tablet Take 4 tablets (100 mg total) by mouth every 8 (eight) hours. 7/9/24 7/9/25  Nicolas Styles MD   insulin glargine (LANTUS) 100 unit/mL  injection Inject 50 Units into the skin every evening. 4/12/24 6/11/24  Ernesto Kennedy MD   meclizine (ANTIVERT) 25 mg tablet Take 1 tablet (25 mg total) by mouth 3 (three) times daily. for 10 days 4/12/24 4/22/24  Ernesto Kennedy MD   pioglitazone (ACTOS) 30 MG tablet Take 1 tablet (30 mg total) by mouth once daily. 4/12/24 5/12/24  Ernesto Kennedy MD   traMADoL (ULTRAM) 50 mg tablet Take 1 tablet (50 mg total) by mouth every 8 (eight) hours as needed for Pain. 7/9/24   Nicolas Styles MD       REVIEW OF SYSTEMS:   Except as documented, all other systems reviewed and negative   Review of Systems   Constitutional:  Positive for chills. Negative for fever.   Cardiovascular:  Negative for chest pain and orthopnea.   Gastrointestinal:  Positive for abdominal pain, heartburn, nausea and vomiting. Negative for blood in stool, constipation, diarrhea and melena.   Musculoskeletal:  Negative for back pain.   Neurological:  Positive for weakness. Negative for dizziness.         PHYSICAL EXAM:     VITAL SIGNS: 24 HRS MIN & MAX LAST   Temp  Min: 98.1 °F (36.7 °C)  Max: 98.4 °F (36.9 °C) 98.1 °F (36.7 °C)   BP  Min: 153/91  Max: 208/103 (!) 179/94   Pulse  Min: 85  Max: 94  85   Resp  Min: 14  Max: 18 18   SpO2  Min: 88 %  Max: 95 % 95 %       General appearance: Well-developed, obese female in no apparent distress.  HENT: Atraumatic head. Moist mucous membranes of oral cavity.  Eyes: Normal extraocular movements.   Neck: Supple.   Lungs: Clear to auscultation bilaterally. No wheezing present.   Heart: Regular rate and rhythm. S1 and S2 present with no murmurs/gallop/rub. No pedal edema. No JVD present.   Abdomen: Soft, non-distended, tender right side of abd. No rebound tenderness/guarding. Bowel sounds are decreased.   Extremities: No cyanosis, clubbing, or edema.  Skin: No Rash.   Neuro: Motor and sensory exams grossly intact. Good tone. Muscle strength 5/5 in all 4 extremities  Psych/mental status: Appropriate mood and  affect. Responds appropriately to questions.     LABS AND IMAGING:     Recent Labs   Lab 07/17/24  0031   WBC 11.76*   RBC 3.65*   HGB 10.9*   HCT 33.3*   MCV 91.2   MCH 29.9   MCHC 32.7*   RDW 13.6   *   MPV 10.5*       Recent Labs   Lab 07/17/24  0007 07/17/24  0031   NA  --  136   K  --  3.7   CL  --  95*   CO2  --  27   BUN  --  18.8   CREATININE  --  1.46*   CALCIUM  --  9.6   PH 7.393  --    MG  --  1.50*   ALBUMIN  --  2.9*   ALKPHOS  --  551*   ALT  --  63*   AST  --  40*   BILITOT  --  0.6       Microbiology Results (last 7 days)       Procedure Component Value Units Date/Time    Blood culture #2 **CANNOT BE ORDERED STAT** [[id number]] Collected: 07/17/24 0515    Order Status: Resulted Specimen: Blood from Antecubital, Left Updated: 07/17/24 0516    Blood culture #1 **CANNOT BE ORDERED STAT** [[id number]] Collected: 07/17/24 0445    Order Status: Resulted Specimen: Blood from Hand, Left Updated: 07/17/24 0516    Urine culture [[id number]] Collected: 07/17/24 0227    Order Status: Sent Specimen: Urine, Clean Catch Updated: 07/17/24 0254             CT Abdomen Pelvis With IV Contrast NO Oral Contrast  Narrative: Technique: CT of the abdomen and pelvis was performed with axial images as well as sagittal and coronal reconstruction images with intravenous contrast.    Comparison: Comparison is with CT pelvis study dated June 18, 2024    Clinical History: Pt having n/v x 4 days unable to take bp medication now having hypertension.    Dosage Information: Automated Exposure Control was utilized.      Findings:    Thorax:    Lungs: There is mild nonspecific dependent change at the lung bases.    Pleura: No effusions or thickening are seen.    Abdomen:    Abdominal Wall: No abdominal wall pathology is seen.    Liver: The liver appears unremarkable.    Biliary System: No intrahepatic or extrahepatic biliary duct dilatation is seen.    Gallbladder: Surgical clips are seen in the gallbladder fossa  consistent with prior cholecystectomy.    Pancreas: The pancreas appears unremarkable.    Spleen: The spleen appears unremarkable.    Adrenals: The adrenal glands appear unremarkable.    Kidneys: There is a 3 mm nonobstructing right renal calculus in the mid pole (series 2, image 23). Subcentimeter hypodensity in the lower pole of the right kidney, which is too small to further characterize. There is a 1.5 cm left renal parapelvic cyst in the mid pole for which no specific follow-up imaging is recommended    Aorta: There is mild calcification of the abdominal aorta and its branches.    Bowel:    Esophagus: The visualized esophagus appears unremarkable.    Stomach: The gastric antral wall seems thick raising concern for gastritis.    Duodenum: Unremarkable appearing duodenum.    Small Bowel: The small bowel appears unremarkable.    Colon: Nondistended.    Appendix: The appendix appears unremarkable.    Peritoneum: No intraperitoneal free air or ascites is seen.    Pelvis:    Bladder: The bladder appears unremarkable.    Female:    Uterus: The uterus appears unremarkable.    Ovaries: No adnexal masses are seen.    Bony structures:    Dorsal Spine: There is mild spondylosis of the visualized dorsal spine.    Bony Pelvis: The visualized bony structures of the pelvis appear unremarkable.    Miscellaneous: There is an incompletely imaged 1.1 x 3 x 3 (AP x T x CC) ill-defined peripherally enhancing fluid collection with gas locules in the left perineum (series 2, image 85). There is surrounding fat stranding. This is of concern for cellulitis with an abscess. The prior study demonstrates extensive subcutaneous fat stranding in the left perineum, medial gluteal cleft with a 3.6 x 2.9 cm ill-defined subcutaneous soft tissue density in the left perianal region.  Impression: Impression:    1. There is an incompletely imaged 1.1 x 3 x 3 (AP x T x CC) ill-defined peripherally enhancing fluid collection with gas locules in the  left perineum (series 2, image 85). There is surrounding fat stranding. This is of concern for cellulitis with an abscess. The prior study demonstrates extensive subcutaneous fat stranding in the left perineum, medial gluteal cleft with a 3.6 x 2.9 cm ill-defined subcutaneous soft tissue density in the left perianal region. Correlate clinically as regards further evaluation and followup with CT pelvis.    2. The gastric antral wall seems thick raising concern for gastritis.    3. Details and other findings as discussed above.    No significant discrepancy with overnight report.    Electronically signed by: Garland Bowie  Date:    07/17/2024  Time:    07:57      Recent Results (from the past 24 hour(s))   POCT Venous Blood Gas Once    Collection Time: 07/17/24 12:05 AM   Result Value Ref Range    POC VBG Source Venous     POC COHb      POC MetHb      POC pH Venous 7.393     POC pCO2 Venous 57.0     POC THb      POC Saturated O2 Venous 62     POC pO2 Venous 33     POC pO2 Mixed Venous      POC O2Hb Venous      POC O2Hb Mixed Venous      CO2 TOTAL 36     Base Excess, Jonny 10.0 (A) -2.0 - 2.0 mmol/L    POC HCO3 Venous 34.70     Site      DelSys      Allens Test     ISTAT PROCEDURE    Collection Time: 07/17/24 12:07 AM   Result Value Ref Range    POC PH 7.393 7.35 - 7.45    POC PCO2 57.0 (H) 35 - 45 mmHg    POC PO2 33 (LL) 40 - 60 mmHg    POC HCO3 34.7 (H) 24 - 28 mmol/L    POC BE 10 (H) -2 to 2 mmol/L    POC SATURATED O2 62 95 - 100 %    POC TCO2 36 (H) 24 - 29 mmol/L    Sample VENOUS     FiO2 21    COVID/FLU A&B PCR    Collection Time: 07/17/24 12:23 AM   Result Value Ref Range    Influenza A PCR Not Detected Not Detected    Influenza B PCR Not Detected Not Detected    SARS-CoV-2 PCR Not Detected Not Detected, Negative   Comprehensive metabolic panel    Collection Time: 07/17/24 12:31 AM   Result Value Ref Range    Sodium 136 136 - 145 mmol/L    Potassium 3.7 3.5 - 5.1 mmol/L    Chloride 95 (L) 98 - 107 mmol/L    CO2 27  22 - 29 mmol/L    Glucose 426 (H) 74 - 100 mg/dL    Blood Urea Nitrogen 18.8 9.8 - 20.1 mg/dL    Creatinine 1.46 (H) 0.55 - 1.02 mg/dL    Calcium 9.6 8.4 - 10.2 mg/dL    Protein Total 9.5 (H) 6.4 - 8.3 gm/dL    Albumin 2.9 (L) 3.5 - 5.0 g/dL    Globulin 6.6 (H) 2.4 - 3.5 gm/dL    Albumin/Globulin Ratio 0.4 (L) 1.1 - 2.0 ratio    Bilirubin Total 0.6 <=1.5 mg/dL     (H) 40 - 150 unit/L    ALT 63 (H) 0 - 55 unit/L    AST 40 (H) 5 - 34 unit/L    eGFR 42 mL/min/1.73/m2    Anion Gap 14.0 mEq/L    BUN/Creatinine Ratio 13    Lipase    Collection Time: 07/17/24 12:31 AM   Result Value Ref Range    Lipase Level 29 <=60 U/L   Beta-Hydroxybutyrate, Serum    Collection Time: 07/17/24 12:31 AM   Result Value Ref Range    Beta Hydroxybutyrate 0.20 <=0.30 mmol/L   Magnesium    Collection Time: 07/17/24 12:31 AM   Result Value Ref Range    Magnesium Level 1.50 (L) 1.60 - 2.60 mg/dL   CBC with Differential    Collection Time: 07/17/24 12:31 AM   Result Value Ref Range    WBC 11.76 (H) 4.50 - 11.50 x10(3)/mcL    RBC 3.65 (L) 4.20 - 5.40 x10(6)/mcL    Hgb 10.9 (L) 12.0 - 16.0 g/dL    Hct 33.3 (L) 37.0 - 47.0 %    MCV 91.2 80.0 - 94.0 fL    MCH 29.9 27.0 - 31.0 pg    MCHC 32.7 (L) 33.0 - 36.0 g/dL    RDW 13.6 11.5 - 17.0 %    Platelet 501 (H) 130 - 400 x10(3)/mcL    MPV 10.5 (H) 7.4 - 10.4 fL    Neut % 65.4 %    Lymph % 23.8 %    Mono % 7.5 %    Eos % 2.3 %    Basophil % 0.6 %    Lymph # 2.80 0.6 - 4.6 x10(3)/mcL    Neut # 7.69 2.1 - 9.2 x10(3)/mcL    Mono # 0.88 0.1 - 1.3 x10(3)/mcL    Eos # 0.27 0 - 0.9 x10(3)/mcL    Baso # 0.07 <=0.2 x10(3)/mcL    IG# 0.05 (H) 0 - 0.04 x10(3)/mcL    IG% 0.4 %    NRBC% 0.0 %   POCT glucose    Collection Time: 07/17/24  2:20 AM   Result Value Ref Range    POCT Glucose 403 (H) 70 - 110 mg/dL   Urinalysis    Collection Time: 07/17/24  2:27 AM   Result Value Ref Range    Color, UA Yellow Yellow, Light-Yellow, Dark Yellow, [place], Straw    Appearance, UA Cloudy (A) Clear    Specific [place], UA 1.010  1.005 - 1.030    pH, UA 6.0 5.0 - 8.5    Protein,  (A) Negative    Glucose, UA >=1000 (A) Negative, Normal    Ketones, UA Negative Negative    Blood, UA Large (A) Negative    Bilirubin, UA Negative Negative    Urobilinogen, UA 0.2 0.2, 1.0, Normal    Nitrites, UA Negative Negative    Leukocyte Esterase, UA Moderate (A) Negative   Urinalysis, Microscopic    Collection Time: 07/17/24  2:27 AM   Result Value Ref Range    Bacteria, UA Many (A) None Seen, Rare, Occasional /HPF    RBC, UA 6-10 (A) None Seen, 0-2, 3-5, 0-5 /HPF    WBC, UA 21-50 (A) None Seen, 0-2, 3-5, 0-5 /HPF    Squamous Epithelial Cells, UA Few (A) None Seen, Rare, Occasional, Occ /HPF   POCT glucose    Collection Time: 07/17/24  3:32 AM   Result Value Ref Range    POCT Glucose 342 (H) 70 - 110 mg/dL   Lactic acid, plasma    Collection Time: 07/17/24  5:17 AM   Result Value Ref Range    Lactic Acid Level 1.6 0.5 - 2.2 mmol/L   POCT glucose    Collection Time: 07/17/24 12:58 PM   Result Value Ref Range    POCT Glucose 327 (H) 70 - 110 mg/dL     __________________________________________________________________________  INPATIENT LIST OF MEDICATIONS     Scheduled Meds:   aluminum-magnesium hydroxide-simethicone  30 mL Oral QID (AC & HS)    amLODIPine  10 mg Oral Daily    carvediloL  12.5 mg Oral BID    [START ON 7/18/2024] cloNIDine 0.2 mg/24 hr td ptwk  1 patch Transdermal Q7 Days    enoxparin  40 mg Subcutaneous Daily    magnesium sulfate IVPB  2 g Intravenous Once    metoclopramide  5 mg Intravenous Q6H    mupirocin   Nasal BID    pantoprazole  40 mg Intravenous Daily    piperacillin-tazobactam (Zosyn) IV (PEDS and ADULTS) (extended infusion is not appropriate)  4.5 g Intravenous Q8H               ASSESSMENT & PLAN:     Nausea and vomiting  Gastritis  Recent I&D of perineal abscess  HTN  DM 2 A1c 11  Hx of asthma  Gen weakness  Low mag      Plan  Order IVF, Protonix  Add reglan  Trial of clears  Treat elevated bp  Start ISS  Check A1C  Consult  wound care  Consult Gen surgery to review CT abd/pelvis findings  Replace mag  Resume home norvasc and coreg  Switch clonidine to patch  Hold home hydralazine for now  Hold home dm meds for now, to monitor cbgs and oral intake improves    Ernesto Kennedy MD   07/17/2024

## 2024-07-17 NOTE — ED PROVIDER NOTES
Encounter Date: 7/16/2024       History     Chief Complaint   Patient presents with    Nausea    Vomiting     58-year-old female history of hypertension, diabetes was in her usual state of health until 3 days ago when she began to some nausea and vomiting.  Yesterday she started to have diffuse abdominal described as a 10 out 10 constant ache.  Patient has continued to vomit everyday for the last 4 days.  She says it is too numerous to count.  Denies blood coffee-ground emesis and diarrhea.  Last bowel today.  No melena.  She has been trying to take her diabetes and blood pressure meds however she has not able to keep them down.  No travel or sick contacts    The history is provided by the patient.     Review of patient's allergies indicates:  No Known Allergies  Past Medical History:   Diagnosis Date    Asthma     Diabetes mellitus     Hypertension      Past Surgical History:   Procedure Laterality Date    INCISION AND DRAINAGE OF ABSCESS N/A 6/14/2024    Procedure: Incision and Drainage;  Surgeon: Papa Cesar Jr., MD;  Location: [facility];  Service: General;  Laterality: N/A;  DEBRIDEMENT SOFT TISSUE - PERINEUM    INCISION AND DRAINAGE, LOWER EXTREMITY Left 6/18/2024    Procedure: Incision and Drainage;  Surgeon: Arnaud Vital MD;  Location: [facility] OR;  Service: General;  Laterality: Left;  LEFT GROIN    WOUND DEBRIDEMENT Left 6/21/2024    Procedure: DEBRIDEMENT, WOUND;  Surgeon: Arnaud Vital MD;  Location: [facility] OR;  Service: General;  Laterality: Left;  left groin, lithotomy     No family history on file.  Social History     Tobacco Use    Smoking status: Never    Smokeless tobacco: Never   Substance Use Topics    Alcohol use: Never    Drug use: Never     Review of Systems   Constitutional:  Negative for chills, diaphoresis, fatigue and fever.   HENT:  Negative for congestion, drooling, ear discharge, ear pain, postnasal drip, rhinorrhea, sinus pressure, sinus pain, sore throat and tinnitus.    Eyes:   Negative for discharge.   Respiratory:  Negative for cough, chest tightness, shortness of breath and wheezing.    Cardiovascular:  Negative for chest pain and palpitations.   Gastrointestinal:  Negative for abdominal pain, diarrhea, nausea and vomiting.   Genitourinary:  Negative for frequency, hematuria and urgency.   Musculoskeletal:  Negative for back pain, neck pain and neck stiffness.   Skin:  Negative for rash.   Neurological:  Negative for syncope, weakness, light-headedness, numbness and headaches.   Psychiatric/Behavioral:  Negative for suicidal ideas.        Physical Exam     Initial Vitals [07/16/24 2236]   BP Pulse Resp Temp SpO2   (!) 208/103 94 18 98.4 °F (36.9 °C) 95 %      MAP       --         Physical Exam    Nursing note and vitals reviewed.  Constitutional: She appears well-developed and well-nourished. No distress.   Eyes: Conjunctivae are normal.   Cardiovascular:  Normal rate.           Pulmonary/Chest: Breath sounds normal. No respiratory distress.   Abdominal: Abdomen is soft. She exhibits no distension. There is abdominal tenderness (Diffuse). There is no guarding.   Musculoskeletal:         General: No edema. Normal range of motion.     Neurological: She is alert and oriented to person, place, and time. She has normal strength. No sensory deficit.   Skin: Skin is warm. No pallor.         ED Course   Procedures  Labs Reviewed   COMPREHENSIVE METABOLIC PANEL - Abnormal; Notable for the following components:       Result Value    Chloride 95 (*)     Glucose 426 (*)     Creatinine 1.46 (*)     Protein Total 9.5 (*)     Albumin 2.9 (*)     Globulin 6.6 (*)     Albumin/Globulin Ratio 0.4 (*)      (*)     ALT 63 (*)     AST 40 (*)     All other components within normal limits   MAGNESIUM - Abnormal; Notable for the following components:    Magnesium Level 1.50 (*)     All other components within normal limits   URINALYSIS - Abnormal; Notable for the following components:    Appearance, UA  Cloudy (*)     Protein,  (*)     Glucose, UA >=1000 (*)     Blood, UA Large (*)     Leukocyte Esterase, UA Moderate (*)     All other components within normal limits   CBC WITH DIFFERENTIAL - Abnormal; Notable for the following components:    WBC 11.76 (*)     RBC 3.65 (*)     Hgb 10.9 (*)     Hct 33.3 (*)     MCHC 32.7 (*)     Platelet 501 (*)     MPV 10.5 (*)     IG# 0.05 (*)     All other components within normal limits   URINALYSIS, MICROSCOPIC - Abnormal; Notable for the following components:    Bacteria, UA Many (*)     RBC, UA 6-10 (*)     WBC, UA 21-50 (*)     Squamous Epithelial Cells, UA Few (*)     All other components within normal limits   ISTAT PROCEDURE - Abnormal; Notable for the following components:    POC PCO2 57.0 (*)     POC PO2 33 (*)     POC HCO3 34.7 (*)     POC BE 10 (*)     POC TCO2 36 (*)     All other components within normal limits   POCT GLUCOSE - Abnormal; Notable for the following components:    POCT Glucose 403 (*)     All other components within normal limits   POCT GLUCOSE - Abnormal; Notable for the following components:    POCT Glucose 342 (*)     All other components within normal limits   LIPASE - Normal   BETA - HYDROXYBUTYRATE, SERUM - Normal   COVID/FLU A&B PCR - Normal    Narrative:     The Xpert Xpress SARS-CoV-2/FLU/RSV plus is a rapid, multiplexed real-time PCR test intended for the simultaneous qualitative detection and differentiation of SARS-CoV-2, Influenza A, Influenza B, and respiratory syncytial virus (RSV) viral RNA in either nasopharyngeal swab or nasal swab specimens.         LACTIC ACID, PLASMA - Normal   CULTURE, URINE   BLOOD CULTURE OLG   BLOOD CULTURE OLG   CBC W/ AUTO DIFFERENTIAL    Narrative:     The following orders were created for panel order CBC auto differential.  Procedure                               Abnormality         Status                     ---------                               -----------         ------                     CBC  with Differential[[id number]]       Abnormal            Final result                 Please view results for these tests on the individual orders.          Imaging Results              CT Abdomen Pelvis With IV Contrast NO Oral Contrast (Preliminary result)  Result time 07/17/24 02:33:44      Preliminary result by Jf Cancino Jr., MD (07/17/24 02:33:44)                   Narrative:    START OF REPORT:  Technique: CT of the abdomen and pelvis was performed with axial images as well as sagittal and coronal reconstruction images with intravenous contrast.    Comparison: Comparison is with CT pelvis study dated 2024-06-13 10:19:34.    Clinical History: Pt having n/v x 4 days unable to take bp medication now having hypertension.    Dosage Information: Automated Exposure Control was utilized.    Findings:  Thorax:  Lungs: There is mild nonspecific dependent change at the lung bases.  Pleura: No effusions or thickening are seen.  Heart: The heart size is within normal limits.  Abdomen:  Abdominal Wall: No abdominal wall pathology is seen.  Liver: The liver appears unremarkable.  Biliary System: No intrahepatic or extrahepatic biliary duct dilatation is seen.  Gallbladder: Surgical clips are seen in the gallbladder fossa consistent with prior cholecystectomy.  Pancreas: The pancreas appears unremarkable.  Spleen: The spleen appears unremarkable.  Adrenals: The adrenal glands appear unremarkable.  Kidneys: There is a 3 mm nonobstructing right renal calculus in the mid pole (series 2, image 23). Subcentimeter hypodensity in the lower pole of the right kidney, which is too small to further characterize. There is a 1.5 cm left renal parapelvic cyst in the mid pole.  Aorta: There is mild calcification of the abdominal aorta and its branches.  IVC: Unremarkable.  Bowel:  Esophagus: The visualized esophagus appears unremarkable.  Stomach: The gastric antral wall seems thick raising concern for gastritis.  Duodenum:  Unremarkable appearing duodenum.  Small Bowel: The small bowel appears unremarkable.  Colon: Nondistended.  Appendix: The appendix appears unremarkable.  Peritoneum: No intraperitoneal free air or ascites is seen.    Pelvis:  Bladder: The bladder appears unremarkable.  Female:  Uterus: The uterus appears unremarkable.  Ovaries: No adnexal masses are seen.    Bony structures:  Dorsal Spine: There is mild spondylosis of the visualized dorsal spine.  Bony Pelvis: The visualized bony structures of the pelvis appear unremarkable.    Miscellaneous: There is an incompletely imaged 1.1 x 3 x 3 (AP x T x CC) ill-defined peripherally enhancing fluid collection with gas locules in the left perineum (series 2, image 85). There is surrounding fat stranding. This is of concern for cellulitis with an abscess. The prior study demonstrates extensive subcutaneous fat stranding in the left perineum, medial gluteal cleft with a 3.6 x 2.9 cm ill-defined subcutaneous soft tissue density in the left perianal region.      Impression:  1. There is an incompletely imaged 1.1 x 3 x 3 (AP x T x CC) ill-defined peripherally enhancing fluid collection with gas locules in the left perineum (series 2, image 85). There is surrounding fat stranding. This is of concern for cellulitis with an abscess. The prior study demonstrates extensive subcutaneous fat stranding in the left perineum, medial gluteal cleft with a 3.6 x 2.9 cm ill-defined subcutaneous soft tissue density in the left perianal region. Correlate clinically as regards further evaluation and followup with CT pelvis.  2. The gastric antral wall seems thick raising concern for gastritis.  3. Details and other findings as discussed above.                                         Medications   hydrALAZINE injection 10 mg (has no administration in time range)   piperacillin-tazobactam (ZOSYN) 4.5 g in D5W 100 mL IVPB (MB+) (0 g Intravenous Stopped 7/17/24 7984)   vancomycin 1,250 mg in D5W 250  mL IVPB (Vial-Mate) (1,250 mg Intravenous New Bag 7/17/24 0600)     And   vancomycin 1,250 mg in D5W 250 mL IVPB (Vial-Mate) (has no administration in time range)   sodium chloride 0.9% bolus 1,000 mL 1,000 mL (0 mLs Intravenous Stopped 7/17/24 0114)   hydrALAZINE injection 10 mg (10 mg Intravenous Given 7/17/24 0013)   morphine injection 4 mg (4 mg Intravenous Given 7/17/24 0013)   ondansetron injection 4 mg (4 mg Intravenous Given 7/17/24 0013)   iohexoL (OMNIPAQUE 350) injection 99 mL (99 mLs Intravenous Given 7/17/24 0137)   magnesium sulfate in dextrose IVPB (premix) 1 g (0 g Intravenous Stopped 7/17/24 0347)   insulin regular injection 10 Units 0.1 mL (10 Units Intravenous Given 7/17/24 0247)   prochlorperazine injection Soln 5 mg (5 mg Intravenous Given 7/17/24 0441)   lactated ringers bolus 1,848 mL (1,848 mLs Intravenous New Bag 7/17/24 0505)     Medical Decision Making  Medical Decision Making  Problem list/ differential diagnosis including but not limited to:  DKA, ACS, intracranial hem, ICH, meningitis, sepsis, appendicitis, bowel obstruction/ ileus, cholecystitis/ biliary colic, cns tumor, electrolyte disturbance, volvulus, medication tox/ side effect, pancreatitis, gastroenteritis, cannabinoid hyperemesis, gastroparesis, hepatitis, withdrawal, pud, uti, renal colic,     Patient's chronic illnesses impacting care:  Diabetes and hypertension    Diagnostic test considered but not ordered:    My interpretations:      Radiology reports      Discussion of case with external qualified healthcare professionals:  Not applicable    Review of external notes( inpt, ems, NH, clinic):      Decision rules/scores:    Medications reviewed:  Medications ordered in the ER:  Zofran, IV fluids, insulin, Compazine  Discharge prescriptions:    Social variables possible impacting patient's healthcare:    Code status/discussion    Shared decision making:    Consideration for admission versus discharge:  Admission    Amount  and/or Complexity of Data Reviewed  Labs: ordered. Decision-making details documented in ED Course.  Radiology: ordered.    Risk  OTC drugs.  Prescription drug management.               ED Course as of 07/17/24 0615   Wed Jul 17, 2024   0210 Hemoglobin(!): 10.9 [WC]   0211 Hematocrit(!): 33.3 [WC]   0211 WBC(!): 11.76 [WC]   0211 Beta-Hydroxybutyrate: 0.20 [WC]   0212 SARS-CoV2 (COVID-19) Qualitative PCR: Not Detected [WC]   0213 POC PH: 7.393 [WC]   0214 Glucose(!): 426 [WC]   0214 Creatinine(!): 1.46 [WC]   0214 ALP(!): 551 [WC]   0214 ALT(!): 63 [WC]   0214 AST(!): 40 [WC]   0221 Repeat fingerstick is 403.  We will give 8-10 units of insulin [WC]   0335 Repeat fingerstick 342 [WC]   0422 Patient began to have vomiting again.  We will give her Compazine.  UA positive for urinary tract infection [WC]      ED Course User Index  [WC] Ez Rangel MD                           Clinical Impression:  Final diagnoses:  [R11.2] Nausea and vomiting, unspecified vomiting type (Primary)  [N39.0, R31.9] Urinary tract infection with hematuria, site unspecified  [R73.9] Hyperglycemia  [Z98.890] Status post incision and drainage  [I10] Uncontrolled hypertension  [R74.01] Transaminitis          ED Disposition Condition    Admit Stable                Ez Rangel MD  07/17/24 0615

## 2024-07-18 LAB
ANION GAP SERPL CALC-SCNC: 11 MEQ/L
BUN SERPL-MCNC: 16.6 MG/DL (ref 9.8–20.1)
CALCIUM SERPL-MCNC: 9 MG/DL (ref 8.4–10.2)
CHLORIDE SERPL-SCNC: 97 MMOL/L (ref 98–107)
CO2 SERPL-SCNC: 28 MMOL/L (ref 22–29)
CREAT SERPL-MCNC: 1.57 MG/DL (ref 0.55–1.02)
CREAT/UREA NIT SERPL: 11
ERYTHROCYTE [DISTWIDTH] IN BLOOD BY AUTOMATED COUNT: 13.7 % (ref 11.5–17)
EST. AVERAGE GLUCOSE BLD GHB EST-MCNC: 168.6 MG/DL
GFR SERPLBLD CREATININE-BSD FMLA CKD-EPI: 38 ML/MIN/1.73/M2
GLUCOSE SERPL-MCNC: 392 MG/DL (ref 74–100)
HBA1C MFR BLD: 7.5 %
HCT VFR BLD AUTO: 30.5 % (ref 37–47)
HGB BLD-MCNC: 9.8 G/DL (ref 12–16)
MAGNESIUM SERPL-MCNC: 1.8 MG/DL (ref 1.6–2.6)
MCH RBC QN AUTO: 29.3 PG (ref 27–31)
MCHC RBC AUTO-ENTMCNC: 32.1 G/DL (ref 33–36)
MCV RBC AUTO: 91.3 FL (ref 80–94)
NRBC BLD AUTO-RTO: 0 %
PLATELET # BLD AUTO: 401 X10(3)/MCL (ref 130–400)
PMV BLD AUTO: 10.4 FL (ref 7.4–10.4)
POCT GLUCOSE: 277 MG/DL (ref 70–110)
POCT GLUCOSE: 481 MG/DL (ref 70–110)
POTASSIUM SERPL-SCNC: 3.6 MMOL/L (ref 3.5–5.1)
RBC # BLD AUTO: 3.34 X10(6)/MCL (ref 4.2–5.4)
SODIUM SERPL-SCNC: 136 MMOL/L (ref 136–145)
VANCOMYCIN SERPL-MCNC: 20.7 UG/ML (ref 15–20)
WBC # BLD AUTO: 8.39 X10(3)/MCL (ref 4.5–11.5)

## 2024-07-18 PROCEDURE — 36415 COLL VENOUS BLD VENIPUNCTURE: CPT | Performed by: INTERNAL MEDICINE

## 2024-07-18 PROCEDURE — 63600175 PHARM REV CODE 636 W HCPCS: Performed by: NURSE PRACTITIONER

## 2024-07-18 PROCEDURE — 83735 ASSAY OF MAGNESIUM: CPT | Performed by: INTERNAL MEDICINE

## 2024-07-18 PROCEDURE — 25000003 PHARM REV CODE 250: Performed by: INTERNAL MEDICINE

## 2024-07-18 PROCEDURE — 83036 HEMOGLOBIN GLYCOSYLATED A1C: CPT | Performed by: INTERNAL MEDICINE

## 2024-07-18 PROCEDURE — 85027 COMPLETE CBC AUTOMATED: CPT | Performed by: INTERNAL MEDICINE

## 2024-07-18 PROCEDURE — 99222 1ST HOSP IP/OBS MODERATE 55: CPT | Mod: ,,, | Performed by: SURGERY

## 2024-07-18 PROCEDURE — 80048 BASIC METABOLIC PNL TOTAL CA: CPT | Performed by: INTERNAL MEDICINE

## 2024-07-18 PROCEDURE — 11000001 HC ACUTE MED/SURG PRIVATE ROOM

## 2024-07-18 PROCEDURE — 25000003 PHARM REV CODE 250: Performed by: NURSE PRACTITIONER

## 2024-07-18 PROCEDURE — 80202 ASSAY OF VANCOMYCIN: CPT | Performed by: NURSE PRACTITIONER

## 2024-07-18 PROCEDURE — 63600175 PHARM REV CODE 636 W HCPCS: Performed by: INTERNAL MEDICINE

## 2024-07-18 PROCEDURE — 36415 COLL VENOUS BLD VENIPUNCTURE: CPT | Performed by: NURSE PRACTITIONER

## 2024-07-18 RX ORDER — GLUCAGON 1 MG
1 KIT INJECTION
Status: DISCONTINUED | OUTPATIENT
Start: 2024-07-18 | End: 2024-07-21 | Stop reason: HOSPADM

## 2024-07-18 RX ORDER — IBUPROFEN 200 MG
16 TABLET ORAL
Status: DISCONTINUED | OUTPATIENT
Start: 2024-07-18 | End: 2024-07-21 | Stop reason: HOSPADM

## 2024-07-18 RX ORDER — ACETAMINOPHEN 325 MG/1
650 TABLET ORAL EVERY 4 HOURS PRN
Status: DISCONTINUED | OUTPATIENT
Start: 2024-07-18 | End: 2024-07-21 | Stop reason: HOSPADM

## 2024-07-18 RX ORDER — DOXYCYCLINE HYCLATE 100 MG
100 TABLET ORAL EVERY 12 HOURS
Status: DISCONTINUED | OUTPATIENT
Start: 2024-07-18 | End: 2024-07-19

## 2024-07-18 RX ORDER — ACETAMINOPHEN 500 MG
1000 TABLET ORAL EVERY 6 HOURS PRN
Status: DISCONTINUED | OUTPATIENT
Start: 2024-07-18 | End: 2024-07-21 | Stop reason: HOSPADM

## 2024-07-18 RX ORDER — SODIUM CHLORIDE 0.9 % (FLUSH) 0.9 %
10 SYRINGE (ML) INJECTION
Status: DISCONTINUED | OUTPATIENT
Start: 2024-07-18 | End: 2024-07-21 | Stop reason: HOSPADM

## 2024-07-18 RX ORDER — IBUPROFEN 200 MG
24 TABLET ORAL
Status: DISCONTINUED | OUTPATIENT
Start: 2024-07-18 | End: 2024-07-21 | Stop reason: HOSPADM

## 2024-07-18 RX ORDER — ONDANSETRON HYDROCHLORIDE 2 MG/ML
4 INJECTION, SOLUTION INTRAVENOUS EVERY 4 HOURS PRN
Status: DISCONTINUED | OUTPATIENT
Start: 2024-07-18 | End: 2024-07-21 | Stop reason: HOSPADM

## 2024-07-18 RX ADMIN — PIPERACILLIN AND TAZOBACTAM 4.5 G: 4; .5 INJECTION, POWDER, LYOPHILIZED, FOR SOLUTION INTRAVENOUS; PARENTERAL at 11:07

## 2024-07-18 RX ADMIN — MUPIROCIN: 20 OINTMENT TOPICAL at 07:07

## 2024-07-18 RX ADMIN — CARVEDILOL 12.5 MG: 12.5 TABLET, FILM COATED ORAL at 08:07

## 2024-07-18 RX ADMIN — ENOXAPARIN SODIUM 40 MG: 40 INJECTION SUBCUTANEOUS at 05:07

## 2024-07-18 RX ADMIN — CARVEDILOL 12.5 MG: 12.5 TABLET, FILM COATED ORAL at 07:07

## 2024-07-18 RX ADMIN — INSULIN ASPART 10 UNITS: 100 INJECTION, SOLUTION INTRAVENOUS; SUBCUTANEOUS at 12:07

## 2024-07-18 RX ADMIN — CLONIDINE 1 PATCH: 0.2 PATCH TRANSDERMAL at 08:07

## 2024-07-18 RX ADMIN — PIPERACILLIN AND TAZOBACTAM 4.5 G: 4; .5 INJECTION, POWDER, LYOPHILIZED, FOR SOLUTION INTRAVENOUS; PARENTERAL at 08:07

## 2024-07-18 RX ADMIN — ALUMINA, MAGNESIA, AND SIMETHICONE ORAL SUSPENSION REGULAR STRENGTH 30 ML: 1200; 1200; 120 SUSPENSION ORAL at 05:07

## 2024-07-18 RX ADMIN — MUPIROCIN: 20 OINTMENT TOPICAL at 08:07

## 2024-07-18 RX ADMIN — PANTOPRAZOLE SODIUM 40 MG: 40 INJECTION, POWDER, LYOPHILIZED, FOR SOLUTION INTRAVENOUS at 08:07

## 2024-07-18 RX ADMIN — ALUMINA, MAGNESIA, AND SIMETHICONE ORAL SUSPENSION REGULAR STRENGTH 30 ML: 1200; 1200; 120 SUSPENSION ORAL at 12:07

## 2024-07-18 RX ADMIN — VANCOMYCIN HYDROCHLORIDE 2000 MG: 500 INJECTION, POWDER, LYOPHILIZED, FOR SOLUTION INTRAVENOUS at 12:07

## 2024-07-18 RX ADMIN — DOXYCYCLINE HYCLATE 100 MG: 100 TABLET, COATED ORAL at 07:07

## 2024-07-18 RX ADMIN — METOCLOPRAMIDE HYDROCHLORIDE 5 MG: 5 INJECTION INTRAMUSCULAR; INTRAVENOUS at 12:07

## 2024-07-18 RX ADMIN — AMLODIPINE BESYLATE 10 MG: 5 TABLET ORAL at 08:07

## 2024-07-18 RX ADMIN — PIPERACILLIN AND TAZOBACTAM 4.5 G: 4; .5 INJECTION, POWDER, LYOPHILIZED, FOR SOLUTION INTRAVENOUS; PARENTERAL at 05:07

## 2024-07-18 RX ADMIN — METOCLOPRAMIDE HYDROCHLORIDE 5 MG: 5 INJECTION INTRAMUSCULAR; INTRAVENOUS at 05:07

## 2024-07-18 RX ADMIN — ALUMINA, MAGNESIA, AND SIMETHICONE ORAL SUSPENSION REGULAR STRENGTH 30 ML: 1200; 1200; 120 SUSPENSION ORAL at 11:07

## 2024-07-18 RX ADMIN — INSULIN ASPART 10 UNITS: 100 INJECTION, SOLUTION INTRAVENOUS; SUBCUTANEOUS at 07:07

## 2024-07-18 RX ADMIN — ALUMINA, MAGNESIA, AND SIMETHICONE ORAL SUSPENSION REGULAR STRENGTH 30 ML: 1200; 1200; 120 SUSPENSION ORAL at 07:07

## 2024-07-18 RX ADMIN — METOCLOPRAMIDE HYDROCHLORIDE 5 MG: 5 INJECTION INTRAMUSCULAR; INTRAVENOUS at 11:07

## 2024-07-18 RX ADMIN — METOCLOPRAMIDE HYDROCHLORIDE 5 MG: 5 INJECTION INTRAMUSCULAR; INTRAVENOUS at 07:07

## 2024-07-18 NOTE — CONSULTS
Acute Care Surgery   History and Physical    Patient Name: Greer Kahn  YOB: 1965  Date: 07/18/2024 6:16 PM  Date of Admission: 7/16/2024  HD#1  POD#* No surgery found *    PRESENTING HISTORY   Chief Complaint/Reason for Admission: Nausea and vomiting    History of Present Illness:  Greer Kahn is a 58 y.o. female with a history of Uncontrolled T@DM, and HTN presenting with nausea and vomiting. General surgery was consulted for evaluation of left perineal wound.    Ms. Kahn is a prior patient of our service and recently followed up in our clinic for a wound check of the same perineal wound on 7/16. Her left perineal wound is s/p multiple round of I&D and is currently being managed with dry gauze and healing by secondary intent.    Review of Systems:  12 point ROS negative except as stated in HPI    PAST HISTORY:   Past medical history:  Past Medical History:   Diagnosis Date    Asthma     Diabetes mellitus     Hypertension        Past surgical history:  Past Surgical History:   Procedure Laterality Date    GALLBLADDER SURGERY      INCISION AND DRAINAGE OF ABSCESS N/A 06/14/2024    Procedure: Incision and Drainage;  Surgeon: Papa Cesar Jr., MD;  Location: [facility] OR;  Service: General;  Laterality: N/A;  DEBRIDEMENT SOFT TISSUE - PERINEUM    INCISION AND DRAINAGE, LOWER EXTREMITY Left 06/18/2024    Procedure: Incision and Drainage;  Surgeon: Arnaud Vital MD;  Location: [facility] OR;  Service: General;  Laterality: Left;  LEFT GROIN    WOUND DEBRIDEMENT Left 06/21/2024    Procedure: DEBRIDEMENT, WOUND;  Surgeon: Arnaud Vital MD;  Location: [facility] OR;  Service: General;  Laterality: Left;  left groin, lithotomy       Family history:  Family History   Problem Relation Name Age of Onset    Diabetes Mother         Social history:  Social History     Socioeconomic History    Marital status: Single   Tobacco Use    Smoking status: Never    Smokeless tobacco: Never   Substance and  Sexual Activity    Alcohol use: Never    Drug use: Never    Sexual activity: Not Currently     Social Determinants of Health     Financial Resource Strain: High Risk (6/14/2024)    Overall Financial Resource Strain (CARDIA)     Difficulty of Paying Living Expenses: Hard   Food Insecurity: Food Insecurity Present (6/14/2024)    Hunger Vital Sign     Worried About Running Out of Food in the Last Year: Often true     Ran Out of Food in the Last Year: Never true   Transportation Needs: No Transportation Needs (6/14/2024)    TRANSPORTATION NEEDS     Transportation : No   Physical Activity: Unknown (6/14/2024)    Exercise Vital Sign     Days of Exercise per Week: 0 days   Stress: Stress Concern Present (6/14/2024)    [employer] [employer] - Occupational Stress Questionnaire     Feeling of Stress : To some extent   Housing Stability: High Risk (6/14/2024)    Housing Stability Vital Sign     Unable to Pay for Housing in the Last Year: Yes     Homeless in the Last Year: No     Social History     Tobacco Use   Smoking Status Never   Smokeless Tobacco Never      Social History     Substance and Sexual Activity   Alcohol Use Never        MEDICATIONS & ALLERGIES:     No current facility-administered medications on file prior to encounter.     Current Outpatient Medications on File Prior to Encounter   Medication Sig    albuterol (PROVENTIL/VENTOLIN HFA) 90 mcg/actuation inhaler Inhale 2 puffs into the lungs every 4 to 6 hours as needed for Shortness of Breath.    amLODIPine (NORVASC) 10 MG tablet Take 1 tablet (10 mg total) by mouth once daily.    carvediloL (COREG) 6.25 MG tablet Take 2 tablets (12.5 mg total) by mouth 2 (two) times daily.    cloNIDine (CATAPRES) 0.2 MG tablet Take 1 tablet (0.2 mg total) by mouth 3 (three) times daily.    famotidine (PEPCID) 20 MG tablet Take 1 tablet (20 mg total) by mouth 2 (two) times daily.    glimepiride (AMARYL) 4 MG tablet Take 1 tablet (4 mg total) by mouth daily  with breakfast.    hydrALAZINE (APRESOLINE) 25 MG tablet Take 4 tablets (100 mg total) by mouth every 8 (eight) hours.    insulin glargine (LANTUS) 100 unit/mL injection Inject 50 Units into the skin every evening.    meclizine (ANTIVERT) 25 mg tablet Take 1 tablet (25 mg total) by mouth 3 (three) times daily. for 10 days    pioglitazone (ACTOS) 30 MG tablet Take 1 tablet (30 mg total) by mouth once daily.    traMADoL (ULTRAM) 50 mg tablet Take 1 tablet (50 mg total) by mouth every 8 (eight) hours as needed for Pain.       Allergies: Review of patient's allergies indicates:  No Known Allergies    Scheduled Meds:   aluminum-magnesium hydroxide-simethicone  30 mL Oral QID (AC & HS)    amLODIPine  10 mg Oral Daily    carvediloL  12.5 mg Oral BID    cloNIDine 0.2 mg/24 hr td ptwk  1 patch Transdermal Q7 Days    enoxparin  40 mg Subcutaneous Daily    metoclopramide  5 mg Intravenous Q6H    mupirocin   Nasal BID    pantoprazole  40 mg Intravenous Daily    piperacillin-tazobactam (Zosyn) IV (PEDS and ADULTS) (extended infusion is not appropriate)  4.5 g Intravenous Q8H       Continuous Infusions:   0.9% NaCl   Intravenous Continuous 100 mL/hr at 07/17/24 2312 New Bag at 07/17/24 2312       PRN Meds:  Current Facility-Administered Medications:     acetaminophen, 1,000 mg, Oral, Q6H PRN    acetaminophen, 650 mg, Oral, Q4H PRN    albuterol, 2 puff, Inhalation, Q4H PRN    dextrose 10%, 12.5 g, Intravenous, PRN    dextrose 10%, 25 g, Intravenous, PRN    glucagon (human recombinant), 1 mg, Intramuscular, PRN    glucose, 16 g, Oral, PRN    glucose, 24 g, Oral, PRN    insulin aspart U-100, 0-10 Units, Subcutaneous, QID (AC + HS) PRN    labetalol, 10 mg, Intravenous, Q4H PRN    magnesium oxide, 800 mg, Oral, PRN    naloxone, 0.02 mg, Intravenous, PRN    ondansetron, 4 mg, Intravenous, Q4H PRN    potassium bicarbonate, 50 mEq, Oral, PRN    prochlorperazine, 5 mg, Intravenous, Q6H PRN    simethicone, 1 tablet, Oral, QID PRN    sodium  "chloride 0.9%, 10 mL, Intravenous, Q12H PRN    sodium chloride 0.9%, 10 mL, Intravenous, PRN    vancomycin - pharmacy to dose, , Intravenous, pharmacy to manage frequency    OBJECTIVE:   Vital Signs:  VITAL SIGNS: 24 HR MIN & MAX LAST   Temp  Min: 98.1 °F (36.7 °C)  Max: 99.8 °F (37.7 °C)  98.1 °F (36.7 °C)   BP  Min: 115/71  Max: 169/95  (!) 146/75    Pulse  Min: 78  Max: 81  78    Resp  Min: 18  Max: 20  20    SpO2  Min: 91 %  Max: 99 %  (!) 91 %      HT: 5' 7" (170.2 cm)  WT: 129.7 kg (286 lb)  BMI: 44.8     Intake/output:  Intake/Output - Last 3 Shifts         07/16 0700 07/17 0659 07/17 0700 07/18 0659 07/18 0700 07/19 0659    IV Piggyback  150.4     Total Intake(mL/kg)  150.4 (1.2)     Urine (mL/kg/hr)  500 (0.2)     Total Output  500     Net  -349.6                    Intake/Output Summary (Last 24 hours) at 7/18/2024 1816  Last data filed at 7/18/2024 0614  Gross per 24 hour   Intake 150.42 ml   Output 500 ml   Net -349.58 ml         Physical Exam:  General: Well developed, well nourished, no acute distress  HEENT: Normocephalic, atraumatic, PERRL  CV: RR  Resp: NWOB  GI:  Abdomen soft, non-tender, non-distended, no guarding, no rebound, normoactive bowel sounds, no masses  :  Deferred  MSK: No muscle atrophy, cyanosis, peripheral edema, moving all extremities spontaneously  Skin/wounds:  left perivulvular wound spanning roughly 10 cm long and 4 cm deep, wound is well vascularized with granulation tissue present, tissue edges are nonerythematous and nonindurated  Neuro:  CNII-XII grossly intact, alert and oriented to person, place, and time    Labs:  Troponin:  No results for input(s): "TROPONINI" in the last 72 hours.  CBC:  Recent Labs     07/17/24  0031 07/18/24  0610   WBC 11.76* 8.39   RBC 3.65* 3.34*   HGB 10.9* 9.8*   HCT 33.3* 30.5*   * 401*   MCV 91.2 91.3   MCH 29.9 29.3   MCHC 32.7* 32.1*     CMP:  Recent Labs     07/17/24  0031 07/18/24  0610   CALCIUM 9.6 9.0   ALBUMIN 2.9*  --    NA " "136 136   K 3.7 3.6   CO2 27 28   CL 95* 97*   BUN 18.8 16.6   CREATININE 1.46* 1.57*   ALKPHOS 551*  --    ALT 63*  --    AST 40*  --    BILITOT 0.6  --      Lactic Acid:  Recent Labs     07/17/24  0517   LACTATE 1.6     ETOH:  No results for input(s): "ETHANOL" in the last 72 hours.   Urine Drug Screen:  No results for input(s): "COCAINE", "OPIATE", "BARBITURATE", "AMPHETAMINE", "FENTANYL", "CANNABINOIDS", "MDMA" in the last 72 hours.    Invalid input(s): "BENZODIAZEPINE", "PHENCYCLIDINE"   ABG:  Recent Labs   Lab 07/17/24  0007   PH 7.393   PO2 33*   PCO2 57.0*   HCO3 34.7*   BE 10*        Diagnostic Results:  CT Abdomen Pelvis With IV Contrast NO Oral Contrast   Final Result   Impression:      1. There is an incompletely imaged 1.1 x 3 x 3 (AP x T x CC) ill-defined peripherally enhancing fluid collection with gas locules in the left perineum (series 2, image 85). There is surrounding fat stranding. This is of concern for cellulitis with an abscess. The prior study demonstrates extensive subcutaneous fat stranding in the left perineum, medial gluteal cleft with a 3.6 x 2.9 cm ill-defined subcutaneous soft tissue density in the left perianal region. Correlate clinically as regards further evaluation and followup with CT pelvis.      2. The gastric antral wall seems thick raising concern for gastritis.      3. Details and other findings as discussed above.      No significant discrepancy with overnight report.         Electronically signed by: Garland Bowie   Date:    07/17/2024   Time:    07:57          ASSESSMENT & PLAN:    Greer Kahn is a 58 y.o. female presenting to the hospital with nausea and vomiting . General surgery was consulted for evaluation of a left perineal wound. She recently followed up with our clinic on 7/16 for a wound check. The wound has not changed since her last encounter with our team. The wound is healing well with visible granulation tissue, good blood supple, and clean and healthy " wound edges.    - continue regular wound care, pack with dry gauze  - follow up with general surgery if any more assistance is needed        Jose Francisco Cervantes M.D.  PGY1 [facility]

## 2024-07-18 NOTE — H&P
[facility] Hospital Medicine History & Physical Examination       Patient Name: Greer Kahn  MRN: [id number]  Patient Class: IP- Inpatient   Admission Date: 7/16/2024   Admitting Physician: RADHA Service   Length of Stay: 1  Attending Physician: Dr Nicolas Styles   Primary Care Provider: Ricardo Canchola NP  Face-to-Face encounter date: 07/18/2024  Code Status: Full code  Chief Complaint: Nausea and Vomiting        Screening for Social Drivers for health:  Patient screened for food insecurity, housing instability, transportation needs, utility difficulties, and interpersonal safety (select all that apply as identified as concern)  []Housing or Food  []Transportation Needs  []Utility Difficulties  []Interpersonal safety  [x]None    Patient information was obtained from patient, patient's family, past medical records and/or ER records.     HISTORY OF PRESENT ILLNESS:   Greer Kahn is a 58 y.o. female who PMH includes asthma, DM type II uncontrolled, HTN; presents to the ED at [facility] with complaints of nausea with vomiting with associated perineal tenderness to abscess area.  It was reported patient was recently hospitalized from 06/13-07/09/2024 for left groin perineal necrotizing soft tissue infection with AUTUMN which patient did require hemodialysis treatments at that time.  Patient has had multiple I and D's to the area.  Patient has since then was discharged and she reports she did follow-up with surgery outpatient, with her last visit on 07/16/2024.  Patient had home health services with wound care services at home.  She reports increased pain at the wound site with associated abdominal pain and discomfort.  She denies any diarrhea, fever, chills, chest pain, shortness a breath or any sick contacts.  Lab work reviewed demonstrated WBCs 11.76, chloride 95, creatinine 1.46, glucose 426, magnesium 1.5, AST 40, ALT 63, lactic acid 1.6; other indices unremarkable.  Blood cultures were  collected with preliminary reports Gram-positive cocci Gram-positive rods Gram-negative rods.  Urine culture preliminary reports greater than 100,000 colonies of Gram-negative rods.  Patient does have a history of MRSA in the past.  Patient was started on IV Zosyn and vancomycin therapy.  Patient was subsequently transferred from [facility] to [facility] on 7/16/2024  for admission and further management of perineal wound. CT of the abdomen and pelvis with IV contrast impression reviewed demonstrated left perineal fluid collection with gas locule surrounding fat stranding, concern for cellulitis with an abscess, gastric antral wall seems thick raising concern for gastritis.   Vital signs /71 pulse 78 respirations 18 temperature 98.1° F O2 saturation 95% on room air.  Blood and urine cultures were collected with above-noted preliminary reports.  Patient did receive Zosyn and vancomycin.  Patient is admitted to hospital medicine services for further management.    PAST MEDICAL HISTORY:   Asthma   DM type 2   HTN   AUTUMN with temporary hemodialysis treatments  Obesity     PAST SURGICAL HISTORY:     Past Surgical History:   Procedure Laterality Date    GALLBLADDER SURGERY      INCISION AND DRAINAGE OF ABSCESS N/A 06/14/2024    Procedure: Incision and Drainage;  Surgeon: Papa Cesar Jr., MD;  Location: [facility];  Service: General;  Laterality: N/A;  DEBRIDEMENT SOFT TISSUE - PERINEUM    INCISION AND DRAINAGE, LOWER EXTREMITY Left 06/18/2024    Procedure: Incision and Drainage;  Surgeon: Arnaud Vital MD;  Location: [facility] OR;  Service: General;  Laterality: Left;  LEFT GROIN    WOUND DEBRIDEMENT Left 06/21/2024    Procedure: DEBRIDEMENT, WOUND;  Surgeon: Arnaud Vital MD;  Location: [facility];  Service: General;  Laterality: Left;  left groin, lithotomy       ALLERGIES:   Patient has no known allergies.    FAMILY HISTORY:   Reviewed and negative    SOCIAL HISTORY:     Social History     Tobacco Use    Smoking status:  Never    Smokeless tobacco: Never   Substance Use Topics    Alcohol use: Never        HOME MEDICATIONS:   As documented  Prior to Admission medications    Medication Sig Start Date End Date Taking? Authorizing Provider   albuterol (PROVENTIL/VENTOLIN HFA) 90 mcg/actuation inhaler Inhale 2 puffs into the lungs every 4 to 6 hours as needed for Shortness of Breath. 1/17/24   Provider, Historical   amLODIPine (NORVASC) 10 MG tablet Take 1 tablet (10 mg total) by mouth once daily. 4/12/24 5/12/24  Ernesto Kennedy MD   carvediloL (COREG) 6.25 MG tablet Take 2 tablets (12.5 mg total) by mouth 2 (two) times daily. 7/9/24 7/9/25  Nicolas Styles MD   cloNIDine (CATAPRES) 0.2 MG tablet Take 1 tablet (0.2 mg total) by mouth 3 (three) times daily. 7/9/24 7/9/25  Nicolas Styles MD   famotidine (PEPCID) 20 MG tablet Take 1 tablet (20 mg total) by mouth 2 (two) times daily. 4/12/24 5/12/24  Ernesto Kennedy MD   glimepiride (AMARYL) 4 MG tablet Take 1 tablet (4 mg total) by mouth daily with breakfast. 4/13/24 5/13/24  Ernesto Kennedy MD   hydrALAZINE (APRESOLINE) 25 MG tablet Take 4 tablets (100 mg total) by mouth every 8 (eight) hours. 7/9/24 7/9/25  Nicolas Styles MD   insulin glargine (LANTUS) 100 unit/mL injection Inject 50 Units into the skin every evening. 4/12/24 6/11/24  Ernesto Kennedy MD   meclizine (ANTIVERT) 25 mg tablet Take 1 tablet (25 mg total) by mouth 3 (three) times daily. for 10 days 4/12/24 4/22/24  Ernesto Kennedy MD   pioglitazone (ACTOS) 30 MG tablet Take 1 tablet (30 mg total) by mouth once daily. 4/12/24 5/12/24  Ernesto Kennedy MD   traMADoL (ULTRAM) 50 mg tablet Take 1 tablet (50 mg total) by mouth every 8 (eight) hours as needed for Pain. 7/9/24   Nicolas Styles MD       REVIEW OF SYSTEMS:   Except as documented, all other systems reviewed and negative     PHYSICAL EXAM:     VITAL SIGNS: 24 HRS MIN & MAX LAST   Temp  Min: 98.1 °F (36.7 °C)  Max: 98.8 °F (37.1 °C) 98.8 °F (37.1 °C)   BP  Min: 115/71  Max:  183/90 (!) 152/81   Pulse  Min: 78  Max: 90  79   Resp  Min: 14  Max: 20 20   SpO2  Min: 88 %  Max: 99 % 99 %       General appearance:  Chronically ill-appearing looks older than stated age; nontoxic, fatigued  HENT: Atraumatic head. Moist mucous membranes of oral cavity.  Eyes: PERRL.   Lungs: Clear to auscultation bilaterally. No wheezing present.   Heart: Regular rate and rhythm. S1 and S2 present , cap refill brisk  Abdomen: Soft, obese, non-distended, non-tender. No rebound tenderness/guarding. Bowel sounds are normal.   Extremities: No cyanosis, clubbing  Skin: warm and dry, perineal wound with TTP  Neuro: oriented x 3, no acute focal deficits  Psych/mental status: Appropriate mood and affect. Responds appropriately to questions.     LABS AND IMAGING:     Recent Labs   Lab 07/17/24  0031 07/18/24  0610   WBC 11.76* 8.39   RBC 3.65* 3.34*   HGB 10.9* 9.8*   HCT 33.3* 30.5*   MCV 91.2 91.3   MCH 29.9 29.3   MCHC 32.7* 32.1*   RDW 13.6 13.7   * 401*   MPV 10.5* 10.4       Recent Labs   Lab 07/17/24  0007 07/17/24  0031 07/18/24  0610   NA  --  136 136   K  --  3.7 3.6   CL  --  95* 97*   CO2  --  27 28   BUN  --  18.8 16.6   CREATININE  --  1.46* 1.57*   CALCIUM  --  9.6 9.0   PH 7.393  --   --    MG  --  1.50* 1.80   ALBUMIN  --  2.9*  --    ALKPHOS  --  551*  --    ALT  --  63*  --    AST  --  40*  --    BILITOT  --  0.6  --        Microbiology Results (last 7 days)       Procedure Component Value Units Date/Time    Blood culture #2 **CANNOT BE ORDERED STAT** [[id number]]  (Abnormal) Collected: 07/17/24 0515    Order Status: Completed Specimen: Blood from Antecubital, Left Updated: 07/18/24 0719     GRAM STAIN Quality 1+      Many Gram positive cocci      Many Gram Positive Rods      Moderate Gram Negative Rods    Urine culture [[id number]]  (Abnormal) Collected: 07/17/24 0227    Order Status: Completed Specimen: Urine, Clean Catch Updated: 07/18/24 0655     Urine Culture >/= 100,000 colonies/ml  Gram-negative Rods    Blood culture #1 **CANNOT BE ORDERED STAT** [[id number]] Collected: 07/17/24 0445    Order Status: Resulted Specimen: Blood from Hand, Left Updated: 07/17/24 0516             CT Abdomen Pelvis With IV Contrast NO Oral Contrast  Narrative: Technique: CT of the abdomen and pelvis was performed with axial images as well as sagittal and coronal reconstruction images with intravenous contrast.    Comparison: Comparison is with CT pelvis study dated June 18, 2024    Clinical History: Pt having n/v x 4 days unable to take bp medication now having hypertension.    Dosage Information: Automated Exposure Control was utilized.      Findings:    Thorax:    Lungs: There is mild nonspecific dependent change at the lung bases.    Pleura: No effusions or thickening are seen.    Abdomen:    Abdominal Wall: No abdominal wall pathology is seen.    Liver: The liver appears unremarkable.    Biliary System: No intrahepatic or extrahepatic biliary duct dilatation is seen.    Gallbladder: Surgical clips are seen in the gallbladder fossa consistent with prior cholecystectomy.    Pancreas: The pancreas appears unremarkable.    Spleen: The spleen appears unremarkable.    Adrenals: The adrenal glands appear unremarkable.    Kidneys: There is a 3 mm nonobstructing right renal calculus in the mid pole (series 2, image 23). Subcentimeter hypodensity in the lower pole of the right kidney, which is too small to further characterize. There is a 1.5 cm left renal parapelvic cyst in the mid pole for which no specific follow-up imaging is recommended    Aorta: There is mild calcification of the abdominal aorta and its branches.    Bowel:    Esophagus: The visualized esophagus appears unremarkable.    Stomach: The gastric antral wall seems thick raising concern for gastritis.    Duodenum: Unremarkable appearing duodenum.    Small Bowel: The small bowel appears unremarkable.    Colon: Nondistended.    Appendix: The appendix  appears unremarkable.    Peritoneum: No intraperitoneal free air or ascites is seen.    Pelvis:    Bladder: The bladder appears unremarkable.    Female:    Uterus: The uterus appears unremarkable.    Ovaries: No adnexal masses are seen.    Bony structures:    Dorsal Spine: There is mild spondylosis of the visualized dorsal spine.    Bony Pelvis: The visualized bony structures of the pelvis appear unremarkable.    Miscellaneous: There is an incompletely imaged 1.1 x 3 x 3 (AP x T x CC) ill-defined peripherally enhancing fluid collection with gas locules in the left perineum (series 2, image 85). There is surrounding fat stranding. This is of concern for cellulitis with an abscess. The prior study demonstrates extensive subcutaneous fat stranding in the left perineum, medial gluteal cleft with a 3.6 x 2.9 cm ill-defined subcutaneous soft tissue density in the left perianal region.  Impression: Impression:    1. There is an incompletely imaged 1.1 x 3 x 3 (AP x T x CC) ill-defined peripherally enhancing fluid collection with gas locules in the left perineum (series 2, image 85). There is surrounding fat stranding. This is of concern for cellulitis with an abscess. The prior study demonstrates extensive subcutaneous fat stranding in the left perineum, medial gluteal cleft with a 3.6 x 2.9 cm ill-defined subcutaneous soft tissue density in the left perianal region. Correlate clinically as regards further evaluation and followup with CT pelvis.    2. The gastric antral wall seems thick raising concern for gastritis.    3. Details and other findings as discussed above.    No significant discrepancy with overnight report.    Electronically signed by: Garland Bowie  Date:    07/17/2024  Time:    07:57        ASSESSMENT & PLAN:   ASSESSMENT:  Acute left perineal abscess/cellulitis- s/p I&D- POA  Nausea with vomiting-non intractable-POA  DM type 2 with hyperglycemia-POA   HTN-urgency-POA   Acute gastritis-per  imaging-POA  History of MRSA infection - POA      PLAN:  Consult surgery Services appreciate assistance and recommendations  Consult wound care services appreciate assistance and recommendations   Follow cultures and sensitivities   Isolation precautions per MRSA protocol   Continue with IV Zosyn and vancomycin therapy   Accu-Cheks and sliding scale coverage   Resume home medication as deemed necessary   Repeat lab work in a.m.   We will await further recommendations per surgery Services      VTE Prophylaxis: Lovenox for DVT prophylaxis and will be advised to be as mobile as possible and sit in a chair as tolerated    Patient condition:  Stable    __________________________________________________________________________  INPATIENT LIST OF MEDICATIONS     Scheduled Meds:   aluminum-magnesium hydroxide-simethicone  30 mL Oral QID (AC & HS)    amLODIPine  10 mg Oral Daily    carvediloL  12.5 mg Oral BID    cloNIDine 0.2 mg/24 hr td ptwk  1 patch Transdermal Q7 Days    enoxparin  40 mg Subcutaneous Daily    metoclopramide  5 mg Intravenous Q6H    mupirocin   Nasal BID    pantoprazole  40 mg Intravenous Daily    piperacillin-tazobactam (Zosyn) IV (PEDS and ADULTS) (extended infusion is not appropriate)  4.5 g Intravenous Q8H     Continuous Infusions:   0.9% NaCl   Intravenous Continuous 100 mL/hr at 07/17/24 2312 New Bag at 07/17/24 2312     PRN Meds:.  Current Facility-Administered Medications:     acetaminophen, 1,000 mg, Oral, Q6H PRN    acetaminophen, 650 mg, Oral, Q4H PRN    albuterol, 2 puff, Inhalation, Q4H PRN    dextrose 10%, 12.5 g, Intravenous, PRN    dextrose 10%, 12.5 g, Intravenous, PRN    dextrose 10%, 25 g, Intravenous, PRN    dextrose 10%, 25 g, Intravenous, PRN    glucagon (human recombinant), 1 mg, Intramuscular, PRN    glucagon (human recombinant), 1 mg, Intramuscular, PRN    glucose, 16 g, Oral, PRN    glucose, 16 g, Oral, PRN    glucose, 24 g, Oral, PRN    glucose, 24 g, Oral, PRN    insulin aspart  U-100, 0-10 Units, Subcutaneous, QID (AC + HS) PRN    labetalol, 10 mg, Intravenous, Q4H PRN    magnesium oxide, 800 mg, Oral, PRN    naloxone, 0.02 mg, Intravenous, PRN    ondansetron, 4 mg, Intravenous, Q4H PRN    potassium bicarbonate, 50 mEq, Oral, PRN    prochlorperazine, 5 mg, Intravenous, Q6H PRN    simethicone, 1 tablet, Oral, QID PRN    sodium chloride 0.9%, 10 mL, Intravenous, Q12H PRN    sodium chloride 0.9%, 10 mL, Intravenous, PRN      IAbraham FNP have reviewed and discussed the case with Dr. _ Please see the following addendum for further assessment and plan from their attending MD.  JOHN Hutchison   07/18/2024    ________________________________________________________________________________    MD Addendum:  Dr. MALCOLM ---assumed care of this patient today at ---am/pm  For the patient encounter, I performed the substantive portion of the visit, I reviewed the NP/PA documentation, treatment plan, and medical decision making.  I had face to face time with this patient     A. History:    B. Physical exam:    C. Medical decision making:    Discharge Planning and Disposition: No mobility needs. Ambulating well. Good social support system.   Anticipated discharge    All diagnosis and differential diagnosis have been reviewed; assessment and plan has been documented; I have personally reviewed the labs and test results that are presently available; I have reviewed the patients medication list; I have reviewed the consulting providers response and recommendations. I have reviewed or attempted to review medical records based upon their availability.    All of the patient and family questions have been addressed and answered. Patient's is agreeable to the above stated plan. I will continue to monitor closely and make adjustments to medical management as needed.

## 2024-07-18 NOTE — PROGRESS NOTES
Pharmacokinetic Initial Assessment: IV Vancomycin    Assessment/Plan:    Initiate intravenous vancomycin with loading dose of 2500 mg once with subsequent doses when random concentrations are less than 20 mcg/mL  Desired empiric serum trough concentration is 15 to 20 mcg/mL  Draw vancomycin random level on 1000 at 07/19.  Pharmacy will continue to follow and monitor vancomycin.      Please contact pharmacy at extension [phone] with any questions regarding this assessment.     Thank you for the consult,   Wes Rosa       Patient brief summary:  Greer Kahn is a 58 y.o. female initiated on antimicrobial therapy with IV Vancomycin for treatment of suspected skin & soft tissue infection, bacteremia    Drug Allergies:   Review of patient's allergies indicates:  No Known Allergies    Actual Body Weight:   Wt Readings from Last 1 Encounters:   07/16/24 129.7 kg (286 lb)       Renal Function:   Estimated Creatinine Clearance: 55.4 mL/min (A) (based on SCr of 1.57 mg/dL (H)).,     Dialysis Method (if applicable):  N/A Previous HD, no new schedule     CBC (last 72 hours):  Recent Labs   Lab Result Units 07/17/24  0031 07/18/24  0610   WBC x10(3)/mcL 11.76* 8.39   Hgb g/dL 10.9* 9.8*   Hemoglobin A1c %  --  7.5*   Hct % 33.3* 30.5*   Platelet x10(3)/mcL 501* 401*   Mono % % 7.5  --    Eos % % 2.3  --    Basophil % % 0.6  --        Metabolic Panel (last 72 hours):  Recent Labs   Lab Result Units 07/17/24  0005 07/17/24  0031 07/17/24  0227 07/18/24  0610   Sodium mmol/L  --  136  --  136   Potassium mmol/L  --  3.7  --  3.6   Chloride mmol/L  --  95*  --  97*   CO2 mmol/L  --  27  --  28   CO2 TOTAL  36  --   --   --    Glucose mg/dL  --  426*  --  392*   Glucose, UA   --   --  >=1000*  --    Blood Urea Nitrogen mg/dL  --  18.8  --  16.6   Creatinine mg/dL  --  1.46*  --  1.57*   Albumin g/dL  --  2.9*  --   --    Bilirubin Total mg/dL  --  0.6  --   --    ALP unit/L  --  551*  --   --    AST unit/L  --  40*  --   --     ALT unit/L  --  63*  --   --    Magnesium Level mg/dL  --  1.50*  --  1.80       Drug levels (last 3 results):  Recent Labs   Lab Result Units 07/18/24  0827   Vancomycin Random ug/ml 20.7*       Microbiologic Results:  Microbiology Results (last 7 days)       Procedure Component Value Units Date/Time    Blood culture #1 **CANNOT BE ORDERED STAT** [[id number]]  (Normal) Collected: 07/17/24 0445    Order Status: Completed Specimen: Blood from Hand, Left Updated: 07/18/24 0801     Blood Culture No Growth At 24 Hours    Blood culture #2 **CANNOT BE ORDERED STAT** [[id number]]  (Abnormal) Collected: 07/17/24 0515    Order Status: Completed Specimen: Blood from Antecubital, Left Updated: 07/18/24 0719     GRAM STAIN Quality 1+      Many Gram positive cocci      Many Gram Positive Rods      Moderate Gram Negative Rods    Urine culture [[id number]]  (Abnormal) Collected: 07/17/24 0227    Order Status: Completed Specimen: Urine, Clean Catch Updated: 07/18/24 0655     Urine Culture >/= 100,000 colonies/ml Gram-negative Rods

## 2024-07-18 NOTE — PLAN OF CARE
07/18/24 1117   Discharge Assessment   Assessment Type Discharge Planning Assessment   Confirmed/corrected address, phone number and insurance Yes   Confirmed Demographics Correct on Facesheet   Source of Information patient   When was your last doctors appointment?   (has an appt scheduled with pcp july 22, was seen by surgeon)   Communicated TANIYA with patient/caregiver Date not available/Unable to determine   Facility Arrived From: [facility]   Do you expect to return to your current living situation? Yes   Do you have help at home or someone to help you manage your care at home? Yes   Who are your caregiver(s) and their phone number(s)? Ann Marquez (Daughter)  [phone] (  (Ann Marquez (Daughter)  [phone] ()   Prior to hospitilization cognitive status: Alert/Oriented   Current cognitive status: Alert/Oriented   Walking or Climbing Stairs Difficulty yes   Walking or Climbing Stairs ambulation difficulty, requires equipment  (rolling walker)   Mobility Management rolling walker   Dressing/Bathing Difficulty no   Home Layout Able to live on 1st floor   Equipment Currently Used at Home walker, rolling   Readmission within 30 days? Yes   Patient currently being followed by outpatient case management? No   Do you currently have service(s) that help you manage your care at home? Yes   Name and Contact number of agency [facility]   Is the pt/caregiver preference to resume services with current agency Yes   Do you take prescription medications? Yes   Do you have prescription coverage? No   Do you have any problems affording any of your prescribed medications? Yes   If yes, what medications? all   Is the patient taking medications as prescribed? yes   Who is going to help you get home at discharge? daughter   How do you get to doctors appointments? family or friend will provide   Are you on dialysis? No   Do you take coumadin? No   Discharge Plan A Home with family;Home Health   Discharge Plan B Home with family   DME  Needed Upon Discharge  other (see comments)  (tbd)   Discharge Plan discussed with: Patient   Transition of Care Barriers Does not adhere to care plan;Unisured

## 2024-07-18 NOTE — NURSING
Nurses Note -- 4 Eyes      7/17/2024   11:22 PM      Skin assessed during: Admit      [] No Altered Skin Integrity Present    []Prevention Measures Documented      [x] Yes- Altered Skin Integrity Present or Discovered   [x] LDA Added if Not in Epic (Describe Wound)   [] New Altered Skin Integrity was Present on Admit and Documented in LDA   [] Wound Image Taken    Wound Care Consulted? Yes    Attending Nurse:  Issa Jones RN/Staff Member:   Briseida Baeza RN

## 2024-07-19 LAB
ALBUMIN SERPL-MCNC: 2.4 G/DL (ref 3.5–5)
ALBUMIN/GLOB SERPL: 0.5 RATIO (ref 1.1–2)
ALP SERPL-CCNC: 442 UNIT/L (ref 40–150)
ALT SERPL-CCNC: 43 UNIT/L (ref 0–55)
ANION GAP SERPL CALC-SCNC: 9 MEQ/L
AST SERPL-CCNC: 32 UNIT/L (ref 5–34)
BACTERIA UR CULT: ABNORMAL
BASOPHILS # BLD AUTO: 0.09 X10(3)/MCL
BASOPHILS NFR BLD AUTO: 0.9 %
BILIRUB SERPL-MCNC: 0.5 MG/DL
BUN SERPL-MCNC: 13.3 MG/DL (ref 9.8–20.1)
CALCIUM SERPL-MCNC: 9 MG/DL (ref 8.4–10.2)
CHLORIDE SERPL-SCNC: 100 MMOL/L (ref 98–107)
CO2 SERPL-SCNC: 28 MMOL/L (ref 22–29)
CREAT SERPL-MCNC: 1.48 MG/DL (ref 0.55–1.02)
CREAT/UREA NIT SERPL: 9
EOSINOPHIL # BLD AUTO: 0.46 X10(3)/MCL (ref 0–0.9)
EOSINOPHIL NFR BLD AUTO: 4.4 %
ERYTHROCYTE [DISTWIDTH] IN BLOOD BY AUTOMATED COUNT: 13.7 % (ref 11.5–17)
GFR SERPLBLD CREATININE-BSD FMLA CKD-EPI: 41 ML/MIN/1.73/M2
GLOBULIN SER-MCNC: 4.9 GM/DL (ref 2.4–3.5)
GLUCOSE SERPL-MCNC: 256 MG/DL (ref 74–100)
HCT VFR BLD AUTO: 30.9 % (ref 37–47)
HGB BLD-MCNC: 9.8 G/DL (ref 12–16)
IMM GRANULOCYTES # BLD AUTO: 0.05 X10(3)/MCL (ref 0–0.04)
IMM GRANULOCYTES NFR BLD AUTO: 0.5 %
LYMPHOCYTES # BLD AUTO: 3.87 X10(3)/MCL (ref 0.6–4.6)
LYMPHOCYTES NFR BLD AUTO: 37.4 %
MCH RBC QN AUTO: 30.2 PG (ref 27–31)
MCHC RBC AUTO-ENTMCNC: 31.7 G/DL (ref 33–36)
MCV RBC AUTO: 95.1 FL (ref 80–94)
MONOCYTES # BLD AUTO: 0.85 X10(3)/MCL (ref 0.1–1.3)
MONOCYTES NFR BLD AUTO: 8.2 %
NEUTROPHILS # BLD AUTO: 5.02 X10(3)/MCL (ref 2.1–9.2)
NEUTROPHILS NFR BLD AUTO: 48.6 %
NRBC BLD AUTO-RTO: 0 %
PLATELET # BLD AUTO: 402 X10(3)/MCL (ref 130–400)
PMV BLD AUTO: 10.3 FL (ref 7.4–10.4)
POCT GLUCOSE: 227 MG/DL (ref 70–110)
POCT GLUCOSE: 253 MG/DL (ref 70–110)
POCT GLUCOSE: 255 MG/DL (ref 70–110)
POCT GLUCOSE: 422 MG/DL (ref 70–110)
POCT GLUCOSE: 457 MG/DL (ref 70–110)
POTASSIUM SERPL-SCNC: 3.9 MMOL/L (ref 3.5–5.1)
PROT SERPL-MCNC: 7.3 GM/DL (ref 6.4–8.3)
RBC # BLD AUTO: 3.25 X10(6)/MCL (ref 4.2–5.4)
SODIUM SERPL-SCNC: 137 MMOL/L (ref 136–145)
WBC # BLD AUTO: 10.34 X10(3)/MCL (ref 4.5–11.5)

## 2024-07-19 PROCEDURE — 63600175 PHARM REV CODE 636 W HCPCS: Performed by: INTERNAL MEDICINE

## 2024-07-19 PROCEDURE — 11000001 HC ACUTE MED/SURG PRIVATE ROOM

## 2024-07-19 PROCEDURE — 25000003 PHARM REV CODE 250: Performed by: INTERNAL MEDICINE

## 2024-07-19 PROCEDURE — 63600175 PHARM REV CODE 636 W HCPCS: Performed by: NURSE PRACTITIONER

## 2024-07-19 PROCEDURE — 80053 COMPREHEN METABOLIC PANEL: CPT | Performed by: INTERNAL MEDICINE

## 2024-07-19 PROCEDURE — 85025 COMPLETE CBC W/AUTO DIFF WBC: CPT | Performed by: INTERNAL MEDICINE

## 2024-07-19 PROCEDURE — 36415 COLL VENOUS BLD VENIPUNCTURE: CPT | Performed by: INTERNAL MEDICINE

## 2024-07-19 PROCEDURE — 25000003 PHARM REV CODE 250: Performed by: NURSE PRACTITIONER

## 2024-07-19 RX ORDER — NITROFURANTOIN 25; 75 MG/1; MG/1
100 CAPSULE ORAL EVERY 12 HOURS
Status: DISCONTINUED | OUTPATIENT
Start: 2024-07-19 | End: 2024-07-21 | Stop reason: HOSPADM

## 2024-07-19 RX ORDER — INSULIN GLARGINE 100 [IU]/ML
50 INJECTION, SOLUTION SUBCUTANEOUS NIGHTLY
Status: DISCONTINUED | OUTPATIENT
Start: 2024-07-19 | End: 2024-07-21 | Stop reason: HOSPADM

## 2024-07-19 RX ADMIN — ONDANSETRON 4 MG: 2 INJECTION INTRAMUSCULAR; INTRAVENOUS at 05:07

## 2024-07-19 RX ADMIN — PIPERACILLIN AND TAZOBACTAM 4.5 G: 4; .5 INJECTION, POWDER, LYOPHILIZED, FOR SOLUTION INTRAVENOUS; PARENTERAL at 09:07

## 2024-07-19 RX ADMIN — PANTOPRAZOLE SODIUM 40 MG: 40 INJECTION, POWDER, LYOPHILIZED, FOR SOLUTION INTRAVENOUS at 09:07

## 2024-07-19 RX ADMIN — INSULIN ASPART 6 UNITS: 100 INJECTION, SOLUTION INTRAVENOUS; SUBCUTANEOUS at 09:07

## 2024-07-19 RX ADMIN — ENOXAPARIN SODIUM 40 MG: 40 INJECTION SUBCUTANEOUS at 04:07

## 2024-07-19 RX ADMIN — INSULIN ASPART 6 UNITS: 100 INJECTION, SOLUTION INTRAVENOUS; SUBCUTANEOUS at 05:07

## 2024-07-19 RX ADMIN — MUPIROCIN: 20 OINTMENT TOPICAL at 09:07

## 2024-07-19 RX ADMIN — CARVEDILOL 12.5 MG: 12.5 TABLET, FILM COATED ORAL at 09:07

## 2024-07-19 RX ADMIN — ONDANSETRON 4 MG: 2 INJECTION INTRAMUSCULAR; INTRAVENOUS at 11:07

## 2024-07-19 RX ADMIN — ALUMINA, MAGNESIA, AND SIMETHICONE ORAL SUSPENSION REGULAR STRENGTH 30 ML: 1200; 1200; 120 SUSPENSION ORAL at 04:07

## 2024-07-19 RX ADMIN — DOXYCYCLINE HYCLATE 100 MG: 100 TABLET, COATED ORAL at 09:07

## 2024-07-19 RX ADMIN — AMLODIPINE BESYLATE 10 MG: 5 TABLET ORAL at 09:07

## 2024-07-19 RX ADMIN — METOCLOPRAMIDE HYDROCHLORIDE 5 MG: 5 INJECTION INTRAMUSCULAR; INTRAVENOUS at 05:07

## 2024-07-19 RX ADMIN — INSULIN ASPART 10 UNITS: 100 INJECTION, SOLUTION INTRAVENOUS; SUBCUTANEOUS at 04:07

## 2024-07-19 RX ADMIN — METOCLOPRAMIDE HYDROCHLORIDE 5 MG: 5 INJECTION INTRAMUSCULAR; INTRAVENOUS at 11:07

## 2024-07-19 RX ADMIN — NITROFURANTOIN MONOHYDRATE/MACROCRYSTALS 100 MG: 25; 75 CAPSULE ORAL at 09:07

## 2024-07-19 RX ADMIN — ALUMINA, MAGNESIA, AND SIMETHICONE ORAL SUSPENSION REGULAR STRENGTH 30 ML: 1200; 1200; 120 SUSPENSION ORAL at 09:07

## 2024-07-19 NOTE — PROGRESS NOTES
[facility] - 9th Floor Med Surg  Wound Care    Patient Name:  Greer Kahn   MRN:  [id number]  Date: 7/19/2024  Diagnosis: Nausea and vomiting    History:     Past Medical History:   Diagnosis Date    Asthma     Diabetes mellitus     Hypertension        Social History     Socioeconomic History    Marital status: Single   Tobacco Use    Smoking status: Never    Smokeless tobacco: Never   Substance and Sexual Activity    Alcohol use: Never    Drug use: Never    Sexual activity: Not Currently     Social Determinants of Health     Financial Resource Strain: High Risk (6/14/2024)    Overall Financial Resource Strain (CARDIA)     Difficulty of Paying Living Expenses: Hard   Food Insecurity: Food Insecurity Present (6/14/2024)    Hunger Vital Sign     Worried About Running Out of Food in the Last Year: Often true     Ran Out of Food in the Last Year: Never true   Transportation Needs: No Transportation Needs (6/14/2024)    TRANSPORTATION NEEDS     Transportation : No   Physical Activity: Unknown (6/14/2024)    Exercise Vital Sign     Days of Exercise per Week: 0 days   Stress: Stress Concern Present (6/14/2024)    [employer] [place] [employer] - Occupational Stress Questionnaire     Feeling of Stress : To some extent   Housing Stability: High Risk (6/14/2024)    Housing Stability Vital Sign     Unable to Pay for Housing in the Last Year: Yes     Homeless in the Last Year: No       Precautions:     Allergies as of 07/16/2024    (No Known Allergies)       WO Assessment Details/Treatment        07/19/24 1148   WOCN Assessment   Visit Date 07/19/24   Visit Time 1148   Consult Type New   WOCN Speciality Wound   Wound surgical   Intervention assessed;chart review;orders   Teaching on-going   Skin Interventions   Device Skin Pressure Protection absorbent pad utilized/changed        Wound 06/14/24 Incision Left Perineum vertical   Date First Assessed: 06/14/24   Primary Wound Type: Incision  Side: Left   Location: Perineum  Incision Type: vertical  Closure Method: Other (see comments)  Additional Comments: packed with kelix soaked betadine   Wound Image    Dressing Appearance Moist drainage   Drainage Amount Moderate   Drainage Characteristics/Odor Serosanguineous   Appearance Pink   Red (%), Wound Tissue Color 100 %   Periwound Area Moist   Wound Edges Defined   Wound Length (cm) 13 cm   Wound Width (cm) 3 cm   Wound Depth (cm) 5 cm   Wound Volume (cm^3) 195 cm^3   Wound Surface Area (cm^2) 39 cm^2   Care Cleansed with:;Antimicrobial agent   Dressing Applied;Gauze, wet to moist;Absorptive Pad     Wound care consulted for left groin wound. .  S/p Left groin incision, drainage, and debridement on 6/14, 6/18 and 6/21. Surgery team has been following.  Treatment recommendations put into place. Left groin: Cleanse with vashe. Pack with vashe moistened 4x4, Abd pad, secure with medipore tape. Change BID and PRN soilage. No adult briefs while in bed. Nursing to continue with preventative measures. Will follow up.      07/19/2024

## 2024-07-19 NOTE — PLAN OF CARE
Problem: Adult Inpatient Plan of Care  Goal: Plan of Care Review  Outcome: Progressing  Goal: Patient-Specific Goal (Individualized)  Outcome: Progressing  Goal: Absence of Hospital-Acquired Illness or Injury  Outcome: Progressing  Goal: Optimal Comfort and Wellbeing  Outcome: Progressing  Goal: Readiness for Transition of Care  Outcome: Progressing     Problem: Bariatric Environmental Safety  Goal: Safety Maintained with Care  Outcome: Progressing     Problem: Diabetes Comorbidity  Goal: Blood Glucose Level Within Targeted Range  Outcome: Progressing     Problem: Wound  Goal: Optimal Coping  Outcome: Progressing  Goal: Optimal Functional Ability  Outcome: Progressing  Goal: Absence of Infection Signs and Symptoms  Outcome: Progressing  Goal: Improved Oral Intake  Outcome: Progressing  Goal: Optimal Pain Control and Function  Outcome: Progressing  Goal: Skin Health and Integrity  Outcome: Progressing  Goal: Optimal Wound Healing  Outcome: Progressing     Problem: Skin Injury Risk Increased  Goal: Skin Health and Integrity  Outcome: Progressing

## 2024-07-19 NOTE — NURSING
Nurses Note -- 4 Eyes      7/18/2024   7:00 AM      Skin assessed during: Q Shift Change      [] No Altered Skin Integrity Present    []Prevention Measures Documented      [x] Yes- Altered Skin Integrity Present or Discovered   [x] LDA Added if Not in Epic (Describe Wound)   [x] New Altered Skin Integrity was Present on Admit and Documented in LDA   [x] Wound Image Taken    Wound Care Consulted? Yes    Attending Nurse:  Heidi Ortega RN    Second RN/Staff Member:   BELEM Parsons

## 2024-07-20 VITALS
DIASTOLIC BLOOD PRESSURE: 72 MMHG | HEIGHT: 67 IN | SYSTOLIC BLOOD PRESSURE: 158 MMHG | WEIGHT: 286 LBS | OXYGEN SATURATION: 96 % | HEART RATE: 82 BPM | TEMPERATURE: 99 F | BODY MASS INDEX: 44.89 KG/M2 | RESPIRATION RATE: 18 BRPM

## 2024-07-20 PROBLEM — R11.2 NAUSEA AND VOMITING: Status: RESOLVED | Noted: 2024-04-08 | Resolved: 2024-07-20

## 2024-07-20 LAB
ALBUMIN SERPL-MCNC: 2.6 G/DL (ref 3.5–5)
ALBUMIN/GLOB SERPL: 0.5 RATIO (ref 1.1–2)
ALP SERPL-CCNC: 418 UNIT/L (ref 40–150)
ALT SERPL-CCNC: 39 UNIT/L (ref 0–55)
ANION GAP SERPL CALC-SCNC: 11 MEQ/L
AST SERPL-CCNC: 27 UNIT/L (ref 5–34)
BASOPHILS # BLD AUTO: 0.06 X10(3)/MCL
BASOPHILS NFR BLD AUTO: 0.6 %
BILIRUB SERPL-MCNC: 0.5 MG/DL
BUN SERPL-MCNC: 10.1 MG/DL (ref 9.8–20.1)
CALCIUM SERPL-MCNC: 9.1 MG/DL (ref 8.4–10.2)
CHLORIDE SERPL-SCNC: 102 MMOL/L (ref 98–107)
CO2 SERPL-SCNC: 27 MMOL/L (ref 22–29)
CREAT SERPL-MCNC: 1.23 MG/DL (ref 0.55–1.02)
CREAT/UREA NIT SERPL: 8
EOSINOPHIL # BLD AUTO: 0.37 X10(3)/MCL (ref 0–0.9)
EOSINOPHIL NFR BLD AUTO: 3.8 %
ERYTHROCYTE [DISTWIDTH] IN BLOOD BY AUTOMATED COUNT: 13.6 % (ref 11.5–17)
GFR SERPLBLD CREATININE-BSD FMLA CKD-EPI: 51 ML/MIN/1.73/M2
GLOBULIN SER-MCNC: 4.9 GM/DL (ref 2.4–3.5)
GLUCOSE SERPL-MCNC: 207 MG/DL (ref 74–100)
HCT VFR BLD AUTO: 31.3 % (ref 37–47)
HGB BLD-MCNC: 9.9 G/DL (ref 12–16)
IMM GRANULOCYTES # BLD AUTO: 0.04 X10(3)/MCL (ref 0–0.04)
IMM GRANULOCYTES NFR BLD AUTO: 0.4 %
LYMPHOCYTES # BLD AUTO: 2.93 X10(3)/MCL (ref 0.6–4.6)
LYMPHOCYTES NFR BLD AUTO: 30.2 %
MCH RBC QN AUTO: 29.5 PG (ref 27–31)
MCHC RBC AUTO-ENTMCNC: 31.6 G/DL (ref 33–36)
MCV RBC AUTO: 93.2 FL (ref 80–94)
MONOCYTES # BLD AUTO: 0.8 X10(3)/MCL (ref 0.1–1.3)
MONOCYTES NFR BLD AUTO: 8.3 %
NEUTROPHILS # BLD AUTO: 5.49 X10(3)/MCL (ref 2.1–9.2)
NEUTROPHILS NFR BLD AUTO: 56.7 %
NRBC BLD AUTO-RTO: 0 %
PLATELET # BLD AUTO: 398 X10(3)/MCL (ref 130–400)
PMV BLD AUTO: 10.2 FL (ref 7.4–10.4)
POCT GLUCOSE: 188 MG/DL (ref 70–110)
POTASSIUM SERPL-SCNC: 3.6 MMOL/L (ref 3.5–5.1)
PROT SERPL-MCNC: 7.5 GM/DL (ref 6.4–8.3)
RBC # BLD AUTO: 3.36 X10(6)/MCL (ref 4.2–5.4)
SODIUM SERPL-SCNC: 140 MMOL/L (ref 136–145)
WBC # BLD AUTO: 9.69 X10(3)/MCL (ref 4.5–11.5)

## 2024-07-20 PROCEDURE — 25000003 PHARM REV CODE 250: Performed by: NURSE PRACTITIONER

## 2024-07-20 PROCEDURE — 80053 COMPREHEN METABOLIC PANEL: CPT | Performed by: INTERNAL MEDICINE

## 2024-07-20 PROCEDURE — 63600175 PHARM REV CODE 636 W HCPCS: Performed by: NURSE PRACTITIONER

## 2024-07-20 PROCEDURE — 85025 COMPLETE CBC W/AUTO DIFF WBC: CPT | Performed by: INTERNAL MEDICINE

## 2024-07-20 PROCEDURE — 25000003 PHARM REV CODE 250: Performed by: INTERNAL MEDICINE

## 2024-07-20 PROCEDURE — 36415 COLL VENOUS BLD VENIPUNCTURE: CPT | Performed by: INTERNAL MEDICINE

## 2024-07-20 RX ORDER — ONDANSETRON 4 MG/1
4 TABLET, ORALLY DISINTEGRATING ORAL 2 TIMES DAILY
Qty: 15 TABLET | Refills: 0 | Status: SHIPPED | OUTPATIENT
Start: 2024-07-20

## 2024-07-20 RX ORDER — PANTOPRAZOLE SODIUM 40 MG/1
40 TABLET, DELAYED RELEASE ORAL DAILY
Qty: 30 TABLET | Refills: 0 | Status: SHIPPED | OUTPATIENT
Start: 2024-07-20 | End: 2025-07-20

## 2024-07-20 RX ORDER — PANTOPRAZOLE SODIUM 40 MG/10ML
40 INJECTION, POWDER, LYOPHILIZED, FOR SOLUTION INTRAVENOUS DAILY
Qty: 30 EACH | Refills: 0 | Status: SHIPPED | OUTPATIENT
Start: 2024-07-20 | End: 2024-07-20 | Stop reason: HOSPADM

## 2024-07-20 RX ORDER — NITROFURANTOIN 25; 75 MG/1; MG/1
100 CAPSULE ORAL EVERY 12 HOURS
Qty: 6 CAPSULE | Refills: 0 | Status: SHIPPED | OUTPATIENT
Start: 2024-07-20 | End: 2024-07-23

## 2024-07-20 RX ADMIN — ACETAMINOPHEN 1000 MG: 500 TABLET ORAL at 04:07

## 2024-07-20 RX ADMIN — ALUMINA, MAGNESIA, AND SIMETHICONE ORAL SUSPENSION REGULAR STRENGTH 30 ML: 1200; 1200; 120 SUSPENSION ORAL at 06:07

## 2024-07-20 RX ADMIN — ONDANSETRON 4 MG: 2 INJECTION INTRAMUSCULAR; INTRAVENOUS at 07:07

## 2024-07-20 NOTE — NURSING
Nurses Note -- 4 Eyes      7/20/2024   6:11 PM      Skin assessed during: Q Shift Change      [] No Altered Skin Integrity Present    []Prevention Measures Documented      [x] Yes- Altered Skin Integrity Present or Discovered   [] LDA Added if Not in Epic (Describe Wound)   [] New Altered Skin Integrity was Present on Admit and Documented in LDA   [] Wound Image Taken    Wound Care Consulted? No already on the case.    Attending Nurse:  Isabel Jones RN/Staff Member:   [place]

## 2024-07-20 NOTE — PROGRESS NOTES
Hospital Medicine  Progress Note    Patient Name: Greer Kahn  MRN: [id number]  Status: IP- Inpatient   Admission Date: 7/16/2024  Length of Stay: 2  Date of Service: 07/19/2024       CC: hospital follow-up for N/V       SUBJECTIVE   HPI and hospital course reviewed.  Today: Patient seen and examined at bedside, and chart reviewed.  Still having some vomiting. Urine culture with E coli sensitive to Macrobid.      MEDICATIONS   Scheduled   aluminum-magnesium hydroxide-simethicone  30 mL Oral QID (AC & HS)    amLODIPine  10 mg Oral Daily    carvediloL  12.5 mg Oral BID    cloNIDine 0.2 mg/24 hr td ptwk  1 patch Transdermal Q7 Days    doxycycline  100 mg Oral Q12H    enoxparin  40 mg Subcutaneous Daily    insulin glargine U-100 (Lantus)  50 Units Subcutaneous QHS    mupirocin   Nasal BID    nitrofurantoin (macrocrystal-monohydrate)  100 mg Oral Q12H    pantoprazole  40 mg Intravenous Daily     Continuous Infusions  None      PHYSICAL EXAM   VITALS: T 98.1 °F (36.7 °C)   BP (!) 157/80   P 83   RR 18   O2 (!) 92 %    GENERAL: Awake and in NAD  LUNGS: CTA anteriorly  CVS: Normal rate  GI/: Soft, NT, bowel sounds positive.  EXTREMITIES: No LE edema  NEURO: AAOx3  PSYCH: Cooperative      LABS   CBC  Recent Labs     07/18/24  0610 07/19/24  0528   WBC 8.39 10.34   RBC 3.34* 3.25*   HGB 9.8* 9.8*   HCT 30.5* 30.9*   MCV 91.3 95.1*   MCH 29.3 30.2   MCHC 32.1* 31.7*   RDW 13.7 13.7   * 402*     CHEM  Recent Labs     07/17/24  0031 07/18/24  0610 07/19/24  0528    136 137   K 3.7 3.6 3.9   CO2 27 28 28   BUN 18.8 16.6 13.3   CREATININE 1.46* 1.57* 1.48*   GLUCOSE 426* 392* 256*   CALCIUM 9.6 9.0 9.0   MG 1.50* 1.80  --    ALBUMIN 2.9*  --  2.4*   GLOBULIN 6.6*  --  4.9*   ALKPHOS 551*  --  442*   ALT 63*  --  43   AST 40*  --  32   BILITOT 0.6  --  0.5   LIPASE 29  --   --          MICROBIOLOGY     Microbiology Results (last 7 days)       Procedure Component Value Units Date/Time    Urine culture [[id number]]   (Abnormal)  (Susceptibility) Collected: 07/17/24 0227    Order Status: Completed Specimen: Urine, Clean Catch Updated: 07/19/24 0928     Urine Culture >/= 100,000 colonies/ml Escherichia coli ESBL    Blood culture #1 **CANNOT BE ORDERED STAT** [[id number]]  (Normal) Collected: 07/17/24 0445    Order Status: Completed Specimen: Blood from Hand, Left Updated: 07/19/24 0801     Blood Culture No Growth At 48 Hours    Blood culture #2 **CANNOT BE ORDERED STAT** [[id number]]  (Normal) Collected: 07/17/24 0515    Order Status: Completed Specimen: Blood from Antecubital, Left Updated: 07/19/24 0801     Blood Culture No Growth At 48 Hours            ASSESSMENT   Nausea and vomiting  Acute cystitis  Healing left perineal wound  Essential HTN  Diabetes mellitus type II    PLAN   Continue clears and antiemetics as needed  Wound evaluated by surgery, noted clean without signs of infection  Will stop antibiotics and   Will resume Lantus and monitor sugars closely  Continue ISS coverage in the interim  Otherwise continue current management and monitoring in the interim        Prophylaxis: SC Lovenomihir Styles MD  [facility]

## 2024-07-21 NOTE — NURSING
Went over d/c instructions with patient regarding help at home, follow up appts, and medications. Verbalized understanding. IV removed and tele d/c. Cleaned and packed wound. Stable at d/c.

## 2024-07-21 NOTE — PLAN OF CARE
Problem: Adult Inpatient Plan of Care  Goal: Plan of Care Review  Outcome: Met  Goal: Absence of Hospital-Acquired Illness or Injury  Outcome: Met  Goal: Optimal Comfort and Wellbeing  Outcome: Met  Goal: Readiness for Transition of Care  Outcome: Met     Problem: Bariatric Environmental Safety  Goal: Safety Maintained with Care  Outcome: Met     Problem: Diabetes Comorbidity  Goal: Blood Glucose Level Within Targeted Range  Outcome: Adequate for Care Transition     Problem: Wound  Goal: Optimal Coping  Outcome: Adequate for Care Transition  Goal: Optimal Functional Ability  Outcome: Adequate for Care Transition  Goal: Absence of Infection Signs and Symptoms  Outcome: Adequate for Care Transition  Goal: Improved Oral Intake  Outcome: Met  Goal: Optimal Pain Control and Function  Outcome: Met  Goal: Skin Health and Integrity  Outcome: Met  Goal: Optimal Wound Healing  Outcome: Met     Problem: Skin Injury Risk Increased  Goal: Skin Health and Integrity  Outcome: Adequate for Care Transition

## 2024-07-22 LAB
BACTERIA BLD CULT: NORMAL
BACTERIA BLD CULT: NORMAL

## 2024-07-22 NOTE — DISCHARGE SUMMARY
Hospital Medicine  Discharge Summary    Patient Name: Greer Kahn  MRN: [id number]  Admit Date: 7/16/2024  Discharge Date: 7/20/2024   Status: IP- Inpatient   Length of Stay: 3      PHYSICIANS   Admitting Physician: Shante Jackson MD  Discharging Physician: Nicolas Styles MD.  Primary Care Physician: Ricardo Canchola NP  Consults: General Surgery      DISCHARGE DIAGNOSES   Nausea and vomiting  Acute cystitis  Healing left perineal wound  Essential HTN  Diabetes mellitus type II      PROCEDURES   None      HOSPITAL COURSE    58-year-old female has a history that includes diabetes and was recently hospitalized for left peroneal soft tissue infection requiring multiple I&D's.  She completed a course of antibiotics and was discharged home to home health to continue with wound care.  She followed up with General surgery on 07/16, who noted wound to be healing well.  She has now presented back to the ED with nausea and vomiting.  Imaging included a CT of the does note an ill-defined peripherally enhancing collection with gas locules in the left perineum, with associated fat stranding.  General surgery was consulted and noted wound to be healing well, without evidence of infection.  Her UA on presentation however did note large amount of blood, moderate LE and WBCs, with many bacteria.  She was started on vanc and Zosyn from the ED.  She was admitted to  services and continued on Zosyn, but Vanc discontinued.  Blood cultures are negative at 24 Urine culture noted pan-sensitive E coli, so antibiotics tailored down to Macrobid.  She was tolerating diet and stable for discharge.        STATUS  Improved    DISPOSITION  Discharge to home health    DIET  Diabetic    ACTIVITY  As tolerated      FOLLOW-UP      Ricardo Canchola NP. Schedule an appointment as soon as possible for a visit in 1 week(s).    Contact information:  [address] [place] [id number] [phone]                 DISCHARGE MEDICATION RECONCILIATION      PRESCRIBED     nitrofurantoin (macrocrystal-monohydrate) 100 MG capsule  Commonly known as: MACROBID  Take 1 capsule (100 mg total) by mouth every 12 (twelve) hours. for 3 days     ondansetron 4 MG Tbdl  Commonly known as: ZOFRAN-ODT  Take 1 tablet (4 mg total) by mouth 2 (two) times daily.     pantoprazole 40 MG tablet  Commonly known as: PROTONIX  Take 1 tablet (40 mg total) by mouth once daily.       CONTINUE     albuterol 90 mcg/actuation inhaler  Commonly known as: PROVENTIL/VENTOLIN HFA     amLODIPine 10 MG tablet  Commonly known as: NORVASC  Take 1 tablet (10 mg total) by mouth once daily.     carvediloL 6.25 MG tablet  Commonly known as: COREG  Take 2 tablets (12.5 mg total) by mouth 2 (two) times daily.     cloNIDine 0.2 MG tablet  Commonly known as: CATAPRES  Take 1 tablet (0.2 mg total) by mouth 3 (three) times daily.     famotidine 20 MG tablet  Commonly known as: PEPCID  Take 1 tablet (20 mg total) by mouth 2 (two) times daily.     glimepiride 4 MG tablet  Commonly known as: AMARYL  Take 1 tablet (4 mg total) by mouth daily with breakfast.     hydrALAZINE 25 MG tablet  Commonly known as: APRESOLINE  Take 4 tablets (100 mg total) by mouth every 8 (eight) hours.     insulin glargine U-100 (Lantus) 100 unit/mL injection  Inject 50 Units into the skin every evening.     meclizine 25 mg tablet  Commonly known as: ANTIVERT  Take 1 tablet (25 mg total) by mouth 3 (three) times daily. for 10 days     pioglitazone 30 MG tablet  Commonly known as: ACTOS  Take 1 tablet (30 mg total) by mouth once daily.     traMADoL 50 mg tablet  Commonly known as: ULTRAM  Take 1 tablet (50 mg total) by mouth every 8 (eight) hours as needed for Pain.       DISCONTINUE     ciprofloxacin-dexAMETHasone 0.3-0.1% 0.3-0.1 % Drps  Commonly known as: CIPRODEX     fluconazole 100 MG tablet  Commonly known as: DIFLUCAN         PHYSICAL EXAM   VITALS: T 98.9 °F (37.2 °C)   BP (!) 158/72   P 82   RR 18   O2 96 %    GENERAL: Awake and in  NAD  LUNGS: CTA anteriorly  CVS: Normal rate  GI/: Soft, NT, bowel sounds positive.  EXTREMITIES: No LE edema  NEURO: AAOx3  PSYCH: Cooperative        Discharge time: 33 minutes     Nicolas Styles MD  [facility] Medicine       DIAGNOSITCS   CBC:   Recent Labs   Lab 07/18/24  0610 07/19/24  0528 07/20/24  0504   WBC 8.39 10.34 9.69   HGB 9.8* 9.8* 9.9*   HCT 30.5* 30.9* 31.3*   * 402* 398       CMP:   Recent Labs   Lab 07/17/24  0031 07/18/24  0610 07/19/24  0528 07/20/24  0504   CALCIUM 9.6 9.0 9.0 9.1   ALBUMIN 2.9*  --  2.4* 2.6*    136 137 140   K 3.7 3.6 3.9 3.6   CO2 27 28 28 27   CL 95* 97* 100 102   BUN 18.8 16.6 13.3 10.1   CREATININE 1.46* 1.57* 1.48* 1.23*   ALKPHOS 551*  --  442* 418*   ALT 63*  --  43 39   AST 40*  --  32 27   BILITOT 0.6  --  0.5 0.5   MG 1.50* 1.80  --   --      Estimated Creatinine Clearance: 70.7 mL/min (A) (based on SCr of 1.23 mg/dL (H)).         Microbiology Results (last 7 days)       Procedure Component Value Units Date/Time    Urine culture [[id number]]  (Abnormal)  (Susceptibility) Collected: 07/17/24 0227    Order Status: Completed Specimen: Urine, Clean Catch Updated: 07/19/24 0928     Urine Culture >/= 100,000 colonies/ml Escherichia coli ESBL              Lab Results   Component Value Date    HGBA1C 7.5 (H) 07/18/2024        CT Abdomen Pelvis With IV Contrast NO Oral Contrast  Result Date: 7/17/2024  Technique: CT of the abdomen and pelvis was performed with axial images as well as sagittal and coronal reconstruction images with intravenous contrast. Comparison: Comparison is with CT pelvis study dated June 18, 2024 Clinical History: Pt having n/v x 4 days unable to take bp medication now having hypertension. Dosage Information: Automated Exposure Control was utilized.   Findings: Thorax: Lungs: There is mild nonspecific dependent change at the lung bases. Pleura: No effusions or thickening are seen. Abdomen: Abdominal Wall: No abdominal wall pathology  is seen. Liver: The liver appears unremarkable. Biliary System: No intrahepatic or extrahepatic biliary duct dilatation is seen. Gallbladder: Surgical clips are seen in the gallbladder fossa consistent with prior cholecystectomy. Pancreas: The pancreas appears unremarkable. Spleen: The spleen appears unremarkable. Adrenals: The adrenal glands appear unremarkable. Kidneys: There is a 3 mm nonobstructing right renal calculus in the mid pole (series 2, image 23). Subcentimeter hypodensity in the lower pole of the right kidney, which is too small to further characterize. There is a 1.5 cm left renal parapelvic cyst in the mid pole for which no specific follow-up imaging is recommended Aorta: There is mild calcification of the abdominal aorta and its branches. Bowel: Esophagus: The visualized esophagus appears unremarkable. Stomach: The gastric antral wall seems thick raising concern for gastritis. Duodenum: Unremarkable appearing duodenum. Small Bowel: The small bowel appears unremarkable. Colon: Nondistended. Appendix: The appendix appears unremarkable. Peritoneum: No intraperitoneal free air or ascites is seen. Pelvis: Bladder: The bladder appears unremarkable. Female: Uterus: The uterus appears unremarkable. Ovaries: No adnexal masses are seen. Bony structures: Dorsal Spine: There is mild spondylosis of the visualized dorsal spine. Bony Pelvis: The visualized bony structures of the pelvis appear unremarkable. Miscellaneous: There is an incompletely imaged 1.1 x 3 x 3 (AP x T x CC) ill-defined peripherally enhancing fluid collection with gas locules in the left perineum (series 2, image 85). There is surrounding fat stranding. This is of concern for cellulitis with an abscess. The prior study demonstrates extensive subcutaneous fat stranding in the left perineum, medial gluteal cleft with a 3.6 x 2.9 cm ill-defined subcutaneous soft tissue density in the left perianal region.   Impression:   1. There is an  incompletely imaged 1.1 x 3 x 3 (AP x T x CC) ill-defined peripherally enhancing fluid collection with gas locules in the left perineum (series 2, image 85). There is surrounding fat stranding. This is of concern for cellulitis with an abscess. The prior study demonstrates extensive subcutaneous fat stranding in the left perineum, medial gluteal cleft with a 3.6 x 2.9 cm ill-defined subcutaneous soft tissue density in the left perianal region. Correlate clinically as regards further evaluation and followup with CT pelvis.   2. The gastric antral wall seems thick raising concern for gastritis.   3. Details and other findings as discussed above. No significant discrepancy with overnight report.   Electronically signed by: Garland Bowie Date:    07/17/2024 Time:    07:57    X-Ray Chest 1 View  Result Date: 6/22/2024  EXAMINATION: XR CHEST 1 VIEW CLINICAL HISTORY: leukocytosis workup; COMPARISON: 18 June 2024 FINDINGS: Frontal view of the chest was obtained. Stable right-sided catheter.  The heart is not significantly enlarged.  Similar right hilar prominence and right basilar opacities.  No pneumothorax.   Impression:   Similar appearance of the chest.   Electronically signed by: Issa Luna Date:    06/22/2024 Time:    13:08

## (undated) DEVICE — KIT SURGICAL TURNOVER

## (undated) DEVICE — TRAY SKIN SCRUB WET PREMIUM

## (undated) DEVICE — KIT IRR SUCTION HND PIECE

## (undated) DEVICE — GLOVE PROTEXIS HYDROGEL SZ7.5

## (undated) DEVICE — ELECTRODE PATIENT RETURN DISP

## (undated) DEVICE — TRAY BASIN PLACENTA LAFAYETTE

## (undated) DEVICE — DRESSING TELFA N ADH 3X8

## (undated) DEVICE — SUT COATED VICRYL 3-0 1X27

## (undated) DEVICE — PAD ABDOMINAL STERILE 8X10IN

## (undated) DEVICE — TRAY CATH FOL SIL URIMTR 16FR

## (undated) DEVICE — DRAPE FLUID WARMER ORS 44X44IN

## (undated) DEVICE — BANDAGE BULKEE LITE 3INX4.1YD

## (undated) DEVICE — BANDAGE GAUZE COT STRL 4.5X4.1

## (undated) DEVICE — CONTAINER SPECIMEN SCREW 4OZ

## (undated) DEVICE — SOL NACL IRR 1000ML BTL

## (undated) DEVICE — Device